# Patient Record
Sex: FEMALE | Race: WHITE | NOT HISPANIC OR LATINO | Employment: STUDENT | URBAN - METROPOLITAN AREA
[De-identification: names, ages, dates, MRNs, and addresses within clinical notes are randomized per-mention and may not be internally consistent; named-entity substitution may affect disease eponyms.]

---

## 2017-01-17 ENCOUNTER — MYC MEDICAL ADVICE (OUTPATIENT)
Dept: FAMILY MEDICINE | Facility: CLINIC | Age: 37
End: 2017-01-17

## 2017-01-17 ENCOUNTER — TELEPHONE (OUTPATIENT)
Dept: FAMILY MEDICINE | Facility: CLINIC | Age: 37
End: 2017-01-17

## 2017-01-17 DIAGNOSIS — E06.3 HASHIMOTO'S THYROIDITIS: ICD-10-CM

## 2017-01-17 DIAGNOSIS — E66.01 MORBID OBESITY, UNSPECIFIED OBESITY TYPE (H): ICD-10-CM

## 2017-01-17 DIAGNOSIS — Z98.84 S/P LAPAROSCOPIC SLEEVE GASTRECTOMY: ICD-10-CM

## 2017-01-17 RX ORDER — LEVOTHYROXINE SODIUM 137 UG/1
137 TABLET ORAL DAILY
COMMUNITY
Start: 2017-01-17 | End: 2017-01-31

## 2017-01-17 NOTE — TELEPHONE ENCOUNTER
Patient informed.  States she has been taking 137mcg daily; Rx'd by an outside provider she was seeing.  Updated med list and scheduled lab only for tomorrow 1/18.  Aria DEL TORO RN

## 2017-01-17 NOTE — TELEPHONE ENCOUNTER
Reason for Call: lab order    Detailed comments: pt has an appt on 1/31/17 w dr Callaway  For physical and thyroid check'  Pt is requesting that her thyroid labs are ordered now so she can  Get it checked before she sees dr Callaway  Please call pt back and advise    Phone Number Patient can be reached at: Home number on file [phone] (home)    Best Time:     Can we leave a detailed message on this number? YES    Call taken on 1/17/2017 at 12:20 PM by Claudia Mitchell

## 2017-01-18 DIAGNOSIS — E06.3 HASHIMOTO'S THYROIDITIS: ICD-10-CM

## 2017-01-18 DIAGNOSIS — E66.01 MORBID OBESITY, UNSPECIFIED OBESITY TYPE (H): ICD-10-CM

## 2017-01-18 DIAGNOSIS — Z98.84 S/P LAPAROSCOPIC SLEEVE GASTRECTOMY: ICD-10-CM

## 2017-01-18 LAB
ALBUMIN SERPL-MCNC: 3.5 G/DL (ref 3.4–5)
ALP SERPL-CCNC: 57 U/L (ref 40–150)
ALT SERPL W P-5'-P-CCNC: 15 U/L (ref 0–50)
ANION GAP SERPL CALCULATED.3IONS-SCNC: 8 MMOL/L (ref 3–14)
AST SERPL W P-5'-P-CCNC: 11 U/L (ref 0–45)
BILIRUB SERPL-MCNC: 0.4 MG/DL (ref 0.2–1.3)
BUN SERPL-MCNC: 11 MG/DL (ref 7–30)
CALCIUM SERPL-MCNC: 8.8 MG/DL (ref 8.5–10.1)
CHLORIDE SERPL-SCNC: 108 MMOL/L (ref 94–109)
CHOLEST SERPL-MCNC: 235 MG/DL
CO2 SERPL-SCNC: 24 MMOL/L (ref 20–32)
CREAT SERPL-MCNC: 0.68 MG/DL (ref 0.52–1.04)
FERRITIN SERPL-MCNC: 5 NG/ML (ref 12–150)
GFR SERPL CREATININE-BSD FRML MDRD: NORMAL ML/MIN/1.7M2
GLUCOSE SERPL-MCNC: 83 MG/DL (ref 70–99)
HDLC SERPL-MCNC: 54 MG/DL
HGB BLD-MCNC: 9.8 G/DL (ref 11.7–15.7)
LDLC SERPL CALC-MCNC: 160 MG/DL
NONHDLC SERPL-MCNC: 181 MG/DL
POTASSIUM SERPL-SCNC: 3.8 MMOL/L (ref 3.4–5.3)
PROT SERPL-MCNC: 7.1 G/DL (ref 6.8–8.8)
SODIUM SERPL-SCNC: 140 MMOL/L (ref 133–144)
TRIGL SERPL-MCNC: 107 MG/DL
TSH SERPL DL<=0.005 MIU/L-ACNC: 1.95 MU/L (ref 0.4–4)
VIT B12 SERPL-MCNC: 680 PG/ML (ref 193–986)

## 2017-01-18 PROCEDURE — 36415 COLL VENOUS BLD VENIPUNCTURE: CPT | Performed by: FAMILY MEDICINE

## 2017-01-18 PROCEDURE — 82607 VITAMIN B-12: CPT | Performed by: FAMILY MEDICINE

## 2017-01-18 PROCEDURE — 82728 ASSAY OF FERRITIN: CPT | Performed by: FAMILY MEDICINE

## 2017-01-18 PROCEDURE — 84443 ASSAY THYROID STIM HORMONE: CPT | Performed by: FAMILY MEDICINE

## 2017-01-18 PROCEDURE — 80053 COMPREHEN METABOLIC PANEL: CPT | Performed by: FAMILY MEDICINE

## 2017-01-18 PROCEDURE — 85018 HEMOGLOBIN: CPT | Performed by: FAMILY MEDICINE

## 2017-01-18 PROCEDURE — 80061 LIPID PANEL: CPT | Performed by: FAMILY MEDICINE

## 2017-01-19 NOTE — PROGRESS NOTES
Quick Note:    Jose Martin Slaughter,   Labs look good except for your hemoglobin and iron. You are a little more anemic. I suspect you are feeling pretty tired and maybe even short of breath when you exert yourself. Remind me how much iron you are taking?  -PN    ______

## 2017-01-23 ENCOUNTER — TRANSFERRED RECORDS (OUTPATIENT)
Dept: HEALTH INFORMATION MANAGEMENT | Facility: CLINIC | Age: 37
End: 2017-01-23

## 2017-01-31 ENCOUNTER — OFFICE VISIT (OUTPATIENT)
Dept: FAMILY MEDICINE | Facility: CLINIC | Age: 37
End: 2017-01-31
Payer: COMMERCIAL

## 2017-01-31 VITALS
HEART RATE: 90 BPM | HEIGHT: 66 IN | SYSTOLIC BLOOD PRESSURE: 128 MMHG | WEIGHT: 192.3 LBS | DIASTOLIC BLOOD PRESSURE: 80 MMHG | OXYGEN SATURATION: 99 % | RESPIRATION RATE: 16 BRPM | TEMPERATURE: 98.4 F | BODY MASS INDEX: 30.91 KG/M2

## 2017-01-31 DIAGNOSIS — E06.3 HASHIMOTO'S THYROIDITIS: ICD-10-CM

## 2017-01-31 DIAGNOSIS — Z00.00 ENCOUNTER FOR ROUTINE ADULT HEALTH EXAMINATION WITHOUT ABNORMAL FINDINGS: Primary | ICD-10-CM

## 2017-01-31 DIAGNOSIS — G43.109 MIGRAINE WITH AURA AND WITHOUT STATUS MIGRAINOSUS, NOT INTRACTABLE: ICD-10-CM

## 2017-01-31 DIAGNOSIS — L72.3 SEBACEOUS CYST: ICD-10-CM

## 2017-01-31 DIAGNOSIS — B36.0 TINEA VERSICOLOR: ICD-10-CM

## 2017-01-31 DIAGNOSIS — D50.8 IRON DEFICIENCY ANEMIA SECONDARY TO INADEQUATE DIETARY IRON INTAKE: ICD-10-CM

## 2017-01-31 PROCEDURE — 99395 PREV VISIT EST AGE 18-39: CPT | Performed by: FAMILY MEDICINE

## 2017-01-31 RX ORDER — FLUCONAZOLE 150 MG/1
TABLET ORAL
Qty: 4 TABLET | Refills: 0 | Status: SHIPPED | OUTPATIENT
Start: 2017-01-31 | End: 2017-07-12

## 2017-01-31 RX ORDER — LEVOTHYROXINE SODIUM 137 UG/1
137 TABLET ORAL DAILY
Qty: 90 TABLET | Refills: 3 | Status: SHIPPED | OUTPATIENT
Start: 2017-01-31 | End: 2017-07-18

## 2017-01-31 NOTE — PROGRESS NOTES
SUBJECTIVE:     CC: Kasandra Winkler is an 36 year old woman who presents for preventive health visit.     Healthy Habits:    Do you get at least three servings of calcium containing foods daily (dairy, green leafy vegetables, etc.)? yes    Amount of exercise or daily activities, outside of work: 2 day(s) per week    Problems taking medications regularly Yes sometimes forgets    Medication side effects: No    Have you had an eye exam in the past two years? no    Do you see a dentist twice per year? yes    Do you have sleep apnea, excessive snoring or daytime drowsiness?feels tired frequently          Today's PHQ-2 Score:   PHQ-2 ( 1999 Pf[employer]) 1/31/2017 9/30/2015   Q1: Little interest or pleasure in doing things 0 1   Q2: Feeling down, depressed or hopeless 0 0   PHQ-2 Score 0 1       Abuse: Current or Past(Physical, Sexual or Emotional)- No  Do you feel safe in your environment - Yes    Social History   Substance Use Topics     Smoking status: Former Smoker -- 0.50 packs/day for 10 years     Quit date: 07/20/2009     Smokeless tobacco: Never Used      Comment: social smoker for 10 yr     Alcohol Use: 0.0 oz/week     0 Standard drinks or equivalent per week      Comment: occasional ETOH use     The patient does not drink >3 drinks per day nor >7 drinks per week.    Recent Labs   Lab Test  01/18/17   0817  09/29/15   1134  03/06/15   0902   CHOL  235*  226*  211*   HDL  54  44*  49*   LDL  160*  149*  135*   TRIG  107  162*  134   CHOLHDLRATIO   --   5.1*  4.3   NHDL  181*   --    --        Reviewed orders with patient.  Reviewed health maintenance and updated orders accordingly - Yes    Mammo Decision Support:  Mammogram not appropriate for this patient based on age.    Pertinent mammograms are reviewed under the imaging tab.  History of abnormal Pap smear: NO - age 30-65 PAP every 5 years with negative HPV co-testing recommended  All Histories reviewed and updated in Epic.      ROS:  C: NEGATIVE for fever,  "chills, change in weight  INTEGUMENTARY/SKIN: hypopigmented spots on back  E: NEGATIVE for vision changes or irritation  ENT: NEGATIVE for ear, mouth and throat problems  R: NEGATIVE for significant cough or SOB  B: NEGATIVE for masses, tenderness or discharge  CV: NEGATIVE for chest pain, palpitations or peripheral edema  GI: NEGATIVE for nausea, abdominal pain, heartburn, or change in bowel habits  : NEGATIVE for unusual urinary or vaginal symptoms. Periods are regular.  M: NEGATIVE for significant arthralgias or myalgia  N: NEGATIVE for weakness, dizziness or paresthesias  P: NEGATIVE for changes in mood or affect    Problem list, Medication list, Allergies, and Medical/Social/Surgical histories reviewed in [facility] and updated as appropriate.  OBJECTIVE:     /80 mmHg  Pulse 90  Temp(Src) 98.4  F (36.9  C) (Oral)  Resp 16  Ht 5' 6\" (1.676 m)  Wt 192 lb 4.8 oz (87.227 kg)  BMI 31.05 kg/m2  SpO2 99%  LMP 01/26/2017 (Approximate)  Breastfeeding? No  EXAM:  GENERAL: healthy, alert and no distress  EYES: Eyes grossly normal to inspection, PERRL and conjunctivae and sclerae normal  HENT: ear canals and TM's normal, nose and mouth without ulcers or lesions  NECK: no adenopathy, no asymmetry, masses, or scars and thyroid normal to palpation  RESP: lungs clear to auscultation - no rales, rhonchi or wheezes  BREAST: normal without masses, tenderness or nipple discharge and no palpable axillary masses or adenopathy  CV: regular rate and rhythm, normal S1 S2, no S3 or S4, no murmur, click or rub, no peripheral edema and peripheral pulses strong  ABDOMEN: soft, nontender, no hepatosplenomegaly, no masses and bowel sounds normal  MS: no gross musculoskeletal defects noted, no edema  SKIN: hypopigmentation - upper back  NEURO: Normal strength and tone, mentation intact and speech normal  PSYCH: mentation appears normal, affect normal/bright    ASSESSMENT/PLAN:     1. Encounter for routine adult health examination " "without abnormal findings  Routine screening    2. Iron deficiency anemia secondary to inadequate dietary iron intake  Reviewed her recent labs  Has hx of anemia - secondary to her iron def from gastrectomy surgery  discusesd increasing to BID dosing and will plan to check labs in one month   - CBC with platelets; Future  - Ferritin; Future  - Iron and iron binding capacity; Future    3. Migraine with aura and without status migrainosus, not intractable   worked with neurology last year and diagnosed  meds not helping  Discussed using magnesium daily     4. Sebaceous cyst  On head - recommended removal with Dr. Lim     5. Hashimoto's thyroiditis  ordered  - levothyroxine (SYNTHROID/LEVOTHROID) 137 MCG tablet; Take 1 tablet (137 mcg) by mouth daily  Dispense: 90 tablet; Refill: 3    6. Tinea versicolor   tinea versicolor on exam today   - fluconazole (DIFLUCAN) 150 MG tablet; Take 300mg once weekly for 2 weeks  Dispense: 4 tablet; Refill: 0    COUNSELING:   Reviewed preventive health counseling, as reflected in patient instructions    BP Screening:   Last 3 BP Readings:    BP Readings from Last 3 Encounters:   01/31/17 128/80   11/14/16 130/50   09/23/16 122/55       The following was recommended to the patient:  Re-screen BP within a year and recommended lifestyle modifications       reports that she quit smoking about 7 years ago. She has never used smokeless tobacco.    Estimated body mass index is 31.05 kg/(m^2) as calculated from the following:    Height as of this encounter: 5' 6\" (1.676 m).    Weight as of this encounter: 192 lb 4.8 oz (87.227 kg).   Weight management plan: Discussed healthy diet and exercise guidelines and patient will follow up in 12 months in clinic to re-evaluate.    Counseling Resources:  ATP IV Guidelines  Pooled Cohorts Equation Calculator  Breast Cancer Risk Calculator  FRAX Risk Assessment  ICSI Preventive Guidelines  Dietary Guidelines for Americans, 2010  USDA's MyPlate  ASA " Prophylaxis  Lung CA Screening    Trish Callaway, [facility]

## 2017-01-31 NOTE — PATIENT INSTRUCTIONS
For migraine prevention -   Take magnesium 200-400mg daily  Verify B1 and B2 are in the multivitamin    Preventive Health Recommendations  Female Ages 26 - 39  Yearly exam:   See your health care provider every year in order to    Review health changes.     Discuss preventive care.      Review your medicines if you your doctor has prescribed any.    Until age 30: Get a Pap test every three years (more often if you have had an abnormal result).    After age 30: Talk to your doctor about whether you should have a Pap test every 3 years or have a Pap test with HPV screening every 5 years.   You do not need a Pap test if your uterus was removed (hysterectomy) and you have not had cancer.  You should be tested each year for STDs (sexually transmitted diseases), if you're at risk.   Talk to your provider about how often to have your cholesterol checked.  If you are at risk for diabetes, you should have a diabetes test (fasting glucose).  Shots: Get a flu shot each year. Get a tetanus shot every 10 years.   Nutrition:     Eat at least 5 servings of fruits and vegetables each day.    Eat whole-grain bread, whole-wheat pasta and brown rice instead of white grains and rice.    Talk to your provider about Calcium and Vitamin D.     Lifestyle    Exercise at least 150 minutes a week (30 minutes a day, 5 days of the week). This will help you control your weight and prevent disease.    Limit alcohol to one drink per day.    No smoking.     Wear sunscreen to prevent skin cancer.    See your dentist every six months for an exam and cleaning.

## 2017-01-31 NOTE — NURSING NOTE
"Chief Complaint   Patient presents with     Physical     /80 mmHg  Pulse 90  Temp(Src) 98.4  F (36.9  C) (Oral)  Resp 16  Ht 5' 6\" (1.676 m)  Wt 192 lb 4.8 oz (87.227 kg)  BMI 31.05 kg/m2  SpO2 99%  LMP 01/26/2017 (Approximate)  Breastfeeding? No Estimated body mass index is 31.05 kg/(m^2) as calculated from the following:    Height as of this encounter: 5' 6\" (1.676 m).    Weight as of this encounter: 192 lb 4.8 oz (87.227 kg).  bp completed using cuff size: regular    left    Health Maintenance Due Pending Provider Review:  Pap Smear and PHQ9    Possibly completing today per provider review.  PHQ-9 completed today.    Kylah Bowden MA  [facility]    "

## 2017-01-31 NOTE — MR AVS SNAPSHOT
After Visit Summary   1/31/2017    Kasandra Winkler    MRN: [id number]           Patient Information     Date Of Birth          1980        Visit Information        Provider Department      1/31/2017 1:30 PM Trish Callaway DO [facility]        Today's Diagnoses     Encounter for routine adult health examination without abnormal findings    -  1     Iron deficiency anemia secondary to inadequate dietary iron intake         Migraine with aura and without status migrainosus, not intractable         Sebaceous cyst         Hashimoto's thyroiditis         Tinea versicolor           Care Instructions    For migraine prevention -   Take magnesium 200-400mg daily  Verify B1 and B2 are in the multivitamin    Preventive Health Recommendations  Female Ages 26 - 39  Yearly exam:   See your health care provider every year in order to    Review health changes.     Discuss preventive care.      Review your medicines if you your doctor has prescribed any.    Until age 30: Get a Pap test every three years (more often if you have had an abnormal result).    After age 30: Talk to your doctor about whether you should have a Pap test every 3 years or have a Pap test with HPV screening every 5 years.   You do not need a Pap test if your uterus was removed (hysterectomy) and you have not had cancer.  You should be tested each year for STDs (sexually transmitted diseases), if you're at risk.   Talk to your provider about how often to have your cholesterol checked.  If you are at risk for diabetes, you should have a diabetes test (fasting glucose).  Shots: Get a flu shot each year. Get a tetanus shot every 10 years.   Nutrition:     Eat at least 5 servings of fruits and vegetables each day.    Eat whole-grain bread, whole-wheat pasta and brown rice instead of white grains and rice.    Talk to your provider about Calcium and Vitamin D.     Lifestyle    Exercise at least 150 minutes a week (30 minutes a day, 5  days of the week). This will help you control your weight and prevent disease.    Limit alcohol to one drink per day.    No smoking.     Wear sunscreen to prevent skin cancer.    See your dentist every six months for an exam and cleaning.            Follow-ups after your visit        Future tests that were ordered for you today     Open Future Orders        Priority Expected Expires Ordered    CBC with platelets Routine  5/31/2017 1/31/2017    Ferritin Routine  1/31/2018 1/31/2017    Iron and iron binding capacity Routine  5/31/2017 1/31/2017            Who to contact     If you have questions or need follow up information about today's clinic visit or your schedule please contact [facility] directly at [phone].  Normal or non-critical lab and imaging results will be communicated to you by [employer], letter or phone within 4 business days after the clinic has received the results. If you do not hear from us within 7 days, please contact the clinic through Get Int or phone. If you have a critical or abnormal lab result, we will notify you by phone as soon as possible.  Submit refill requests through [employer] or call your pharmacy and they will forward the refill request to us. Please allow 3 business days for your refill to be completed.          Additional Information About Your Visit        [employer] Information     [employer] gives you secure access to your electronic health record. If you see a primary care provider, you can also send messages to your care team and make appointments. If you have questions, please call your primary care clinic.  If you do not have a primary care provider, please call [phone] and they will assist you.        Care EveryWhere ID     This is your Care EveryWhere ID. This could be used by other organizations to access your [facility] medical records  [id number]        Your Vitals Were     Pulse Temperature Respirations    90 98.4  F (36.9  C) (Oral) 16    Height BMI (Body  "Mass Index) Pulse Oximetry    5' 6\" (1.676 m) 31.05 kg/m2 99%    Last Period Breastfeeding?       01/26/2017 (Approximate) No        Blood Pressure from Last 3 Encounters:   01/31/17 128/80   11/14/16 130/50   09/23/16 122/55    Weight from Last 3 Encounters:   01/31/17 192 lb 4.8 oz (87.227 kg)   09/23/16 193 lb 4.8 oz (87.68 kg)   08/22/16 196 lb (88.905 kg)                 Today's Medication Changes          These changes are accurate as of: 1/31/17  2:10 PM.  If you have any questions, ask your nurse or doctor.               Start taking these medicines.        Dose/Directions    fluconazole 150 MG tablet   Commonly known as:  DIFLUCAN   Used for:  Tinea versicolor   Started by:  Trish Callaway, DO        Take 300mg once weekly for 2 weeks   Quantity:  4 tablet   Refills:  0            Where to get your medicines      These medications were sent to [facility] [address] [place] [address] AT [address] [address]    Hours:  24-hours Phone:  [phone]    - fluconazole 150 MG tablet  - levothyroxine 137 MCG tablet             Primary Care Provider Office Phone # Fax #    Trish Callaway [phone]       [address] [address]        Thank you!     Thank you for choosing [facility]  for your care. Our goal is always to provide you with excellent care. Hearing back from our patients is one way we can continue to improve our services. Please take a few minutes to complete the written survey that you may receive in the mail after your visit with us. Thank you!             Your Updated Medication List - Protect others around you: Learn how to safely use, store and throw away your medicines at www.disposemymeds.org.          This list is accurate as of: 1/31/17  2:10 PM.  Always use your most recent med list.                   Brand Name Dispense Instructions for " use    azelastine 0.1 % spray    ASTELIN    1 Bottle    Spray 1-2 sprays into both nostrils 2 times daily       beclomethasone 80 MCG/ACT Inhaler    QVAR    3 Inhaler    Inhale 2 puffs into the lungs 2 times daily       calcium carbonate 500 MG tablet    OS-JONATAN 500 mg [place]. Ca     Take 500 mg by mouth 2 times daily       cetirizine 10 MG tablet    zyrTEC     Take 10 mg by mouth daily       clonazePAM 0.5 MG tablet    klonoPIN    20 tablet    Take 1 tablet (0.5 mg) by mouth as needed for anxiety       Cod Liver Oil 1000 MG Caps      Take 1 capsule by mouth daily.       fluconazole 150 MG tablet    DIFLUCAN    4 tablet    Take 300mg once weekly for 2 weeks       fluticasone 50 MCG/ACT spray    FLONASE    1 Package    Spray 1-2 sprays into both nostrils daily.       HM MULTIVITAMIN ADULT GUMMY PO          levothyroxine 137 MCG tablet    SYNTHROID/LEVOTHROID    90 tablet    Take 1 tablet (137 mcg) by mouth daily       Vitamin B-12 500 MCG Subl      Place 500 mcg under the tongue daily       Vitamin D3 3000 UNITS Tabs      Take 1 tablet by mouth daily.

## 2017-02-01 ASSESSMENT — PATIENT HEALTH QUESTIONNAIRE - PHQ9: SUM OF ALL RESPONSES TO PHQ QUESTIONS 1-9: 5

## 2017-02-02 DIAGNOSIS — J30.1 ALLERGIC RHINITIS DUE TO POLLEN: Primary | ICD-10-CM

## 2017-02-03 RX ORDER — AZELASTINE 1 MG/ML
SPRAY, METERED NASAL
Qty: 30 ML | Refills: 5 | Status: SHIPPED | OUTPATIENT
Start: 2017-02-03 | End: 2017-12-20

## 2017-02-03 NOTE — TELEPHONE ENCOUNTER
Prescription approved per [facility] Refill Protocol.  Aria DEL TORO RN     Pending Prescriptions:                       Disp   Refills    azelastine (ASTELIN) 0.1 % spray [Pharmacy*30 mL  0        Sig: USE 1 TO 2 SPRAYS IN EACH NOSTRIL TWICE DAILY        Last Written Prescription Date: 4/26/2016  Last Fill Quantity: 1,  # refills: 1   Last Office Visit with [facility], [facility] or [facility] prescribing provider: 1/31/2017

## 2017-02-24 ENCOUNTER — VIRTUAL VISIT (OUTPATIENT)
Dept: FAMILY MEDICINE | Facility: OTHER | Age: 37
End: 2017-02-24

## 2017-02-24 NOTE — PROGRESS NOTES
Date:   Clinician: Vince Vizcaino  Clinician NPI: [id number]  Patient: Kasandra Winkler  Patient : 1980  Patient Address: [address] [place] [id number]  Patient Phone: [phone]  Visit Protocol: URI  Patient Summary:  Kasandra is a 36 year old ( : 1980 ) female who initiated a Zip for a presumed sinus infection.      Her  symptoms started gradually 2 weeks ago  and consist of rhinitis, ear pain, cough, post-nasal drainage, hoarse voice, sore throat, nasal congestion, and malaise.   She denies dyspnea, chills, loss of appetite, itchy eyes, vomiting, nausea, dysphagia, chest pain, petechial or purpuric rash, myalgias, and fever. She denies a history of facial surgery.   Her profuse nasal secretions are green. Her moderate facial pain or pressure does not feel worse when bending or leaning forward and is located on both sides of her head. The facial pain or pressure started after the onset of other URI symptoms.  She has teeth pain and is confident the tooth pain is not from a cavity, recent dental work or other mouth problems. Kasandra has a moderate headache. The headache did not start before her other symptoms and is located on both sides of her head.   In the past year Kasandra has been diagnosed with three (3) episodes of sinusitis.   She has a moderately painful sore throat. The patient denies having white spots on the tonsils similar to a sample strep throat image provided. She has not been exposed to Strep. When asked to feel her neck she could not tell if lymph nodes were enlarged. She denies axillary lymphadenopathy.   Regarding the ear pain, the patient denies tinnitus, recent injury to the area around the ear, and experiencing pain when gently pulling on the earlobe.   She reports having mild ear pain on the ear canal area of both ears for 2-4 days. The patient hears normally despite the ear pain.   Kasandra denies having swelling, redness, a feeling of fullness in the ear as  if it is clogged, and tenderness on her ear.   Additionally, she finds the pain worsens if the mouth is open fully or the teeth are clenched and does not experience pain when bending the chin to the chest.   She has never had tympanostomy tube placement.    Her moderately severe (cough every 5-10 minutes) productive cough is NOT more bothersome at night. She believes the cough is caused by post-nasal drainage. Her cough produces green sputum.   Her highest temperature was 99.9 degrees Fahrenheit. Her current temperature is 98.9 degrees Fahrenheit. Kasandra has had a fever for 1 - 2 days and used the temporal method for measuring her temperature.   She has passed urine in the past 12 hours.   Kasandra denies having COPD or other chronic lung disease.   Pulse: self-reported pulse rate as: 10 beats in 10 seconds.   Current Temperature (F): 98.9     Weight (in lbs): 195   She states she is not pregnant and denies breastfeeding. She is currently menstruating.   Kasandra does not smoke or use smokeless tobacco.   MEDICATIONS:  Fluticasone (Flonase), cetirizine (Zyrtec), and thyroid  , ALLERGIES:  NKDA   Clinician Response:  Dear Kasandra,  Based on the information you have provided, you likely have  acute sinusitis, otherwise known as a sinus infection.   I am prescribing fluticasone propionate nasal (Flonase) 50 mcg/spray. Take one or two inhalations in each nostril one time a day. There are no refills with this prescription.   I am prescribing amoxicillin 500 mg. Take two tablets by mouth three times a day for 10 days. There are no refills with this prescription.   Try the following to help with your throat pain and discomfort:     Use throat lozenges    Gargle with warm salt water (1/4 teaspoon of salt per 8 ounce glass of water).    Suck on frozen items such as Popsicles or ice cubes.     Call 911 or go to the emergency room if you feel that your throat is closing off, you suddenly develop a rash, you are unable to swallow  fluids, you are drooling, or you are having difficulty breathing.  Follow up with your primary care clinician if your symptoms are not improving in 3-4 days.   Drink plenty of liquids, especially water and take time to rest your body. This may mean taking a nap or going to bed earlier. Your body is fighting an infection and liquids and rest will improve the pace of recovery. Remember to regularly wash your hands and avoid close contact with others to prevent spreading your infection.   Some people develop allergies to antibiotics. If you notice a new rash, significant swelling or difficulty breathing, stop the medication immediately and go into a clinic for physical evaluation.   Some women may also develop a yeast infection as a side effect of taking antibiotics. If you notice symptoms of a yeast infection, please use Zipnosis to get treatment.   If you become pregnant during this course of treatment with medication, stop taking the medication and contact your primary care clinician.   Diagnosis: Acute Sinusitis  Diagnosis ICD: J01.90  Prescription: fluticasone propionate (Flonase) 50mcg nasal spray 16 gm, 30 days supply. Take one or two inhalations in each nostril one time a day. Refills: 0, Refill as needed: no, Allow substitutions: yes  Prescription: amoxicillin 500mg oral tablet 60 tablets, 10 days supply. Take two tablets by mouth three times a day for 10 days. Refills: 0, Refill as needed: no, Allow substitutions: yes  Prescription Sent At: February 24 15:40:30, 2017  Pharmacy: [id number] - [phone] - [address] [place] [address]

## 2017-03-10 DIAGNOSIS — D50.8 IRON DEFICIENCY ANEMIA SECONDARY TO INADEQUATE DIETARY IRON INTAKE: ICD-10-CM

## 2017-03-10 LAB
ERYTHROCYTE [DISTWIDTH] IN BLOOD BY AUTOMATED COUNT: 18.6 % (ref 10–15)
HCT VFR BLD AUTO: 34.9 % (ref 35–47)
HGB BLD-MCNC: 10.9 G/DL (ref 11.7–15.7)
MCH RBC QN AUTO: 25.1 PG (ref 26.5–33)
MCHC RBC AUTO-ENTMCNC: 31.2 G/DL (ref 31.5–36.5)
MCV RBC AUTO: 80 FL (ref 78–100)
PLATELET # BLD AUTO: 296 10E9/L (ref 150–450)
RBC # BLD AUTO: 4.35 10E12/L (ref 3.8–5.2)
WBC # BLD AUTO: 8.4 10E9/L (ref 4–11)

## 2017-03-10 PROCEDURE — 82728 ASSAY OF FERRITIN: CPT | Performed by: FAMILY MEDICINE

## 2017-03-10 PROCEDURE — 85027 COMPLETE CBC AUTOMATED: CPT | Performed by: FAMILY MEDICINE

## 2017-03-10 PROCEDURE — 36415 COLL VENOUS BLD VENIPUNCTURE: CPT | Performed by: FAMILY MEDICINE

## 2017-03-10 PROCEDURE — 83540 ASSAY OF IRON: CPT | Performed by: FAMILY MEDICINE

## 2017-03-10 PROCEDURE — 83550 IRON BINDING TEST: CPT | Performed by: FAMILY MEDICINE

## 2017-03-11 LAB
FERRITIN SERPL-MCNC: 7 NG/ML (ref 12–150)
IRON SATN MFR SERPL: 6 % (ref 15–46)
IRON SERPL-MCNC: 24 UG/DL (ref 35–180)
TIBC SERPL-MCNC: 407 UG/DL (ref 240–430)

## 2017-03-13 ENCOUNTER — MYC MEDICAL ADVICE (OUTPATIENT)
Dept: FAMILY MEDICINE | Facility: CLINIC | Age: 37
End: 2017-03-13

## 2017-03-13 DIAGNOSIS — D50.8 IRON DEFICIENCY ANEMIA SECONDARY TO INADEQUATE DIETARY IRON INTAKE: Primary | ICD-10-CM

## 2017-03-14 NOTE — PROGRESS NOTES
Dear Kasandra,   Your test results are all back -   Looks like you are still anemic.  I agree with switching the iron.  Plan to recheck in 6 weeks.  Let us know if you have any questions.  -Trish Callaway, DO

## 2017-07-12 ENCOUNTER — OFFICE VISIT (OUTPATIENT)
Dept: FAMILY MEDICINE | Facility: CLINIC | Age: 37
End: 2017-07-12
Payer: COMMERCIAL

## 2017-07-12 VITALS
BODY MASS INDEX: 31.45 KG/M2 | DIASTOLIC BLOOD PRESSURE: 78 MMHG | WEIGHT: 195.7 LBS | TEMPERATURE: 98.2 F | SYSTOLIC BLOOD PRESSURE: 114 MMHG | HEIGHT: 66 IN | OXYGEN SATURATION: 99 % | HEART RATE: 96 BPM

## 2017-07-12 DIAGNOSIS — J01.90 ACUTE SINUSITIS WITH SYMPTOMS > 10 DAYS: Primary | ICD-10-CM

## 2017-07-12 DIAGNOSIS — E06.3 HASHIMOTO'S THYROIDITIS: ICD-10-CM

## 2017-07-12 DIAGNOSIS — D50.8 IRON DEFICIENCY ANEMIA SECONDARY TO INADEQUATE DIETARY IRON INTAKE: ICD-10-CM

## 2017-07-12 LAB
ERYTHROCYTE [DISTWIDTH] IN BLOOD BY AUTOMATED COUNT: 15 % (ref 10–15)
HCT VFR BLD AUTO: 34.7 % (ref 35–47)
HGB BLD-MCNC: 10.9 G/DL (ref 11.7–15.7)
MCH RBC QN AUTO: 25.8 PG (ref 26.5–33)
MCHC RBC AUTO-ENTMCNC: 31.4 G/DL (ref 31.5–36.5)
MCV RBC AUTO: 82 FL (ref 78–100)
PLATELET # BLD AUTO: 350 10E9/L (ref 150–450)
RBC # BLD AUTO: 4.23 10E12/L (ref 3.8–5.2)
WBC # BLD AUTO: 5.6 10E9/L (ref 4–11)

## 2017-07-12 PROCEDURE — 36415 COLL VENOUS BLD VENIPUNCTURE: CPT | Performed by: FAMILY MEDICINE

## 2017-07-12 PROCEDURE — 84443 ASSAY THYROID STIM HORMONE: CPT | Performed by: FAMILY MEDICINE

## 2017-07-12 PROCEDURE — 82728 ASSAY OF FERRITIN: CPT | Performed by: FAMILY MEDICINE

## 2017-07-12 PROCEDURE — 85027 COMPLETE CBC AUTOMATED: CPT | Performed by: FAMILY MEDICINE

## 2017-07-12 PROCEDURE — 99213 OFFICE O/P EST LOW 20 MIN: CPT | Performed by: FAMILY MEDICINE

## 2017-07-12 PROCEDURE — 83550 IRON BINDING TEST: CPT | Performed by: FAMILY MEDICINE

## 2017-07-12 PROCEDURE — 83540 ASSAY OF IRON: CPT | Performed by: FAMILY MEDICINE

## 2017-07-12 NOTE — NURSING NOTE
"Chief Complaint   Patient presents with     URI     x 2 weeks     /78  Pulse 96  Temp 98.2  F (36.8  C) (Oral)  Ht 5' 6\" (1.676 m)  Wt 195 lb 11.2 oz (88.8 kg)  SpO2 99%  BMI 31.59 kg/m2 Estimated body mass index is 31.59 kg/(m^2) as calculated from the following:    Height as of this encounter: 5' 6\" (1.676 m).    Weight as of this encounter: 195 lb 11.2 oz (88.8 kg).  Medication Reconciliation: complete      Health Maintenance due pending provider review:  PHQ9    PHQ/ACT/FOREST--Gave pt questionnare    Dariana Tran CMA  "

## 2017-07-12 NOTE — PROGRESS NOTES
SUBJECTIVE:                                                    Kasandra Winkler is a 36 year old female who presents to clinic today for the following health issues:      RESPIRATORY SYMPTOMS      Duration: 2 weeks    Description  nasal congestion, cough and sneezing, with asthma issues, fatigue    Severity: moderate    Accompanying signs and symptoms: None    History (predisposing factors):  asthma    Precipitating or alleviating factors: None    Therapies tried and outcome:  Inhaler, flonase, zyrtec with some improvement      Last week went to [place] - had migraine that started on the plane  Feeling fatigue - not sure if thyroid or iron  Has mental foggy -       -------------------------------------    Problem list and histories reviewed & adjusted, as indicated.  Additional history: as documented    Patient Active Problem List   Diagnosis     CARDIOVASCULAR SCREENING; LDL GOAL LESS THAN 160     Hypothyroidism     Obesity     Hashimoto's thyroiditis     Hypovitaminosis D     Fatigue     Ovarian cyst     Major depressive disorder, recurrent episode, mild (H)     Asthma with allergic rhinitis     Morbid obesity (H)     laparoscopic sleeve gastrectomy 6/29/15     Anxiety     Allergic rhinitis due to pollen     Iron deficiency anemia secondary to inadequate dietary iron intake     Migraine with aura and without status migrainosus, not intractable     Past Surgical History:   Procedure Laterality Date      SECTION  10/10/10     CHOLECYSTECTOMY, LAPOROSCOPIC  2009    gallstones -      FINGER SURGERY      right little finger fracture     LAPAROSCOPIC GASTRIC SLEEVE N/A 2015    Procedure: LAPAROSCOPIC GASTRIC SLEEVE;  Surgeon: Cam Alvarez MD;  Location:  OR       Social History   Substance Use Topics     Smoking status: Former Smoker     Packs/day: 0.50     Years: 10.00     Quit date: 2009     Smokeless tobacco: Never Used      Comment: social smoker for 10 yr     Alcohol use 0.0  "oz/week     0 Standard drinks or equivalent per week      Comment: occasional ETOH use     Family History   Problem Relation Age of Onset     HEART DISEASE Maternal Grandmother      Hypertension Maternal Grandmother      Arthritis Maternal Grandmother      RA      OSTEOPOROSIS Mother      Respiratory Maternal Grandmother      Asthma     Hypertension Mother      CEREBROVASCULAR DISEASE Mother      Aneurysm     Substance Abuse Mother            Reviewed and updated as needed this visit by clinical staff  Tobacco  Allergies  Meds  Problems  Med Hx  Surg Hx  Fam Hx  Soc Hx        Reviewed and updated as needed this visit by Provider  Allergies  Meds  Problems         ROS:  Constitutional, HEENT, cardiovascular, pulmonary, GI, , musculoskeletal, neuro, skin, endocrine and psych systems are negative, except as otherwise noted.    OBJECTIVE:     /78  Pulse 96  Temp 98.2  F (36.8  C) (Oral)  Ht 5' 6\" (1.676 m)  Wt 195 lb 11.2 oz (88.8 kg)  SpO2 99%  BMI 31.59 kg/m2  Body mass index is 31.59 kg/(m^2).  GENERAL: alert and no distress  HENT: normal cephalic/atraumatic, ear canals and TM's normal, nose and mouth without ulcers or lesions, oropharynx clear, oral mucous membranes moist and sinuses: not tender at this moment but tender other parts of the day  NECK: no adenopathy, no asymmetry, masses, or scars and thyroid normal to palpation  RESP: lungs clear to auscultation - no rales, rhonchi or wheezes  CV: regular rate and rhythm, normal S1 S2, no S3 or S4, no murmur, click or rub, no peripheral edema and peripheral pulses strong    Diagnostic Test Results:  pending    ASSESSMENT/PLAN:     1. Hashimoto's thyroiditis  Thyroid issues - feeling fatigue - will check TSH  - TSH with free T4 reflex    2. Iron deficiency anemia secondary to inadequate dietary iron intake  Long hx of anemia - due to gastric surgery for obesity and absorption issues  - CBC with platelets  - Iron and iron binding capacity  - " Ferritin    3. Acute sinusitis with symptoms > 10 days  Will hold off on filling for now and wait for labs but if symptoms worsen or persist she will fill  - amoxicillin-clavulanate (AUGMENTIN) 875-125 MG per tablet; Take 1 tablet by mouth 2 times daily  Dispense: 20 tablet; Refill: 0    Pt will call or RTC if symptoms worsen or do not improve.     Trish Callaway,   [facility]

## 2017-07-12 NOTE — MR AVS SNAPSHOT
"              After Visit Summary   7/12/2017    Kasandra Winkler    MRN: [id number]           Patient Information     Date Of Birth          1980        Visit Information        Provider Department      7/12/2017 7:45 AM Trish Callaway DO [facility]        Today's Diagnoses     Acute sinusitis with symptoms > 10 days    -  1    Hashimoto's thyroiditis        Iron deficiency anemia secondary to inadequate dietary iron intake           Follow-ups after your visit        Who to contact     If you have questions or need follow up information about today's clinic visit or your schedule please contact [facility] directly at [phone].  Normal or non-critical lab and imaging results will be communicated to you by MyChart, letter or phone within 4 business days after the clinic has received the results. If you do not hear from us within 7 days, please contact the clinic through theAudiencehart or phone. If you have a critical or abnormal lab result, we will notify you by phone as soon as possible.  Submit refill requests through GLG or call your pharmacy and they will forward the refill request to us. Please allow 3 business days for your refill to be completed.          Additional Information About Your Visit        MyChart Information     GLG gives you secure access to your electronic health record. If you see a primary care provider, you can also send messages to your care team and make appointments. If you have questions, please call your primary care clinic.  If you do not have a primary care provider, please call [phone] and they will assist you.        Care EveryWhere ID     This is your Care EveryWhere ID. This could be used by other organizations to access your [place] medical records  [id number]        Your Vitals Were     Pulse Temperature Height Pulse Oximetry BMI (Body Mass Index)       96 98.2  F (36.8  C) (Oral) 5' 6\" (1.676 m) 99% 31.59 kg/m2        Blood Pressure " from Last 3 Encounters:   07/12/17 114/78   01/31/17 128/80   11/14/16 130/50    Weight from Last 3 Encounters:   07/12/17 195 lb 11.2 oz (88.8 kg)   01/31/17 192 lb 4.8 oz (87.2 kg)   09/23/16 193 lb 4.8 oz (87.7 kg)              We Performed the Following     CBC with platelets     Ferritin     Iron and iron binding capacity     TSH with free T4 reflex          Today's Medication Changes          These changes are accurate as of: 7/12/17  8:20 AM.  If you have any questions, ask your nurse or doctor.               Start taking these medicines.        Dose/Directions    amoxicillin-clavulanate 875-125 MG per tablet   Commonly known as:  AUGMENTIN   Used for:  Acute sinusitis with symptoms > 10 days   Started by:  Trish Callaway DO        Dose:  1 tablet   Take 1 tablet by mouth 2 times daily   Quantity:  20 tablet   Refills:  0            Where to get your medicines      Some of these will need a paper prescription and others can be bought over the counter.  Ask your nurse if you have questions.     Bring a paper prescription for each of these medications     amoxicillin-clavulanate 875-125 MG per tablet                Primary Care Provider Office Phone # Fax #    Trish Callaway [phone]       [place] [address]        Equal Access to Services     KRIS SILVA : Hadii chrissy ku hadasho Soomaali, waaxda luqadaha, qaybta kaalmada adeegyada, chris grimm. So [facility] [phone].    ATENCIÓN: Si habla español, tiene a das disposición servicios gratuitos de asistencia lingüística. Sravan dominguez [phone].    We comply with applicable federal civil rights laws and [place] laws. We do not discriminate on the basis of race, color, national origin, age, disability sex, sexual orientation or gender identity.            Thank you!     Thank you for choosing [facility]  for your care. Our goal is always to provide you  with excellent care. Hearing back from our patients is one way we can continue to improve our services. Please take a few minutes to complete the written survey that you may receive in the mail after your visit with us. Thank you!             Your Updated Medication List - Protect others around you: Learn how to safely use, store and throw away your medicines at www.disposemymeds.org.          This list is accurate as of: 7/12/17  8:20 AM.  Always use your most recent med list.                   Brand Name Dispense Instructions for use Diagnosis    amoxicillin-clavulanate 875-125 MG per tablet    AUGMENTIN    20 tablet    Take 1 tablet by mouth 2 times daily    Acute sinusitis with symptoms > 10 days       azelastine 0.1 % spray    ASTELIN    30 mL    USE 1 TO 2 SPRAYS IN EACH NOSTRIL TWICE DAILY    Allergic rhinitis due to pollen       beclomethasone 80 MCG/ACT Inhaler    QVAR    3 Inhaler    Inhale 2 puffs into the lungs 2 times daily    Rhinitis, allergic       calcium carbonate 1250 MG tablet    OS-JONATAN 500 mg [place]. Ca     Take 500 mg by mouth 2 times daily        cetirizine 10 MG tablet    zyrTEC     Take 10 mg by mouth daily        clonazePAM 0.5 MG tablet    klonoPIN    20 tablet    Take 1 tablet (0.5 mg) by mouth as needed for anxiety    Major depressive disorder, recurrent episode, mild (H)       Cod Liver Oil 1000 MG Caps      Take 1 capsule by mouth daily.        fluticasone 50 MCG/ACT spray    FLONASE    1 Package    Spray 1-2 sprays into both nostrils daily.    Seasonal allergic rhinitis       HM MULTIVITAMIN ADULT GUMMY PO           levothyroxine 137 MCG tablet    SYNTHROID/LEVOTHROID    90 tablet    Take 1 tablet (137 mcg) by mouth daily    Hashimoto's thyroiditis       Vitamin B-12 500 MCG Subl      Place 500 mcg under the tongue daily        Vitamin D3 3000 UNITS Tabs      Take 1 tablet by mouth daily.

## 2017-07-13 LAB
FERRITIN SERPL-MCNC: 7 NG/ML (ref 12–150)
IRON SATN MFR SERPL: 9 % (ref 15–46)
IRON SERPL-MCNC: 36 UG/DL (ref 35–180)
TIBC SERPL-MCNC: 413 UG/DL (ref 240–430)
TSH SERPL DL<=0.005 MIU/L-ACNC: 3.87 MU/L (ref 0.4–4)

## 2017-07-13 ASSESSMENT — PATIENT HEALTH QUESTIONNAIRE - PHQ9: SUM OF ALL RESPONSES TO PHQ QUESTIONS 1-9: 7

## 2017-07-14 NOTE — PROGRESS NOTES
Dear Kasandra,   Your test results are all back -   -Anemia test is stable - keep trying to remember the iron.   It is not changed since the last check so I doubt it the new change.  Your thyroid is in the high normal range.  We could consider a slight increase in your dose.  Let us know if you have any questions.  -Trish Callaway, DO

## 2017-07-18 ENCOUNTER — MYC MEDICAL ADVICE (OUTPATIENT)
Dept: FAMILY MEDICINE | Facility: CLINIC | Age: 37
End: 2017-07-18

## 2017-07-18 DIAGNOSIS — E06.3 HASHIMOTO'S THYROIDITIS: ICD-10-CM

## 2017-07-18 RX ORDER — LEVOTHYROXINE SODIUM 150 UG/1
137 TABLET ORAL DAILY
Qty: 30 TABLET | Refills: 1 | Status: SHIPPED | OUTPATIENT
Start: 2017-07-18 | End: 2017-09-19

## 2017-08-11 ENCOUNTER — OFFICE VISIT (OUTPATIENT)
Dept: FAMILY MEDICINE | Facility: CLINIC | Age: 37
End: 2017-08-11
Payer: COMMERCIAL

## 2017-08-11 VITALS
OXYGEN SATURATION: 100 % | WEIGHT: 194 LBS | TEMPERATURE: 98.5 F | DIASTOLIC BLOOD PRESSURE: 87 MMHG | HEIGHT: 66 IN | HEART RATE: 81 BPM | BODY MASS INDEX: 31.18 KG/M2 | SYSTOLIC BLOOD PRESSURE: 127 MMHG

## 2017-08-11 DIAGNOSIS — D50.8 IRON DEFICIENCY ANEMIA SECONDARY TO INADEQUATE DIETARY IRON INTAKE: ICD-10-CM

## 2017-08-11 DIAGNOSIS — M94.0 COSTOCHONDRITIS: Primary | ICD-10-CM

## 2017-08-11 PROCEDURE — 99214 OFFICE O/P EST MOD 30 MIN: CPT | Performed by: FAMILY MEDICINE

## 2017-08-11 NOTE — PATIENT INSTRUCTIONS
Costochondritis    Costochondritis is inflammation of a rib or the cartilage that connects a rib to your breastbone (sternum). It causes tenderness, and sometimes chest pain may be sharp or aching, or it may feel like pressure. Pain may get worse with deep breathing, movement, or exercise. In some cases, the pain is mistaken for a heart attack. Despite this, the condition is not serious. Read on to learn more about the condition and how it can be treated.  What causes costochondritis?  The cause of costochondritis is not completely clear, but it may happen after a chest injury, chest infection, or coughing episode. Some physical activities can sometimes lead to costochondritis. Large-breasted women may be more likely to have the condition. Often, the reason for the inflammation is unknown.  Diagnosing costochondritis  There is no test for costochondritis. The condition is diagnosed by the symptoms you have. Your healthcare provider will perform a physical exam. He or she will ask you about your symptoms and examine your chest for tenderness. In some cases, tests are done to rule out more serious problems. These tests may include imaging tests such as chest X-ray, CT scan, or an ECG.  Treating costochondritis  If an underlying cause is found, treatment for that will likely relieve the problem. Costochondritis often goes away on its own. The course of the condition varies from person to person. It usually lasts from weeks to months. In some cases, mild symptoms continue for months to years. To ease symptoms:    Take medicine as directed. These relieve pain and swelling. Ibuprofen or other NSAIDs are often recommended. In some cases, you may be given prescription medicine, such as muscle relaxants.    Avoid activities that put stress on the chest or spine.    Apply a heating pad (set to warm, not too high, heat) to the breastbone several times a day.    Perform stretching exercises as directed.  Call the healthcare  provider right away if you have any of the following:    Pain that is not relieved by medicine    Shortness of breath    Lightheadedness, dizziness, or fainting    Feeling of irregular heartbeat or fast pulse  Anyone with chest pain should see a healthcare provider, especially those who are older and may be at risk for heart disease.   Date Last Reviewed: 10/1/2016    6804-7388 [employer]. [address] [place] [address]. All rights reserved. This information is not intended as a substitute for professional medical care. Always follow your healthcare professional's instructions.

## 2017-08-11 NOTE — NURSING NOTE
"Chief Complaint   Patient presents with     Abdominal Pain       Initial /87  Pulse 81  Temp 98.5  F (36.9  C) (Oral)  Ht 5' 6\" (1.676 m)  Wt 194 lb (88 kg)  LMP 08/07/2017 (Exact Date)  SpO2 100%  Breastfeeding? No  BMI 31.31 kg/m2 Estimated body mass index is 31.31 kg/(m^2) as calculated from the following:    Height as of this encounter: 5' 6\" (1.676 m).    Weight as of this encounter: 194 lb (88 kg).  Medication Reconciliation: complete      Health Maintenance that is potentially due pending provider review:  NONE    n/a    RICO Ramires  "

## 2017-08-11 NOTE — MR AVS SNAPSHOT
After Visit Summary   8/11/2017    Kasandra Winkler    MRN: [id number]           Patient Information     Date Of Birth          1980        Visit Information        Provider Department      8/11/2017 8:45 AM Trish Callaway DO [facility]        Care Instructions      Costochondritis    Costochondritis is inflammation of a rib or the cartilage that connects a rib to your breastbone (sternum). It causes tenderness, and sometimes chest pain may be sharp or aching, or it may feel like pressure. Pain may get worse with deep breathing, movement, or exercise. In some cases, the pain is mistaken for a heart attack. Despite this, the condition is not serious. Read on to learn more about the condition and how it can be treated.  What causes costochondritis?  The cause of costochondritis is not completely clear, but it may happen after a chest injury, chest infection, or coughing episode. Some physical activities can sometimes lead to costochondritis. Large-breasted women may be more likely to have the condition. Often, the reason for the inflammation is unknown.  Diagnosing costochondritis  There is no test for costochondritis. The condition is diagnosed by the symptoms you have. Your healthcare provider will perform a physical exam. He or she will ask you about your symptoms and examine your chest for tenderness. In some cases, tests are done to rule out more serious problems. These tests may include imaging tests such as chest X-ray, CT scan, or an ECG.  Treating costochondritis  If an underlying cause is found, treatment for that will likely relieve the problem. Costochondritis often goes away on its own. The course of the condition varies from person to person. It usually lasts from weeks to months. In some cases, mild symptoms continue for months to years. To ease symptoms:    Take medicine as directed. These relieve pain and swelling. Ibuprofen or other NSAIDs are often recommended. In  some cases, you may be given prescription medicine, such as muscle relaxants.    Avoid activities that put stress on the chest or spine.    Apply a heating pad (set to warm, not too high, heat) to the breastbone several times a day.    Perform stretching exercises as directed.  Call the healthcare provider right away if you have any of the following:    Pain that is not relieved by medicine    Shortness of breath    Lightheadedness, dizziness, or fainting    Feeling of irregular heartbeat or fast pulse  Anyone with chest pain should see a healthcare provider, especially those who are older and may be at risk for heart disease.   Date Last Reviewed: 10/1/2016    [id number] [employer]. [address] [place] [address], [place] [address]. All rights reserved. This information is not intended as a substitute for professional medical care. Always follow your healthcare professional's instructions.                Follow-ups after your visit        Who to contact     If you have questions or need follow up information about today's clinic visit or your schedule please contact [facility] directly at [phone].  Normal or non-critical lab and imaging results will be communicated to you by Skybox Imaginghart, letter or phone within 4 business days after the clinic has received the results. If you do not hear from us within 7 days, please contact the clinic through Revuzet or phone. If you have a critical or abnormal lab result, we will notify you by phone as soon as possible.  Submit refill requests through Springr or call your pharmacy and they will forward the refill request to us. Please allow 3 business days for your refill to be completed.          Additional Information About Your Visit        Springr Information     Springr gives you secure access to your electronic health record. If you see a primary care provider, you can also send messages to your care team and make appointments. If you have questions,  "please call your primary care clinic.  If you do not have a primary care provider, please call [phone] and they will assist you.        Care EveryWhere ID     This is your Care EveryWhere ID. This could be used by other organizations to access your [place] medical records  [id number]        Your Vitals Were     Pulse Temperature Height Last Period Pulse Oximetry Breastfeeding?    81 98.5  F (36.9  C) (Oral) 5' 6\" (1.676 m) 08/07/2017 (Exact Date) 100% No    BMI (Body Mass Index)                   31.31 kg/m2            Blood Pressure from Last 3 Encounters:   08/11/17 127/87   07/12/17 114/78   01/31/17 128/80    Weight from Last 3 Encounters:   08/11/17 194 lb (88 kg)   07/12/17 195 lb 11.2 oz (88.8 kg)   01/31/17 192 lb 4.8 oz (87.2 kg)              Today, you had the following     No orders found for display       Primary Care Provider Office Phone # Fax #    Trish Callaway [phone] [address]        Equal Access to Services     [facility] : Hadii aad ku hadasho Soomaali, waaxda luqadaha, qaybta kaalmada adeegyada, waxay robertain haygayathrin adeluis alfredo blankenship la'dahlia . So [facility] [phone].    ATENCIÓN: Si habla español, tiene a das disposición servicios gratuitos de asistencia lingüística. Llame al [phone].    We comply with applicable federal civil rights laws and [place] laws. We do not discriminate on the basis of race, color, national origin, age, disability sex, sexual orientation or gender identity.            Thank you!     Thank you for choosing [facility]  for your care. Our goal is always to provide you with excellent care. Hearing back from our patients is one way we can continue to improve our services. Please take a few minutes to complete the written survey that you may receive in the mail after your visit with us. Thank you!             Your Updated Medication List - Protect others around you: Learn how to safely use, " store and throw away your medicines at www.disposemymeds.org.          This list is accurate as of: 8/11/17  9:18 AM.  Always use your most recent med list.                   Brand Name Dispense Instructions for use Diagnosis    azelastine 0.1 % spray    ASTELIN    30 mL    USE 1 TO 2 SPRAYS IN EACH NOSTRIL TWICE DAILY    Allergic rhinitis due to pollen       beclomethasone 80 MCG/ACT Inhaler    QVAR    3 Inhaler    Inhale 2 puffs into the lungs 2 times daily    Rhinitis, allergic       calcium carbonate 1250 MG tablet    OS-JONATAN 500 mg [place]. Ca     Take 500 mg by mouth 2 times daily        cetirizine 10 MG tablet    zyrTEC     Take 10 mg by mouth daily        clonazePAM 0.5 MG tablet    klonoPIN    20 tablet    Take 1 tablet (0.5 mg) by mouth as needed for anxiety    Major depressive disorder, recurrent episode, mild (H)       Cod Liver Oil 1000 MG Caps      Take 1 capsule by mouth daily.        fluticasone 50 MCG/ACT spray    FLONASE    1 Package    Spray 1-2 sprays into both nostrils daily.    Seasonal allergic rhinitis       HM MULTIVITAMIN ADULT GUMMY PO           levothyroxine 150 MCG tablet    SYNTHROID/LEVOTHROID    30 tablet    Take 1 tablet (150 mcg) by mouth daily    Hashimoto's thyroiditis       Vitamin B-12 500 MCG Subl      Place 500 mcg under the tongue daily        Vitamin D3 3000 UNITS Tabs      Take 1 tablet by mouth daily.

## 2017-08-11 NOTE — PROGRESS NOTES
SUBJECTIVE:                                                    Kasandra Winkler is a 36 year old female who presents to clinic today for the following health issues:      Abdominal Pain      Duration: x2 weeks     Description (location/character/radiation): upper right quadrant        Associated flank pain: yes     Intensity:  moderate    Accompanying signs and symptoms:        Fever/Chills: no        Gas/Bloating: no        Nausea/vomitting: no        Diarrhea: no        Dysuria or Hematuria: no     History (previous similar pain/trauma/previous testing): no     Precipitating or alleviating factors:       Pain worse with eating/BM/urination: no       Pain relieved by BM: no     Therapies tried and outcome: aleve     LMP:  17    RUQ abdominal pain -   Lymph nodes get swollen - almost monthly around that time  Joints and hip pain around that time     Aggravated by bending over or touching area  No issues with food   No associated n/v/d/c        -------------------------------------    Problem list and histories reviewed & adjusted, as indicated.  Additional history: as documented    Patient Active Problem List   Diagnosis     CARDIOVASCULAR SCREENING; LDL GOAL LESS THAN 160     Hypothyroidism     Obesity     Hashimoto's thyroiditis     Hypovitaminosis D     Fatigue     Ovarian cyst     Major depressive disorder, recurrent episode, mild (H)     Asthma with allergic rhinitis     Morbid obesity (H)     laparoscopic sleeve gastrectomy 6/29/15     Anxiety     Allergic rhinitis due to pollen     Iron deficiency anemia secondary to inadequate dietary iron intake     Migraine with aura and without status migrainosus, not intractable     Past Surgical History:   Procedure Laterality Date      SECTION  10/10/10     CHOLECYSTECTOMY, LAPOROSCOPIC  2009    gallstones -      FINGER SURGERY      right little finger fracture     LAPAROSCOPIC GASTRIC SLEEVE N/A 2015    Procedure: LAPAROSCOPIC GASTRIC  "SLEEVE;  Surgeon: Cam Alvarez MD;  Location: [facility]       Social History   Substance Use Topics     Smoking status: Former Smoker     Packs/day: 0.50     Years: 10.00     Quit date: 7/20/2009     Smokeless tobacco: Never Used      Comment: social smoker for 10 yr     Alcohol use 0.0 oz/week     0 Standard drinks or equivalent per week      Comment: occasional ETOH use     Family History   Problem Relation Age of Onset     HEART DISEASE Maternal Grandmother      Hypertension Maternal Grandmother      Arthritis Maternal Grandmother      RA      OSTEOPOROSIS Mother      Respiratory Maternal Grandmother      Asthma     Hypertension Mother      CEREBROVASCULAR DISEASE Mother      Aneurysm     Substance Abuse Mother              Reviewed and updated as needed this visit by clinical staffTobacco  Allergies  Meds  Problems       Reviewed and updated as needed this visit by Provider  Allergies  Meds  Problems         ROS:  Constitutional, HEENT, cardiovascular, pulmonary, GI, , musculoskeletal, neuro, skin, endocrine and psych systems are negative, except as otherwise noted.      OBJECTIVE:   /87  Pulse 81  Temp 98.5  F (36.9  C) (Oral)  Ht 5' 6\" (1.676 m)  Wt 194 lb (88 kg)  LMP 08/07/2017 (Exact Date)  SpO2 100%  Breastfeeding? No  BMI 31.31 kg/m2  Body mass index is 31.31 kg/(m^2).  GENERAL: healthy, alert and no distress  ABDOMEN: bowel sounds normal and soft with some tenderness over the right costochondral lower rib area    Diagnostic Test Results:  none     ASSESSMENT/PLAN:       1. Costochondritis  Suspect costochondritis -   Associated more with menses - wonder about inflammation  Information printed  Advised to watch for warning signs and symptoms  Pt will call or RTC if symptoms worsen or do not improve.     2. Iron deficiency anemia secondary to inadequate dietary iron intake  Would like to f/u on anemia   Since her gastric bypass  - CBC with platelets; Future  - Ferritin; Future  - " Iron and iron binding capacity; Future      Trish Callaway, [facility]

## 2017-08-29 ENCOUNTER — OFFICE VISIT (OUTPATIENT)
Dept: FAMILY MEDICINE | Facility: CLINIC | Age: 37
End: 2017-08-29
Payer: COMMERCIAL

## 2017-08-29 VITALS
HEART RATE: 98 BPM | TEMPERATURE: 98.4 F | HEIGHT: 66 IN | BODY MASS INDEX: 31.48 KG/M2 | OXYGEN SATURATION: 95 % | SYSTOLIC BLOOD PRESSURE: 122 MMHG | WEIGHT: 195.9 LBS | DIASTOLIC BLOOD PRESSURE: 72 MMHG

## 2017-08-29 DIAGNOSIS — L21.0 DANDRUFF: Primary | ICD-10-CM

## 2017-08-29 DIAGNOSIS — B36.0 TINEA VERSICOLOR: ICD-10-CM

## 2017-08-29 PROCEDURE — 99213 OFFICE O/P EST LOW 20 MIN: CPT | Performed by: FAMILY MEDICINE

## 2017-08-29 RX ORDER — KETOCONAZOLE 20 MG/ML
SHAMPOO TOPICAL
Qty: 120 ML | Refills: 3 | Status: SHIPPED | OUTPATIENT
Start: 2017-08-29 | End: 2018-04-20

## 2017-08-29 RX ORDER — FLUCONAZOLE 150 MG/1
TABLET ORAL
Qty: 4 TABLET | Refills: 0 | Status: SHIPPED | OUTPATIENT
Start: 2017-08-29 | End: 2018-04-20

## 2017-08-29 NOTE — MR AVS SNAPSHOT
"              After Visit Summary   8/29/2017    Kasandra Winkler    MRN: [id number]           Patient Information     Date Of Birth          1980        Visit Information        Provider Department      8/29/2017 11:30 AM Trish Callaway, DO [facility]        Today's Diagnoses     Dandruff    -  1    Tinea versicolor           Follow-ups after your visit        Who to contact     If you have questions or need follow up information about today's clinic visit or your schedule please contact [facility] directly at [phone].  Normal or non-critical lab and imaging results will be communicated to you by [employer], letter or phone within 4 business days after the clinic has received the results. If you do not hear from us within 7 days, please contact the clinic through [employer] or phone. If you have a critical or abnormal lab result, we will notify you by phone as soon as possible.  Submit refill requests through [employer] or call your pharmacy and they will forward the refill request to us. Please allow 3 business days for your refill to be completed.          Additional Information About Your Visit        MyChart Information     [employer] gives you secure access to your electronic health record. If you see a primary care provider, you can also send messages to your care team and make appointments. If you have questions, please call your primary care clinic.  If you do not have a primary care provider, please call [phone] and they will assist you.        Care EveryWhere ID     This is your Care EveryWhere ID. This could be used by other organizations to access your [place] medical records  [id number]        Your Vitals Were     Pulse Temperature Height Last Period Pulse Oximetry BMI (Body Mass Index)    98 98.4  F (36.9  C) (Oral) 5' 6\" (1.676 m) 08/07/2017 (Exact Date) 95% 31.62 kg/m2       Blood Pressure from Last 3 Encounters:   08/29/17 122/72   08/11/17 127/87   07/12/17 114/78 "    Weight from Last 3 Encounters:   08/29/17 195 lb 14.4 oz (88.9 kg)   08/11/17 194 lb (88 kg)   07/12/17 195 lb 11.2 oz (88.8 kg)              Today, you had the following     No orders found for display         Today's Medication Changes          These changes are accurate as of: 8/29/17  2:31 PM.  If you have any questions, ask your nurse or doctor.               Start taking these medicines.        Dose/Directions    fluconazole 150 MG tablet   Commonly known as:  DIFLUCAN   Used for:  Tinea versicolor   Started by:  Trish Callaway DO        Take 2 tablets (300mg) by mouth once a week for 2 weeks   Quantity:  4 tablet   Refills:  0       ketoconazole 2 % shampoo   Commonly known as:  NIZORAL   Used for:  Dandruff, Tinea versicolor   Started by:  Trish Callaway DO        Apply to the affected area and wash off after 5 minutes.   Quantity:  120 mL   Refills:  3            Where to get your medicines      These medications were sent to [facility] [address] [place] [id number]    Hours:  24-hours Phone:  [phone]     ketoconazole 2 % shampoo         Some of these will need a paper prescription and others can be bought over the counter.  Ask your nurse if you have questions.     Bring a paper prescription for each of these medications     fluconazole 150 MG tablet                Primary Care Provider Office Phone # Fax #    Trish Callaway [phone] [address] [address]        Equal Access to Services     JESSICA SILVA AH: Hadii chrissy christianson hadasho Soomaali, waaxda luqadaha, qaybta kaalmada adeegyamian, chris grimm. So [facility] [phone].    ATENCIÓN: Si habla español, tiene a das disposición servicios gratuitos de asistencia lingüística. Llame al [phone].    We comply with applicable federal civil rights laws and [place] laws. We do  not discriminate on the basis of race, color, national origin, age, disability sex, sexual orientation or gender identity.            Thank you!     Thank you for choosing [facility]  for your care. Our goal is always to provide you with excellent care. Hearing back from our patients is one way we can continue to improve our services. Please take a few minutes to complete the written survey that you may receive in the mail after your visit with us. Thank you!             Your Updated Medication List - Protect others around you: Learn how to safely use, store and throw away your medicines at www.disposemymeds.org.          This list is accurate as of: 8/29/17  2:31 PM.  Always use your most recent med list.                   Brand Name Dispense Instructions for use Diagnosis    azelastine 0.1 % spray    ASTELIN    30 mL    USE 1 TO 2 SPRAYS IN EACH NOSTRIL TWICE DAILY    Allergic rhinitis due to pollen       beclomethasone 80 MCG/ACT Inhaler    QVAR    3 Inhaler    Inhale 2 puffs into the lungs 2 times daily    Rhinitis, allergic       calcium carbonate 1250 MG tablet    OS-JONATAN 500 mg [place]. [place]     Take 500 mg by mouth 2 times daily        cetirizine 10 MG tablet    zyrTEC     Take 10 mg by mouth daily        clonazePAM 0.5 MG tablet    klonoPIN    20 tablet    Take 1 tablet (0.5 mg) by mouth as needed for anxiety    Major depressive disorder, recurrent episode, mild (H)       Cod Liver Oil 1000 MG Caps      Take 1 capsule by mouth daily.        fluconazole 150 MG tablet    DIFLUCAN    4 tablet    Take 2 tablets (300mg) by mouth once a week for 2 weeks    Tinea versicolor       fluticasone 50 MCG/ACT spray    FLONASE    1 Package    Spray 1-2 sprays into both nostrils daily.    Seasonal allergic rhinitis       HM MULTIVITAMIN ADULT GUMMY PO           ketoconazole 2 % shampoo    NIZORAL    120 mL    Apply to the affected area and wash off after 5 minutes.    Dandruff, Tinea versicolor       levothyroxine  150 MCG tablet    SYNTHROID/LEVOTHROID    30 tablet    Take 1 tablet (150 mcg) by mouth daily    Hashimoto's thyroiditis       Vitamin B-12 500 MCG Subl      Place 500 mcg under the tongue daily        Vitamin D3 3000 UNITS Tabs      Take 1 tablet by mouth daily.

## 2017-08-29 NOTE — PROGRESS NOTES
"  SUBJECTIVE:   Kasandra Winkler is a 36 year old female who presents to clinic today for the following health issues:      Derm issue      Duration: x 1 week    Description (location/character/radiation): fungal type rash along upper chest area    Intensity:  moderate    Accompanying signs and symptoms:     History (similar episodes/previous evaluation): reoccuring issue    Precipitating or alleviating factors: None    Therapies tried and outcome: previously trx with \"pill\" with improvement     Used OTC cream -   In the past they didn't work     -------------------------------------    Problem list and histories reviewed & adjusted, as indicated.  Additional history: as documented    Patient Active Problem List   Diagnosis     CARDIOVASCULAR SCREENING; LDL GOAL LESS THAN 160     Hypothyroidism     Obesity     Hashimoto's thyroiditis     Hypovitaminosis D     Fatigue     Ovarian cyst     Major depressive disorder, recurrent episode, mild (H)     Asthma with allergic rhinitis     Morbid obesity (H)     laparoscopic sleeve gastrectomy 6/29/15     Anxiety     Allergic rhinitis due to pollen     Iron deficiency anemia secondary to inadequate dietary iron intake     Migraine with aura and without status migrainosus, not intractable     Past Surgical History:   Procedure Laterality Date      SECTION  10/10/10     CHOLECYSTECTOMY, LAPOROSCOPIC  2009    gallstones -      FINGER SURGERY      right little finger fracture     LAPAROSCOPIC GASTRIC SLEEVE N/A 2015    Procedure: LAPAROSCOPIC GASTRIC SLEEVE;  Surgeon: Cam Alvarez MD;  Location:  OR       Social History   Substance Use Topics     Smoking status: Former Smoker     Packs/day: 0.50     Years: 10.00     Quit date: 2009     Smokeless tobacco: Never Used      Comment: social smoker for 10 yr     Alcohol use 0.0 oz/week     0 Standard drinks or equivalent per week      Comment: occasional ETOH use     Family History   Problem Relation " "Age of Onset     HEART DISEASE Maternal Grandmother      Hypertension Maternal Grandmother      Arthritis Maternal Grandmother      RA      OSTEOPOROSIS Mother      Respiratory Maternal Grandmother      Asthma     Hypertension Mother      CEREBROVASCULAR DISEASE Mother      Aneurysm     Substance Abuse Mother              Reviewed and updated as needed this visit by clinical staffTobacco  Allergies  Meds  Problems  Med Hx  Surg Hx  Fam Hx  Soc Hx        Reviewed and updated as needed this visit by Provider  Allergies  Meds  Problems         ROS:  Constitutional, HEENT, cardiovascular, pulmonary, gi and gu systems are negative, except as otherwise noted.      OBJECTIVE:   /72  Pulse 98  Temp 98.4  F (36.9  C) (Oral)  Ht 5' 6\" (1.676 m)  Wt 195 lb 14.4 oz (88.9 kg)  LMP 08/07/2017 (Exact Date)  SpO2 95%  BMI 31.62 kg/m2  Body mass index is 31.62 kg/(m^2).  GENERAL: healthy, alert and no distress  SKIN: no suspicious lesions or rashes and hyperpigmentation - SPOTS ON THE CHEST AND BACK    Diagnostic Test Results:  none     ASSESSMENT/PLAN:       1. Dandruff     - ketoconazole (NIZORAL) 2 % shampoo; Apply to the affected area and wash off after 5 minutes.  Dispense: 120 mL; Refill: 3    2. Tinea versicolor  Discussed trying topical shampoo and creams first  Will use the oral diflucan only if not improving -   - ketoconazole (NIZORAL) 2 % shampoo; Apply to the affected area and wash off after 5 minutes.  Dispense: 120 mL; Refill: 3  - fluconazole (DIFLUCAN) 150 MG tablet; Take 2 tablets (300mg) by mouth once a week for 2 weeks  Dispense: 4 tablet; Refill: 0    Pt will call or RTC if symptoms worsen or do not improve.     Trish Callaway, DO  [facility]  "

## 2017-08-29 NOTE — NURSING NOTE
"Chief Complaint   Patient presents with     Derm Problem     reoccuring fungal issue     /72  Pulse 98  Temp 98.4  F (36.9  C) (Oral)  Ht 5' 6\" (1.676 m)  Wt 195 lb 14.4 oz (88.9 kg)  LMP 08/07/2017 (Exact Date)  SpO2 95%  BMI 31.62 kg/m2 Estimated body mass index is 31.62 kg/(m^2) as calculated from the following:    Height as of this encounter: 5' 6\" (1.676 m).    Weight as of this encounter: 195 lb 14.4 oz (88.9 kg).  Medication Reconciliation: complete      Health Maintenance due pending provider review:  NONE    n/a    Dariana Tran CMA  "

## 2017-09-14 DIAGNOSIS — E06.3 HASHIMOTO'S THYROIDITIS: ICD-10-CM

## 2017-09-14 DIAGNOSIS — D50.8 IRON DEFICIENCY ANEMIA SECONDARY TO INADEQUATE DIETARY IRON INTAKE: ICD-10-CM

## 2017-09-14 DIAGNOSIS — E56.9 VITAMIN DEFICIENCY: ICD-10-CM

## 2017-09-14 LAB
ERYTHROCYTE [DISTWIDTH] IN BLOOD BY AUTOMATED COUNT: 14.8 % (ref 10–15)
HCT VFR BLD AUTO: 34.4 % (ref 35–47)
HGB BLD-MCNC: 10.8 G/DL (ref 11.7–15.7)
MCH RBC QN AUTO: 25.5 PG (ref 26.5–33)
MCHC RBC AUTO-ENTMCNC: 31.4 G/DL (ref 31.5–36.5)
MCV RBC AUTO: 81 FL (ref 78–100)
PLATELET # BLD AUTO: 340 10E9/L (ref 150–450)
RBC # BLD AUTO: 4.24 10E12/L (ref 3.8–5.2)
WBC # BLD AUTO: 6.2 10E9/L (ref 4–11)

## 2017-09-14 PROCEDURE — 82728 ASSAY OF FERRITIN: CPT | Performed by: FAMILY MEDICINE

## 2017-09-14 PROCEDURE — 83540 ASSAY OF IRON: CPT | Performed by: FAMILY MEDICINE

## 2017-09-14 PROCEDURE — 85027 COMPLETE CBC AUTOMATED: CPT | Performed by: FAMILY MEDICINE

## 2017-09-14 PROCEDURE — 82306 VITAMIN D 25 HYDROXY: CPT | Performed by: FAMILY MEDICINE

## 2017-09-14 PROCEDURE — 84443 ASSAY THYROID STIM HORMONE: CPT | Performed by: FAMILY MEDICINE

## 2017-09-14 PROCEDURE — 36415 COLL VENOUS BLD VENIPUNCTURE: CPT | Performed by: FAMILY MEDICINE

## 2017-09-14 PROCEDURE — 83550 IRON BINDING TEST: CPT | Performed by: FAMILY MEDICINE

## 2017-09-15 ENCOUNTER — MYC MEDICAL ADVICE (OUTPATIENT)
Dept: FAMILY MEDICINE | Facility: CLINIC | Age: 37
End: 2017-09-15

## 2017-09-15 DIAGNOSIS — L21.0 DANDRUFF: ICD-10-CM

## 2017-09-15 DIAGNOSIS — E06.3 HASHIMOTO'S THYROIDITIS: Primary | ICD-10-CM

## 2017-09-15 DIAGNOSIS — B36.0 TINEA VERSICOLOR: ICD-10-CM

## 2017-09-15 LAB
DEPRECATED CALCIDIOL+CALCIFEROL SERPL-MC: 29 UG/L (ref 20–75)
FERRITIN SERPL-MCNC: 4 NG/ML (ref 12–150)
IRON SATN MFR SERPL: 8 % (ref 15–46)
IRON SERPL-MCNC: 31 UG/DL (ref 35–180)
TIBC SERPL-MCNC: 410 UG/DL (ref 240–430)
TSH SERPL DL<=0.005 MIU/L-ACNC: 0.44 MU/L (ref 0.4–4)

## 2017-09-15 NOTE — PROGRESS NOTES
Dear Kasandra,   Your test results are all back -   -Your vitamin D was stable  Your iron is still too low - remind me how much iron you are actually taking  The thyroid looks good.  Let us know if you have any questions.  -Trish Callaway, DO

## 2017-09-19 RX ORDER — LEVOTHYROXINE SODIUM 150 UG/1
TABLET ORAL
Qty: 30 TABLET | Refills: 1 | Status: SHIPPED | OUTPATIENT
Start: 2017-09-19 | End: 2017-09-22

## 2017-09-19 RX ORDER — LEVOTHYROXINE SODIUM 137 UG/1
TABLET ORAL
Qty: 30 TABLET | Refills: 1 | Status: SHIPPED | OUTPATIENT
Start: 2017-09-19 | End: 2017-09-22

## 2017-09-22 ENCOUNTER — OFFICE VISIT (OUTPATIENT)
Dept: FAMILY MEDICINE | Facility: CLINIC | Age: 37
End: 2017-09-22
Payer: COMMERCIAL

## 2017-09-22 VITALS
HEIGHT: 66 IN | DIASTOLIC BLOOD PRESSURE: 68 MMHG | TEMPERATURE: 98.3 F | HEART RATE: 98 BPM | SYSTOLIC BLOOD PRESSURE: 118 MMHG | WEIGHT: 196.8 LBS | OXYGEN SATURATION: 100 % | BODY MASS INDEX: 31.63 KG/M2

## 2017-09-22 DIAGNOSIS — R07.0 THROAT PAIN: Primary | ICD-10-CM

## 2017-09-22 DIAGNOSIS — E06.3 HASHIMOTO'S THYROIDITIS: ICD-10-CM

## 2017-09-22 LAB
DEPRECATED S PYO AG THROAT QL EIA: NORMAL
SPECIMEN SOURCE: NORMAL

## 2017-09-22 PROCEDURE — 87880 STREP A ASSAY W/OPTIC: CPT | Performed by: FAMILY MEDICINE

## 2017-09-22 PROCEDURE — 87081 CULTURE SCREEN ONLY: CPT | Performed by: FAMILY MEDICINE

## 2017-09-22 PROCEDURE — 99213 OFFICE O/P EST LOW 20 MIN: CPT | Performed by: FAMILY MEDICINE

## 2017-09-22 RX ORDER — LEVOTHYROXINE SODIUM 137 UG/1
137 TABLET ORAL DAILY
Qty: 30 TABLET | Refills: 1 | Status: SHIPPED | OUTPATIENT
Start: 2017-09-22 | End: 2017-12-10

## 2017-09-22 NOTE — MR AVS SNAPSHOT
After Visit Summary   9/22/2017    Kasandra Winkler    MRN: [id number]           Patient Information     Date Of Birth          1980        Visit Information        Provider Department      9/22/2017 9:15 AM Trish Callaway, DO [facility]        Today's Diagnoses     Throat pain    -  1    Hashimoto's thyroiditis           Follow-ups after your visit        Future tests that were ordered for you today     Open Future Orders        Priority Expected Expires Ordered    **TSH with free T4 reflex FUTURE 2mo Routine 11/21/2017 1/20/2018 9/22/2017            Who to contact     If you have questions or need follow up information about today's clinic visit or your schedule please contact [facility] directly at [phone].  Normal or non-critical lab and imaging results will be communicated to you by [employer]hart, letter or phone within 4 business days after the clinic has received the results. If you do not hear from us within 7 days, please contact the clinic through [employer] or phone. If you have a critical or abnormal lab result, we will notify you by phone as soon as possible.  Submit refill requests through [employer] or call your pharmacy and they will forward the refill request to us. Please allow 3 business days for your refill to be completed.          Additional Information About Your Visit        MyChart Information     [employer] gives you secure access to your electronic health record. If you see a primary care provider, you can also send messages to your care team and make appointments. If you have questions, please call your primary care clinic.  If you do not have a primary care provider, please call [phone] and they will assist you.        Care EveryWhere ID     This is your Care EveryWhere ID. This could be used by other organizations to access your [place] medical records  [id number]        Your Vitals Were     Pulse Temperature Height Pulse Oximetry BMI (Body  "Mass Index)       98 98.3  F (36.8  C) (Oral) 5' 6\" (1.676 m) 100% 31.76 kg/m2        Blood Pressure from Last 3 Encounters:   09/22/17 118/68   08/29/17 122/72   08/11/17 127/87    Weight from Last 3 Encounters:   09/22/17 196 lb 12.8 oz (89.3 kg)   08/29/17 195 lb 14.4 oz (88.9 kg)   08/11/17 194 lb (88 kg)              We Performed the Following     Beta strep group A culture     Rapid strep screen          Today's Medication Changes          These changes are accurate as of: 9/22/17 12:17 PM.  If you have any questions, ask your nurse or doctor.               These medicines have changed or have updated prescriptions.        Dose/Directions    levothyroxine 137 MCG tablet   Commonly known as:  SYNTHROID/LEVOTHROID   This may have changed:    - how much to take  - how to take this  - when to take this  - additional instructions   Used for:  Hashimoto's thyroiditis   Changed by:  Trish Callaway DO        Dose:  137 mcg   Take 1 tablet (137 mcg) by mouth daily   Quantity:  30 tablet   Refills:  1            Where to get your medicines      These medications were sent to [facility] [address] [place] [id number]    Hours:  24-hours Phone:  [phone]     levothyroxine 137 MCG tablet                Primary Care Provider Office Phone # Fax #    Trish Callaway [phone] [address] [address]        Equal Access to Services     [facility] AH: Hadstephan To, wadavid flores, qanorma thornealchris rodriguez. So [facility] [phone].    ATENCIÓN: Si habla español, tiene a das disposición servicios gratuitos de asistencia lingüística. Sravan al [phone].    We comply with applicable federal civil rights laws and [place] laws. We do not discriminate on the basis of race, color, national origin, age, disability sex, " sexual orientation or gender identity.            Thank you!     Thank you for choosing [facility]  for your care. Our goal is always to provide you with excellent care. Hearing back from our patients is one way we can continue to improve our services. Please take a few minutes to complete the written survey that you may receive in the mail after your visit with us. Thank you!             Your Updated Medication List - Protect others around you: Learn how to safely use, store and throw away your medicines at www.disposemymeds.org.          This list is accurate as of: 9/22/17 12:17 PM.  Always use your most recent med list.                   Brand Name Dispense Instructions for use Diagnosis    azelastine 0.1 % spray    ASTELIN    30 mL    USE 1 TO 2 SPRAYS IN EACH NOSTRIL TWICE DAILY    Allergic rhinitis due to pollen       beclomethasone 80 MCG/ACT Inhaler    QVAR    3 Inhaler    Inhale 2 puffs into the lungs 2 times daily    Rhinitis, allergic       calcium carbonate 1250 MG tablet    OS-JONATAN 500 mg [place]. [place]     Take 500 mg by mouth 2 times daily        cetirizine 10 MG tablet    zyrTEC     Take 10 mg by mouth daily        clonazePAM 0.5 MG tablet    klonoPIN    20 tablet    Take 1 tablet (0.5 mg) by mouth as needed for anxiety    Major depressive disorder, recurrent episode, mild (H)       Cod Liver Oil 1000 MG Caps      Take 1 capsule by mouth daily.        fluconazole 150 MG tablet    DIFLUCAN    4 tablet    Take 2 tablets (300mg) by mouth once a week for 2 weeks    Tinea versicolor       fluticasone 50 MCG/ACT spray    FLONASE    1 Package    Spray 1-2 sprays into both nostrils daily.    Seasonal allergic rhinitis       HM MULTIVITAMIN ADULT GUMMY PO           ketoconazole 2 % shampoo    NIZORAL    120 mL    Apply to the affected area and wash off after 5 minutes.    Dandruff, Tinea versicolor       levothyroxine 137 MCG tablet    SYNTHROID/LEVOTHROID    30 tablet    Take 1 tablet (137 mcg) by  mouth daily    Hashimoto's thyroiditis       Vitamin B-12 500 MCG Subl      Place 500 mcg under the tongue daily        Vitamin D3 3000 UNITS Tabs      Take 1 tablet by mouth daily.

## 2017-09-22 NOTE — PROGRESS NOTES
SUBJECTIVE:   Kasandra Winkler is a 36 year old female who presents to clinic today for the following health issues:      Sore throat      Duration: 4-5 days    Description  sore throat and headache    Severity: moderate    Accompanying signs and symptoms: some allergy flare ups    History (predisposing factors):  none    Precipitating or alleviating factors: None    Therapies tried and outcome:  Aleve with no improvement    Pt would also like to talk about thyroid   She was on 150mcg and this dose caused some hyperthyroid symptoms  Was going to every other day - but still worried about this dose.  Wants to go to 137mcg daily    -------------------------------------    Problem list and histories reviewed & adjusted, as indicated.  Additional history: as documented    Patient Active Problem List   Diagnosis     CARDIOVASCULAR SCREENING; LDL GOAL LESS THAN 160     Hypothyroidism     Obesity     Hashimoto's thyroiditis     Hypovitaminosis D     Fatigue     Ovarian cyst     Major depressive disorder, recurrent episode, mild (H)     Asthma with allergic rhinitis     Morbid obesity (H)     laparoscopic sleeve gastrectomy 6/29/15     Anxiety     Allergic rhinitis due to pollen     Iron deficiency anemia secondary to inadequate dietary iron intake     Migraine with aura and without status migrainosus, not intractable     Past Surgical History:   Procedure Laterality Date      SECTION  10/10/10     CHOLECYSTECTOMY, LAPOROSCOPIC  2009    gallstones -      FINGER SURGERY      right little finger fracture     LAPAROSCOPIC GASTRIC SLEEVE N/A 2015    Procedure: LAPAROSCOPIC GASTRIC SLEEVE;  Surgeon: Cam Alvarez MD;  Location:  OR       Social History   Substance Use Topics     Smoking status: Former Smoker     Packs/day: 0.50     Years: 10.00     Quit date: 2009     Smokeless tobacco: Never Used      Comment: social smoker for 10 yr     Alcohol use 0.0 oz/week     0 Standard drinks or  "equivalent per week      Comment: occasional ETOH use     Family History   Problem Relation Age of Onset     HEART DISEASE Maternal Grandmother      Hypertension Maternal Grandmother      Arthritis Maternal Grandmother      RA      OSTEOPOROSIS Mother      Respiratory Maternal Grandmother      Asthma     Hypertension Mother      CEREBROVASCULAR DISEASE Mother      Aneurysm     Substance Abuse Mother              Reviewed and updated as needed this visit by clinical staffTobacco  Allergies  Meds  Problems  Med Hx  Surg Hx  Fam Hx  Soc Hx        Reviewed and updated as needed this visit by Provider  Allergies  Meds  Problems         ROS:  Constitutional, HEENT, cardiovascular, pulmonary, gi and gu systems are negative, except as otherwise noted.      OBJECTIVE:   /68  Pulse 98  Temp 98.3  F (36.8  C) (Oral)  Ht 5' 6\" (1.676 m)  Wt 196 lb 12.8 oz (89.3 kg)  SpO2 100%  BMI 31.76 kg/m2  Body mass index is 31.76 kg/(m^2).  GENERAL: healthy, alert and no distress  HENT: normal cephalic/atraumatic, ear canals and TM's normal, nose and mouth without ulcers or lesions, oral mucous membranes moist and tonsillar hypertrophy  NECK: no adenopathy, no asymmetry, masses, or scars and thyroid normal to palpation  CV: regular rate and rhythm, normal S1 S2, no S3 or S4, no murmur, click or rub, no peripheral edema and peripheral pulses strong    Diagnostic Test Results:  Results for orders placed or performed in visit on 09/22/17 (from the past 24 hour(s))   Rapid strep screen   Result Value Ref Range    Specimen Description Throat     Rapid Strep A Screen       NEGATIVE: No Group A streptococcal antigen detected by immunoassay, await culture report.       ASSESSMENT/PLAN:       1. Throat pain  Suspect allergy or viral related  Conservative therapy  Pt will call or RTC if symptoms worsen or do not improve.   - Rapid strep screen  - Beta strep group A culture    2. Hashimoto's thyroiditis  Will switch to 137mcg " and recheck in 6 weeks.  PN    - levothyroxine (SYNTHROID/LEVOTHROID) 137 MCG tablet; Take 1 tablet (137 mcg) by mouth daily  Dispense: 30 tablet; Refill: 1        Trish Callaway [facility]

## 2017-09-23 LAB
BACTERIA SPEC CULT: NORMAL
SPECIMEN SOURCE: NORMAL

## 2017-09-24 ENCOUNTER — MYC MEDICAL ADVICE (OUTPATIENT)
Dept: FAMILY MEDICINE | Facility: CLINIC | Age: 37
End: 2017-09-24

## 2017-09-24 DIAGNOSIS — D50.8 IRON DEFICIENCY ANEMIA SECONDARY TO INADEQUATE DIETARY IRON INTAKE: Primary | ICD-10-CM

## 2017-11-14 ENCOUNTER — OFFICE VISIT (OUTPATIENT)
Dept: FAMILY MEDICINE | Facility: CLINIC | Age: 37
End: 2017-11-14
Payer: COMMERCIAL

## 2017-11-14 VITALS
OXYGEN SATURATION: 96 % | DIASTOLIC BLOOD PRESSURE: 80 MMHG | WEIGHT: 197 LBS | HEART RATE: 70 BPM | BODY MASS INDEX: 31.66 KG/M2 | TEMPERATURE: 99.1 F | SYSTOLIC BLOOD PRESSURE: 118 MMHG | RESPIRATION RATE: 14 BRPM | HEIGHT: 66 IN

## 2017-11-14 DIAGNOSIS — N92.0 SPOTTING: Primary | ICD-10-CM

## 2017-11-14 DIAGNOSIS — Z23 NEED FOR PROPHYLACTIC VACCINATION AND INOCULATION AGAINST INFLUENZA: ICD-10-CM

## 2017-11-14 DIAGNOSIS — G43.109 MIGRAINE WITH AURA AND WITHOUT STATUS MIGRAINOSUS, NOT INTRACTABLE: ICD-10-CM

## 2017-11-14 DIAGNOSIS — N89.8 VAGINAL DISCHARGE: ICD-10-CM

## 2017-11-14 LAB
ERYTHROCYTE [DISTWIDTH] IN BLOOD BY AUTOMATED COUNT: 16.1 % (ref 10–15)
HCT VFR BLD AUTO: 34.2 % (ref 35–47)
HGB BLD-MCNC: 10.8 G/DL (ref 11.7–15.7)
MCH RBC QN AUTO: 25.8 PG (ref 26.5–33)
MCHC RBC AUTO-ENTMCNC: 31.6 G/DL (ref 31.5–36.5)
MCV RBC AUTO: 82 FL (ref 78–100)
PLATELET # BLD AUTO: 281 10E9/L (ref 150–450)
RBC # BLD AUTO: 4.19 10E12/L (ref 3.8–5.2)
SPECIMEN SOURCE: ABNORMAL
WBC # BLD AUTO: 8.5 10E9/L (ref 4–11)
WET PREP SPEC: ABNORMAL

## 2017-11-14 PROCEDURE — 36415 COLL VENOUS BLD VENIPUNCTURE: CPT | Performed by: FAMILY MEDICINE

## 2017-11-14 PROCEDURE — 90686 IIV4 VACC NO PRSV 0.5 ML IM: CPT | Performed by: FAMILY MEDICINE

## 2017-11-14 PROCEDURE — 84443 ASSAY THYROID STIM HORMONE: CPT | Performed by: FAMILY MEDICINE

## 2017-11-14 PROCEDURE — 87210 SMEAR WET MOUNT SALINE/INK: CPT | Performed by: FAMILY MEDICINE

## 2017-11-14 PROCEDURE — 85027 COMPLETE CBC AUTOMATED: CPT | Performed by: FAMILY MEDICINE

## 2017-11-14 PROCEDURE — 99214 OFFICE O/P EST MOD 30 MIN: CPT | Mod: 25 | Performed by: FAMILY MEDICINE

## 2017-11-14 PROCEDURE — 90471 IMMUNIZATION ADMIN: CPT | Performed by: FAMILY MEDICINE

## 2017-11-14 RX ORDER — METRONIDAZOLE 500 MG/1
500 TABLET ORAL 2 TIMES DAILY
Qty: 14 TABLET | Refills: 0 | Status: SHIPPED | OUTPATIENT
Start: 2017-11-14 | End: 2017-11-21

## 2017-11-14 NOTE — PATIENT INSTRUCTIONS
" bacterial vaginosis  Those with symptoms present with an unpleasant, \"fishy smelling\" discharge that is more noticeable after coitus.I do recommend treatment with metronidazole orally twice a day for a week, that's been sent to the pharmacy for you to .The medication  may give metallic taste in mouth, but over all well tolerated by most. Please call with questions or concern as needed.    Call to schedule your imaging:    [place] or [facility] [phone]    [facility] [phone]      Keep menstrual calender    "

## 2017-11-14 NOTE — MR AVS SNAPSHOT
"              After Visit Summary   11/14/2017    Kasandra Winkler    MRN: [id number]           Patient Information     Date Of Birth          1980        Visit Information        Provider Department      11/14/2017 4:00 PM Ernestine Carey MD [facility]        Today's Diagnoses     Spotting    -  1    Vaginal discharge        Migraine with aura and without status migrainosus, not intractable        Need for prophylactic vaccination and inoculation against influenza          Care Instructions     bacterial vaginosis  Those with symptoms present with an unpleasant, \"fishy smelling\" discharge that is more noticeable after coitus.I do recommend treatment with metronidazole orally twice a day for a week, that's been sent to the pharmacy for you to .The medication  may give metallic taste in mouth, but over all well tolerated by most. Please call with questions or concern as needed.    Call to schedule your imaging:    [facility] [phone]    [facility] [phone]      Keep menstrual calender            Follow-ups after your visit        Who to contact     If you have questions or need follow up information about today's clinic visit or your schedule please contact [facility] directly at [phone].  Normal or non-critical lab and imaging results will be communicated to you by MyChart, letter or phone within 4 business days after the clinic has received the results. If you do not hear from us within 7 days, please contact the clinic through [email or web address] or phone. If you have a critical or abnormal lab result, we will notify you by phone as soon as possible.  Submit refill requests through [employer] or call your pharmacy and they will forward the refill request to us. Please allow 3 business days for your refill to be completed.          Additional Information About Your Visit        [email or web address] Information     [employer] gives you secure access to your electronic " "health record. If you see a primary care provider, you can also send messages to your care team and make appointments. If you have questions, please call your primary care clinic.  If you do not have a primary care provider, please call [phone] and they will assist you.        Care EveryWhere ID     This is your Care EveryWhere ID. This could be used by other organizations to access your [place] medical records  [id number]        Your Vitals Were     Pulse Temperature Respirations Height Pulse Oximetry Breastfeeding?    70 99.1  F (37.3  C) (Oral) 14 5' 6\" (1.676 m) 96% No    BMI (Body Mass Index)                   31.8 kg/m2            Blood Pressure from Last 3 Encounters:   11/14/17 118/80   09/22/17 118/68   08/29/17 122/72    Weight from Last 3 Encounters:   11/14/17 197 lb (89.4 kg)   09/22/17 196 lb 12.8 oz (89.3 kg)   08/29/17 195 lb 14.4 oz (88.9 kg)              We Performed the Following     CBC with platelets     HC FLU VAC PRESRV FREE QUAD SPLIT VIR 3+YRS IM     TSH with free T4 reflex     Wet prep          Today's Medication Changes          These changes are accurate as of: 11/14/17 11:59 PM.  If you have any questions, ask your nurse or doctor.               Start taking these medicines.        Dose/Directions    metroNIDAZOLE 500 MG tablet   Commonly known as:  FLAGYL   Used for:  Vaginal discharge   Started by:  Ernestine Carey MD        Dose:  500 mg   Take 1 tablet (500 mg) by mouth 2 times daily   Quantity:  14 tablet   Refills:  0            Where to get your medicines      These medications were sent to [facility] [id number] [place] [address] [address] [place] [address] AT [address] [address]    Hours:  24-hours Phone:  [phone]     metroNIDAZOLE 500 MG tablet                Primary Care Provider Office Phone # Fax #    Trish Callaway [phone] [address] [address]      "   Equal Access to Services     [facility] : Hadii chrissy christianson ursulamarciano Yousuf, waemyda luqadaha, qaybta kajoeymian lopez, chris grimm. So [facility] [phone].    ATENCIÓN: Si habla danie, tiene a das disposición servicios gratuitos de asistencia lingüística. Timame al [phone].    We comply with applicable federal civil rights laws and [place] laws. We do not discriminate on the basis of race, color, national origin, age, disability, sex, sexual orientation, or gender identity.            Thank you!     Thank you for choosing [facility]  for your care. Our goal is always to provide you with excellent care. Hearing back from our patients is one way we can continue to improve our services. Please take a few minutes to complete the written survey that you may receive in the mail after your visit with us. Thank you!             Your Updated Medication List - Protect others around you: Learn how to safely use, store and throw away your medicines at www.disposemymeds.org.          This list is accurate as of: 11/14/17 11:59 PM.  Always use your most recent med list.                   Brand Name Dispense Instructions for use Diagnosis    azelastine 0.1 % spray    ASTELIN    30 mL    USE 1 TO 2 SPRAYS IN EACH NOSTRIL TWICE DAILY    Allergic rhinitis due to pollen       beclomethasone 80 MCG/ACT Inhaler    QVAR    3 Inhaler    Inhale 2 puffs into the lungs 2 times daily    Rhinitis, allergic       calcium carbonate 1250 MG tablet    OS-JONATAN 500 mg [place]     Take 500 mg by mouth 2 times daily        cetirizine 10 MG tablet    zyrTEC     Take 10 mg by mouth daily        clonazePAM 0.5 MG tablet    klonoPIN    20 tablet    Take 1 tablet (0.5 mg) by mouth as needed for anxiety    Major depressive disorder, recurrent episode, mild (H)       Cod Liver Oil 1000 MG Caps      Take 1 capsule by mouth daily.        fluconazole 150 MG tablet    DIFLUCAN    4 tablet    Take 2 tablets (300mg)  by mouth once a week for 2 weeks    Tinea versicolor       fluticasone 50 MCG/ACT spray    FLONASE    1 Package    Spray 1-2 sprays into both nostrils daily.    Seasonal allergic rhinitis       HM MULTIVITAMIN ADULT GUMMY PO           ketoconazole 2 % shampoo    NIZORAL    120 mL    Apply to the affected area and wash off after 5 minutes.    Dandruff, Tinea versicolor       levothyroxine 137 MCG tablet    SYNTHROID/LEVOTHROID    30 tablet    Take 1 tablet (137 mcg) by mouth daily    Hashimoto's thyroiditis       metroNIDAZOLE 500 MG tablet    FLAGYL    14 tablet    Take 1 tablet (500 mg) by mouth 2 times daily    Vaginal discharge       Vitamin B-12 500 MCG Subl      Place 500 mcg under the tongue daily        Vitamin D3 3000 UNITS Tabs      Take 1 tablet by mouth daily.

## 2017-11-14 NOTE — PROGRESS NOTES
"  SUBJECTIVE:   Kasandra Winkler is a 36 year old female who presents to clinic today for the following health issues:      Vaginal Bleeding (Dysmenorrhea)    Spotting between periods last 2 months-    Onset: last 2 months    Description:  Duration of bleeding episodes: 2 days  Frequency between periods:   4 weeks  Describe bleeding/flow: increased rate of flow, heavier  Clots: YES-   Number of pads/hour: wears super plus tampon and pad  Cramping: mild and during    Intensity:  mild  Accompanying signs and symptoms: excessive discharge between periods, feeling bloating,   low, mid back pain,( different from usual)  pelvic cramping        History (similar episodes/previous evaluation): None    Precipitating or alleviating factors: None    Therapies tried and outcome: None    Last menstural period  10/30/17  Mutually monogamous relationship with female  Not pregnant and no concerns with sexually transmitted infection       PROBLEMS TO ADD ON...    Problem list and histories reviewed & adjusted, as indicated.  Additional history: as documented    Labs reviewed in EPIC    Reviewed and updated as needed this visit by clinical staffTobacco  Allergies  Meds  Med Hx  Surg Hx  Fam Hx  Soc Hx      Reviewed and updated as needed this visit by Provider         ROS: Migraine headache , was controlled , more lately- 2 in past week  Constitutional, HEENT, cardiovascular, pulmonary, gi and gu systems are negative, except as otherwise noted.      OBJECTIVE:   /80  Pulse 70  Temp 99.1  F (37.3  C) (Oral)  Resp 14  Ht 5' 6\" (1.676 m)  Wt 197 lb (89.4 kg)  SpO2 96%  Breastfeeding? No  BMI 31.8 kg/m2  Body mass index is 31.8 kg/(m^2).  GENERAL: healthy, alert and no distress  NECK: no adenopathy, no asymmetry, masses, or scars and thyroid normal to palpation  RESP: lungs clear to auscultation - no rales, rhonchi or wheezes  CV: regular rate and rhythm  ABDOMEN: soft, nontender, no hepatosplenomegaly, no masses and " bowel sounds normal  Vagina and vulva are normal;  no discharge is noted.  Cervix normal without lesions. Uterus anteverted and mobile, normal in size and shape without tenderness.  Adnexa normal in size without masses or tenderness. Scant discharge with ordour    ASSESSMENT/PLAN:     1. Spotting ? Mild uterine bleeding   - US Pelvic Complete with Transvaginal; Future- for endometrial thickness, and consider biopsy in follow up if problem persists  - Timmy menstrual calender Bring record in follow up in 2-3 months, unless heavy frequent bleeding     Today labs to  check for anemia, she reports she was advised to double the dose of iron tabs- has yet to start    & TSH- if needs adjustment of thyroid  Medications     - CBC with platelets  - TSH with free T4 reflex    2. Vaginal discharge    - Wet prep- positive for bacterial vaginosis   - metroNIDAZOLE (FLAGYL) 500 MG tablet; Take 1 tablet (500 mg) by mouth 2 times daily  Dispense: 14 tablet; Refill: 0    3. Migraine with aura and without status migrainosus, not intractable  Reports over the counter Excedrin help  Follow up with primary care physicain- if frequent headache     4. Need for prophylactic vaccination and inoculation against influenza    - HC FLU VAC PRESRV FREE QUAD SPLIT VIR 3+YRS IM          Potential medication side effects were discussed with the patient; let me know if any occur.  The patient indicates understanding of these issues and agrees with the plan.        Ernestine Carey MD  [facility]

## 2017-11-15 LAB — TSH SERPL DL<=0.005 MIU/L-ACNC: 3.95 MU/L (ref 0.4–4)

## 2017-11-16 ENCOUNTER — RADIANT APPOINTMENT (OUTPATIENT)
Dept: ULTRASOUND IMAGING | Facility: CLINIC | Age: 37
End: 2017-11-16
Attending: FAMILY MEDICINE
Payer: COMMERCIAL

## 2017-11-16 DIAGNOSIS — N92.0 SPOTTING: ICD-10-CM

## 2017-11-16 PROCEDURE — 76830 TRANSVAGINAL US NON-OB: CPT | Performed by: OBSTETRICS & GYNECOLOGY

## 2017-11-17 NOTE — PROGRESS NOTES
Hi  The ultrasound of uterus shows no fibroid- that's good  The inner linning of uterus, endometrium is slightly irregular-and if the bleeding continues, endometrial biopsy in office is an option    Next best option is  oral contraceptive pills to regulate heavy periods- that should be discussed in return office visit in the clinic- given your history of migraine headache     Keep taking iron tabs and watch symptoms- follow up if getting worse or unresolved    There is a small follicle in right ovary of no concern

## 2017-11-21 ENCOUNTER — OFFICE VISIT (OUTPATIENT)
Dept: FAMILY MEDICINE | Facility: CLINIC | Age: 37
End: 2017-11-21
Payer: COMMERCIAL

## 2017-11-21 VITALS
HEIGHT: 66 IN | HEART RATE: 78 BPM | RESPIRATION RATE: 16 BRPM | BODY MASS INDEX: 31.5 KG/M2 | OXYGEN SATURATION: 98 % | DIASTOLIC BLOOD PRESSURE: 81 MMHG | WEIGHT: 196 LBS | SYSTOLIC BLOOD PRESSURE: 119 MMHG | TEMPERATURE: 98.5 F

## 2017-11-21 DIAGNOSIS — F33.0 MAJOR DEPRESSIVE DISORDER, RECURRENT EPISODE, MILD (H): ICD-10-CM

## 2017-11-21 DIAGNOSIS — E06.3 HASHIMOTO'S THYROIDITIS: ICD-10-CM

## 2017-11-21 DIAGNOSIS — N89.8 VAGINAL DISCHARGE: Primary | ICD-10-CM

## 2017-11-21 DIAGNOSIS — F41.9 ANXIETY: ICD-10-CM

## 2017-11-21 LAB
SPECIMEN SOURCE: NORMAL
WET PREP SPEC: NORMAL

## 2017-11-21 PROCEDURE — 87210 SMEAR WET MOUNT SALINE/INK: CPT | Performed by: FAMILY MEDICINE

## 2017-11-21 PROCEDURE — 99214 OFFICE O/P EST MOD 30 MIN: CPT | Performed by: FAMILY MEDICINE

## 2017-11-21 ASSESSMENT — ANXIETY QUESTIONNAIRES
2. NOT BEING ABLE TO STOP OR CONTROL WORRYING: SEVERAL DAYS
5. BEING SO RESTLESS THAT IT IS HARD TO SIT STILL: NOT AT ALL
IF YOU CHECKED OFF ANY PROBLEMS ON THIS QUESTIONNAIRE, HOW DIFFICULT HAVE THESE PROBLEMS MADE IT FOR YOU TO DO YOUR WORK, TAKE CARE OF THINGS AT HOME, OR GET ALONG WITH OTHER PEOPLE: SOMEWHAT DIFFICULT
6. BECOMING EASILY ANNOYED OR IRRITABLE: SEVERAL DAYS
3. WORRYING TOO MUCH ABOUT DIFFERENT THINGS: MORE THAN HALF THE DAYS
7. FEELING AFRAID AS IF SOMETHING AWFUL MIGHT HAPPEN: SEVERAL DAYS
GAD7 TOTAL SCORE: 10
1. FEELING NERVOUS, ANXIOUS, OR ON EDGE: NEARLY EVERY DAY

## 2017-11-21 ASSESSMENT — PATIENT HEALTH QUESTIONNAIRE - PHQ9: 5. POOR APPETITE OR OVEREATING: MORE THAN HALF THE DAYS

## 2017-11-21 NOTE — PROGRESS NOTES
SUBJECTIVE:   36 year old female complains of white vaginal discharge for more than a week. She was seen on 11/14 & wet prep was positive for bacterial vaginosis. Treatment with metronidazole completed by patient yesterday and she reports the symptoms unchanged and wondering if needs additional antibiotic weeks.  Denies abnormal vaginal bleeding or significant pelvic pain or  fever. No UTI symptoms. Denies history of known exposure to STD. Denies dyspareunia.    Patient's last menstrual period was 11/06/2017.      We also discussed at length her concerns about thyroid and recent TSH  She reports she feels hyperthyroid and very surprised to see TSH fluctuated so much  She reports feeling edgy, anxious, sweaty & hot often- while she is often cold like in hypothyroid.   we discussed her TSH as well and she has an appointment with primary care physicain for next week to discuss it further.she reports the thyroid problem feels like a moving target that is hard to master, never felt balanced on most does and did try armour for a bit but vasyl active thyroid was too high.       She reports she has history of anxiety all her life and lately feeling the best with weekly therapy  She also reports she has used multiple anti anxiety medications, including selective serotonin reuptake inhibitor over past 20 yrs and does not necessary like them- as feel- almost lack of emotions on them    She does not want to consider selective serotonin reuptake inhibitor further and has plans to up her excercise- she like YOGA    OBJECTIVE:   She appears well, afebrile.  Abdomen: benign, soft, nontender, no masses.  Pelvic Exam: wet prep obtained by th the patient herself  S1 and S2 normal, no murmurs, clicks, gallops or rubs. Regular rate and rhythm.   Chest is clear; no wheezes or rales. No edema or JVD.  Psych: Alert and oriented times 3; coherent speech, normal   rate and volume, able to articulate logical thoughts, able   to abstract  reason, no tangential thoughts, no hallucinations   or delusions    FOREST-7 SCORE 9/30/2015 11/21/2017   Total Score 14 10     Assestment & Plan:  Vaginal discharge  - Wet prep, negative   -patient is reassured.   - we also discussed that this week, if she feels-that vaginal discharge is in excess, persistent  and with odour- she can make a lab only appointment and can get it done, for me to review and release the lab with recommendations,- but no need for antidotic today.  Vaginal culture is an option- that is not completed as wet prep was reported negative       Hashimoto's thyroiditis & hypothyroidism  TSH   Date Value Ref Range Status   11/14/2017 3.95 0.40 - 4.00 mU/L Final   09/14/2017 0.44 0.40 - 4.00 mU/L Final   07/12/2017 3.87 0.40 - 4.00 mU/L Final   01/18/2017 1.95 0.40 - 4.00 mU/L Final   07/07/2016 1.64 0.40 - 4.00 mU/L Final   currently on levothyroxine 137 mcg & feels sweaty  Her TSH has fluctuated, since July, rather quite a bit and most recent dose change was appropriate based on TSH (decreased from  150 mcg to 137 mcg) but lately she continues to have poor tolerance to heat, and feeling a bit edgy    We discussed that we can try change of the dose- & she reports she has not really found a dose that's worked best for her lately-     We discussed its  possible that some of the symptoms may be the result of on going anxiety.  NO CHANGE OF DOSE MADE TODAY      Anxiety & Major depressive disorder, recurrent episode, mild (H)    Long discussion about mental health . It is commendable that she has been dealing with the symptoms very well with weekly therapy/counsleing and has plans to up/ excercise- she like yoga- both are corner stone for anxiety/ depression management.  But given her symptoms , I do recommend to try a very small dose of SNRI- effexor or SSRI- like prozac at 10 mg and see if that off set, some of the symptoms- she is willing to think it over and did not want any prescriptions sent today-  she wants it further discussed with Trish Callaway and has an appointment next week    More than half the time spent on discussing and counseling as above    The patient indicates understanding of these issues and agrees with the plan.

## 2017-11-21 NOTE — NURSING NOTE
"Chief Complaint   Patient presents with     Vaginal Discharge     /81  Pulse 78  Temp 98.5  F (36.9  C) (Oral)  Resp 16  Ht 5' 6\" (1.676 m)  Wt 196 lb (88.9 kg)  LMP 11/06/2017  SpO2 98%  BMI 31.64 kg/m2 Estimated body mass index is 31.64 kg/(m^2) as calculated from the following:    Height as of this encounter: 5' 6\" (1.676 m).    Weight as of this encounter: 196 lb (88.9 kg).  bp completed using cuff size: regular       Health Maintenance addressed:  NONE    n/a    Tamara Boyle MA     "

## 2017-11-21 NOTE — MR AVS SNAPSHOT
After Visit Summary   11/21/2017    Kasandra Winkler    MRN: [id number]           Patient Information     Date Of Birth          1980        Visit Information        Provider Department      11/21/2017 9:00 AM Ernestine Carey MD [facility]        Today's Diagnoses     Vaginal discharge    -  1    Hashimoto's thyroiditis        Anxiety        Major depressive disorder, recurrent episode, mild (H)           Follow-ups after your visit        Your next 10 appointments already scheduled     Nov 29, 2017  8:15 AM CST   MyChart Short with Trish Callaway,    [facility] ([facility])    [address]  [facility] [address] [phone]              Who to contact     If you have questions or need follow up information about today's clinic visit or your schedule please contact [facility] directly at [phone].  Normal or non-critical lab and imaging results will be communicated to you by [employer], letter or phone within 4 business days after the clinic has received the results. If you do not hear from us within 7 days, please contact the clinic through [employer] or phone. If you have a critical or abnormal lab result, we will notify you by phone as soon as possible.  Submit refill requests through [employer] or call your pharmacy and they will forward the refill request to us. Please allow 3 business days for your refill to be completed.          Additional Information About Your Visit        MyChart Information     [employer] gives you secure access to your electronic health record. If you see a primary care provider, you can also send messages to your care team and make appointments. If you have questions, please call your primary care clinic.  If you do not have a primary care provider, please call [phone] and they will assist you.        Care EveryWhere ID     This is your Care EveryWhere ID. This could be used by other  "organizations to access your [place] medical records  [id number]        Your Vitals Were     Pulse Temperature Respirations Height Last Period Pulse Oximetry    78 98.5  F (36.9  C) (Oral) 16 5' 6\" (1.676 m) 11/06/2017 98%    BMI (Body Mass Index)                   31.64 kg/m2            Blood Pressure from Last 3 Encounters:   11/21/17 119/81   11/14/17 118/80   09/22/17 118/68    Weight from Last 3 Encounters:   11/21/17 196 lb (88.9 kg)   11/14/17 197 lb (89.4 kg)   09/22/17 196 lb 12.8 oz (89.3 kg)              We Performed the Following     Wet prep          Today's Medication Changes          These changes are accurate as of: 11/21/17 10:07 AM.  If you have any questions, ask your nurse or doctor.               Stop taking these medicines if you haven't already. Please contact your care team if you have questions.     metroNIDAZOLE 500 MG tablet   Commonly known as:  FLAGYL   Stopped by:  Ernestine Carey MD                    Primary Care Provider Office Phone # Fax #    Trish CASE Callaway [phone] [address] [address]        Equal Access to Services     KRIS SILVA AH: Hadii chrissy christianson hadasho Soomaali, waaxda luqadaha, qaybta kaalmada adeegyada, chris grimm. So [facility] [phone].    ATENCIÓN: Si habla español, tiene a das disposición servicios gratuitos de asistencia lingüística. Llame al [phone].    We comply with applicable federal civil rights laws and [place] laws. We do not discriminate on the basis of race, color, national origin, age, disability, sex, sexual orientation, or gender identity.            Thank you!     Thank you for choosing [facility]  for your care. Our goal is always to provide you with excellent care. Hearing back from our patients is one way we can continue to improve our services. Please take a few minutes to complete the written survey that you may receive in the mail after your " visit with us. Thank you!             Your Updated Medication List - Protect others around you: Learn how to safely use, store and throw away your medicines at www.disposemymeds.org.          This list is accurate as of: 11/21/17 10:07 AM.  Always use your most recent med list.                   Brand Name Dispense Instructions for use Diagnosis    azelastine 0.1 % spray    ASTELIN    30 mL    USE 1 TO 2 SPRAYS IN EACH NOSTRIL TWICE DAILY    Allergic rhinitis due to pollen       beclomethasone 80 MCG/ACT Inhaler    QVAR    3 Inhaler    Inhale 2 puffs into the lungs 2 times daily    Rhinitis, allergic       calcium carbonate 1250 MG tablet    OS-JONATAN 500 mg [place]. Ca     Take 500 mg by mouth 2 times daily        cetirizine 10 MG tablet    zyrTEC     Take 10 mg by mouth daily        clonazePAM 0.5 MG tablet    klonoPIN    20 tablet    Take 1 tablet (0.5 mg) by mouth as needed for anxiety    Major depressive disorder, recurrent episode, mild (H)       Cod Liver Oil 1000 MG Caps      Take 1 capsule by mouth daily.        fluconazole 150 MG tablet    DIFLUCAN    4 tablet    Take 2 tablets (300mg) by mouth once a week for 2 weeks    Tinea versicolor       fluticasone 50 MCG/ACT spray    FLONASE    1 Package    Spray 1-2 sprays into both nostrils daily.    Seasonal allergic rhinitis       HM MULTIVITAMIN ADULT GUMMY PO           ketoconazole 2 % shampoo    NIZORAL    120 mL    Apply to the affected area and wash off after 5 minutes.    Dandruff, Tinea versicolor       levothyroxine 137 MCG tablet    SYNTHROID/LEVOTHROID    30 tablet    Take 1 tablet (137 mcg) by mouth daily    Hashimoto's thyroiditis       Vitamin B-12 500 MCG Subl      Place 500 mcg under the tongue daily        Vitamin D3 3000 UNITS Tabs      Take 1 tablet by mouth daily.

## 2017-11-22 ASSESSMENT — ANXIETY QUESTIONNAIRES: GAD7 TOTAL SCORE: 10

## 2017-11-29 ENCOUNTER — OFFICE VISIT (OUTPATIENT)
Dept: FAMILY MEDICINE | Facility: CLINIC | Age: 37
End: 2017-11-29
Payer: COMMERCIAL

## 2017-11-29 VITALS
RESPIRATION RATE: 16 BRPM | BODY MASS INDEX: 31.5 KG/M2 | OXYGEN SATURATION: 97 % | WEIGHT: 196 LBS | SYSTOLIC BLOOD PRESSURE: 116 MMHG | HEIGHT: 66 IN | TEMPERATURE: 97.8 F | DIASTOLIC BLOOD PRESSURE: 76 MMHG | HEART RATE: 74 BPM

## 2017-11-29 DIAGNOSIS — F41.9 ANXIETY: ICD-10-CM

## 2017-11-29 DIAGNOSIS — N92.0 EXCESSIVE OR FREQUENT MENSTRUATION: ICD-10-CM

## 2017-11-29 DIAGNOSIS — N76.0 VAGINAL BACTERIOSIS: Primary | ICD-10-CM

## 2017-11-29 DIAGNOSIS — B96.89 VAGINAL BACTERIOSIS: Primary | ICD-10-CM

## 2017-11-29 DIAGNOSIS — D50.8 IRON DEFICIENCY ANEMIA SECONDARY TO INADEQUATE DIETARY IRON INTAKE: ICD-10-CM

## 2017-11-29 DIAGNOSIS — E06.3 HYPOTHYROIDISM DUE TO HASHIMOTO'S THYROIDITIS: ICD-10-CM

## 2017-11-29 PROCEDURE — 99214 OFFICE O/P EST MOD 30 MIN: CPT | Performed by: FAMILY MEDICINE

## 2017-11-29 RX ORDER — NORETHINDRONE ACETATE AND ETHINYL ESTRADIOL .02; 1 MG/1; MG/1
1 TABLET ORAL DAILY
Qty: 28 TABLET | Refills: 11 | Status: SHIPPED | OUTPATIENT
Start: 2017-11-29 | End: 2017-11-29

## 2017-11-29 RX ORDER — NORETHINDRONE ACETATE AND ETHINYL ESTRADIOL 1MG-20(21)
1 KIT ORAL DAILY
Qty: 28 TABLET | Refills: 11 | Status: SHIPPED | OUTPATIENT
Start: 2017-11-29 | End: 2017-12-28 | Stop reason: ALTCHOICE

## 2017-11-29 NOTE — PROGRESS NOTES
SUBJECTIVE:   Kasandra Winkler is a 36 year old female who presents to clinic today for the following health issues:      Hypothyroidism Follow-up      Since last visit, patient describes the following symptoms: dry skin, anxiety and hair loss, more migraines      Amount of exercise or physical activity: 2-3 days/week for an average of 30-45 minutes    Problems taking medications regularly: No    Medication side effects: none    Diet: regular (no restrictions)      Having hot flashes -   Flushing and sweating  Anxiety is very high -         TSH   Date Value Ref Range Status   2017 3.95 0.40 - 4.00 mU/L Final   2017 0.44 0.40 - 4.00 mU/L Final   2017 3.87 0.40 - 4.00 mU/L Final   2017 1.95 0.40 - 4.00 mU/L Final   2016 1.64 0.40 - 4.00 mU/L Final           -------------------------------------    Problem list and histories reviewed & adjusted, as indicated.  Additional history: as documented    Patient Active Problem List   Diagnosis     CARDIOVASCULAR SCREENING; LDL GOAL LESS THAN 160     Hypothyroidism     Obesity     Hashimoto's thyroiditis     Hypovitaminosis D     Fatigue     Ovarian cyst     Major depressive disorder, recurrent episode, mild (H)     Asthma with allergic rhinitis     Morbid obesity (H)     laparoscopic sleeve gastrectomy 6/29/15     Anxiety     Allergic rhinitis due to pollen     Iron deficiency anemia secondary to inadequate dietary iron intake     Migraine with aura and without status migrainosus, not intractable     Past Surgical History:   Procedure Laterality Date      SECTION  10/10/10     CHOLECYSTECTOMY, LAPOROSCOPIC  2009    gallstones -      FINGER SURGERY      right little finger fracture     LAPAROSCOPIC GASTRIC SLEEVE N/A 2015    Procedure: LAPAROSCOPIC GASTRIC SLEEVE;  Surgeon: Cam Alvarez MD;  Location:  OR       Social History   Substance Use Topics     Smoking status: Former Smoker     Packs/day: 0.50     Years:  "10.00     Quit date: 7/20/2009     Smokeless tobacco: Never Used      Comment: social smoker for 10 yr     Alcohol use 0.0 oz/week     0 Standard drinks or equivalent per week      Comment: occasional ETOH use     Family History   Problem Relation Age of Onset     OSTEOPOROSIS Mother      Hypertension Mother      CEREBROVASCULAR DISEASE Mother      Aneurysm     Substance Abuse Mother      HEART DISEASE Maternal Grandmother      Hypertension Maternal Grandmother      Arthritis Maternal Grandmother      RA      Respiratory Maternal Grandmother      Asthma             Reviewed and updated as needed this visit by clinical staffTobacco  Allergies  Meds  Problems  Med Hx  Surg Hx  Fam Hx  Soc Hx        Reviewed and updated as needed this visit by Provider  Allergies  Meds  Problems         ROS:  Constitutional, HEENT, cardiovascular, pulmonary, GI, , musculoskeletal, neuro, skin, endocrine and psych systems are negative, except as otherwise noted.      OBJECTIVE:   /76  Pulse 74  Temp 97.8  F (36.6  C)  Resp 16  Ht 5' 6\" (1.676 m)  Wt 196 lb (88.9 kg)  LMP 11/06/2017  SpO2 97%  BMI 31.64 kg/m2  Body mass index is 31.64 kg/(m^2).  GENERAL: alert and no distress  PSYCH: mentation appears normal, affect normal/bright    Diagnostic Test Results:  Results for orders placed or performed in visit on 11/21/17   Wet prep   Result Value Ref Range    Specimen Description Vagina     Wet Prep No Trichomonas seen     Wet Prep No clue cells seen     Wet Prep No yeast seen        ASSESSMENT/PLAN:     1. Vaginal bacteriosis  Pt has had BV 2 times this month  Currently menses started - not sure if it will return  Will try over the counter boric acid vaginal suppository  - Wet prep    2. Excessive or frequent menstruation  Will try birth control to see if helpful   Advised risk vs benefits   Will see back in one month  - norethindrone-ethinyl estradiol (JUNEL FE 1/20) 1-20 MG-MCG per tablet; Take 1 tablet by mouth " daily  Dispense: 28 tablet; Refill: 11    3. Iron deficiency anemia secondary to inadequate dietary iron intake  Pt missing doses of vitamin    4. Hypothyroidism due to Hashimoto's thyroiditis  Thyroid is up but she admits to missing about one tablet a week  Will try to remember better    5. Anxiety  Worsening anxiety - feels like she had a set back  Will try OCP but advised she may need SSRI  Pt will call or RTC if symptoms worsen or do not improve.     I spent over 25 minutes with patient and over 50% time in counseling regarding above issues.     Pt will call or RTC if symptoms worsen or do not improve.     Trish Callaway, DO  [facility]

## 2017-11-29 NOTE — MR AVS SNAPSHOT
After Visit Summary   11/29/2017    Kasandra Winkler    MRN: [id number]           Patient Information     Date Of Birth          1980        Visit Information        Provider Department      11/29/2017 8:15 AM Trish Callaway DO [facility]        Today's Diagnoses     Vaginal bacteriosis    -  1    Excessive or frequent menstruation        Iron deficiency anemia secondary to inadequate dietary iron intake        Hypothyroidism due to Hashimoto's thyroiditis        Anxiety           Follow-ups after your visit        Who to contact     If you have questions or need follow up information about today's clinic visit or your schedule please contact [facility] directly at [phone].  Normal or non-critical lab and imaging results will be communicated to you by [employer], letter or phone within 4 business days after the clinic has received the results. If you do not hear from us within 7 days, please contact the clinic through [employer] [facility] or phone. If you have a critical or abnormal lab result, we will notify you by phone as soon as possible.  Submit refill requests through [employer] or call your pharmacy and they will forward the refill request to us. Please allow 3 business days for your refill to be completed.          Additional Information About Your Visit        MyChart Information     [employer] gives you secure access to your electronic health record. If you see a primary care provider, you can also send messages to your care team and make appointments. If you have questions, please call your primary care clinic.  If you do not have a primary care provider, please call [phone] and they will assist you.        Care EveryWhere ID     This is your Care EveryWhere ID. This could be used by other organizations to access your [place] medical records  [id number]        Your Vitals Were     Pulse Temperature Respirations Height Last Period Pulse Oximetry    74 97.8  F (36.6  C)  "16 5' 6\" (1.676 m) 11/06/2017 97%    BMI (Body Mass Index)                   31.64 kg/m2            Blood Pressure from Last 3 Encounters:   11/29/17 116/76   11/21/17 119/81   11/14/17 118/80    Weight from Last 3 Encounters:   11/29/17 196 lb (88.9 kg)   11/21/17 196 lb (88.9 kg)   11/14/17 197 lb (89.4 kg)              We Performed the Following     Wet prep          Today's Medication Changes          These changes are accurate as of: 11/29/17  8:38 AM.  If you have any questions, ask your nurse or doctor.               Start taking these medicines.        Dose/Directions    norethindrone-ethinyl estradiol 1-20 MG-MCG per tablet   Commonly known as:  JUNEL FE 1/20   Used for:  Excessive or frequent menstruation   Started by:  Trish Callaway DO        Dose:  1 tablet   Take 1 tablet by mouth daily   Quantity:  28 tablet   Refills:  11            Where to get your medicines      [facility] [address] [place] [address] AT [address] [address]     Phone:  [phone]     norethindrone-ethinyl estradiol 1-20 MG-MCG per tablet                Primary Care Provider Office Phone # Fax #    Trish Callaway [phone] [address] [address]        Equal Access to Services     KRIS SILVA AH: Hadii chrissy christianson hadasho Soomaali, waaxda luqadaha, qaybta kaalmada adeegyada, chris grimm. So [facility] [phone].    ATENCIÓN: Si habla danie, tiene a das disposición servicios gratuitos de asistencia lingüística. Llame al [phone].    We comply with applicable federal civil rights laws and [place] laws. We do not discriminate on the basis of race, color, national origin, age, disability, sex, sexual orientation, or gender identity.            Thank you!     Thank you for choosing [facility]  for your care. Our goal is always to " provide you with excellent care. Hearing back from our patients is one way we can continue to improve our services. Please take a few minutes to complete the written survey that you may receive in the mail after your visit with us. Thank you!             Your Updated Medication List - Protect others around you: Learn how to safely use, store and throw away your medicines at www.disposemymeds.org.          This list is accurate as of: 11/29/17  8:38 AM.  Always use your most recent med list.                   Brand Name Dispense Instructions for use Diagnosis    azelastine 0.1 % spray    ASTELIN    30 mL    USE 1 TO 2 SPRAYS IN EACH NOSTRIL TWICE DAILY    Allergic rhinitis due to pollen       beclomethasone 80 MCG/ACT Inhaler    QVAR    3 Inhaler    Inhale 2 puffs into the lungs 2 times daily    Rhinitis, allergic       calcium carbonate 1250 MG tablet    OS-JONATAN 500 mg [place]. Ca     Take 500 mg by mouth 2 times daily        cetirizine 10 MG tablet    zyrTEC     Take 10 mg by mouth daily        clonazePAM 0.5 MG tablet    klonoPIN    20 tablet    Take 1 tablet (0.5 mg) by mouth as needed for anxiety    Major depressive disorder, recurrent episode, mild (H)       Cod Liver Oil 1000 MG Caps      Take 1 capsule by mouth daily.        fluconazole 150 MG tablet    DIFLUCAN    4 tablet    Take 2 tablets (300mg) by mouth once a week for 2 weeks    Tinea versicolor       fluticasone 50 MCG/ACT spray    FLONASE    1 Package    Spray 1-2 sprays into both nostrils daily.    Seasonal allergic rhinitis       HM MULTIVITAMIN ADULT GUMMY PO           ketoconazole 2 % shampoo    NIZORAL    120 mL    Apply to the affected area and wash off after 5 minutes.    Dandruff, Tinea versicolor       levothyroxine 137 MCG tablet    SYNTHROID/LEVOTHROID    30 tablet    Take 1 tablet (137 mcg) by mouth daily    Hashimoto's thyroiditis       norethindrone-ethinyl estradiol 1-20 MG-MCG per tablet    JUNEL FE 1/20    28 tablet    Take 1 tablet by  mouth daily    Excessive or frequent menstruation       Vitamin B-12 500 MCG Subl      Place 500 mcg under the tongue daily        Vitamin D3 3000 UNITS Tabs      Take 1 tablet by mouth daily.

## 2017-12-04 ENCOUNTER — OFFICE VISIT (OUTPATIENT)
Dept: FAMILY MEDICINE | Facility: CLINIC | Age: 37
End: 2017-12-04
Payer: COMMERCIAL

## 2017-12-04 ENCOUNTER — CARE COORDINATION (OUTPATIENT)
Dept: SURGERY | Facility: CLINIC | Age: 37
End: 2017-12-04

## 2017-12-04 VITALS
WEIGHT: 196.1 LBS | TEMPERATURE: 99.5 F | SYSTOLIC BLOOD PRESSURE: 111 MMHG | HEIGHT: 66 IN | DIASTOLIC BLOOD PRESSURE: 71 MMHG | OXYGEN SATURATION: 98 % | HEART RATE: 82 BPM | BODY MASS INDEX: 31.52 KG/M2

## 2017-12-04 DIAGNOSIS — Z98.84 STATUS POST BARIATRIC SURGERY: Primary | ICD-10-CM

## 2017-12-04 DIAGNOSIS — M54.42 ACUTE LEFT-SIDED LOW BACK PAIN WITH LEFT-SIDED SCIATICA: Primary | ICD-10-CM

## 2017-12-04 PROCEDURE — 99213 OFFICE O/P EST LOW 20 MIN: CPT | Performed by: PHYSICIAN ASSISTANT

## 2017-12-04 RX ORDER — METHOCARBAMOL 500 MG/1
1000 TABLET, FILM COATED ORAL 3 TIMES DAILY PRN
Qty: 30 TABLET | Refills: 1 | Status: SHIPPED | OUTPATIENT
Start: 2017-12-04 | End: 2017-12-20

## 2017-12-04 RX ORDER — HYDROCODONE BITARTRATE AND ACETAMINOPHEN 5; 325 MG/1; MG/1
1-2 TABLET ORAL
Qty: 7 TABLET | Refills: 0 | Status: SHIPPED | OUTPATIENT
Start: 2017-12-04 | End: 2018-02-22

## 2017-12-04 NOTE — NURSING NOTE
"Chief Complaint   Patient presents with     Back Pain       Initial /71  Pulse 82  Temp 99.5  F (37.5  C) (Oral)  Ht 5' 6\" (1.676 m)  Wt 196 lb 1.6 oz (89 kg)  LMP 11/27/2017 (Exact Date)  SpO2 98%  BMI 31.65 kg/m2 Estimated body mass index is 31.65 kg/(m^2) as calculated from the following:    Height as of this encounter: 5' 6\" (1.676 m).    Weight as of this encounter: 196 lb 1.6 oz (89 kg).  Medication Reconciliation: complete      Health Maintenance that is potentially due pending provider review:  NONE    n/a    RICO Ramires  "

## 2017-12-04 NOTE — LETTER
[facility]  [address]  [facility] [address]  Phone: [phone]    December 4, 2017        Kasandra Winkler  [address]  [facility] [address]          To whom it may concern:    RE: Kasandra Winkler    Patient was seen and treated today at our clinic and missed work.    Please contact me for questions or concerns.      Sincerely,        Jamie Alfaro PA-C

## 2017-12-04 NOTE — PROGRESS NOTES
SUBJECTIVE:   Kasandra Winkler is a 36 year old female who presents to clinic today for the following health issues:      Back Pain       Duration: x2 days        Specific cause: turning/bending    Description:   Location of pain: low back bilateral  Character of pain: sharp, stabbing and gnawing  Pain radiation:radiates into the right buttocks, radiates into the right leg, radiates into the left buttocks and radiates into the left leg  New numbness or weakness in legs, not attributed to pain:  no     Intensity: Currently 6/10, At its worst 9/10    History:   Pain interferes with job: YES  History of back problems: recurrent self limited episodes of low back pain in the past  Any previous MRI or X-rays: None  Sees a specialist for back pain:  No  Therapies tried without relief: aleve, ice and heat     Alleviating factors:   Improved by: lying down and chiropractor      Precipitating factors:  Worsened by: Lifting, Bending, Standing, Sitting and Walking    Functional and Psychosocial Screen (Andrey STarT Back):      Not performed today          Accompanying Signs & Symptoms:  Risk of Fracture:  None  Risk of Cauda Equina:  None  Risk of Infection:  None  Risk of Cancer:  None  Risk of Ankylosing Spondylitis:  Onset at age <35, male, AND morning back stiffness. no                       Problem list and histories reviewed & adjusted, as indicated.  Additional history: 37 y/o new to me female here for evaluation of low back pain.  She was doing come vacuuming with twisting, and felt the pain then.  She has had some intermittent shooting pain down her left leg.  She has taken some aleve, not a ton of help.  She does feel better laying down.  Sitting and walking.      She has had some low back issues in the past, and she has worked with chiropractor in the past with mild help.      BP Readings from Last 3 Encounters:   12/13/17 108/62   12/07/17 122/75   12/04/17 111/71    Wt Readings from Last 3 Encounters:   12/13/17  "196 lb (88.9 kg)   12/07/17 192 lb 6.4 oz (87.3 kg)   12/04/17 196 lb 1.6 oz (89 kg)                      Reviewed and updated as needed this visit by clinical staff       Reviewed and updated as needed this visit by Provider         ROS:  Constitutional, HEENT, cardiovascular, pulmonary, gi and gu systems are negative, except as otherwise noted.      OBJECTIVE:   /71  Pulse 82  Temp 99.5  F (37.5  C) (Oral)  Ht 5' 6\" (1.676 m)  Wt 196 lb 1.6 oz (89 kg)  LMP 11/27/2017 (Exact Date)  SpO2 98%  BMI 31.65 kg/m2  Body mass index is 31.65 kg/(m^2).  GENERAL: healthy, alert and mild distress  EYES: Eyes grossly normal to inspection  RESP: lungs clear to auscultation - no rales, rhonchi or wheezes  CV: regular rate and rhythm, normal S1 S2, no S3 or S4, no murmur, click or rub, no peripheral edema and peripheral pulses strong  Comprehensive back pain exam:  Tenderness of left para lumbar into piriformis, Pain limits the following motions: flexion and rotation, Lower extremity strength functional and equal on both sides, Lower extremity sensation normal and equal on both sides and Straight leg raise negative bilaterally    Diagnostic Test Results:  none     ASSESSMENT/PLAN:             1. Acute left-sided low back pain with left-sided sciatica  Ice, painless ROM exercises, especially extension.  Will have her get into PHYSICAL THERAPY for further eval and treat.  - [facility] PT, HAND, AND CHIROPRACTIC REFERRAL  - methocarbamol (ROBAXIN) 500 MG tablet; Take 2 tablets (1,000 mg) by mouth 3 times daily as needed for muscle spasms  Dispense: 30 tablet; Refill: 1  - HYDROcodone-acetaminophen (NORCO) 5-325 MG per tablet; Take 1-2 tablets by mouth nightly as needed for moderate to severe pain maximum 1 tablet(s) per day  Dispense: 7 tablet; Refill: 0        Jamie Alfaro PA-C  [facility]  "

## 2017-12-04 NOTE — MR AVS SNAPSHOT
After Visit Summary   12/4/2017    Kasandra Winkler    MRN: [id number]           Patient Information     Date Of Birth          1980        Visit Information        Provider Department      12/4/2017 2:00 PM Jamie Alfaro PA-C [facility]        Today's Diagnoses     Acute left-sided low back pain with left-sided sciatica    -  1       Follow-ups after your visit        Additional Services     [facility] PT, HAND, AND CHIROPRACTIC REFERRAL       **This order will print in the [facility] Scheduling Office**    Physical Therapy, Hand Therapy and Chiropractic Care are available through:    *[facility]  *[facility]  *[facility]    Call one number to schedule at any of the above locations: [phone].    Your provider has referred you to: Physical Therapy at [facility] or [facility]    Indication/Reason for Referral: Low Back Pain  Onset of Illness: 2 days  Therapy Orders: Evaluate and Treat  Special Programs: None  Special Request: None    Andrey Balderas      Additional Comments for the Therapist or Chiropractor:     Please be aware that coverage of these services is subject to the terms and limitations of your health insurance plan.  Call member services at your health plan with any benefit or coverage questions.      Please bring the following to your appointment:    *Your personal calendar for scheduling future appointments  *Comfortable clothing                  Your next 10 appointments already scheduled     Dec 07, 2017  1:00 PM CST   LAB with  LAB    Health Lab ([facility])    [address] [place] [address] [phone]           Please do not eat 10-12 hours before your appointment if you are coming in fasting for labs on lipids, cholesterol, or glucose (sugar). This does not apply to pregnant women. Water, hot tea and black coffee (with nothing added) are okay. Do not drink other  "fluids, diet soda or chew gum.            Dec 07, 2017  2:15 PM CST   (Arrive by 2:00 PM)   RETURN BARIATRIC SURGERY with Jasmin Robison PA-C   [facility] Surgical [facility] Management ([facility])    [address] [place] [address] [phone]              Who to contact     If you have questions or need follow up information about today's clinic visit or your schedule please contact [facility] directly at [phone].  Normal or non-critical lab and imaging results will be communicated to you by [employer], letter or phone within 4 business days after the clinic has received the results. If you do not hear from us within 7 days, please contact the clinic through [employer] or phone. If you have a critical or abnormal lab result, we will notify you by phone as soon as possible.  Submit refill requests through [employer] or call your pharmacy and they will forward the refill request to us. Please allow 3 business days for your refill to be completed.          Additional Information About Your Visit        MyChart Information     [employer] gives you secure access to your electronic health record. If you see a primary care provider, you can also send messages to your care team and make appointments. If you have questions, please call your primary care clinic.  If you do not have a primary care provider, please call [phone] and they will assist you.        Care EveryWhere ID     This is your Care EveryWhere ID. This could be used by other organizations to access your [facility] medical records  [id number]        Your Vitals Were     Pulse Temperature Height Last Period Pulse Oximetry BMI (Body Mass Index)    82 99.5  F (37.5  C) (Oral) 5' 6\" (1.676 m) 11/27/2017 (Exact Date) 98% 31.65 kg/m2       Blood Pressure from Last 3 Encounters:   12/04/17 111/71   11/29/17 116/76   11/21/17 119/81    Weight from Last 3 Encounters:   12/04/17 196 lb 1.6 oz (89 kg) "   11/29/17 196 lb (88.9 kg)   11/21/17 196 lb (88.9 kg)              We Performed the Following     [facility] PT, HAND, AND CHIROPRACTIC REFERRAL          Today's Medication Changes          These changes are accurate as of: 12/4/17  2:24 PM.  If you have any questions, ask your nurse or doctor.               Start taking these medicines.        Dose/Directions    HYDROcodone-acetaminophen 5-325 MG per tablet   Commonly known as:  NORCO   Used for:  Acute left-sided low back pain with left-sided sciatica   Started by:  Jamie Alfaro PA-C        Dose:  1-2 tablet   Take 1-2 tablets by mouth nightly as needed for moderate to severe pain maximum 1 tablet(s) per day   Quantity:  7 tablet   Refills:  0       methocarbamol 500 MG tablet   Commonly known as:  ROBAXIN   Used for:  Acute left-sided low back pain with left-sided sciatica   Started by:  Jamie Alfaro PA-C        Dose:  1000 mg   Take 2 tablets (1,000 mg) by mouth 3 times daily as needed for muscle spasms   Quantity:  30 tablet   Refills:  1            Where to get your medicines      These medications were sent to [facility] [address] [place] [address] AT [address] [address]     Phone:  [phone]     methocarbamol 500 MG tablet         Some of these will need a paper prescription and others can be bought over the counter.  Ask your nurse if you have questions.     Bring a paper prescription for each of these medications     HYDROcodone-acetaminophen 5-325 MG per tablet                Primary Care Provider Office Phone # Fax #    Trish Callaway [phone] [address] [address]        Equal Access to Services     JESSICA SILVA : Krishna To, wadavid flores, qaybta kaalchencho lopez, chris grimm. [facility] [phone].    ATENCIÓN: Si tere helton, nay cabello das  disposición servicios gratuitos de asistencia lingüística. Sravan dominguez [phone].    We comply with applicable federal civil rights laws and [place] laws. We do not discriminate on the basis of race, color, national origin, age, disability, sex, sexual orientation, or gender identity.            Thank you!     Thank you for choosing [facility]  for your care. Our goal is always to provide you with excellent care. Hearing back from our patients is one way we can continue to improve our services. Please take a few minutes to complete the written survey that you may receive in the mail after your visit with us. Thank you!             Your Updated Medication List - Protect others around you: Learn how to safely use, store and throw away your medicines at www.disposemymeds.org.          This list is accurate as of: 12/4/17  2:24 PM.  Always use your most recent med list.                   Brand Name Dispense Instructions for use Diagnosis    azelastine 0.1 % spray    ASTELIN    30 mL    USE 1 TO 2 SPRAYS IN EACH NOSTRIL TWICE DAILY    Allergic rhinitis due to pollen       beclomethasone 80 MCG/ACT Inhaler    QVAR    3 Inhaler    Inhale 2 puffs into the lungs 2 times daily    Rhinitis, allergic       calcium carbonate 1250 MG tablet    OS-JONATAN 500 mg [place]. [place]     Take 500 mg by mouth 2 times daily        cetirizine 10 MG tablet    zyrTEC     Take 10 mg by mouth daily        clonazePAM 0.5 MG tablet    klonoPIN    20 tablet    Take 1 tablet (0.5 mg) by mouth as needed for anxiety    Major depressive disorder, recurrent episode, mild (H)       Cod Liver Oil 1000 MG Caps      Take 1 capsule by mouth daily.        fluconazole 150 MG tablet    DIFLUCAN    4 tablet    Take 2 tablets (300mg) by mouth once a week for 2 weeks    Tinea versicolor       fluticasone 50 MCG/ACT spray    FLONASE    1 Package    Spray 1-2 sprays into both nostrils daily.    Seasonal allergic rhinitis       HM MULTIVITAMIN ADULT GUMMY PO            HYDROcodone-acetaminophen 5-325 MG per tablet    NORCO    7 tablet    Take 1-2 tablets by mouth nightly as needed for moderate to severe pain maximum 1 tablet(s) per day    Acute left-sided low back pain with left-sided sciatica       ketoconazole 2 % shampoo    NIZORAL    120 mL    Apply to the affected area and wash off after 5 minutes.    Dandruff, Tinea versicolor       levothyroxine 137 MCG tablet    SYNTHROID/LEVOTHROID    30 tablet    Take 1 tablet (137 mcg) by mouth daily    Hashimoto's thyroiditis       methocarbamol 500 MG tablet    ROBAXIN    30 tablet    Take 2 tablets (1,000 mg) by mouth 3 times daily as needed for muscle spasms    Acute left-sided low back pain with left-sided sciatica       norethindrone-ethinyl estradiol 1-20 MG-MCG per tablet    JUNEL FE 1/20    28 tablet    Take 1 tablet by mouth daily    Excessive or frequent menstruation       Vitamin B-12 500 MCG Subl      Place 500 mcg under the tongue daily        Vitamin D3 3000 UNITS Tabs      Take 1 tablet by mouth daily.

## 2017-12-05 ENCOUNTER — THERAPY VISIT (OUTPATIENT)
Dept: PHYSICAL THERAPY | Facility: CLINIC | Age: 37
End: 2017-12-05
Payer: COMMERCIAL

## 2017-12-05 DIAGNOSIS — Z98.84 STATUS POST BARIATRIC SURGERY: ICD-10-CM

## 2017-12-05 DIAGNOSIS — M54.42 ACUTE BILATERAL LOW BACK PAIN WITH LEFT-SIDED SCIATICA: Primary | ICD-10-CM

## 2017-12-05 LAB
ERYTHROCYTE [DISTWIDTH] IN BLOOD BY AUTOMATED COUNT: 17.6 % (ref 10–15)
HBA1C MFR BLD: 4.8 % (ref 4.3–6)
HCT VFR BLD AUTO: 35.5 % (ref 35–47)
HGB BLD-MCNC: 11.4 G/DL (ref 11.7–15.7)
MCH RBC QN AUTO: 26.7 PG (ref 26.5–33)
MCHC RBC AUTO-ENTMCNC: 32.1 G/DL (ref 31.5–36.5)
MCV RBC AUTO: 83 FL (ref 78–100)
PLATELET # BLD AUTO: 306 10E9/L (ref 150–450)
PTH-INTACT SERPL-MCNC: 47 PG/ML (ref 12–72)
RBC # BLD AUTO: 4.27 10E12/L (ref 3.8–5.2)
VIT B12 SERPL-MCNC: 822 PG/ML (ref 193–986)
WBC # BLD AUTO: 6.6 10E9/L (ref 4–11)

## 2017-12-05 PROCEDURE — 83036 HEMOGLOBIN GLYCOSYLATED A1C: CPT | Performed by: SURGERY

## 2017-12-05 PROCEDURE — 82525 ASSAY OF COPPER: CPT | Mod: 90 | Performed by: SURGERY

## 2017-12-05 PROCEDURE — 82728 ASSAY OF FERRITIN: CPT | Performed by: SURGERY

## 2017-12-05 PROCEDURE — 84630 ASSAY OF ZINC: CPT | Mod: 90 | Performed by: SURGERY

## 2017-12-05 PROCEDURE — 84134 ASSAY OF PREALBUMIN: CPT | Performed by: SURGERY

## 2017-12-05 PROCEDURE — 82306 VITAMIN D 25 HYDROXY: CPT | Performed by: SURGERY

## 2017-12-05 PROCEDURE — 84590 ASSAY OF VITAMIN A: CPT | Mod: 90 | Performed by: SURGERY

## 2017-12-05 PROCEDURE — G0283 ELEC STIM OTHER THAN WOUND: HCPCS | Mod: GP | Performed by: PHYSICAL THERAPIST

## 2017-12-05 PROCEDURE — 82607 VITAMIN B-12: CPT | Performed by: SURGERY

## 2017-12-05 PROCEDURE — 97110 THERAPEUTIC EXERCISES: CPT | Mod: GP | Performed by: PHYSICAL THERAPIST

## 2017-12-05 PROCEDURE — 80053 COMPREHEN METABOLIC PANEL: CPT | Performed by: SURGERY

## 2017-12-05 PROCEDURE — 36415 COLL VENOUS BLD VENIPUNCTURE: CPT | Performed by: SURGERY

## 2017-12-05 PROCEDURE — 83970 ASSAY OF PARATHORMONE: CPT | Performed by: SURGERY

## 2017-12-05 PROCEDURE — 85027 COMPLETE CBC AUTOMATED: CPT | Performed by: SURGERY

## 2017-12-05 PROCEDURE — 84425 ASSAY OF VITAMIN B-1: CPT | Mod: 90 | Performed by: SURGERY

## 2017-12-05 PROCEDURE — 99000 SPECIMEN HANDLING OFFICE-LAB: CPT | Performed by: SURGERY

## 2017-12-05 PROCEDURE — 97161 PT EVAL LOW COMPLEX 20 MIN: CPT | Mod: GP | Performed by: PHYSICAL THERAPIST

## 2017-12-05 NOTE — MR AVS SNAPSHOT
After Visit Summary   12/5/2017    Kasandra Winkler    MRN: [id number]           Patient Information     Date Of Birth          1980        Visit Information        Provider Department      12/5/2017 11:40 AM Hortensia Foote, PT [facility] Physical Therapy        Today's Diagnoses     Acute bilateral low back pain with left-sided sciatica    -  1       Follow-ups after your visit        Your next 10 appointments already scheduled     Dec 07, 2017  1:00 PM CST   LAB with  LAB   [facility] ([facility])    [address] [place] [address]  [address] [id number] [phone]           Please do not eat 10-12 hours before your appointment if you are coming in fasting for labs on lipids, cholesterol, or glucose (sugar). This does not apply to pregnant women. Water, hot tea and black coffee (with nothing added) are okay. Do not drink other fluids, diet soda or chew gum.            Dec 07, 2017  2:15 PM CST   (Arrive by 2:00 PM)   RETURN BARIATRIC SURGERY with Jasmin Robison PA-C   [facility] Surgical Weight Management ([facility])    [address] [place] [address]  [facility] [id number] [phone]              Future tests that were ordered for you today     Open Future Orders        Priority Expected Expires Ordered    CBC with platelets [DOF603] Routine  12/4/2018 12/4/2017    Comprehensive metabolic panel [LAB17] Routine  12/4/2018 12/4/2017    Parathyroid Hormone Intact [LMM716] Routine  12/4/2018 12/4/2017    Vitamin D [DEQ614] Routine  12/4/2018 12/4/2017    Vitamin B12 [LAB67] Routine  12/4/2018 12/4/2017    Ferritin [LAB68] Routine  12/4/2018 12/4/2017    Hemoglobin [KON747] Routine  12/4/2018 12/4/2017    Prealbumin [XWZ690] Routine  12/4/2018 12/4/2017    Hemoglobin A1c [LAB90] Routine  12/4/2018 12/4/2017    Copper level [LXJ937] Routine  12/4/2018 12/4/2017    Zinc [TWW1234] Routine   12/4/2018 12/4/2017    Vitamin A [YLL120] Routine  12/4/2018 12/4/2017    Vitamin B1 (Thiamine) [PYG377] Routine  12/4/2018 12/4/2017            Who to contact     If you have questions or need follow up information about today's clinic visit or your schedule please contact [facility] PHYSICAL THERAPY directly at [phone].  Normal or non-critical lab and imaging results will be communicated to you by [employer], letter or phone within 4 business days after the clinic has received the results. If you do not hear from us within 7 days, please contact the clinic through [employer] or phone. If you have a critical or abnormal lab result, we will notify you by phone as soon as possible.  Submit refill requests through [employer] or call your pharmacy and they will forward the refill request to us. Please allow 3 business days for your refill to be completed.          Additional Information About Your Visit        Med[employer]ay Information     [employer] gives you secure access to your electronic health record. If you see a primary care provider, you can also send messages to your care team and make appointments. If you have questions, please call your primary care clinic.  If you do not have a primary care provider, please call [phone] and they will assist you.        Care EveryWhere ID     This is your Care EveryWhere ID. This could be used by other organizations to access your [facility] medical records  [id number]        Your Vitals Were     Last Period                   11/27/2017 (Exact Date)            Blood Pressure from Last 3 Encounters:   12/04/17 111/71   11/29/17 116/76   11/21/17 119/81    Weight from Last 3 Encounters:   12/04/17 89 kg (196 lb 1.6 oz)   11/29/17 88.9 kg (196 lb)   11/21/17 88.9 kg (196 lb)              We Performed the Following     ELECTRIC STIMULATION THERAPY     HC PT EVAL, LOW COMPLEXITY     [facility] INITIAL EVAL REPORT     THERAPEUTIC EXERCISES        Primary Care  Provider Office Phone # Fax #    Trish Callaway [phone] [address] [address]        Equal Access to Services     KRIS SILVA : Krishna chrissy christianson ursulao Sohumphrey, waaxda luqadaha, qaybta kaalmada aderj, chris beckettdahlia grimm. So [facility] [phone].    ATENCIÓN: Si habla español, tiene a das disposición servicios gratuitos de asistencia lingüística. Timame al [phone].    We comply with applicable federal civil rights laws and [place] laws. We do not discriminate on the basis of race, color, national origin, age, disability, sex, sexual orientation, or gender identity.            Thank you!     Thank you for choosing [place] [facility] PHYSICAL THERAPY  for your care. Our goal is always to provide you with excellent care. Hearing back from our patients is one way we can continue to improve our services. Please take a few minutes to complete the written survey that you may receive in the mail after your visit with us. Thank you!             Your Updated Medication List - Protect others around you: Learn how to safely use, store and throw away your medicines at www.disposemymeds.org.          This list is accurate as of: 12/5/17 12:26 PM.  Always use your most recent med list.                   Brand Name Dispense Instructions for use Diagnosis    azelastine 0.1 % spray    ASTELIN    30 mL    USE 1 TO 2 SPRAYS IN EACH NOSTRIL TWICE DAILY    Allergic rhinitis due to pollen       beclomethasone 80 MCG/ACT Inhaler    QVAR    3 Inhaler    Inhale 2 puffs into the lungs 2 times daily    Rhinitis, allergic       calcium carbonate 1250 MG tablet    OS-JONATAN 500 mg [place]. [place]     Take 500 mg by mouth 2 times daily        cetirizine 10 MG tablet    zyrTEC     Take 10 mg by mouth daily        clonazePAM 0.5 MG tablet    klonoPIN    20 tablet    Take 1 tablet (0.5 mg) by mouth as needed for anxiety    Major depressive disorder, recurrent  episode, mild (H)       Cod Liver Oil 1000 MG Caps      Take 1 capsule by mouth daily.        fluconazole 150 MG tablet    DIFLUCAN    4 tablet    Take 2 tablets (300mg) by mouth once a week for 2 weeks    Tinea versicolor       fluticasone 50 MCG/ACT spray    FLONASE    1 Package    Spray 1-2 sprays into both nostrils daily.    Seasonal allergic rhinitis       HM MULTIVITAMIN ADULT GUMMY PO           HYDROcodone-acetaminophen 5-325 MG per tablet    NORCO    7 tablet    Take 1-2 tablets by mouth nightly as needed for moderate to severe pain maximum 1 tablet(s) per day    Acute left-sided low back pain with left-sided sciatica       ketoconazole 2 % shampoo    NIZORAL    120 mL    Apply to the affected area and wash off after 5 minutes.    Dandruff, Tinea versicolor       levothyroxine 137 MCG tablet    SYNTHROID/LEVOTHROID    30 tablet    Take 1 tablet (137 mcg) by mouth daily    Hashimoto's thyroiditis       methocarbamol 500 MG tablet    ROBAXIN    30 tablet    Take 2 tablets (1,000 mg) by mouth 3 times daily as needed for muscle spasms    Acute left-sided low back pain with left-sided sciatica       norethindrone-ethinyl estradiol 1-20 MG-MCG per tablet    JUNEL FE 1/20    28 tablet    Take 1 tablet by mouth daily    Excessive or frequent menstruation       Vitamin B-12 500 MCG Subl      Place 500 mcg under the tongue daily        Vitamin D3 3000 UNITS Tabs      Take 1 tablet by mouth daily.

## 2017-12-05 NOTE — PROGRESS NOTES
Subjective:    Patient is a 36 year old female presenting with rehab back hpi.   Kasandra Winkler is a 36 year old female with a lumbar condition.  Condition occurred with:  Twisting.  Condition occurred: at home.  This is a new condition  Has had lbp in the past, but on 12/2, patient was vacuuming and had sharp pain in low back and down to left calf/ankle..    Patient reports pain:  Lumbar spine left.  Radiates to:  Gluteals left, knee left and lower leg left.  Pain is described as sharp Episode frequency: constant back apin and intermittent leg pain. Pain Scale: 5-9/10 without pain med but can decrease to 3/10 with pain meds.   Pain is the same all the time.  Symptoms are exacerbated by bending, lifting, twisting, sitting and walking Relieved by: hydrocodone and muscle relaxer, standing is better than sitting, but still hurts.  Since onset symptoms are gradually improving.    Previous treatment includes chiropractic.  There was mild improvement following previous treatment.  General health as reported by patient is fair.                                              Objective:    Standing Alignment:    Cervical/Thoracic:  Forward head  Shoulder/UE:  Rounded shoulders  Lumbar:  Lordosis decr            Gait:    Gait Type:  Antalgic   Assistive Devices:  None  Deviations:  Lumbar:  Trunk flexionGeneral Deviations:  Beba decr               Lumbar/SI Evaluation  ROM:    AROM Lumbar:   Flexion:          Mid thigh++  Ext:                    25%+   Side Bend:        Left:     Right:   Rotation:           Left:     Right:   Side Glide:        Left:  WNL+    Right:  WNL+        Strength: Feels the best lying prone today with 1-2 pillows.  Lumbar Myotomes:  normal            Lumbar DTR's:  normal          Neural Tension/Mobility:    Left side:  Slump positive.   Right side:   Slump positive.  Lumbar Palpation:  Palpation (lumbar): Tender at L5-S1.  Tenderness present at Left:    Quadratus Lumborum and Erector  Spinae  Tenderness present at Right: Quadratus Lumborum and Erector Spinae                                                     General     ROS    Assessment/Plan:      Patient is a 36 year old female with lumbar complaints.    Patient has the following significant findings with corresponding treatment plan.                Diagnosis 1:  Left lumbar radiculopathy  Pain -  hot/cold therapy, electric stimulation, self management, education, directional preference exercise and home program  Decreased ROM/flexibility - manual therapy, therapeutic exercise and home program  Decreased strength - therapeutic exercise, therapeutic activities and home program  Impaired gait - gait training and home program  Impaired muscle performance - neuro re-education and home program  Decreased function - therapeutic activities and home program  Impaired posture - neuro re-education and home program    Therapy Evaluation Codes:   1) History comprised of:   Personal factors that impact the plan of care:      None.    Comorbidity factors that impact the plan of care are:      None.     Medications impacting care: None.  2) Examination of Body Systems comprised of:   Body structures and functions that impact the plan of care:      Lumbar spine.   Activity limitations that impact the plan of care are:      Bending, Cooking, Driving, Dressing, Sitting, Stairs, Walking, Working, Sleeping and Laying down.  3) Clinical presentation characteristics are:   Evolving/Changing.  4) Decision-Making    Low complexity using standardized patient assessment instrument and/or measureable assessment of functional outcome.  Cumulative Therapy Evaluation is: Low complexity.    Previous and current functional limitations:  (See Goal Flow Sheet for this information)    Short term and Long term goals: (See Goal Flow Sheet for this information)     Communication ability:  Patient appears to be able to clearly communicate and understand verbal and written  communication and follow directions correctly.  Treatment Explanation - The following has been discussed with the patient:   RX ordered/plan of care  Anticipated outcomes  Possible risks and side effects  This patient would benefit from PT intervention to resume normal activities.   Rehab potential is good.    Frequency:  2 X week, once daily  Duration:  for 1 weeks tapering to 1 X a week over 7 weeks  Discharge Plan:  Achieve all LTG.  Independent in home treatment program.  Reach maximal therapeutic benefit.    Please refer to the daily flowsheet for treatment today, total treatment time and time spent performing 1:1 timed codes.

## 2017-12-06 LAB
ALBUMIN SERPL-MCNC: 3.7 G/DL (ref 3.4–5)
ALP SERPL-CCNC: 53 U/L (ref 40–150)
ALT SERPL W P-5'-P-CCNC: 20 U/L (ref 0–50)
ANION GAP SERPL CALCULATED.3IONS-SCNC: 8 MMOL/L (ref 3–14)
AST SERPL W P-5'-P-CCNC: 11 U/L (ref 0–45)
BILIRUB SERPL-MCNC: 0.4 MG/DL (ref 0.2–1.3)
BUN SERPL-MCNC: 13 MG/DL (ref 7–30)
CALCIUM SERPL-MCNC: 8.9 MG/DL (ref 8.5–10.1)
CHLORIDE SERPL-SCNC: 107 MMOL/L (ref 94–109)
CO2 SERPL-SCNC: 23 MMOL/L (ref 20–32)
COPPER SERPL-MCNC: 131 UG/DL (ref 80–155)
CREAT SERPL-MCNC: 0.74 MG/DL (ref 0.52–1.04)
DEPRECATED CALCIDIOL+CALCIFEROL SERPL-MC: 29 UG/L (ref 20–75)
FERRITIN SERPL-MCNC: 7 NG/ML (ref 12–150)
GFR SERPL CREATININE-BSD FRML MDRD: 87 ML/MIN/1.7M2
GLUCOSE SERPL-MCNC: 82 MG/DL (ref 70–99)
POTASSIUM SERPL-SCNC: 4.3 MMOL/L (ref 3.4–5.3)
PREALB SERPL IA-MCNC: 23 MG/DL (ref 15–45)
PROT SERPL-MCNC: 7.2 G/DL (ref 6.8–8.8)
SODIUM SERPL-SCNC: 138 MMOL/L (ref 133–144)
ZINC SERPL-MCNC: 75 UG/DL (ref 60–120)

## 2017-12-06 NOTE — PROGRESS NOTES
Subjective:    Patient is a 36 year old female presenting with rehab left ankle/foot hpi.                                      Pertinent medical history includes:  Thyroid problems, anemia and migraines.      Current medications:  Thyroid medication, anti-inflammatory, pain medication and muscle relaxants.      Primary job tasks include:  Prolonged sitting.              Oswestry Score: 58 %                 Objective:    System    Physical Exam    General     ROS    Assessment/Plan:

## 2017-12-07 ENCOUNTER — THERAPY VISIT (OUTPATIENT)
Dept: PHYSICAL THERAPY | Facility: CLINIC | Age: 37
End: 2017-12-07
Payer: COMMERCIAL

## 2017-12-07 ENCOUNTER — OFFICE VISIT (OUTPATIENT)
Dept: SURGERY | Facility: CLINIC | Age: 37
End: 2017-12-07

## 2017-12-07 VITALS
HEIGHT: 66 IN | BODY MASS INDEX: 30.92 KG/M2 | OXYGEN SATURATION: 98 % | SYSTOLIC BLOOD PRESSURE: 122 MMHG | DIASTOLIC BLOOD PRESSURE: 75 MMHG | HEART RATE: 75 BPM | WEIGHT: 192.4 LBS | TEMPERATURE: 98.9 F

## 2017-12-07 DIAGNOSIS — Z98.84 STATUS POST BARIATRIC SURGERY: Primary | ICD-10-CM

## 2017-12-07 DIAGNOSIS — M54.42 ACUTE BILATERAL LOW BACK PAIN WITH LEFT-SIDED SCIATICA: ICD-10-CM

## 2017-12-07 PROCEDURE — 97140 MANUAL THERAPY 1/> REGIONS: CPT | Mod: GP | Performed by: PHYSICAL THERAPIST

## 2017-12-07 PROCEDURE — 97110 THERAPEUTIC EXERCISES: CPT | Mod: GP | Performed by: PHYSICAL THERAPIST

## 2017-12-07 NOTE — PATIENT INSTRUCTIONS
Return to clinic in 1  Year and yearly with labs    Set up iron infusion    Continue working with PCP or GYN regarding heavy menstrual periods.    Infusion center [phone]

## 2017-12-07 NOTE — MR AVS SNAPSHOT
After Visit Summary   12/7/2017    Kasandra Winkler    MRN: [id number]           Patient Information     Date Of Birth          1980        Visit Information        Provider Department      12/7/2017 2:15 PM Jasmin Robison PA-C [facility] Surgical Weight Management        Today's Diagnoses     Status post bariatric surgery    -  1      Care Instructions    Return to clinic in 1  Year and yearly with labs    Set up iron infusion    Continue working with PCP or GYN regarding heavy menstrual periods.    Infusion center [phone]            Follow-ups after your visit        Your next 10 appointments already scheduled     Dec 07, 2017  4:10 PM CST   [facility] with Hortensia Foote PT   [place] [facility] ([facility]  )    [id number]  [place] [address] [phone]              Who to contact     Please call your clinic at [phone] to:    Ask questions about your health    Make or cancel appointments    Discuss your medicines    Learn about your test results    Speak to your doctor   If you have compliments or concerns about an experience at your clinic, or if you wish to file a complaint, please contact [facility] Patient Relations at [phone] or email us at [email or web address]         Additional Information About Your Visit        MyChart Information     [employer] gives you secure access to your electronic health record. If you see a primary care provider, you can also send messages to your care team and make appointments. If you have questions, please call your primary care clinic.  If you do not have a primary care provider, please call [phone] and they will assist you.      [employer] is an electronic gateway that provides easy, online access to your medical records. With [employer], you can request a clinic appointment, read your test results, renew a prescription or communicate  "with your care team.     To access your existing account, please contact your [facility] or call [phone] for assistance.        Care EveryWhere ID     This is your Care EveryWhere ID. This could be used by other organizations to access your [facility] medical records  [id number]        Your Vitals Were     Pulse Temperature Height Last Period Pulse Oximetry BMI (Body Mass Index)    75 98.9  F (37.2  C) (Oral) 5' 6\" 11/27/2017 (Exact Date) 98% 31.05 kg/m2       Blood Pressure from Last 3 Encounters:   12/07/17 122/75   12/04/17 111/71   11/29/17 116/76    Weight from Last 3 Encounters:   12/07/17 192 lb 6.4 oz   12/04/17 196 lb 1.6 oz   11/29/17 196 lb              Today, you had the following     No orders found for display       Primary Care Provider Office Phone # Fax #    Trish CASE Callaway [phone]       [address] [address]        Equal Access to Services     [facility]: Hadii aad ku hadasho Soomaali, waaxda luqadaha, qaybta kaalmada adeegyada, waxay shabbir haydahlia will . So [facility] [phone].    ATENCIÓN: Si habla español, tiene a das disposición servicios gratuitos de asistencia lingüística. Llame al [phone].    We comply with applicable federal civil rights laws and [place] laws. We do not discriminate on the basis of race, color, national origin, age, disability, sex, sexual orientation, or gender identity.            Thank you!     Thank you for choosing [facility] SURGICAL WEIGHT MANAGEMENT  for your care. Our goal is always to provide you with excellent care. Hearing back from our patients is one way we can continue to improve our services. Please take a few minutes to complete the written survey that you may receive in the mail after your visit with us. Thank you!             Your Updated Medication List - Protect others around you: Learn how to safely use, store and throw away your medicines at " www.disposemymeds.org.          This list is accurate as of: 12/7/17  2:58 PM.  Always use your most recent med list.                   Brand Name Dispense Instructions for use Diagnosis    azelastine 0.1 % spray    ASTELIN    30 mL    USE 1 TO 2 SPRAYS IN EACH NOSTRIL TWICE DAILY    Allergic rhinitis due to pollen       beclomethasone 80 MCG/ACT Inhaler    QVAR    3 Inhaler    Inhale 2 puffs into the lungs 2 times daily    Rhinitis, allergic       calcium carbonate 1250 MG tablet    OS-JONATAN 500 mg [place]. [place]     Take 500 mg by mouth 2 times daily        cetirizine 10 MG tablet    zyrTEC     Take 10 mg by mouth daily        clonazePAM 0.5 MG tablet    klonoPIN    20 tablet    Take 1 tablet (0.5 mg) by mouth as needed for anxiety    Major depressive disorder, recurrent episode, mild (H)       Cod Liver Oil 1000 MG Caps      Take 1 capsule by mouth daily.        fluconazole 150 MG tablet    DIFLUCAN    4 tablet    Take 2 tablets (300mg) by mouth once a week for 2 weeks    Tinea versicolor       fluticasone 50 MCG/ACT spray    FLONASE    1 Package    Spray 1-2 sprays into both nostrils daily.    Seasonal allergic rhinitis       HM MULTIVITAMIN ADULT GUMMY PO           HYDROcodone-acetaminophen 5-325 MG per tablet    NORCO    7 tablet    Take 1-2 tablets by mouth nightly as needed for moderate to severe pain maximum 1 tablet(s) per day    Acute left-sided low back pain with left-sided sciatica       ketoconazole 2 % shampoo    NIZORAL    120 mL    Apply to the affected area and wash off after 5 minutes.    Dandruff, Tinea versicolor       levothyroxine 137 MCG tablet    SYNTHROID/LEVOTHROID    30 tablet    Take 1 tablet (137 mcg) by mouth daily    Hashimoto's thyroiditis       methocarbamol 500 MG tablet    ROBAXIN    30 tablet    Take 2 tablets (1,000 mg) by mouth 3 times daily as needed for muscle spasms    Acute left-sided low back pain with left-sided sciatica       norethindrone-ethinyl estradiol 1-20 MG-MCG per  tablet    JUNEL FE 1/20    28 tablet    Take 1 tablet by mouth daily    Excessive or frequent menstruation       Vitamin B-12 500 MCG Subl      Place 500 mcg under the tongue daily        Vitamin D3 3000 UNITS Tabs      Take 1 tablet by mouth daily.

## 2017-12-07 NOTE — LETTER
2017       RE: Kasandra Winkler  [address]  [place] [address]     Dear Colleague,    Thank you for referring your patient, Kasandra Winkler, to the [facility] SURGICAL WEIGHT MANAGEMENT at [facility]. Please see a copy of my visit note below.    Return Bariatric Surgery Note    RE: Kasandra Winkler  MR#: [id number]  : 1980  VISIT DATE: Dec 7, 2017    Dear Trish Callaway,    I had the pleasure of seeing your patient, Kasandra Winkler, in my post-bariatric surgery assessment clinic.    CHIEF COMPLAINT: Post-bariatric surgery follow-up    HISTORY OF PRESENT ILLNESS:  Questions Regarding Prior Weight Loss Surgery Reviewed With Patient 2017   I had the following weight loss procedure: Sleeve Gastrectomy   What year was your surgery?    How has your weight changed since your last visit? I have lost weight   Are you currently taking any weight loss medications? No   Do you currently have any of the following: Heartburn, acid reflux, or GERD (acid reflux disease)?   Have you been to the Emergency room since your last visit with us? Yes   Were you in the hospital since your last visit with us? No   Do you have any concerns today? anemia and face tingling       Weight History:     2017   What is your highest lifetime weight? 298   What is your lowest weight since surgery? (In pounds) 190     Initial Weight: 288 lb  Current Weight: Weight: 192 lb 6.4 oz  Cumulative weight loss (lbs): 95.6  Last Visits Weight: 245 lb 1.6 oz    Questions Regarding Co-Morbidities and Health Concerns Reviewed With Patient 2017   Pre-diabetes: Never   Diabetes II: Never   High Blood Pressure: Never   High cholesterol: Never   Heartburn/Reflux: Stayed the same   Are you taking daily medication for heartburn, acid reflux, or GERD (acid reflux disease)? No   Sleep apnea: Never   PCOS: Never   Back pain: Improved   Joint pain: Stayed the same   Lower leg swelling:  Improved       Eating Habits 12/7/2017   How many meals do you eat per day? 3   Do you snack between meals? Sometimes   How much food are you eating at each meal? 1/2 cup to 1 cup   Are you able to separate your meals and liquids by at least 30 minutes? Yes   Are you able to avoid liquid calories? Sometimes       Exercise Questions Reviewed With Patient 12/7/2017   How often do you exercise? 3 to 4 times per week   What is the duration of your exercise (in minutes)? 30 Minutes   What types of exercise do you do? walking, group fitness classes   What keeps you from being more active?  Pain, Too tired       Social History:      12/7/2017   Are you smoking? No   Are you drinking alcohol? No       Medications:  Current Outpatient Prescriptions   Medication     methocarbamol (ROBAXIN) 500 MG tablet     HYDROcodone-acetaminophen (NORCO) 5-325 MG per tablet     norethindrone-ethinyl estradiol (JUNEL FE 1/20) 1-20 MG-MCG per tablet     levothyroxine (SYNTHROID/LEVOTHROID) 137 MCG tablet     ketoconazole (NIZORAL) 2 % shampoo     fluconazole (DIFLUCAN) 150 MG tablet     azelastine (ASTELIN) 0.1 % spray     clonazePAM (KLONOPIN) 0.5 MG tablet     beclomethasone (QVAR) 80 MCG/ACT Inhaler     calcium carbonate (OS-JONATAN 500 MG [place]. [place]) 500 MG tablet     Cyanocobalamin (VITAMIN B-12) 500 MCG SUBL     Multiple Vitamins-Minerals (HM MULTIVITAMIN ADULT GUMMY PO)     cetirizine (ZYRTEC) 10 MG tablet     Cod Liver Oil 1000 MG CAPS     fluticasone (FLONASE) 50 MCG/ACT nasal spray     Cholecalciferol (VITAMIN D3) 3000 UNITS TABS     No current facility-administered medications for this visit.          12/7/2017   Do you avoid NSAIDs such as (Ibuprofen, Aleve, Naproxen, Advil)?   No     ROS:  GI:      12/7/2017   Vomiting: No   Diarrhea: No   Constipation: Yes   Swallowing trouble: No   Abdominal pain: Yes   Heartburn: Yes   Rash in skin folds: No   Depression: No   Stress urinary incontinence No     Skin:   BAR KIMBERLY ROS - SKIN 12/7/2017    Rash in skin folds: No     Psych:      12/7/2017   Depression: No   Anxiety: Yes     Female Only:   BAR RBS ROS - FEMALE ONLY 12/7/2017   Female only: Excessive menstrual bleeding, Regular menstrual cycles     LABS/IMAGING/MEDICAL RECORDS REVIEW:   Results for ELYSE PAULINO (MRN [id number]) as of 12/7/2017 14:50   Ref. Range 12/5/2017 12:43   Sodium Latest Ref Range: 133 - 144 mmol/L 138   Potassium Latest Ref Range: 3.4 - 5.3 mmol/L 4.3   Chloride Latest Ref Range: 94 - 109 mmol/L 107   Carbon Dioxide Latest Ref Range: 20 - 32 mmol/L 23   Urea Nitrogen Latest Ref Range: 7 - 30 mg/dL 13   Creatinine Latest Ref Range: 0.52 - 1.04 mg/dL 0.74   GFR Estimate Latest Ref Range: >60 mL/min/1.7m2 87   GFR Estimate If Black Latest Ref Range: >60 mL/min/1.7m2 >90   Calcium Latest Ref Range: 8.5 - 10.1 mg/dL 8.9   Anion Gap Latest Ref Range: 3 - 14 mmol/L 8   Albumin Latest Ref Range: 3.4 - 5.0 g/dL 3.7   Prealbumin Latest Ref Range: 15 - 45 mg/dL 23   Protein Total Latest Ref Range: 6.8 - 8.8 g/dL 7.2   Bilirubin Total Latest Ref Range: 0.2 - 1.3 mg/dL 0.4   Alkaline Phosphatase Latest Ref Range: 40 - 150 U/L 53   ALT Latest Ref Range: 0 - 50 U/L 20   AST Latest Ref Range: 0 - 45 U/L 11   Hemoglobin A1C Latest Ref Range: 4.3 - 6.0 % 4.8   Copper Latest Ref Range: 80 - 155 ug/dL 131   Ferritin Latest Ref Range: 12 - 150 ng/mL 7 (L)   Vitamin B12 Latest Ref Range: 193 - 986 pg/mL 822   Vitamin D Deficiency screening Latest Ref Range: 20 - 75 ug/L 29   Zinc Latest Ref Range: 60 - 120 ug/dL 75   Glucose Latest Ref Range: 70 - 99 mg/dL 82   WBC Latest Ref Range: 4.0 - 11.0 10e9/L 6.6   Hemoglobin Latest Ref Range: 11.7 - 15.7 g/dL 11.4 (L)   Hematocrit Latest Ref Range: 35.0 - 47.0 % 35.5   Platelet Count Latest Ref Range: 150 - 450 10e9/L 306   RBC Count Latest Ref Range: 3.8 - 5.2 10e12/L 4.27   MCV Latest Ref Range: 78 - 100 fl 83   MCH Latest Ref Range: 26.5 - 33.0 pg 26.7   MCHC Latest Ref Range: 31.5 - 36.5 g/dL 32.1  "  RDW Latest Ref Range: 10.0 - 15.0 % 17.6 (H)   Parathyroid Hormone Intact Latest Ref Range: 12 - 72 pg/mL 47     PHYSICAL EXAMINATION:  /75  Pulse 75  Temp 98.9  F (37.2  C) (Oral)  Ht 5' 6\"  Wt 192 lb 6.4 oz  LMP 11/27/2017 (Exact Date)  SpO2 98%  BMI 31.05 kg/m2   General: No apparent distress  Neuro: A & O x 3  Head: Atraumatic, normocephalic  Eyes: PERRL, EOMI  Skin: warm and dry, no rashes on exposed skin  Respiratory: respirations unlabored  Abdomen: soft NT ND  Extremities: No LE swelling    ASSESSMENT AND PLAN:      1. 2.5 years status laparoscopic gastric sleeve. Struggling with heavy menstrual periods and anemia.  Would like to do iron infusion.  Labs not improving with oral iron and difficult for her to remember to take the oral iron. She has lost 95 lbs overall since consult.     2. Morbid Obesity current BMI: Body mass index is 31.05 kg/(m^2).    3. Post surgical malabsorption:   Labs ordered per protocol.   Follow food plan per dietitian recommendations.   Continue taking recommended post-op vitamins.  4. Return to clinic in 1  Year.    5. Set up iron infusion    6. Continue working with PCP or GYN regarding heavy menstrual periods.        Sincerely,    Jasmin Robison PA-C    I spent a total of 15 minutes face to face with Kasandra during today's office visit. Over 50% of this time was spent counseling the patient and/or coordinating care.    Again, thank you for allowing me to participate in the care of your patient.      Sincerely,    Jasmin Robison PA-C      "

## 2017-12-07 NOTE — PROGRESS NOTES
Return Bariatric Surgery Note    RE: Kasandra Winkler  MR#: [id number]  : 1980  VISIT DATE: Dec 7, 2017    Dear Trish Callaway,    I had the pleasure of seeing your patient, Kasandra Winkler, in my post-bariatric surgery assessment clinic.    CHIEF COMPLAINT: Post-bariatric surgery follow-up    HISTORY OF PRESENT ILLNESS:  Questions Regarding Prior Weight Loss Surgery Reviewed With Patient 2017   I had the following weight loss procedure: Sleeve Gastrectomy   What year was your surgery?    How has your weight changed since your last visit? I have lost weight   Are you currently taking any weight loss medications? No   Do you currently have any of the following: Heartburn, acid reflux, or GERD (acid reflux disease)?   Have you been to the Emergency room since your last visit with us? Yes   Were you in the hospital since your last visit with us? No   Do you have any concerns today? anemia and face tingling       Weight History:     2017   What is your highest lifetime weight? 298   What is your lowest weight since surgery? (In pounds) 190     Initial Weight: 288 lb  Current Weight: Weight: 192 lb 6.4 oz  Cumulative weight loss (lbs): 95.6  Last Visits Weight: 245 lb 1.6 oz    Questions Regarding Co-Morbidities and Health Concerns Reviewed With Patient 2017   Pre-diabetes: Never   Diabetes II: Never   High Blood Pressure: Never   High cholesterol: Never   Heartburn/Reflux: Stayed the same   Are you taking daily medication for heartburn, acid reflux, or GERD (acid reflux disease)? No   Sleep apnea: Never   PCOS: Never   Back pain: Improved   Joint pain: Stayed the same   Lower leg swelling: Improved       Eating Habits 2017   How many meals do you eat per day? 3   Do you snack between meals? Sometimes   How much food are you eating at each meal? 1/2 cup to 1 cup   Are you able to separate your meals and liquids by at least 30 minutes? Yes   Are you able to avoid liquid calories? Sometimes        Exercise Questions Reviewed With Patient 12/7/2017   How often do you exercise? 3 to 4 times per week   What is the duration of your exercise (in minutes)? 30 Minutes   What types of exercise do you do? walking, group fitness classes   What keeps you from being more active?  Pain, Too tired       Social History:      12/7/2017   Are you smoking? No   Are you drinking alcohol? No       Medications:  Current Outpatient Prescriptions   Medication     methocarbamol (ROBAXIN) 500 MG tablet     HYDROcodone-acetaminophen (NORCO) 5-325 MG per tablet     norethindrone-ethinyl estradiol (JUNEL FE 1/20) 1-20 MG-MCG per tablet     levothyroxine (SYNTHROID/LEVOTHROID) 137 MCG tablet     ketoconazole (NIZORAL) 2 % shampoo     fluconazole (DIFLUCAN) 150 MG tablet     azelastine (ASTELIN) 0.1 % spray     clonazePAM (KLONOPIN) 0.5 MG tablet     beclomethasone (QVAR) 80 MCG/ACT Inhaler     calcium carbonate (OS-JONATAN 500 MG [place]. [place]) 500 MG tablet     Cyanocobalamin (VITAMIN B-12) 500 MCG SUBL     Multiple Vitamins-Minerals (HM MULTIVITAMIN ADULT GUMMY PO)     cetirizine (ZYRTEC) 10 MG tablet     Cod Liver Oil 1000 MG CAPS     fluticasone (FLONASE) 50 MCG/ACT nasal spray     Cholecalciferol (VITAMIN D3) 3000 UNITS TABS     No current facility-administered medications for this visit.          12/7/2017   Do you avoid NSAIDs such as (Ibuprofen, Aleve, Naproxen, Advil)?   No     ROS:  GI:      12/7/2017   Vomiting: No   Diarrhea: No   Constipation: Yes   Swallowing trouble: No   Abdominal pain: Yes   Heartburn: Yes   Rash in skin folds: No   Depression: No   Stress urinary incontinence No     Skin:   BAR RBS ROS - SKIN 12/7/2017   Rash in skin folds: No     Psych:      12/7/2017   Depression: No   Anxiety: Yes     Female Only:   BAR RBS ROS - FEMALE ONLY 12/7/2017   Female only: Excessive menstrual bleeding, Regular menstrual cycles     LABS/IMAGING/MEDICAL RECORDS REVIEW:   Results for ELYSE PAULINO (MRN [id number]) as of  "12/7/2017 14:50   Ref. Range 12/5/2017 12:43   Sodium Latest Ref Range: 133 - 144 mmol/L 138   Potassium Latest Ref Range: 3.4 - 5.3 mmol/L 4.3   Chloride Latest Ref Range: 94 - 109 mmol/L 107   Carbon Dioxide Latest Ref Range: 20 - 32 mmol/L 23   Urea Nitrogen Latest Ref Range: 7 - 30 mg/dL 13   Creatinine Latest Ref Range: 0.52 - 1.04 mg/dL 0.74   GFR Estimate Latest Ref Range: >60 mL/min/1.7m2 87   GFR Estimate If Black Latest Ref Range: >60 mL/min/1.7m2 >90   Calcium Latest Ref Range: 8.5 - 10.1 mg/dL 8.9   Anion Gap Latest Ref Range: 3 - 14 mmol/L 8   Albumin Latest Ref Range: 3.4 - 5.0 g/dL 3.7   Prealbumin Latest Ref Range: 15 - 45 mg/dL 23   Protein Total Latest Ref Range: 6.8 - 8.8 g/dL 7.2   Bilirubin Total Latest Ref Range: 0.2 - 1.3 mg/dL 0.4   Alkaline Phosphatase Latest Ref Range: 40 - 150 U/L 53   ALT Latest Ref Range: 0 - 50 U/L 20   AST Latest Ref Range: 0 - 45 U/L 11   Hemoglobin A1C Latest Ref Range: 4.3 - 6.0 % 4.8   Copper Latest Ref Range: 80 - 155 ug/dL 131   Ferritin Latest Ref Range: 12 - 150 ng/mL 7 (L)   Vitamin B12 Latest Ref Range: 193 - 986 pg/mL 822   Vitamin D Deficiency screening Latest Ref Range: 20 - 75 ug/L 29   Zinc Latest Ref Range: 60 - 120 ug/dL 75   Glucose Latest Ref Range: 70 - 99 mg/dL 82   WBC Latest Ref Range: 4.0 - 11.0 10e9/L 6.6   Hemoglobin Latest Ref Range: 11.7 - 15.7 g/dL 11.4 (L)   Hematocrit Latest Ref Range: 35.0 - 47.0 % 35.5   Platelet Count Latest Ref Range: 150 - 450 10e9/L 306   RBC Count Latest Ref Range: 3.8 - 5.2 10e12/L 4.27   MCV Latest Ref Range: 78 - 100 fl 83   MCH Latest Ref Range: 26.5 - 33.0 pg 26.7   MCHC Latest Ref Range: 31.5 - 36.5 g/dL 32.1   RDW Latest Ref Range: 10.0 - 15.0 % 17.6 (H)   Parathyroid Hormone Intact Latest Ref Range: 12 - 72 pg/mL 47     PHYSICAL EXAMINATION:  /75  Pulse 75  Temp 98.9  F (37.2  C) (Oral)  Ht 5' 6\"  Wt 192 lb 6.4 oz  LMP 11/27/2017 (Exact Date)  SpO2 98%  BMI 31.05 kg/m2   General: No apparent " distress  Neuro: A & O x 3  Head: Atraumatic, normocephalic  Eyes: PERRL, EOMI  Skin: warm and dry, no rashes on exposed skin  Respiratory: respirations unlabored  Abdomen: soft NT ND  Extremities: No LE swelling    ASSESSMENT AND PLAN:      1. 2.5 years status laparoscopic gastric sleeve. Struggling with heavy menstrual periods and anemia.  Would like to do iron infusion.  Labs not improving with oral iron and difficult for her to remember to take the oral iron. She has lost 95 lbs overall since consult.     2. Morbid Obesity current BMI: Body mass index is 31.05 kg/(m^2).    3. Post surgical malabsorption:   Labs ordered per protocol.   Follow food plan per dietitian recommendations.   Continue taking recommended post-op vitamins.  4. Return to clinic in 1  Year.    5. Set up iron infusion    6. Continue working with PCP or GYN regarding heavy menstrual periods.        Sincerely,    Jasmin Robison PA-C    I spent a total of 15 minutes face to face with Kasandra during today's office visit. Over 50% of this time was spent counseling the patient and/or coordinating care.

## 2017-12-07 NOTE — NURSING NOTE
"(   Chief Complaint   Patient presents with     RECHECK     F/u    )    ( Weight: 192 lb 6.4 oz )  ( Height: 5' 6\" )  ( BMI (Calculated): 31.12 )  ( Initial Weight: 288 lb )  ( Cumulative weight loss (lbs): 95.6 )  ( Last Visits Weight: 245 lb 1.6 oz )  ( Wt change since last visit (lbs): -52.7 )  (   )  (   )    ( BP: 122/75 )  (   )  ( Temp: 98.9  F (37.2  C) )  ( Temp src: Oral )  ( Pulse: 75 )  (   )  ( SpO2: 98 % )    (   Patient Active Problem List   Diagnosis     CARDIOVASCULAR SCREENING; LDL GOAL LESS THAN 160     Hypothyroidism     Obesity     Hashimoto's thyroiditis     Hypovitaminosis D     Fatigue     Ovarian cyst     Major depressive disorder, recurrent episode, mild (H)     Asthma with allergic rhinitis     Morbid obesity (H)     laparoscopic sleeve gastrectomy 6/29/15     Anxiety     Allergic rhinitis due to pollen     Iron deficiency anemia secondary to inadequate dietary iron intake     Migraine with aura and without status migrainosus, not intractable     Acute bilateral low back pain with left-sided sciatica    )  (   Current Outpatient Prescriptions   Medication Sig Dispense Refill     methocarbamol (ROBAXIN) 500 MG tablet Take 2 tablets (1,000 mg) by mouth 3 times daily as needed for muscle spasms 30 tablet 1     HYDROcodone-acetaminophen (NORCO) 5-325 MG per tablet Take 1-2 tablets by mouth nightly as needed for moderate to severe pain maximum 1 tablet(s) per day 7 tablet 0     norethindrone-ethinyl estradiol (JUNEL FE 1/20) 1-20 MG-MCG per tablet Take 1 tablet by mouth daily 28 tablet 11     levothyroxine (SYNTHROID/LEVOTHROID) 137 MCG tablet Take 1 tablet (137 mcg) by mouth daily 30 tablet 1     ketoconazole (NIZORAL) 2 % shampoo Apply to the affected area and wash off after 5 minutes. 120 mL 3     fluconazole (DIFLUCAN) 150 MG tablet Take 2 tablets (300mg) by mouth once a week for 2 weeks 4 tablet 0     azelastine (ASTELIN) 0.1 % spray USE 1 TO 2 SPRAYS IN EACH NOSTRIL TWICE DAILY 30 mL 5 "     clonazePAM (KLONOPIN) 0.5 MG tablet Take 1 tablet (0.5 mg) by mouth as needed for anxiety 20 tablet 1     beclomethasone (QVAR) 80 MCG/ACT Inhaler Inhale 2 puffs into the lungs 2 times daily 3 Inhaler 1     calcium carbonate (OS-JONATAN 500 MG [place]. [place]) 500 MG tablet Take 500 mg by mouth 2 times daily       Cyanocobalamin (VITAMIN B-12) 500 MCG SUBL Place 500 mcg under the tongue daily       Multiple Vitamins-Minerals (HM MULTIVITAMIN ADULT GUMMY PO)        cetirizine (ZYRTEC) 10 MG tablet Take 10 mg by mouth daily       Cod Liver Oil 1000 MG CAPS Take 1 capsule by mouth daily.       fluticasone (FLONASE) 50 MCG/ACT nasal spray Spray 1-2 sprays into both nostrils daily. 1 Package 11     Cholecalciferol (VITAMIN D3) 3000 UNITS TABS Take 1 tablet by mouth daily.      )  ( Diabetes Eval:    )    ( Pain Eval:  Data Unavailable )    ( Wound Eval:       )    (   History   Smoking Status     Former Smoker     Packs/day: 0.50     Years: 10.00     Quit date: 7/20/2009   Smokeless Tobacco     Never Used     Comment: social smoker for 10 yr    )    ( Signed By:  John Lopez; December 7, 2017; 2:15 PM )

## 2017-12-07 NOTE — MR AVS SNAPSHOT
After Visit Summary   12/7/2017    Kasandra Winkler    MRN: [id number]           Patient Information     Date Of Birth          1980        Visit Information        Provider Department      12/7/2017 4:10 PM Hortensia Foote, PT [place] [facility]        Today's Diagnoses     Acute bilateral low back pain with left-sided sciatica           Follow-ups after your visit        Your next 10 appointments already scheduled     Dec 14, 2017  7:50 AM CST   CHACHA Spine with David Reddy Pt, PT   [place] [facility] ([facility]  )    [address]  [facility] [address]   [phone]            Dec 20, 2017  3:10 PM CST   CHACHA Spine with David Reddy Pt, PT   [facility] [place] [facility] ([facility]  )    [address]  [facility] [address]   [phone]            Dec 21, 2017 11:00 AM CST   Infusion 360 with UC SPEC INFUSION, UC 44 ATC   [facility] Specialty and Procedure ([facility])    [address] [place] [address]   [phone]            Dec 27, 2017  4:30 PM CST   CHACHA Spine with David Reddy Pt, PT   [facility] [place] [facility] Therapy ([facility]  )    [address]  [facility] [address]   [phone]              Who to contact     If you have questions or need follow up information about today's clinic visit or your schedule please contact [place] [facility] directly at [phone].  Normal or non-critical lab and imaging results will be communicated to you by MyChart, letter or phone within 4 business days after the clinic has received the results. If you do not hear from us within 7 days, please contact the clinic through MyChart or phone. If you have a critical or abnormal lab result, we will notify  you by phone as soon as possible.  Submit refill requests through [employer] or call your pharmacy and they will forward the refill request to us. Please allow 3 business days for your refill to be completed.          Additional Information About Your Visit        Co[employer] Information     [employer] gives you secure access to your electronic health record. If you see a primary care provider, you can also send messages to your care team and make appointments. If you have questions, please call your primary care clinic.  If you do not have a primary care provider, please call [phone] and they will assist you.        Care EveryWhere ID     This is your Care EveryWhere ID. This could be used by other organizations to access your [place] medical records  [id number]        Your Vitals Were     Last Period                   11/27/2017 (Exact Date)            Blood Pressure from Last 3 Encounters:   12/07/17 122/75   12/04/17 111/71   11/29/17 116/76    Weight from Last 3 Encounters:   12/07/17 87.3 kg (192 lb 6.4 oz)   12/04/17 89 kg (196 lb 1.6 oz)   11/29/17 88.9 kg (196 lb)              We Performed the Following     MANUAL THER TECH,1+REGIONS,EA 15 MIN     THERAPEUTIC EXERCISES        Primary Care Provider Office Phone # Fax #    Trish CASE Callaway [phone]       [address] [address]        Equal Access to Services     KRIS SILVA : Hadii aad ku hadasho Soomaali, waaxda luqadaha, qaybta kaalmada jessica, chris will . So [facility] [phone].    ATENCIÓN: Si habla español, tiene a das disposición servicios gratuitos de asistencia lingüística. Sravan al [phone].    We comply with applicable federal civil rights laws and [place] laws. We do not discriminate on the basis of race, color, national origin, age, disability, sex, sexual orientation, or gender identity.            Thank you!     Thank you for choosing [facility]  for your care. Our goal is always to provide you with excellent care. Hearing back from our patients is one way we can continue to improve our services. Please take a few minutes to complete the written survey that you may receive in the mail after your visit with us. Thank you!             Your Updated Medication List - Protect others around you: Learn how to safely use, store and throw away your medicines at www.disposemymeds.org.          This list is accurate as of: 12/7/17  5:31 PM.  Always use your most recent med list.                   Brand Name Dispense Instructions for use Diagnosis    azelastine 0.1 % spray    ASTELIN    30 mL    USE 1 TO 2 SPRAYS IN EACH NOSTRIL TWICE DAILY    Allergic rhinitis due to pollen       beclomethasone 80 MCG/ACT Inhaler    QVAR    3 Inhaler    Inhale 2 puffs into the lungs 2 times daily    Rhinitis, allergic       calcium carbonate 1250 MG tablet    OS-JONATAN 500 mg [place]. [place]     Take 500 mg by mouth 2 times daily        cetirizine 10 MG tablet    zyrTEC     Take 10 mg by mouth daily        clonazePAM 0.5 MG tablet    klonoPIN    20 tablet    Take 1 tablet (0.5 mg) by mouth as needed for anxiety    Major depressive disorder, recurrent episode, mild (H)       Cod Liver Oil 1000 MG Caps      Take 1 capsule by mouth daily.        fluconazole 150 MG tablet    DIFLUCAN    4 tablet    Take 2 tablets (300mg) by mouth once a week for 2 weeks    Tinea versicolor       fluticasone 50 MCG/ACT spray    FLONASE    1 Package    Spray 1-2 sprays into both nostrils daily.    Seasonal allergic rhinitis       HM MULTIVITAMIN ADULT GUMMY PO           HYDROcodone-acetaminophen 5-325 MG per tablet    NORCO    7 tablet    Take 1-2 tablets by mouth nightly as needed for moderate to severe pain maximum 1 tablet(s) per day    Acute left-sided low back pain with left-sided sciatica       ketoconazole 2 % shampoo    NIZORAL    120 mL    Apply to the affected area and wash off after  5 minutes.    Dandruff, Tinea versicolor       levothyroxine 137 MCG tablet    SYNTHROID/LEVOTHROID    30 tablet    Take 1 tablet (137 mcg) by mouth daily    Hashimoto's thyroiditis       methocarbamol 500 MG tablet    ROBAXIN    30 tablet    Take 2 tablets (1,000 mg) by mouth 3 times daily as needed for muscle spasms    Acute left-sided low back pain with left-sided sciatica       norethindrone-ethinyl estradiol 1-20 MG-MCG per tablet    JUNEL FE 1/20    28 tablet    Take 1 tablet by mouth daily    Excessive or frequent menstruation       Vitamin B-12 500 MCG Subl      Place 500 mcg under the tongue daily        Vitamin D3 3000 UNITS Tabs      Take 1 tablet by mouth daily.

## 2017-12-08 LAB
ANNOTATION COMMENT IMP: NORMAL
RETINYL PALMITATE SERPL-MCNC: <0.02 MG/L (ref 0–0.1)
VIT A SERPL-MCNC: 0.56 MG/L (ref 0.3–1.2)

## 2017-12-09 LAB — VIT B1 BLD-MCNC: 96 NMOL/L (ref 70–180)

## 2017-12-10 DIAGNOSIS — E06.3 HASHIMOTO'S THYROIDITIS: ICD-10-CM

## 2017-12-11 RX ORDER — LEVOTHYROXINE SODIUM 137 UG/1
TABLET ORAL
Qty: 90 TABLET | Refills: 0 | Status: SHIPPED | OUTPATIENT
Start: 2017-12-11 | End: 2018-02-22 | Stop reason: ALTCHOICE

## 2017-12-11 NOTE — TELEPHONE ENCOUNTER
Prescription approved per [facility] Refill Protocol.  Aria DEL TORO RN    Requested Prescriptions   Pending Prescriptions Disp Refills     levothyroxine (SYNTHROID/LEVOTHROID) 137 MCG tablet [Pharmacy Med Name: LEVOTHYROXINE 0.137MG (137MCG) TAB] 30 tablet 0     Sig: TAKE 1 TABLET BY MOUTH DAILY    Thyroid Protocol Passed    12/10/2017  9:59 AM       Passed - Patient is 12 years or older       Passed - Recent or future visit with authorizing provider's specialty    Patient had office visit in the last year or has a visit in the next 30 days with authorizing provider.  See chart review.              Passed - Normal TSH on file in past 12 months    Recent Labs   Lab Test  11/14/17   1730   TSH  3.95             Passed - No active pregnancy on record    If patient is pregnant or has had a positive pregnancy test, please check TSH.         Passed - No positive pregnancy test in past 12 months    If patient is pregnant or has had a positive pregnancy test, please check TSH.

## 2017-12-13 ENCOUNTER — OFFICE VISIT (OUTPATIENT)
Dept: FAMILY MEDICINE | Facility: CLINIC | Age: 37
End: 2017-12-13
Payer: COMMERCIAL

## 2017-12-13 VITALS
RESPIRATION RATE: 14 BRPM | TEMPERATURE: 97.7 F | DIASTOLIC BLOOD PRESSURE: 62 MMHG | SYSTOLIC BLOOD PRESSURE: 108 MMHG | BODY MASS INDEX: 31.5 KG/M2 | HEIGHT: 66 IN | OXYGEN SATURATION: 98 % | HEART RATE: 64 BPM | WEIGHT: 196 LBS

## 2017-12-13 DIAGNOSIS — F41.9 ANXIETY: Primary | ICD-10-CM

## 2017-12-13 DIAGNOSIS — M54.50 ACUTE BILATERAL LOW BACK PAIN WITHOUT SCIATICA: ICD-10-CM

## 2017-12-13 DIAGNOSIS — N92.1 MENORRHAGIA WITH IRREGULAR CYCLE: ICD-10-CM

## 2017-12-13 DIAGNOSIS — F33.0 MAJOR DEPRESSIVE DISORDER, RECURRENT EPISODE, MILD (H): ICD-10-CM

## 2017-12-13 PROCEDURE — 99214 OFFICE O/P EST MOD 30 MIN: CPT | Performed by: FAMILY MEDICINE

## 2017-12-13 RX ORDER — BUSPIRONE HYDROCHLORIDE 10 MG/1
10 TABLET ORAL 2 TIMES DAILY
Qty: 60 TABLET | Refills: 3 | Status: SHIPPED | OUTPATIENT
Start: 2017-12-13 | End: 2018-02-22

## 2017-12-13 RX ORDER — DULOXETIN HYDROCHLORIDE 30 MG/1
30 CAPSULE, DELAYED RELEASE ORAL 2 TIMES DAILY
Qty: 60 CAPSULE | Refills: 3 | Status: SHIPPED | OUTPATIENT
Start: 2017-12-13 | End: 2017-12-15

## 2017-12-13 ASSESSMENT — PATIENT HEALTH QUESTIONNAIRE - PHQ9
5. POOR APPETITE OR OVEREATING: NEARLY EVERY DAY
5. POOR APPETITE OR OVEREATING: NEARLY EVERY DAY
SUM OF ALL RESPONSES TO PHQ QUESTIONS 1-9: 14

## 2017-12-13 ASSESSMENT — ANXIETY QUESTIONNAIRES
6. BECOMING EASILY ANNOYED OR IRRITABLE: MORE THAN HALF THE DAYS
IF YOU CHECKED OFF ANY PROBLEMS ON THIS QUESTIONNAIRE, HOW DIFFICULT HAVE THESE PROBLEMS MADE IT FOR YOU TO DO YOUR WORK, TAKE CARE OF THINGS AT HOME, OR GET ALONG WITH OTHER PEOPLE: EXTREMELY DIFFICULT
IF YOU CHECKED OFF ANY PROBLEMS ON THIS QUESTIONNAIRE, HOW DIFFICULT HAVE THESE PROBLEMS MADE IT FOR YOU TO DO YOUR WORK, TAKE CARE OF THINGS AT HOME, OR GET ALONG WITH OTHER PEOPLE: EXTREMELY DIFFICULT
1. FEELING NERVOUS, ANXIOUS, OR ON EDGE: NEARLY EVERY DAY
1. FEELING NERVOUS, ANXIOUS, OR ON EDGE: NEARLY EVERY DAY
7. FEELING AFRAID AS IF SOMETHING AWFUL MIGHT HAPPEN: NEARLY EVERY DAY
3. WORRYING TOO MUCH ABOUT DIFFERENT THINGS: NEARLY EVERY DAY
2. NOT BEING ABLE TO STOP OR CONTROL WORRYING: NEARLY EVERY DAY
5. BEING SO RESTLESS THAT IT IS HARD TO SIT STILL: NOT AT ALL
GAD7 TOTAL SCORE: 17
5. BEING SO RESTLESS THAT IT IS HARD TO SIT STILL: NOT AT ALL
3. WORRYING TOO MUCH ABOUT DIFFERENT THINGS: NEARLY EVERY DAY
6. BECOMING EASILY ANNOYED OR IRRITABLE: MORE THAN HALF THE DAYS
2. NOT BEING ABLE TO STOP OR CONTROL WORRYING: NEARLY EVERY DAY

## 2017-12-13 NOTE — MR AVS SNAPSHOT
After Visit Summary   12/13/2017    Kasandra Winkler    MRN: [id number]           Patient Information     Date Of Birth          1980        Visit Information        Provider Department      12/13/2017 11:30 AM Ernestine Carey MD [facility]        Today's Diagnoses     Anxiety    -  1    Major depressive disorder, recurrent episode, mild (H)        Other iron deficiency anemia          Care Instructions    Start buspar twice daily  Ok to take it once daily if that's all that helps more  Start cymbalat twice daily  Follow up in 1 month  Earlier if concern    IUD placement- Dr Self          Follow-ups after your visit        Your next 10 appointments already scheduled     Dec 20, 2017  3:10 PM CST   CHACHA Spine with David Reddy Pt, PT   [place] [facility] ([facility]  )    [address]  [facility] [id number] [phone]            Dec 21, 2017 11:00 AM CST   Infusion 360 with UC SPEC INFUSION, UC 44 ATC   [facility] [place] Specialty and Procedure ([facility])    [address] [place] [id number] [phone]            Dec 27, 2017  4:30 PM CST   CHACHA Spine with David Reddy Pt, PT   [facility] Therapy ([facility]  )    [address]  [facility] [id number] [phone]              Who to contact     If you have questions or need follow up information about today's clinic visit or your schedule please contact [facility] directly at [phone].  Normal or non-critical lab and imaging results will be communicated to you by MyChart, letter or phone within 4 business days after the clinic has received the results. If you do not hear from us within 7 days, please contact the clinic through MyChart or phone. If you have a critical or abnormal lab result, we will notify you by phone as soon  "as possible.  Submit refill requests through [employer] or call your pharmacy and they will forward the refill request to us. Please allow 3 business days for your refill to be completed.          Additional Information About Your Visit        [email or web address] Information     [employer] gives you secure access to your electronic health record. If you see a primary care provider, you can also send messages to your care team and make appointments. If you have questions, please call your primary care clinic.  If you do not have a primary care provider, please call [phone] and they will assist you.        Care EveryWhere ID     This is your Care EveryWhere ID. This could be used by other organizations to access your [facility] medical records  [id number]        Your Vitals Were     Pulse Temperature Respirations Height Last Period Pulse Oximetry    64 97.7  F (36.5  C) (Tympanic) 14 5' 6\" (1.676 m) 11/27/2017 (Exact Date) 98%    Breastfeeding? BMI (Body Mass Index)                No 31.64 kg/m2           Blood Pressure from Last 3 Encounters:   12/13/17 108/62   12/07/17 122/75   12/04/17 111/71    Weight from Last 3 Encounters:   12/13/17 196 lb (88.9 kg)   12/07/17 192 lb 6.4 oz (87.3 kg)   12/04/17 196 lb 1.6 oz (89 kg)              We Performed the Following     DEPRESSION ACTION PLAN (DAP)          Today's Medication Changes          These changes are accurate as of: 12/13/17 12:33 PM.  If you have any questions, ask your nurse or doctor.               Start taking these medicines.        Dose/Directions    busPIRone 10 MG tablet   Commonly known as:  BUSPAR   Used for:  Anxiety   Started by:  Erenstine Carey MD        Dose:  10 mg   Take 1 tablet (10 mg) by mouth 2 times daily   Quantity:  60 tablet   Refills:  3       DULoxetine 30 MG EC capsule   Commonly known as:  CYMBALTA   Used for:  Major depressive disorder, recurrent episode, mild (H), Anxiety   Started by:  Ernestine Carey MD        Dose:  30 mg   Take 1 " capsule (30 mg) by mouth 2 times daily   Quantity:  60 capsule   Refills:  3            Where to get your medicines      These medications were sent to [facility] [address] [facility] [address] [place], [place] [address]A AVE AT [facility] & [address] [place] [address]     Phone:  [phone]     busPIRone 10 MG tablet    DULoxetine 30 MG EC capsule                Primary Care Provider Office Phone # Fax #    Trish WILLOUGHBY Cesia,  [phone] [address] [address]        Equal Access to Services     [facility]: Hadii aad ku hadasho Soomaali, waaxda luqadaha, qaybta kaalmada adeegyada, waxcarlos granadosin hayaan adeluis alfredo will . So [facility] [phone].    ATENCIÓN: Si habla español, tiene a das disposición servicios gratuitos de asistencia lingüística. Llame al [phone].    We comply with applicable federal civil rights laws and [place] laws. We do not discriminate on the basis of race, color, national origin, age, disability, sex, sexual orientation, or gender identity.            Thank you!     Thank you for choosing [facility]  for your care. Our goal is always to provide you with excellent care. Hearing back from our patients is one way we can continue to improve our services. Please take a few minutes to complete the written survey that you may receive in the mail after your visit with us. Thank you!             Your Updated Medication List - Protect others around you: Learn how to safely use, store and throw away your medicines at www.disposemymeds.org.          This list is accurate as of: 12/13/17 12:33 PM.  Always use your most recent med list.                   Brand Name Dispense Instructions for use Diagnosis    azelastine 0.1 % spray    ASTELIN    30 mL    USE 1 TO 2 SPRAYS IN EACH NOSTRIL TWICE DAILY    Allergic rhinitis due to pollen       beclomethasone 80 MCG/ACT Inhaler    QVAR    3 Inhaler    Inhale 2 puffs into  the lungs 2 times daily    Rhinitis, allergic       busPIRone 10 MG tablet    BUSPAR    60 tablet    Take 1 tablet (10 mg) by mouth 2 times daily    Anxiety       calcium carbonate 1250 MG tablet    OS-JONATAN 500 mg [place]. [place]     Take 500 mg by mouth 2 times daily        cetirizine 10 MG tablet    zyrTEC     Take 10 mg by mouth daily        clonazePAM 0.5 MG tablet    klonoPIN    20 tablet    Take 1 tablet (0.5 mg) by mouth as needed for anxiety    Major depressive disorder, recurrent episode, mild (H)       Cod Liver Oil 1000 MG Caps      Take 1 capsule by mouth daily.        DULoxetine 30 MG EC capsule    CYMBALTA    60 capsule    Take 1 capsule (30 mg) by mouth 2 times daily    Major depressive disorder, recurrent episode, mild (H), Anxiety       fluconazole 150 MG tablet    DIFLUCAN    4 tablet    Take 2 tablets (300mg) by mouth once a week for 2 weeks    Tinea versicolor       fluticasone 50 MCG/ACT spray    FLONASE    1 Package    Spray 1-2 sprays into both nostrils daily.    Seasonal allergic rhinitis       HM MULTIVITAMIN ADULT GUMMY PO           HYDROcodone-acetaminophen 5-325 MG per tablet    NORCO    7 tablet    Take 1-2 tablets by mouth nightly as needed for moderate to severe pain maximum 1 tablet(s) per day    Acute left-sided low back pain with left-sided sciatica       ketoconazole 2 % shampoo    NIZORAL    120 mL    Apply to the affected area and wash off after 5 minutes.    Dandruff, Tinea versicolor       levothyroxine 137 MCG tablet    SYNTHROID/LEVOTHROID    90 tablet    TAKE 1 TABLET BY MOUTH DAILY    Hashimoto's thyroiditis       methocarbamol 500 MG tablet    ROBAXIN    30 tablet    Take 2 tablets (1,000 mg) by mouth 3 times daily as needed for muscle spasms    Acute left-sided low back pain with left-sided sciatica       norethindrone-ethinyl estradiol 1-20 MG-MCG per tablet    JUNEL FE 1/20    28 tablet    Take 1 tablet by mouth daily    Excessive or frequent menstruation       Vitamin B-12 500  MCG Subl      Place 500 mcg under the tongue daily        Vitamin D3 3000 UNITS Tabs      Take 1 tablet by mouth daily.

## 2017-12-13 NOTE — PATIENT INSTRUCTIONS
Start buspar twice daily  Ok to take it once daily if that's all that helps more  Start cymbalat twice daily  Follow up in 1 month  Earlier if concern    IUD placement- Dr Self

## 2017-12-13 NOTE — NURSING NOTE
"Chief Complaint   Patient presents with     Anxiety     discuss meds     Depression       Initial /62  Pulse 64  Temp 97.7  F (36.5  C) (Tympanic)  Resp 14  Ht 5' 6\" (1.676 m)  Wt 196 lb (88.9 kg)  LMP 11/27/2017 (Exact Date)  SpO2 98%  Breastfeeding? No  BMI 31.64 kg/m2 Estimated body mass index is 31.64 kg/(m^2) as calculated from the following:    Height as of this encounter: 5' 6\" (1.676 m).    Weight as of this encounter: 196 lb (88.9 kg).  BP completed using cuff size: large    Health Maintenance that is potentially due pending provider review:  Health Maintenance Due   Topic Date Due     DEPRESSION ACTION PLAN Q1 YR  03/23/2017     PHQ-9 Q6 MONTHS  01/12/2018         PHQ/ACT/FOREST--Gave pt questionnare and DAP ordered  "

## 2017-12-13 NOTE — PROGRESS NOTES
SUBJECTIVE:   Kasandra Winkler is a 37 year old female who presents to clinic today for the following health issues:  In the past tried wellbutrin, celexa and prozac -   wellbutrin stopped working  celexa and prozac didn't help - not effective, side effects    Was on  on cymbalta - 60mg daily  Did much better but started to have migraines  Switched to effexor but did not help - more anxiety and depression  Was on lexapro - stopped July 2015    She reports even after stopping Cymbalta still get migraine headache -   Reports  Previously resisted medications but now ready- wondering which one would be better, also  interested in buspar      She reports she stopped oral contraceptive pills as they made the mood worse and breast tenderness /bloated / irritable , mood a bit better since  stopped oral contraceptive pills about 10 days ago  Oral contraceptive pills were given to control heavy bleeding, last menstural period 2 days ago, ends up with multiple soaked tampons, not hourly but often  Has seen bariatric surgeon and there is plan for iron infusion- that's not started yet.  Wondering about IUD/ Merina    Recent history of lower back pain- herniated disc  Denies saddle numbness, paralysis but sitting long hours, cause further lower back pain   Requesting sit stand desk      Depression and Anxiety Follow-Up    Status since last visit: No change    Other associated symptoms:None    Complicating factors: new job, dealing with therapy    Significant life event: No     Current substance abuse: None    PHQ-9 Score and MyChart F/U Questions 1/31/2017 7/12/2017 12/13/2017   Total Score 5 7 14   Q9: Suicide Ideation Not at all Not at all Several days     FOREST-7 SCORE 9/30/2015 11/21/2017   Total Score 14 10       PHQ-9  English  PHQ-9   Any Language  GAD7  Suicide Assessment Five-step Evaluation and Treatment (SAFE-T)      PROBLEMS TO ADD ON...    Problem list and histories reviewed & adjusted, as indicated.  Additional  history: as documented    Patient Active Problem List   Diagnosis     CARDIOVASCULAR SCREENING; LDL GOAL LESS THAN 160     Hypothyroidism     Obesity     Hashimoto's thyroiditis     Hypovitaminosis D     Fatigue     Ovarian cyst     Major depressive disorder, recurrent episode, mild (H)     Asthma with allergic rhinitis     Morbid obesity (H)     laparoscopic sleeve gastrectomy 6/29/15     Anxiety     Allergic rhinitis due to pollen     Iron deficiency anemia secondary to inadequate dietary iron intake     Migraine with aura and without status migrainosus, not intractable     Acute bilateral low back pain with left-sided sciatica     Past Surgical History:   Procedure Laterality Date      SECTION  10/10/10     CHOLECYSTECTOMY, LAPOROSCOPIC  2009    gallstones -      FINGER SURGERY      right little finger fracture     LAPAROSCOPIC GASTRIC SLEEVE N/A 2015    Procedure: LAPAROSCOPIC GASTRIC SLEEVE;  Surgeon: Cam Alvarez MD;  Location:  OR       Social History   Substance Use Topics     Smoking status: Former Smoker     Packs/day: 0.50     Years: 10.00     Quit date: 2009     Smokeless tobacco: Never Used      Comment: social smoker for 10 yr     Alcohol use 0.0 oz/week     0 Standard drinks or equivalent per week      Comment: occasional ETOH use     Family History   Problem Relation Age of Onset     OSTEOPOROSIS Mother      Hypertension Mother      CEREBROVASCULAR DISEASE Mother      Aneurysm     Substance Abuse Mother      HEART DISEASE Maternal Grandmother      Hypertension Maternal Grandmother      Arthritis Maternal Grandmother      RA      Respiratory Maternal Grandmother      Asthma             Reviewed and updated as needed this visit by clinical staffTobacco  Allergies  Meds  Med Hx  Surg Hx  Fam Hx  Soc Hx      Reviewed and updated as needed this visit by Provider         ROS:  Constitutional, HEENT, cardiovascular, pulmonary, gi and gu systems are negative, except as  "otherwise noted.      OBJECTIVE:   /62  Pulse 64  Temp 97.7  F (36.5  C) (Tympanic)  Resp 14  Ht 5' 6\" (1.676 m)  Wt 196 lb (88.9 kg)  LMP 11/27/2017 (Exact Date)  SpO2 98%  Breastfeeding? No  BMI 31.64 kg/m2  Body mass index is 31.64 kg/(m^2).  GENERAL: healthy, alert and no distress  NECK: no adenopathy, no asymmetry, masses, or scars and thyroid normal to palpation  RESP: lungs clear to auscultation - no rales, rhonchi or wheezes  CV: regular rate and rhythm, normal S1 S2, no S3 or S4, no murmur, click or rub, no peripheral edema and peripheral pulses strong  ABDOMEN: soft, nontender, no hepatosplenomegaly, no masses and bowel sounds normal  MS: extremities normal- no gross deformities noted  SKIN: no suspicious lesions or rashes  NEURO: Normal strength and tone, mentation intact and speech normal  PSYCH: mentation appears normal, affect normal/bright    Diagnostic Test Results:    ASSESSMENT/PLAN:   Anxiety  We discussed the treatment for anxiety and depression in detail.  The importance of a multi faceted approach in controlling symptoms was reviewed.  The benefits of cognitive behavioral therapy reviewed, benefits of exercise, and stress reduction also discussed.      Duration of treatment is at least 9 months with medication, ideally longer. It may take 3-4 weeks before symptom improvement happens.  Do not stop medication suddenly, medication will need to be tapered off.  Slight increased risk of suicide with SSRI group of medications discussed.      - busPIRone (BUSPAR) 10 MG tablet; Take 1 tablet (10 mg) by mouth 2 times daily      Major depressive disorder, recurrent episode, mild (H)    - DEPRESSION ACTION PLAN (DAP)  - DULoxetine (CYMBALTA) 30 MG EC capsule; Take 1 capsule (30 mg) by mouth 2 times daily    Heavy mensturtaion  Regarding heavy mensuration we discuss IUD placement- pros and cons, detail procedure   Ultrasound- most recent-11/2017    Acute bilateral low back pain without " sciatica   OK to have sit/stand desk  No signs of herniated disc  If saddle numbness, paralysis, limb weakness need to be seen for follow up     Continue with PHYSICAL THERAPY       This is addendum- 12/15/17  She report anxiety with Cymbalta  was10 times worse , and took only one pill  & is advised to stop it-    We discussed buspar and she reports she is nervous to start it  Wondering if that can be as needed   And buspar- ok to take twice daily as needed   If worsening symptoms should be seen in emergency department    Eventually if medications not helping  should have psychiatrist consultation & she reports she will manage that on own             Ernestine Carey MD  [facility]

## 2017-12-14 ASSESSMENT — ANXIETY QUESTIONNAIRES: GAD7 TOTAL SCORE: 17

## 2017-12-15 ENCOUNTER — TELEPHONE (OUTPATIENT)
Dept: FAMILY MEDICINE | Facility: CLINIC | Age: 37
End: 2017-12-15

## 2017-12-15 NOTE — TELEPHONE ENCOUNTER
Reason for call:  Patient reporting a symptom    Symptom or request: rx reaction     Duration (how long have symptoms been present):  Sudden     Have you been treated for this before? No    Additional comments: pt is having a reaction to DULoxetine (CYMBALTA) 30. Pt is having severe anxiety cant sleep... Trans to [employer]    Phone Number patient can be reached at:  Home number on file [phone] (home)    Best Time:  any    Can we leave a detailed message on this number:  YES    Call taken on 12/15/2017 at 2:59 PM by Ronnie Breaux

## 2017-12-15 NOTE — TELEPHONE ENCOUNTER
"AS,  Pt says she took the Cymbalta, 1 dose (30mg) after she saw you on 12/13.  She says she had \"way more anxiety, 10x as bad, had to leave work early and couldnt sleep.\"  She has not taken any since.    She did not take the Buspar as she read on the Internet that Buspar and Cymbalta contraindicate each other, one makes serotonin go up and one makes it go down according to some articles she read.    She is looking for your advise.  Also wanting to try klonopin again as it helped in the past per pt.    Please advise.  Thanks,  Anjelica Vaughan RN      "

## 2017-12-16 NOTE — TELEPHONE ENCOUNTER
This is addendum- 12/15/17  She report anxiety with Cymbalta  was10 times worse , and took only one pill  & is advised to stop it-    We discussed buspar and she reports she is nervous to start it  Wondering if that can be as needed   And buspar- ok to take twice daily as needed   If worsening symptoms should be seen in emergency department    Eventually if medications not helping  should have psychiatrist consultation & she reports she will manage that on own   I will see her next week for IUD and she is encouraged to call

## 2017-12-18 ENCOUNTER — OFFICE VISIT (OUTPATIENT)
Dept: FAMILY MEDICINE | Facility: CLINIC | Age: 37
End: 2017-12-18
Payer: COMMERCIAL

## 2017-12-18 VITALS
OXYGEN SATURATION: 98 % | BODY MASS INDEX: 31.66 KG/M2 | DIASTOLIC BLOOD PRESSURE: 80 MMHG | WEIGHT: 197 LBS | HEART RATE: 86 BPM | HEIGHT: 66 IN | RESPIRATION RATE: 16 BRPM | TEMPERATURE: 97.8 F | SYSTOLIC BLOOD PRESSURE: 120 MMHG

## 2017-12-18 DIAGNOSIS — Z30.430 ENCOUNTER FOR IUD INSERTION: Primary | ICD-10-CM

## 2017-12-18 DIAGNOSIS — F33.0 MAJOR DEPRESSIVE DISORDER, RECURRENT EPISODE, MILD (H): ICD-10-CM

## 2017-12-18 DIAGNOSIS — F41.9 ANXIETY: ICD-10-CM

## 2017-12-18 DIAGNOSIS — N88.2 STENOSIS, CERVIX: ICD-10-CM

## 2017-12-18 LAB — BETA HCG QUAL IFA URINE: NEGATIVE

## 2017-12-18 PROCEDURE — 99214 OFFICE O/P EST MOD 30 MIN: CPT | Performed by: FAMILY MEDICINE

## 2017-12-18 PROCEDURE — 84703 CHORIONIC GONADOTROPIN ASSAY: CPT | Performed by: FAMILY MEDICINE

## 2017-12-18 NOTE — PROGRESS NOTES
SUBJECTIVE:   Kasandra Winkler is a 37 year old female who presents to clinic today for the following health issues:      IUD insert  Patient presents today for Merina  IUD placement for heavy mensturation  Type of IUD: Merina    Verification of Procedure  Just before the procedure begins, through verbal and active participation of team members, verify:   Initials   Patient Name Kasandra Winkler    Procedure to be performed AS      Reason for insertion:  Family planning      Pregnancy test: Negative  Past GYN history:  No STD history  Last PAP smear:  Normal  Manogomous relationship? Yes  Urine pregnancy test negative     Counseling:  Patient counseled on potential side effects of Mirena, including unpredictable spotting for at least 2-3 months, decreased dysmenorrhea and plan for removal/replacement of IUD in 5 years or when desired. Reminded patient to check for IUD strings every month.       Consent:  Risks, benefits of treatment, and no treatment were discussed.  Patient's questions were elicited and answered. , Written consent signed and scanned into medical record. and Patient received and verbalized understanding of discharge instructions    Irregular spotting or periods may occur in first 6 months  Amenorrhea, no periods after  1 year use, mostly common (20 %)  First year failure rate: 0.2 %  Of pregnancies that occur: 50% ectopic   Progesterone related side effects ; headache , acne-call or return office visit as needed    Conception occurs :80 % within 12 months of removal     The patient meets and is agreeable to the following conditions:  She is not interested in conception in the near future.  She currently is in a stable, monogamous relationship.  There is no previous history of pelvic inflammatory disease.  There is no previous history of ectopic pregnancy.  She is willing to check monthly for the IUD string.  She is at least 8 weeks post-partum.  There is no history of unresolved abnormal  uterine bleeding.  There is no history of an unresolved abnormal PAP smear.  She has no history of Lyle's disease or an allergy to copper (for ParaGard).  She has no history of diabetes, AIDS, leukemia, IV drug use or chronic steroid use.  She is willing to return annually for PAP smears.  She has had a PAP smear within the past 6 months.  She denies the possibility of pregnancy.  Pregnancy test today is negative.    The following risks were discussed with the patient:  Possibility of pregnancy and ectopic pregnancy.  Possibility of pelvic inflammatory disease, particularly with new partners.  Risk of uterine perforation or IUD expulsion.  Possibility of difficult removal.  Spotting or heavy bleeding.  Cramping, pain or infection during or after insertion.    The patient was given patient information on the IUD and the patient education brochure from the .  The patient has given consent to proceed with placement of the IUD.  A written consent form is present in the chart.  She wishes to proceed.    PROCEDURE:    The patient has taken 600-800mg of Ibuprofen prior to the procedure.   She is placed in a dorsal lithotomy potion and a pelvic exam is performed to determine the position of the uterus, its anetevrted.  The cervix is identified and cleaned with betadine three times.  A single tooth tenaculum is applied to the anterior lip of the cervix for stabilization. i found the cervix stenosis and unable to dilate the cervix without significant pain, and uterus could not be sounded with Pipelle either.   The tenaculum removed and local bleeding controlled with Monsel      (Z30.430) Encounter for IUD insertion  (primary encounter diagnosis)- failed insertion and procedure abandoned   Plan:neg urine pregnancy test    OB/GYN REFERRAL  Will send a message to primary care Trish Calzada & see if she able to accommodate the patient in clinic and Kasandra would like IUD completed soon  If not - OBG/GYN  referral is given  I found the cervix stenosed- wondering if primary care physicain would like to offer Cytotec pre-procedure    Patient was observed post procedure reports no further cramping, no vaginal bleeding\           (F33.0) Major depressive disorder, recurrent episode, mild (H)  And (F41.9) Anxiety  Comment: unchanged  Plan: we discussed above in detail  She reports single dose of Cymbalta at night caused severe reaction with worsening anxiety, was not able to sleep that night- so understandably not willing to try it further  She has been to counseling and reports she is most likely able to handle anxiety attacks so well on the surface that it does not show but inside having complete anxiety attack    I have encouraged her to try Buspar up to twice daily as needed   She reports her nervousness to try it but willing to proceed and keep us informed  Advised follow up in 2-4 weeks or as needed      The patient indicates understanding of these issues and agrees with the plan.

## 2017-12-18 NOTE — NURSING NOTE
"Chief Complaint   Patient presents with     IUD     initial /80 (BP Location: Left arm, Cuff Size: Adult Large)  Pulse 86  Temp 97.8  F (36.6  C) (Oral)  Resp 16  Ht 5' 6\" (1.676 m)  Wt 197 lb (89.4 kg)  LMP 12/11/2017  SpO2 98%  BMI 31.8 kg/m2 Estimated body mass index is 31.8 kg/(m^2) as calculated from the following:    Height as of this encounter: 5' 6\" (1.676 m).    Weight as of this encounter: 197 lb (89.4 kg).  BP completed using cuff size: large.  L  arm      Health Maintenance that is potentially due pending provider review:  NONE    n/a    Troy Polanco ma  "

## 2017-12-18 NOTE — MR AVS SNAPSHOT
After Visit Summary   12/18/2017    Kasandra Winkler    MRN: [id number]           Patient Information     Date Of Birth          1980        Visit Information        Provider Department      12/18/2017 10:00 AM Ernestine Carey MD; UP PROC RM [facility]        Today's Diagnoses     Encounter for IUD insertion    -  1    Stenosis, cervix        Major depressive disorder, recurrent episode, mild (H)        Anxiety           Follow-ups after your visit        Additional Services     OB/GYN REFERRAL       Your provider has referred you to:  [facility]: [facility] [phone]   [email or web address]  [facility]: [facility] [phone]   [email or web address]    Please be aware that coverage of these services is subject to the terms and limitations of your health insurance plan.  Call member services at your health plan with any benefit or coverage questions.      Please bring the following with you to your appointment:    (1) Any X-Rays, CTs or MRIs which have been performed.  Contact the facility where they were done to arrange for  prior to your scheduled appointment.   (2) List of current medications   (3) This referral request   (4) Any documents/labs given to you for this referral                  Your next 10 appointments already scheduled     Dec 20, 2017  3:10 PM CST   [facility] Spine with David Reddy Pt, PT   [facility] ([facility]  )    [address]  [facility] [id number] [phone]            Dec 21, 2017 11:00 AM CST   Infusion 360 with UC SPEC INFUSION, UC 44 ATC   [facility] ([facility])    [address]  [facility] [address] [phone]            Dec 27, 2017  4:30 PM CST   CH[facility]HA  "Spine with David Reddy Pt, PT   [place] [facility] ([facility]  )    [address]  [facility] [address] [phone]              Who to contact     If you have questions or need follow up information about today's clinic visit or your schedule please contact [facility] directly at [phone].  Normal or non-critical lab and imaging results will be communicated to you by Databoxhart, letter or phone within 4 business days after the clinic has received the results. If you do not hear from us within 7 days, please contact the clinic through Vunglet or phone. If you have a critical or abnormal lab result, we will notify you by phone as soon as possible.  Submit refill requests through Hydrelis or call your pharmacy and they will forward the refill request to us. Please allow 3 business days for your refill to be completed.          Additional Information About Your Visit        DataboxharGenometry Information     [employer] gives you secure access to your electronic health record. If you see a primary care provider, you can also send messages to your care team and make appointments. If you have questions, please call your primary care clinic.  If you do not have a primary care provider, please call [phone] and they will assist you.        Care EveryWhere ID     This is your Care EveryWhere ID. This could be used by other organizations to access your [place] medical records  [id number]        Your Vitals Were     Pulse Temperature Respirations Height Last Period Pulse Oximetry    86 97.8  F (36.6  C) (Oral) 16 5' 6\" (1.676 m) 12/11/2017 98%    BMI (Body Mass Index)                   31.8 kg/m2            Blood Pressure from Last 3 Encounters:   12/18/17 120/80   12/13/17 108/62   12/07/17 122/75    Weight from Last 3 Encounters:   12/18/17 197 lb (89.4 kg)   12/13/17 196 lb (88.9 kg)   12/07/17 192 lb 6.4 oz (87.3 kg)              We Performed the Following  "    Beta HCG qual IFA urine - [facility] and [place]     OB/GYN REFERRAL        Primary Care Provider Office Phone # Fax #    Trish Callaway [phone] [address] [address]        Equal Access to Services     KRIS SILVA : Hadii aad ku hadasho Soomaali, waaxda luqadaha, qaybta kaalmada adeegyada, waxay idiin hayaan adeeg khyvonne labriannalaney grimm. So [facility] [phone].    ATENCIÓN: Si habla español, tiene a das disposición servicios gratuitos de asistencia lingüística. Llame al [phone].    We comply with applicable federal civil rights laws and [place] laws. We do not discriminate on the basis of race, color, national origin, age, disability, sex, sexual orientation, or gender identity.            Thank you!     Thank you for choosing [facility]  for your care. Our goal is always to provide you with excellent care. Hearing back from our patients is one way we can continue to improve our services. Please take a few minutes to complete the written survey that you may receive in the mail after your visit with us. Thank you!             Your Updated Medication List - Protect others around you: Learn how to safely use, store and throw away your medicines at www.disposemymeds.org.          This list is accurate as of: 12/18/17 11:43 AM.  Always use your most recent med list.                   Brand Name Dispense Instructions for use Diagnosis    azelastine 0.1 % spray    ASTELIN    30 mL    USE 1 TO 2 SPRAYS IN EACH NOSTRIL TWICE DAILY    Allergic rhinitis due to pollen       beclomethasone 80 MCG/ACT Inhaler    QVAR    3 Inhaler    Inhale 2 puffs into the lungs 2 times daily    Rhinitis, allergic       busPIRone 10 MG tablet    BUSPAR    60 tablet    Take 1 tablet (10 mg) by mouth 2 times daily    Anxiety       calcium carbonate 1250 MG tablet    OS-JONATAN 500 mg [place]. Ca     Take 500 mg by mouth 2 times daily        cetirizine 10 MG tablet    zyrTEC     Take 10 mg by  mouth daily        clonazePAM 0.5 MG tablet    klonoPIN    20 tablet    Take 1 tablet (0.5 mg) by mouth as needed for anxiety    Major depressive disorder, recurrent episode, mild (H)       Cod Liver Oil 1000 MG Caps      Take 1 capsule by mouth daily.        fluconazole 150 MG tablet    DIFLUCAN    4 tablet    Take 2 tablets (300mg) by mouth once a week for 2 weeks    Tinea versicolor       fluticasone 50 MCG/ACT spray    FLONASE    1 Package    Spray 1-2 sprays into both nostrils daily.    Seasonal allergic rhinitis       HM MULTIVITAMIN ADULT GUMMY PO           HYDROcodone-acetaminophen 5-325 MG per tablet    NORCO    7 tablet    Take 1-2 tablets by mouth nightly as needed for moderate to severe pain maximum 1 tablet(s) per day    Acute left-sided low back pain with left-sided sciatica       ketoconazole 2 % shampoo    NIZORAL    120 mL    Apply to the affected area and wash off after 5 minutes.    Dandruff, Tinea versicolor       levothyroxine 137 MCG tablet    SYNTHROID/LEVOTHROID    90 tablet    TAKE 1 TABLET BY MOUTH DAILY    Hashimoto's thyroiditis       methocarbamol 500 MG tablet    ROBAXIN    30 tablet    Take 2 tablets (1,000 mg) by mouth 3 times daily as needed for muscle spasms    Acute left-sided low back pain with left-sided sciatica       norethindrone-ethinyl estradiol 1-20 MG-MCG per tablet    JUNEL FE 1/20    28 tablet    Take 1 tablet by mouth daily    Excessive or frequent menstruation       Vitamin B-12 500 MCG Subl      Place 500 mcg under the tongue daily        Vitamin D3 3000 UNITS Tabs      Take 1 tablet by mouth daily.

## 2017-12-19 ENCOUNTER — TELEPHONE (OUTPATIENT)
Dept: OBGYN | Facility: CLINIC | Age: 37
End: 2017-12-19

## 2017-12-19 DIAGNOSIS — Z30.430 ENCOUNTER FOR IUD INSERTION: Primary | ICD-10-CM

## 2017-12-19 NOTE — TELEPHONE ENCOUNTER
Received call from Kasandra who had a failed IUD insertion yestreday at a [facility]. It was suggested that she try again with cytotec before procedure. Nurse transferred patient to call center to make appointment. Spoke with Dr. Maria in clinic who approved 400mg buccal of cytotec 2 hour prior to appointment. Nurse left message with patient to call back and let us know which pharmacy she would like medication sent to.

## 2017-12-20 ENCOUNTER — MYC REFILL (OUTPATIENT)
Dept: FAMILY MEDICINE | Facility: CLINIC | Age: 37
End: 2017-12-20

## 2017-12-20 DIAGNOSIS — J30.1 SEASONAL ALLERGIC RHINITIS DUE TO POLLEN, UNSPECIFIED CHRONICITY: Primary | ICD-10-CM

## 2017-12-20 DIAGNOSIS — M54.42 ACUTE LEFT-SIDED LOW BACK PAIN WITH LEFT-SIDED SCIATICA: ICD-10-CM

## 2017-12-20 DIAGNOSIS — F33.0 MAJOR DEPRESSIVE DISORDER, RECURRENT EPISODE, MILD (H): ICD-10-CM

## 2017-12-20 DIAGNOSIS — J30.1 ALLERGIC RHINITIS DUE TO POLLEN: ICD-10-CM

## 2017-12-20 DIAGNOSIS — J45.40 MODERATE PERSISTENT ASTHMA WITH ALLERGIC RHINITIS WITHOUT COMPLICATION: Primary | ICD-10-CM

## 2017-12-20 RX ORDER — MISOPROSTOL 200 UG/1
TABLET ORAL
Qty: 2 TABLET | Refills: 0 | Status: SHIPPED | OUTPATIENT
Start: 2017-12-20 | End: 2018-04-20

## 2017-12-20 NOTE — TELEPHONE ENCOUNTER
Message from MyChart:  Original authorizing provider: OSBALDO Moffett would like a refill of the following medications:  methocarbamol (ROBAXIN) 500 MG tablet [Jamie Alfaro PA-C]    Preferred pharmacy: [facility] [address] [place] [address] [address] AT [facility] & [address] [place] [address]    Comment:

## 2017-12-20 NOTE — TELEPHONE ENCOUNTER
Message from [employer]:  Original authorizing provider: DO Kasandra Donis would like a refill of the following medications:  beclomethasone (QVAR) 80 MCG/ACT Inhaler [Trish Callaway DO]    Preferred pharmacy: [facility] [address] [place] [address] [address] AT [address]    Comment:

## 2017-12-20 NOTE — TELEPHONE ENCOUNTER
Message from [employer]:  Original authorizing provider: MD Kasandra Zacarias would like a refill of the following medications:  fluticasone (FLONASE) 50 MCG/ACT nasal spray [Ernestine Craey MD]    Preferred pharmacy: [facility]ORE [address] [place] [address] [address] AT [address]    Comment:

## 2017-12-20 NOTE — TELEPHONE ENCOUNTER
Message from [employer]:  Original authorizing provider: DO Kasandra Donis would like a refill of the following medications:  azelastine (ASTELIN) 0.1 % spray [Trish Callaway DO]    Preferred pharmacy: [facility] [address] [place] [address] [address] AT [address]    Comment:

## 2017-12-20 NOTE — TELEPHONE ENCOUNTER
Received message on triage voicemail from Kasandra returning call.    Tried to reach Kasandra but received voicemail.  Confirmed we need to talk to her about medication to take before procedure. Will send to [facility] on [place] but please call to discuss instructions on how to take medicine.

## 2017-12-20 NOTE — TELEPHONE ENCOUNTER
Message from [employer]adira Gamest:  Original authorizing provider: DO Kasandra Donis would like a refill of the following medications:  clonazePAM (KLONOPIN) 0.5 MG tablet [Trish Callaway DO]    Preferred pharmacy: [facility]ORE [address] [place] [address] [address] AT [address]    Comment:

## 2017-12-21 ENCOUNTER — INFUSION THERAPY VISIT (OUTPATIENT)
Dept: INFUSION THERAPY | Facility: CLINIC | Age: 37
End: 2017-12-21
Attending: PHYSICIAN ASSISTANT
Payer: COMMERCIAL

## 2017-12-21 VITALS
RESPIRATION RATE: 18 BRPM | SYSTOLIC BLOOD PRESSURE: 112 MMHG | TEMPERATURE: 97.9 F | OXYGEN SATURATION: 97 % | HEART RATE: 66 BPM | DIASTOLIC BLOOD PRESSURE: 62 MMHG

## 2017-12-21 DIAGNOSIS — D50.8 IRON DEFICIENCY ANEMIA SECONDARY TO INADEQUATE DIETARY IRON INTAKE: Primary | ICD-10-CM

## 2017-12-21 DIAGNOSIS — J30.9 RHINITIS, ALLERGIC: ICD-10-CM

## 2017-12-21 DIAGNOSIS — E06.3 HASHIMOTO'S THYROIDITIS: ICD-10-CM

## 2017-12-21 LAB — TSH SERPL DL<=0.005 MIU/L-ACNC: 3.15 MU/L (ref 0.4–4)

## 2017-12-21 PROCEDURE — 96366 THER/PROPH/DIAG IV INF ADDON: CPT

## 2017-12-21 PROCEDURE — 96365 THER/PROPH/DIAG IV INF INIT: CPT

## 2017-12-21 PROCEDURE — 96376 TX/PRO/DX INJ SAME DRUG ADON: CPT

## 2017-12-21 PROCEDURE — 25000128 H RX IP 250 OP 636: Mod: ZF | Performed by: PHYSICIAN ASSISTANT

## 2017-12-21 PROCEDURE — 84443 ASSAY THYROID STIM HORMONE: CPT | Performed by: FAMILY MEDICINE

## 2017-12-21 RX ORDER — AZELASTINE 1 MG/ML
1-2 SPRAY, METERED NASAL 2 TIMES DAILY
Qty: 30 ML | Refills: 5 | Status: SHIPPED | OUTPATIENT
Start: 2017-12-21 | End: 2018-10-05

## 2017-12-21 RX ADMIN — IRON DEXTRAN 975 MG: 50 INJECTION INTRAMUSCULAR; INTRAVENOUS at 13:06

## 2017-12-21 RX ADMIN — IRON DEXTRAN 25 MG: 50 INJECTION INTRAMUSCULAR; INTRAVENOUS at 11:37

## 2017-12-21 NOTE — TELEPHONE ENCOUNTER
PN,    Controlled Substance Refill Request for Clonazepam    Last [place]  - last [place] July 2016    Last clinic visit: 12/18/2017     Clinic visit frequency required: not documented  Next appt: none    Controlled substance agreement on file: No.    Documentation in problem list reviewed:  started, needs to be completed    Processing:  [facility]    [place] reviewed[place]  reviewed- [place] concerns  [place]:  [email or web address]    Please authorize if appropriate.  Thanks,  Aria DEL TORO RN

## 2017-12-21 NOTE — MR AVS SNAPSHOT
After Visit Summary   12/21/2017    Kasandra Winkler    MRN: [id number]           Patient Information     Date Of Birth          1980        Visit Information        Provider Department      12/21/2017 11:00 AM UC 44 ATC; UC SPEC INFUSION [facility]        Today's Diagnoses     Iron deficiency anemia secondary to inadequate dietary iron intake    -  1    Hashimoto's thyroiditis          Care Instructions    Dear Kasandra Winkler    Thank you for choosing [facility] ([facility]) for your infusion.  The following information is a summary of our appointment as well as important reminders.      Additional information: Today you had an infusion of iron Dextran 1000mg with 25 mg test dose.      Iron Dextran Solution for injection  What is this medicine?  IRON DEXTRAN (AHY hiro DEX mccarty) is an iron complex. Iron is used to make healthy red blood cells, which carry oxygen and nutrients through the body. This medicine is used to treat people who cannot take iron by mouth and have low levels of iron in the blood.  This medicine may be used for other purposes; ask your health care provider or pharmacist if you have questions.  What should I tell my health care provider before I take this medicine?  They need to know if you have any of these conditions:    anemia not caused by low iron levels    heart disease    high levels of iron in the blood    kidney disease    liver disease    an unusual or allergic reaction to iron, other medicines, foods, dyes, or preservatives    pregnant or trying to get pregnant    breast-feeding  How should I use this medicine?  This medicine is for injection into a vein or a muscle. It is given by a health care professional in a hospital or clinic setting.  Talk to your pediatrician regarding the use of this medicine in children. While this drug may be prescribed for  children as young as 4 months old for selected conditions, precautions do apply.  Overdosage: If you think you have taken too much of this medicine contact a poison control center or emergency room at once.  NOTE: This medicine is only for you. Do not share this medicine with others.    We look forward in seeing you on your next appointment here at [facility].  Please don t hesitate to call us at [phone] to reschedule any of your appointments or to speak with one of the [facility] registered nurses.  It was a pleasure taking care of you today.    Sincerely,    [facility]  Specialty Infusion [facility] Procedure Center  [address] [place]  [address]  Phone:  [phone]          Follow-ups after your visit        Your next 10 appointments already scheduled     Dec 27, 2017  2:30 PM CST   New Patient Visit with VIKTORIA [facility] ([facility])    [facility] [employer] [address]  [address] [facility] [address] [phone]              Who to contact     If you have questions or need follow up information about today's clinic visit or your schedule please contact [facility]ED[facility] directly at [phone].  Normal or non-critical lab and imaging results will be communicated to you by [employer], letter or phone within 4 business days after the clinic has received the results. If you do not hear from us within 7 days, please contact the clinic through [employer] or phone. If you have a critical or abnormal lab result, we will notify you by phone as soon as possible.  Submit refill requests through [employer] or call your pharmacy and they will forward the refill request to us. Please allow 3 business days for your refill to be completed.          Additional Information About Your Visit        [employer] Information     [employer] gives you secure access to your electronic health  record. If you see a primary care provider, you can also send messages to your care team and make appointments. If you have questions, please call your primary care clinic.  If you do not have a primary care provider, please call [phone] and they will assist you.        Care EveryWhere ID     This is your Care EveryWhere ID. This could be used by other organizations to access your [place] medical records  [id number]        Your Vitals Were     Pulse Temperature Respirations Last Period Pulse Oximetry       82 97.9  F (36.6  C) 18 12/11/2017 97%        Blood Pressure from Last 3 Encounters:   12/21/17 132/86   12/18/17 120/80   12/13/17 108/62    Weight from Last 3 Encounters:   12/18/17 89.4 kg (197 lb)   12/13/17 88.9 kg (196 lb)   12/07/17 87.3 kg (192 lb 6.4 oz)              We Performed the Following     **TSH with free T4 reflex FUTURE 2mo        Primary Care Provider Office Phone # Fax #    Trish Callaway [phone] [address] [address]        Equal Access to Services     [facility]: Hadii aad ku hadasho Soomaali, waaxda luqadaha, qaybta kaalmada adeegyada, waxay idiin hayaan adeluis alfredo will . So [facility] [phone].    ATENCIÓN: Si habla español, tiene a das disposición servicios gratuitos de asistencia lingüística. Llame al [phone].    We comply with applicable federal civil rights laws and [place] laws. We do not discriminate on the basis of race, color, national origin, age, disability, sex, sexual orientation, or gender identity.            Thank you!     Thank you for choosing [facility] SPECIALTY AND PROCEDURE  for your care. Our goal is always to provide you with excellent care. Hearing back from our patients is one way we can continue to improve our services. Please take a few minutes to complete the written survey that you may receive in the mail after your visit with us. Thank you!             Your Updated  Medication List - Protect others around you: Learn how to safely use, store and throw away your medicines at www.disposemymeds.org.          This list is accurate as of: 12/21/17 12:04 PM.  Always use your most recent med list.                   Brand Name Dispense Instructions for use Diagnosis    azelastine 0.1 % spray    ASTELIN    30 mL    Spray 1-2 sprays into both nostrils 2 times daily    Allergic rhinitis due to pollen       beclomethasone 80 MCG/ACT Inhaler    QVAR    3 Inhaler    Inhale 2 puffs into the lungs 2 times daily    Rhinitis, allergic       busPIRone 10 MG tablet    BUSPAR    60 tablet    Take 1 tablet (10 mg) by mouth 2 times daily    Anxiety       calcium carbonate 1250 MG tablet    OS-JONATAN 500 mg [place]. Ca     Take 500 mg by mouth 2 times daily        cetirizine 10 MG tablet    zyrTEC     Take 10 mg by mouth daily        clonazePAM 0.5 MG tablet    klonoPIN    20 tablet    Take 1 tablet (0.5 mg) by mouth as needed for anxiety    Major depressive disorder, recurrent episode, mild (H)       Cod Liver Oil 1000 MG Caps      Take 1 capsule by mouth daily.        fluconazole 150 MG tablet    DIFLUCAN    4 tablet    Take 2 tablets (300mg) by mouth once a week for 2 weeks    Tinea versicolor       fluticasone 50 MCG/ACT spray    FLONASE    1 Package    Spray 1-2 sprays into both nostrils daily.    Seasonal allergic rhinitis       HM MULTIVITAMIN ADULT GUMMY PO           HYDROcodone-acetaminophen 5-325 MG per tablet    NORCO    7 tablet    Take 1-2 tablets by mouth nightly as needed for moderate to severe pain maximum 1 tablet(s) per day    Acute left-sided low back pain with left-sided sciatica       ketoconazole 2 % shampoo    NIZORAL    120 mL    Apply to the affected area and wash off after 5 minutes.    Dandruff, Tinea versicolor       levothyroxine 137 MCG tablet    SYNTHROID/LEVOTHROID    90 tablet    TAKE 1 TABLET BY MOUTH DAILY    Hashimoto's thyroiditis       methocarbamol 500 MG tablet     ROBAXIN    30 tablet    Take 2 tablets (1,000 mg) by mouth 3 times daily as needed for muscle spasms    Acute left-sided low back pain with left-sided sciatica       misoprostol 200 MCG tablet    CYTOTEC    2 tablet    Take 2 hours before procedure. Place 1 tab between cheek and gum on the right, and 2nd tab on the left. Dissolve for 30 min, then swallow.    Encounter for IUD insertion       norethindrone-ethinyl estradiol 1-20 MG-MCG per tablet    JUNEL FE 1/20    28 tablet    Take 1 tablet by mouth daily    Excessive or frequent menstruation       Vitamin B-12 500 MCG Subl      Place 500 mcg under the tongue daily        Vitamin D3 3000 UNITS Tabs      Take 1 tablet by mouth daily.

## 2017-12-21 NOTE — TELEPHONE ENCOUNTER
Spoke with Kasandra about instructions/directions for misoprostol. Kasandra will take 2 hours before IUD insertion.

## 2017-12-21 NOTE — TELEPHONE ENCOUNTER
Routing refill request to provider for review/approval because:  Drug not on the [facility] refill protocol   Aria DEL TORO RN    Requested Prescriptions   Pending Prescriptions Disp Refills     methocarbamol (ROBAXIN) 500 MG tablet 30 tablet 1     Sig: Take 2 tablets (1,000 mg) by mouth 3 times daily as needed for muscle spasms    There is no refill protocol information for this order

## 2017-12-21 NOTE — PROGRESS NOTES
Nursing Note  Kasandra Winkler presents today [facility] for:   Chief Complaint   Patient presents with     Infusion     Infed     During today's [facility] appointment, orders from  were completed.  Frequency: once    Progress note:  Patient identification verified by name and date of birth.  Assessment completed.  Vitals recorded in Doc Flowsheets.  Patient was provided with education regarding infusion and possible side effects.  Patient verbalized understanding.      needed: No  Premedications: were not ordered.  Infusion Rates: Test infusion given over approximately 5 mins. No evidence reaction after 1 hour. Full dose (975 mg) given over  Approximate Infusion length:4 hours.   Labs: were drawn per orders.   Vascular access: peripheral IV placed today.  Treatment Conditions: Has had very heavy periods for several years and not getting adequate increases in levels from oral supplements and food.  Patient tolerated infusion: well. No reaction noted. Reviewed what to do for various possible reactions/side effects.    Discharge Plan:   Follow up plan of care with: Dr. Robison  Discharge instructions were reviewed with patient.  Patient/representative verbalized understanding of discharge instructions and all questions answered.  Patient discharged from [facility] in stable condition.    GIACOMO BECK, RN        [facility] 12/11/2017

## 2017-12-21 NOTE — PROGRESS NOTES
Dear Kasandra,   Your test results are all back -   -All of your labs are normal.  Thyroid is just top of normal - let me know if you are feeling good or if you feel like we should adjust the dose.  Let us know if you have any questions.  -Trish Callaway, DO

## 2017-12-21 NOTE — TELEPHONE ENCOUNTER
Prescription approved per [facility] Refill Protocol.  Patient informed via Teodoro DEL TORO RN    Requested Prescriptions   Pending Prescriptions Disp Refills     azelastine (ASTELIN) 0.1 % spray 30 mL 5    Antihistamines Protocol Passed    12/20/2017  7:11 AM       Passed - Recent or future visit with authorizing provider's specialty    Patient had office visit in the last year or has a visit in the next 30 days with authorizing provider.  See chart review.              Passed - Patient is age 3 or older    Apply age and/or weight-based dosing for peds patients age 3 and older.    Forward request to provider for patients under the age of 3.

## 2017-12-21 NOTE — TELEPHONE ENCOUNTER
Routing refill request to provider for review/approval because:  Drug interaction warning - Ketoconazole and Flonase (Medium Warning)  A break in medication - last Rx from 2012  Aria DEL TORO RN    Requested Prescriptions   Pending Prescriptions Disp Refills     fluticasone (FLONASE) 50 MCG/ACT spray       Sig: Spray 1-2 sprays into both nostrils daily    Inhaled Steroids Protocol Failed    12/20/2017  7:11 AM       Failed - Asthma control test 20 or greater in last 6 months    Please review ACT score.          Passed - Patient is age 12 or older       Passed - Recent (6 mo) or future visit with authorizing provider's specialty    Patient had office visit in the last 6 months or has a visit in the next 30 days with authorizing provider.  See chart review.

## 2017-12-21 NOTE — TELEPHONE ENCOUNTER
Routing refill request to provider for review/approval because:  A break in medication - patient to use daily per instructions   Last Rx from April 2016  Aria DEL TORO RN    Requested Prescriptions   Pending Prescriptions Disp Refills     beclomethasone (QVAR) 80 MCG/ACT Inhaler 3 Inhaler 1     Sig: Inhale 2 puffs into the lungs 2 times daily    Inhaled Steroids Protocol Failed    12/20/2017  7:11 AM       Failed - Asthma control test 20 or greater in last 6 months    Please review ACT score.          Passed - Patient is age 12 or older       Passed - Recent (6 mo) or future visit with authorizing provider's specialty    Patient had office visit in the last 6 months or has a visit in the next 30 days with authorizing provider.  See chart review.

## 2017-12-21 NOTE — PATIENT INSTRUCTIONS
Dear Kasandra Winkler    Thank you for choosing [facility] Special[facility] ([facility]) for your infusion.  The following information is a summary of our appointment as well as important reminders.      Additional information: Today you had an infusion of iron Dextran 1000mg with 25 mg test dose.      Iron Dextran Solution for injection  What is this medicine?  IRON DEXTRAN (AHY hiro DEX mccarty) is an iron complex. Iron is used to make healthy red blood cells, which carry oxygen and nutrients through the body. This medicine is used to treat people who cannot take iron by mouth and have low levels of iron in the blood.  This medicine may be used for other purposes; ask your health care provider or pharmacist if you have questions.  What should I tell my health care provider before I take this medicine?  They need to know if you have any of these conditions:    anemia not caused by low iron levels    heart disease    high levels of iron in the blood    kidney disease    liver disease    an unusual or allergic reaction to iron, other medicines, foods, dyes, or preservatives    pregnant or trying to get pregnant    breast-feeding  How should I use this medicine?  This medicine is for injection into a vein or a muscle. It is given by a health care professional in a hospital or clinic setting.  Talk to your pediatrician regarding the use of this medicine in children. While this drug may be prescribed for children as young as 4 months old for selected conditions, precautions do apply.  Overdosage: If you think you have taken too much of this medicine contact a poison control center or emergency room at once.  NOTE: This medicine is only for you. Do not share this medicine with others.    We look forward in seeing you on your next appointment here at [facility].  Please don t hesitate to call us at [phone] to reschedule any of your appointments or to speak with one of the [facility]  registered nurses.  It was a pleasure taking care of you today.    Sincerely,    [facility]  Specialty Infusion & Procedure Center  [address] [place]  [address]  Phone:  [phone]

## 2017-12-22 RX ORDER — METHOCARBAMOL 500 MG/1
1000 TABLET, FILM COATED ORAL 3 TIMES DAILY PRN
Qty: 30 TABLET | Refills: 1 | Status: SHIPPED | OUTPATIENT
Start: 2017-12-22 | End: 2018-04-20

## 2017-12-22 RX ORDER — FLUTICASONE PROPIONATE 50 MCG
1-2 SPRAY, SUSPENSION (ML) NASAL DAILY
Qty: 48 ML | Refills: 1 | Status: SHIPPED | OUTPATIENT
Start: 2017-12-22 | End: 2018-04-11

## 2017-12-22 RX ORDER — CLONAZEPAM 0.5 MG/1
0.5 TABLET ORAL PRN
Qty: 20 TABLET | Refills: 0 | Status: SHIPPED | OUTPATIENT
Start: 2017-12-22 | End: 2021-08-03

## 2017-12-22 NOTE — TELEPHONE ENCOUNTER
Denied  Duplicate; Rx sent today  Aria DEL TORO RN    Requested Prescriptions   Pending Prescriptions Disp Refills     QVAR 80 MCG/ACT Inhaler [Pharmacy Med Name: QVAR 80MCG ORAL INHALER 120 DOSES] 26.1 g 0     Sig: INHALE 2 PUFFS INTO THE LUNGS BY MOUTH TWICE DAILY.    Inhaled Steroids Protocol Failed    12/21/2017  6:46 PM       Failed - Asthma control test 20 or greater in last 6 months    Please review ACT score.          Passed - Patient is age 12 or older       Passed - Recent (6 mo) or future visit with authorizing provider's specialty    Patient had office visit in the last 6 months or has a visit in the next 30 days with authorizing provider.  See chart review.

## 2017-12-27 ENCOUNTER — OFFICE VISIT (OUTPATIENT)
Dept: OBGYN | Facility: CLINIC | Age: 37
End: 2017-12-27
Attending: NURSE PRACTITIONER
Payer: COMMERCIAL

## 2017-12-27 ENCOUNTER — TELEPHONE (OUTPATIENT)
Dept: FAMILY MEDICINE | Facility: CLINIC | Age: 37
End: 2017-12-27

## 2017-12-27 VITALS
SYSTOLIC BLOOD PRESSURE: 131 MMHG | DIASTOLIC BLOOD PRESSURE: 82 MMHG | BODY MASS INDEX: 30.82 KG/M2 | HEIGHT: 66 IN | WEIGHT: 191.8 LBS | HEART RATE: 66 BPM

## 2017-12-27 DIAGNOSIS — J45.40 MODERATE PERSISTENT ASTHMA WITH ALLERGIC RHINITIS WITHOUT COMPLICATION: Primary | ICD-10-CM

## 2017-12-27 DIAGNOSIS — Z30.430 ENCOUNTER FOR IUD INSERTION: Primary | ICD-10-CM

## 2017-12-27 LAB
HCG UR QL: NEGATIVE
INTERNAL QC OK POCT: YES

## 2017-12-27 PROCEDURE — 81025 URINE PREGNANCY TEST: CPT | Mod: ZF | Performed by: NURSE PRACTITIONER

## 2017-12-27 PROCEDURE — 99212 OFFICE O/P EST SF 10 MIN: CPT | Mod: ZF

## 2017-12-27 PROCEDURE — 58300 INSERT INTRAUTERINE DEVICE: CPT | Mod: ZF | Performed by: NURSE PRACTITIONER

## 2017-12-27 PROCEDURE — 25000125 ZZHC RX 250: Mod: ZF

## 2017-12-27 RX ORDER — FLUTICASONE PROPIONATE 110 UG/1
2 AEROSOL, METERED RESPIRATORY (INHALATION) 2 TIMES DAILY
Qty: 3 INHALER | Refills: 3 | Status: SHIPPED | OUTPATIENT
Start: 2017-12-27 | End: 2021-08-03

## 2017-12-27 NOTE — PATIENT INSTRUCTIONS
IUD pamphlet reviewed and given to patient.    A Mirena IUD was placed today  This is due to be removed /replaced by 12/27/2022.  You are encouraged to check for your IUD strings once per month  Return to clinic in 4-6 weeks for a string check.  May take ibuprofen as needed or apply heat today for cramping  Notify provider if you have heavy bleeding (soaking a pad in an hour), abnormal vaginal discharge (odorous), or severe cramping.  Do not place anything in the vagina for 24 hours.

## 2017-12-27 NOTE — PROGRESS NOTES
"  SUBJECTIVE: Kasandra Winkler is a 37 year old  female, requests ***    Verification of Procedure:  Just before the procedure begans through verbal and active participation of team members, I verified:     Initials   Patient Name ***   Patient  ***   Procedure to be performed ***     Consent:  Risks, benefits of treatment, and alternative options for contraception were discussed.  Patient's questions were elicited and answered.  Written consent was obtained and scanned into medical record.       OBJECTIVE: /82  Pulse 66  Ht 1.676 m (5' 5.98\")  Wt 87 kg (191 lb 12.8 oz)  LMP 2017  BMI 30.97 kg/m2    Pelvic Exam:  EG/BUS: {[facility] USPEC VULVA EXAM :729323::\"Normal genital architecture without lesions, erythema or abnormal secretions.\",\"Bartholin's, Urethra, Gambrills's glands are normal.\"}   Vagina: {VAGINAL MUCOSA:508647::\"moist, pink, rugae\"} with {Color:370174::\"creamy\",\"white\",\"odorless\"}secretions  Cervix: {CERVIX :974616::\"no lesions\"}  Uterus: {UTERUS :770232::\"anteverted, \"}   Adnexa: {ADNEXA :407110::\"Within normal limits\",\"No masses, nodularity, tenderness\"}  Rectum: {RECTAL NEGATIVES LIST:211285::\"anus normal\"}      PROCEDURE NOTE  --  {IUD type:763002} Insertion    Reason for Insertion:  {IUD Reason:947590606}.    Pregnancy test: {POSITIVE (CAP):373034}    Counseling:  Patient counseled on efficacy, benefits, risks, potential side effects of IUD.  Insertion procedure and risk of infection, perforation, and spontaneous expulsion reviewed.   Advised to plan removal and/or replacement of IUD in *** years from today or when desired.       Kasandra  was pre-medicated with *** prior to beginning the procedure.      Under sterile technique, cervix was visualized with a {SMALL MEDIUM LARGE:869488} {Endo Speculum type:149722139} speculum and prepped with {Cervix Prep:317797615} solution. The uterus was sounded to *** cm. The {IUD type:238088} IUD insertion apparatus was prepared and placed " through the cervix without significant resistance and deployed at the fundus in the usual fashion. The strings were trimmed 3 cm from the external os.      Device Lot #: ***  Device Expiration Date: ***     EBL:  Minimal     Complications: *** None      Kasandra Winkler {Tolerated/Did Not Tolerate Procedure:967030486}    PLAN:      Written information on IUD use reviewed and given.  Symptoms to report reviewed. To report heavy bleeding, severe cramping, or abnormal vaginal discharge.  May take Ibuprofen 400-800 mg PO TID PRN or Naproxen 500 mg PO BID for cramping. Reminded to check for IUD strings every month. Patient has been counseled to use backup birth control method for 1 week.  Return to clinic in 4-6 weeks for string check. Kasandra Winkler  verbalized understanding of instructions.

## 2017-12-27 NOTE — TELEPHONE ENCOUNTER
[employer] cannot perform PA for Qvar because it is a plan exclusion for this member.  Covered alternative: Flovent HFA 44, 110, 220mcg.  Pt uses [employer] [address] [place] [address].

## 2017-12-27 NOTE — LETTER
"2017       RE: Kasandra Winkler  [address]  [place] [address]     Dear Colleague,    Thank you for referring your patient, Kasandra Winkler, to the [facility] at [facility]. Please see a copy of my visit note below.      SUBJECTIVE: Kasandra Winkler is a 37 year old  female who requests Mirena IUD insertion for menorrhagia.    Sexually active with a long-term female partner.   Encouraged STD testing, but Kasandra declined.   Menses have been q2 weeks and heavy; she was referred to [facility] for this procedure by her family practice clinic after a failed insertion on 2017.  She had an iron transfusion 1 week ago; her latest Hgb was 11.4 and Ferritin was 7 (2017).     LMP: 2017    Verification of Procedure:  Just before the procedure began, through verbal and active participation of team members, I verified:     Initials   Patient Name SLP   Patient  SLP   Procedure to be performed SLP     Consent:  Risks, benefits of treatment, and alternative options for contraception were discussed.  Patient's questions were elicited and answered.  Written consent was obtained and scanned into medical record.     OBJECTIVE: /82  Pulse 66  Ht 1.676 m (5' 5.98\")  Wt 87 kg (191 lb 12.8 oz)  LMP 2017  BMI 30.97 kg/m2    Pelvic Exam:  EG/BUS: Normal genital architecture without lesions, erythema or abnormal secretions. Bartholin's, Urethra, Wyanet's glands are normal.  Vagina: moist, pink, rugae with menstrual blood in vaginal vault   Cervix: pink, moist, closed, without lesion or CMT  Uterus: anteverted,  and small, smooth, firm, mobile w/o pain   Adnexa: Within normal limits and No masses, nodularity, tenderness  Rectum: anus normal    PROCEDURE NOTE  --  Mirena Insertion    Reason for Insertion:  menorrhagia    Pregnancy test: Negative    Counseling:  Patient was counseled on efficacy, benefits, risks, and potential " side effects of IUD.  Insertion procedure and risk of infection, perforation, and spontaneous expulsion reviewed.   Advised to plan removal and/or replacement of IUD in 5 years from today or when desired.       Kasandra declined ibuprofen prior to beginning the procedure.  She had taken misoprostol 400mg buccal 2 hrs prior to procedure due to failed insertion on 12/18/2017.    Under sterile technique, cervix was visualized with a medium Graves speculum and prepped with Betadine solution. A tenaculum was placed on the cervix at the 12 o'clock position.  The uterus was sounded to 9 cm. The Mirena IUD insertion apparatus was prepared and placed through the cervix without significant resistance and deployed at the fundus in the usual fashion. The strings were trimmed 3 cm from the external os.      Device Lot #: [id number]  Device Expiration Date: 06/2020     EBL:  Minimal     Complications: None      Kasandra Winkler tolerated procedure well.    PLAN:    Written information on IUD use reviewed and given.  Symptoms to report reviewed. To report heavy bleeding, severe cramping, or abnormal vaginal discharge.  May take Ibuprofen 400-800 mg PO TID PRN or Naproxen 500 mg PO BID for cramping. Reminded to check for IUD strings every month. Instructed to not put anything into the vagina for 24 hours.  Return to clinic in 4-6 weeks for string check. This Mirena IUD is due to be removed/ replaced by 12/27/2022.  Kasandra Winkler verbalized understanding of instructions.    Amirah Browne, DNP, APRN, WHNP

## 2017-12-27 NOTE — PROGRESS NOTES
"  SUBJECTIVE: Kasandra Winkler is a 37 year old  female who requests Mirena IUD insertion for menorrhagia.    Sexually active with a long-term female partner.   Encouraged STD testing, but Kasandra declined.   Menses have been q2 weeks and heavy; she was referred to [facility] for this procedure by her family practice clinic after a failed insertion on 2017.  She had an iron transfusion 1 week ago; her latest Hgb was 11.4 and Ferritin was 7 (2017).     LMP: 2017    Verification of Procedure:  Just before the procedure began, through verbal and active participation of team members, I verified:     Initials   Patient Name SLP   Patient  SLP   Procedure to be performed SLP     Consent:  Risks, benefits of treatment, and alternative options for contraception were discussed.  Patient's questions were elicited and answered.  Written consent was obtained and scanned into medical record.     OBJECTIVE: /82  Pulse 66  Ht 1.676 m (5' 5.98\")  Wt 87 kg (191 lb 12.8 oz)  LMP 2017  BMI 30.97 kg/m2    Pelvic Exam:  EG/BUS: Normal genital architecture without lesions, erythema or abnormal secretions. Bartholin's, Urethra, Kensett's glands are normal.  Vagina: moist, pink, rugae with menstrual blood in vaginal vault   Cervix: pink, moist, closed, without lesion or CMT  Uterus: anteverted,  and small, smooth, firm, mobile w/o pain   Adnexa: Within normal limits and No masses, nodularity, tenderness  Rectum: anus normal    PROCEDURE NOTE  --  Mirena Insertion    Reason for Insertion:  menorrhagia    Pregnancy test: Negative    Counseling:  Patient was counseled on efficacy, benefits, risks, and potential side effects of IUD.  Insertion procedure and risk of infection, perforation, and spontaneous expulsion reviewed.   Advised to plan removal and/or replacement of IUD in 5 years from today or when desired.       Kasandra declined ibuprofen prior to beginning the procedure.  She had taken misoprostol 400mg " buccal 2 hrs prior to procedure due to failed insertion on 12/18/2017.    Under sterile technique, cervix was visualized with a medium Graves speculum and prepped with Betadine solution. A tenaculum was placed on the cervix at the 12 o'clock position.  The uterus was sounded to 9 cm. The Mirena IUD insertion apparatus was prepared and placed through the cervix without significant resistance and deployed at the fundus in the usual fashion. The strings were trimmed 3 cm from the external os.      Device Lot #: [id number]  Device Expiration Date: 06/2020     EBL:  Minimal     Complications: None      Kasandra Wnikler tolerated procedure well.    PLAN:    Written information on IUD use reviewed and given.  Symptoms to report reviewed. To report heavy bleeding, severe cramping, or abnormal vaginal discharge.  May take Ibuprofen 400-800 mg PO TID PRN or Naproxen 500 mg PO BID for cramping. Reminded to check for IUD strings every month. Instructed to not put anything into the vagina for 24 hours.  Return to clinic in 4-6 weeks for string check. This Mirena IUD is due to be removed/ replaced by 12/27/2022.  Kasandra Winkler verbalized understanding of instructions.    Amirah Browne, JERROD, APRN, WHNP

## 2017-12-27 NOTE — MR AVS SNAPSHOT
After Visit Summary   12/27/2017    Kasandra Winkler    MRN: [id number]           Patient Information     Date Of Birth          1980        Visit Information        Provider Department      12/27/2017 2:30 PM Amirah Browne APRN [facility]        Today's Diagnoses     Encounter for IUD insertion    -  1      Care Instructions      IUD pamphlet reviewed and given to patient.    A Mirena IUD was placed today  This is due to be removed /replaced by 12/27/2022.  You are encouraged to check for your IUD strings once per month  Return to clinic in 4-6 weeks for a string check.  May take ibuprofen as needed or apply heat today for cramping  Notify provider if you have heavy bleeding (soaking a pad in an hour), abnormal vaginal discharge (odorous), or severe cramping.  Do not place anything in the vagina for 24 hours.             Follow-ups after your visit        Who to contact     Please call your clinic at [phone] to:    Ask questions about your health    Make or cancel appointments    Discuss your medicines    Learn about your test results    Speak to your doctor   If you have compliments or concerns about an experience at your clinic, or if you wish to file a complaint, please contact [facility] Patient Relations at [phone] or email us at [email or web address]         Additional Information About Your Visit        MyChart Information     [employer] gives you secure access to your electronic health record. If you see a primary care provider, you can also send messages to your care team and make appointments. If you have questions, please call your primary care clinic.  If you do not have a primary care provider, please call [phone] and they will assist you.      Disrupt6 is an electronic gateway that provides easy, online access to your medical records. With Disrupt6, you can request a clinic appointment, read your test  "results, renew a prescription or communicate with your care team.     To access your existing account, please contact your [facility] or call [phone] for assistance.        Care EveryWhere ID     This is your Care EveryWhere ID. This could be used by other organizations to access your [place] medical records  [id number]        Your Vitals Were     Pulse Height Last Period BMI (Body Mass Index)          66 1.676 m (5' 5.98\") 12/25/2017 30.97 kg/m2         Blood Pressure from Last 3 Encounters:   12/27/17 131/82   12/21/17 112/62   12/18/17 120/80    Weight from Last 3 Encounters:   12/27/17 87 kg (191 lb 12.8 oz)   12/18/17 89.4 kg (197 lb)   12/13/17 88.9 kg (196 lb)              We Performed the Following     hCG qual urine POCT [POC7]     Insertion of IUD [43004]          Today's Medication Changes          These changes are accurate as of: 12/27/17  3:47 PM.  If you have any questions, ask your nurse or doctor.               Start taking these medicines.        Dose/Directions    levonorgestrel 20 MCG/24HR IUD   Commonly known as:  MIRENA (52 MG)   Used for:  Encounter for IUD insertion   Started by:  Amirah Browne APRN CNP        Dose:  1 each   1 each (20 mcg) by Intrauterine route once for 1 dose   Quantity:  1 each   Refills:  0            Where to get your medicines      Some of these will need a paper prescription and others can be bought over the counter.  Ask your nurse if you have questions.     You don't need a prescription for these medications     levonorgestrel 20 MCG/24HR IUD                Primary Care Provider Office Phone # Fax #    Trish Callaway [phone] [address] [address]        Equal Access to Services     KRIS SILVA : Krishna vergarao Sohumphrey, waaxda luqadaha, qaybta kaalmada kellieyada, chris grimm. So [facility] [phone].    ATENCIÓN: Si tere helton, " tiene a das disposición servicios gratuitos de asistencia lingüística. Sravan dominguez [phone].    We comply with applicable federal civil rights laws and [place] laws. We do not discriminate on the basis of race, color, national origin, age, disability, sex, sexual orientation, or gender identity.            Thank you!     Thank you for choosing [facility]  for your care. Our goal is always to provide you with excellent care. Hearing back from our patients is one way we can continue to improve our services. Please take a few minutes to complete the written survey that you may receive in the mail after your visit with us. Thank you!             Your Updated Medication List - Protect others around you: Learn how to safely use, store and throw away your medicines at www.disposemymeds.org.          This list is accurate as of: 12/27/17  3:47 PM.  Always use your most recent med list.                   Brand Name Dispense Instructions for use Diagnosis    azelastine 0.1 % spray    ASTELIN    30 mL    Spray 1-2 sprays into both nostrils 2 times daily    Allergic rhinitis due to pollen       beclomethasone 80 MCG/ACT Inhaler    QVAR    3 Inhaler    Inhale 2 puffs into the lungs 2 times daily    Moderate persistent asthma with allergic rhinitis without complication       busPIRone 10 MG tablet    BUSPAR    60 tablet    Take 1 tablet (10 mg) by mouth 2 times daily    Anxiety       calcium carbonate 1250 MG tablet    OS-JONATAN 500 mg [place]. Ca     Take 500 mg by mouth 2 times daily        cetirizine 10 MG tablet    zyrTEC     Take 10 mg by mouth daily        clonazePAM 0.5 MG tablet    klonoPIN    20 tablet    Take 1 tablet (0.5 mg) by mouth as needed for anxiety    Major depressive disorder, recurrent episode, mild (H)       Cod Liver Oil 1000 MG Caps      Take 1 capsule by mouth daily.        fluconazole 150 MG tablet    DIFLUCAN    4 tablet    Take 2 tablets (300mg) by mouth once a week for 2 weeks     Tinea versicolor       fluticasone 50 MCG/ACT spray    FLONASE    48 mL    Spray 1-2 sprays into both nostrils daily    Seasonal allergic rhinitis due to pollen, unspecified chronicity       HM MULTIVITAMIN ADULT GUMMY PO           HYDROcodone-acetaminophen 5-325 MG per tablet    NORCO    7 tablet    Take 1-2 tablets by mouth nightly as needed for moderate to severe pain maximum 1 tablet(s) per day    Acute left-sided low back pain with left-sided sciatica       ketoconazole 2 % shampoo    NIZORAL    120 mL    Apply to the affected area and wash off after 5 minutes.    Dandruff, Tinea versicolor       levonorgestrel 20 MCG/24HR IUD    MIRENA (52 MG)    1 each    1 each (20 mcg) by Intrauterine route once for 1 dose    Encounter for IUD insertion       levothyroxine 137 MCG tablet    SYNTHROID/LEVOTHROID    90 tablet    TAKE 1 TABLET BY MOUTH DAILY    Hashimoto's thyroiditis       methocarbamol 500 MG tablet    ROBAXIN    30 tablet    Take 2 tablets (1,000 mg) by mouth 3 times daily as needed for muscle spasms    Acute left-sided low back pain with left-sided sciatica       misoprostol 200 MCG tablet    CYTOTEC    2 tablet    Take 2 hours before procedure. Place 1 tab between cheek and gum on the right, and 2nd tab on the left. Dissolve for 30 min, then swallow.    Encounter for IUD insertion       norethindrone-ethinyl estradiol 1-20 MG-MCG per tablet    JUNEL FE 1/20    28 tablet    Take 1 tablet by mouth daily    Excessive or frequent menstruation       Vitamin B-12 500 MCG Subl      Place 500 mcg under the tongue daily        Vitamin D3 3000 UNITS Tabs      Take 1 tablet by mouth daily.

## 2017-12-28 PROBLEM — Z30.430 ENCOUNTER FOR IUD INSERTION: Status: ACTIVE | Noted: 2017-12-28

## 2018-01-08 ENCOUNTER — TELEPHONE (OUTPATIENT)
Dept: OBGYN | Facility: CLINIC | Age: 38
End: 2018-01-08

## 2018-01-08 NOTE — TELEPHONE ENCOUNTER
Spoke to Kasandra who had a IUD placed 12/27/17 calling to report pain on right side of her abdomen, rates it 6-7/10. She hasn't taken any ibuprofen. She will take some Aleve to see if that helps. She reports minimal spotting. She didn't have her string check or make that appt. Scheduled for US tomorrow in clinic to check placement. If pain severe instructed to go to ED.Pt indicated understanding and agreed with plan.

## 2018-01-15 ENCOUNTER — TELEPHONE (OUTPATIENT)
Dept: OBGYN | Facility: CLINIC | Age: 38
End: 2018-01-15

## 2018-01-15 DIAGNOSIS — R10.2 PELVIC PAIN IN FEMALE: Primary | ICD-10-CM

## 2018-01-15 NOTE — TELEPHONE ENCOUNTER
Spoke with Kasandra and she states that she is still having significant pain on her right side. She had a period and pain still persists. Wondering if it is related to IUD. Nurse arranged for US and follow up. Patient agreeable to plan.

## 2018-01-15 NOTE — TELEPHONE ENCOUNTER
----- Message from Gemini Waaicha sent at 1/15/2018  2:05 PM CST -----  Regarding: US order   Contact: [phone]  Patient was scheduled for an US due to pain thought to be due to her IUD placed in December. She cancelled that US the morning of the appt, 1/9. She is wanting to schedule the US again. I do now show an US order on the chart. Please check regarding the US order & please contact patient regarding the pain.     Thank you!  Aydee    Call Center       Please DO NOT send this message and/or reply back to sender. Call Center Representatives DO NOT respond to messages.

## 2018-01-16 ENCOUNTER — OFFICE VISIT (OUTPATIENT)
Dept: OBGYN | Facility: CLINIC | Age: 38
End: 2018-01-16
Attending: OBSTETRICS & GYNECOLOGY
Payer: COMMERCIAL

## 2018-01-16 ENCOUNTER — TELEPHONE (OUTPATIENT)
Dept: FAMILY MEDICINE | Facility: CLINIC | Age: 38
End: 2018-01-16

## 2018-01-16 VITALS
DIASTOLIC BLOOD PRESSURE: 78 MMHG | HEART RATE: 60 BPM | BODY MASS INDEX: 30.68 KG/M2 | WEIGHT: 190 LBS | SYSTOLIC BLOOD PRESSURE: 116 MMHG

## 2018-01-16 DIAGNOSIS — R10.2 PELVIC PAIN IN FEMALE: ICD-10-CM

## 2018-01-16 DIAGNOSIS — E06.3 HASHIMOTO'S THYROIDITIS: ICD-10-CM

## 2018-01-16 DIAGNOSIS — E06.3 HYPOTHYROIDISM DUE TO HASHIMOTO'S THYROIDITIS: Primary | ICD-10-CM

## 2018-01-16 DIAGNOSIS — D50.8 IRON DEFICIENCY ANEMIA SECONDARY TO INADEQUATE DIETARY IRON INTAKE: ICD-10-CM

## 2018-01-16 DIAGNOSIS — N83.201 CYST OF RIGHT OVARY: Primary | ICD-10-CM

## 2018-01-16 PROCEDURE — G0463 HOSPITAL OUTPT CLINIC VISIT: HCPCS | Mod: 25,ZF

## 2018-01-16 PROCEDURE — 76830 TRANSVAGINAL US NON-OB: CPT | Mod: ZF

## 2018-01-16 ASSESSMENT — PAIN SCALES - GENERAL: PAINLEVEL: NO PAIN (0)

## 2018-01-16 NOTE — LETTER
1/16/2018       RE: Kasandra Winkler  [address]  [facility] [address]     Dear Colleague,    Thank you for referring your patient, Kasandra Winkler, to the [facility] at [facility]. Please see a copy of my visit note below.    37 year old female presents for gynecologic ultrasound indicated by pelvic pain.  This study was done transvaginally.    Uterine findings:    Presence: Visible Size: Normal  5.4 x 5.8 x 4.6cm.  Endometrium = 7.1 mm. IUD in place   Cx length = 44.04 mm.      Flexion:  Anteverted Position: Midline Margins: Smooth Shape: Normal   Contour: Regular Texture: Homogeneous Cavity: Normal Masses: Normal    Pelvic findings:    Right Adnexa: Normal   Left Adnexa: Normal   Bladder:  Normal         Cul - de - sac fluid: None    Ovarian follicles:   Right ovary:  2.4 x 2.6 x 3.2cm.     1 hemorrhagic cyst      Size(s):  2.0 x 1.7 x 2.1     Left ovary:  2.1 x 2.1 x 1.7cm.     0 follices    Comments:  IUD in correct position.  2.1 cm hemorrhagic right ovarian cyst.  Recommend repeat scan in 6-8 weeks to ensure resolution.      ANTONI Christina MD      [facility]

## 2018-01-16 NOTE — MR AVS SNAPSHOT
After Visit Summary   1/16/2018    Kasandra Winkler    MRN: [id number]           Patient Information     Date Of Birth          1980        Visit Information        Provider Department      1/16/2018 3:30 PM [facility] ULTRASOUND [facility]        Today's Diagnoses     Pelvic pain in female           Follow-ups after your visit        Your next 10 appointments already scheduled     Jan 16, 2018  3:30 PM CST   ULTRASOUND with [facility] ULTRASOUND   [facility] ([facility])    [facility] [address]  [address]  [facility] [address] [phone]            Jan 16, 2018  4:00 PM CST   Return Visit with Hodan Macedo MD   [facility] ([facility])    [facility] [address]  [address]  [facility] [id number] [phone]            Jan 24, 2018  2:30 PM CST   MyChart Long with Trish Callaway DO   [facility] ([facility]    [address] [facility] [id number] [phone]              Future tests that were ordered for you today     Open Future Orders        Priority Expected Expires Ordered    TSH Routine  1/24/2018 1/16/2018    T4, free Routine  1/24/2018 1/16/2018    T3, Free Routine  1/24/2018 1/16/2018    Thyroid peroxidase antibody Routine  1/24/2018 1/16/2018    Iron and iron binding capacity Routine  1/24/2018 1/16/2018    Ferritin Routine  1/24/2018 1/16/2018    CBC with platelets Routine  1/24/2018 1/16/2018            Who to contact     Please call your clinic at [phone] to:    Ask questions about your health    Make or cancel appointments    Discuss your medicines    Learn about your test results    Speak to your doctor   If you have compliments or concerns about an experience at your clinic, or if you wish to file a complaint, please contact [facility] Patient  Relations at [phone] or email us at [email or web address]         Additional Information About Your Visit        Ipsat Therapies[employer]YumDots Information     SiBEAM gives you secure access to your electronic health record. If you see a primary care provider, you can also send messages to your care team and make appointments. If you have questions, please call your primary care clinic.  If you do not have a primary care provider, please call [phone] and they will assist you.      SiBEAM is an electronic gateway that provides easy, online access to your medical records. With SiBE[employer], you can request a clinic appointment, read your test results, renew a prescription or communicate with your care team.     To access your existing account, please contact your [facility] or call [phone] for assistance.        Care EveryWhere ID     This is your Care EveryWhere ID. This could be used by other organizations to access your [place] medical records  [id number]        Your Vitals Were     Last Period                   12/25/2017            Blood Pressure from Last 3 Encounters:   12/27/17 131/82   12/21/17 112/62   12/18/17 120/80    Weight from Last 3 Encounters:   12/27/17 87 kg (191 lb 12.8 oz)   12/18/17 89.4 kg (197 lb)   12/13/17 88.9 kg (196 lb)              We Performed the Following     US GYN Complete Transvaginal - [address] (In Clinic)        Primary Care Provider Office Phone # Fax #    Trish Callaway [phone] [address] [facility] [address] [address]        Equal Access to Services     [facility]: Hadii aad ku hadasho Soomaali, waaxda luqadaha, qaybta kaalmada adeegyada, chris shea haydahlia will . So [facility] [phone].    ATENCIÓN: Si habla español, tiene a das disposición servicios gratuitos de asistencia lingüística. Llame al [phone].    We comply with applicable federal civil rights laws and [place] laws. We do not  discriminate on the basis of race, color, national origin, age, disability, sex, sexual orientation, or gender identity.            Thank you!     Thank you for choosing [facility]  for your care. Our goal is always to provide you with excellent care. Hearing back from our patients is one way we can continue to improve our services. Please take a few minutes to complete the written survey that you may receive in the mail after your visit with us. Thank you!             Your Updated Medication List - Protect others around you: Learn how to safely use, store and throw away your medicines at www.disposemymeds.org.          This list is accurate as of: 1/16/18  3:19 PM.  Always use your most recent med list.                   Brand Name Dispense Instructions for use Diagnosis    azelastine 0.1 % spray    ASTELIN    30 mL    Spray 1-2 sprays into both nostrils 2 times daily    Allergic rhinitis due to pollen       beclomethasone 80 MCG/ACT Inhaler    QVAR    3 Inhaler    Inhale 2 puffs into the lungs 2 times daily    Moderate persistent asthma with allergic rhinitis without complication       busPIRone 10 MG tablet    BUSPAR    60 tablet    Take 1 tablet (10 mg) by mouth 2 times daily    Anxiety       calcium carbonate 1250 MG tablet    OS-JONATAN 500 mg [place]. Ca     Take 500 mg by mouth 2 times daily        cetirizine 10 MG tablet    zyrTEC     Take 10 mg by mouth daily        clonazePAM 0.5 MG tablet    klonoPIN    20 tablet    Take 1 tablet (0.5 mg) by mouth as needed for anxiety    Major depressive disorder, recurrent episode, mild (H)       Cod Liver Oil 1000 MG Caps      Take 1 capsule by mouth daily.        fluconazole 150 MG tablet    DIFLUCAN    4 tablet    Take 2 tablets (300mg) by mouth once a week for 2 weeks    Tinea versicolor       fluticasone 110 MCG/ACT Inhaler    FLOVENT HFA    3 Inhaler    Inhale 2 puffs into the lungs 2 times daily    Moderate persistent asthma with allergic rhinitis  without complication       fluticasone 50 MCG/ACT spray    FLONASE    48 mL    Spray 1-2 sprays into both nostrils daily    Seasonal allergic rhinitis due to pollen, unspecified chronicity       HM MULTIVITAMIN ADULT GUMMY PO           HYDROcodone-acetaminophen 5-325 MG per tablet    NORCO    7 tablet    Take 1-2 tablets by mouth nightly as needed for moderate to severe pain maximum 1 tablet(s) per day    Acute left-sided low back pain with left-sided sciatica       ketoconazole 2 % shampoo    NIZORAL    120 mL    Apply to the affected area and wash off after 5 minutes.    Dandruff, Tinea versicolor       levonorgestrel 20 MCG/24HR IUD    MIRENA (52 MG)    1 each    1 each (20 mcg) by Intrauterine route once for 1 dose    Encounter for IUD insertion       levothyroxine 137 MCG tablet    SYNTHROID/LEVOTHROID    90 tablet    TAKE 1 TABLET BY MOUTH DAILY    Hashimoto's thyroiditis       methocarbamol 500 MG tablet    ROBAXIN    30 tablet    Take 2 tablets (1,000 mg) by mouth 3 times daily as needed for muscle spasms    Acute left-sided low back pain with left-sided sciatica       misoprostol 200 MCG tablet    CYTOTEC    2 tablet    Take 2 hours before procedure. Place 1 tab between cheek and gum on the right, and 2nd tab on the left. Dissolve for 30 min, then swallow.    Encounter for IUD insertion       Vitamin B-12 500 MCG Subl      Place 500 mcg under the tongue daily        Vitamin D3 3000 UNITS Tabs      Take 1 tablet by mouth daily.

## 2018-01-16 NOTE — TELEPHONE ENCOUNTER
Kasandra is Requesting  Lab orders for Full Thyroid panel including antibodies and Iron/anemia to come in this week, has appt scheduled on 1/24/18 and would like the results for that appt.please place orders and then we can schedule her a lab appt this week.  Thanks,  Deysi [facility] Unit Coordinator

## 2018-01-16 NOTE — LETTER
"2018       RE: Kasandra Winkler  [address]  [place] [facility] [place] [address]     Dear Colleague,    Thank you for referring your patient, Kasandra Winkler, to the [facility] at [facility]. Please see a copy of my visit note below.    Follow up Ultrasound    Patient name: Kasandra Winkler  MRN: [id number]  : 1980    S: Kasandra feels okay today. She notes that for the last two weeks, a few weeks after first placement of an IUD, she was noted to have right lower quadrant abdominal pain that is described as sharp, intermittent. Lasts for 10 seconds to a couple minutes. Seem to be getting more uncomfortable over the last week. Tried Aleve, this did not help much. She also describes discomfort with a sensation of needing to empty her bowels \"have a good bowel movement\" has some bowel movements regularly, but does note episodes of apparent abdominal cramping that feel like a bowel movement, but no diarrhea or bowel movement occurs.     O:   Vitals:    18 1508   BP: 116/78   Pulse: 60   Weight: 86.2 kg (190 lb)     Gen: comfortable appearing, no distress  Abdomen: soft, nondistended, tender to palpation in Right lower quadrant.    US:  Rt ov: 2 cm hemorrhagic cyst. IUD in appropriate place      A/P: 37 year old female here for discussion of US results  - discussed hemorrhagic cyt on right ovary that may be contributing to discomfort, and does contribute if ruptures, but currently, no sign of that given no rebound tenderness. We specifically discussed torsion precautions, to return if the pain worsens or becomes constant. Discussed that endometriosis may have contributed to her period pain in the past, but no evidence of endometrioma or endometriosis is seen on ultrasound. In addition discussed miralax and fluids for treatment of constipation that may help with her abdominal discomfort overall.  - also discussed avoidance of NSAIDs, including " aleve in the setting of a gastric sleeve and call the surgeon's office with any questions  - rtc in 6-8 weeks for clinic check and repeat US: at that time, recommend pelvic exam with palpation of posterior vagina for discomfort.    Patient discussed with Dr. Luna.  Hodan Macedo MD 1/16/2018 5:39 PM     The Patient was seen in [facility] by Dr. Macedo.   I reviewed the history & exam. Assessment and plan were jointly made.   Maddie Luna MD, MPH

## 2018-01-16 NOTE — PROGRESS NOTES
"Follow up Ultrasound    Patient name: Kasandra Winkler  MRN: [id number]  : 1980    S: Kasandra feels okay today. She notes that for the last two weeks, a few weeks after first placement of an IUD, she was noted to have right lower quadrant abdominal pain that is described as sharp, intermittent. Lasts for 10 seconds to a couple minutes. Seem to be getting more uncomfortable over the last week. Tried Aleve, this did not help much. She also describes discomfort with a sensation of needing to empty her bowels \"have a good bowel movement\" has some bowel movements regularly, but does note episodes of apparent abdominal cramping that feel like a bowel movement, but no diarrhea or bowel movement occurs.     O:   Vitals:    18 1508   BP: 116/78   Pulse: 60   Weight: 86.2 kg (190 lb)     Gen: comfortable appearing, no distress  Abdomen: soft, nondistended, tender to palpation in Right lower quadrant.    US:  Rt ov: 2 cm hemorrhagic cyst. IUD in appropriate place      A/P: 37 year old female here for discussion of US results  - discussed hemorrhagic cyt on right ovary that may be contributing to discomfort, and does contribute if ruptures, but currently, no sign of that given no rebound tenderness. We specifically discussed torsion precautions, to return if the pain worsens or becomes constant. Discussed that endometriosis may have contributed to her period pain in the past, but no evidence of endometrioma or endometriosis is seen on ultrasound. In addition discussed miralax and fluids for treatment of constipation that may help with her abdominal discomfort overall.  - also discussed avoidance of NSAIDs, including aleve in the setting of a gastric sleeve and call the surgeon's office with any questions  - rtc in 6-8 weeks for clinic check and repeat US: at that time, recommend pelvic exam with palpation of posterior vagina for discomfort.    Patient discussed with Dr. Luna.  Hodan Macedo MD 2018 " 5:39 PM     The Patient was seen in [facility] by Dr. Macedo.   I reviewed the history & exam. Assessment and plan were jointly made.   Maddie Luna MD, MPH

## 2018-01-16 NOTE — MR AVS SNAPSHOT
After Visit Summary   1/16/2018    Kasandra Winkler    MRN: [id number]           Patient Information     Date Of Birth          1980        Visit Information        Provider Department      1/16/2018 4:00 PM Hodan Macedo MD [facility]        Today's Diagnoses     Cyst of right ovary    -  1      Care Instructions    Try tylenol for pain and return in 6-8 weeks for repeat visit.     If pain worsens or becomes constant          Follow-ups after your visit        Follow-up notes from your care team     Return in about 1 week (around 1/23/2018).      Your next 10 appointments already scheduled     Jan 16, 2018  4:00 PM CST   Return Visit with Hodan Macedo MD   [facility] ([facility])    [facility] [address]  [address]  [facility] [address] [phone]            Jan 24, 2018  2:30 PM CST   Manojt Long with Trish Callaway DO   [facility] ([facility])    [address] [place] [facility] [facility] [id number] [phone]              Future tests that were ordered for you today     Open Future Orders        Priority Expected Expires Ordered    US GYN Complete Transvaginal - 35488 (In Clinic) Routine  5/16/2018 1/16/2018    TSH Routine  1/24/2018 1/16/2018    T4, free Routine  1/24/2018 1/16/2018    T3, Free Routine  1/24/2018 1/16/2018    Thyroid peroxidase antibody Routine  1/24/2018 1/16/2018    Iron and iron binding capacity Routine  1/24/2018 1/16/2018    Ferritin Routine  1/24/2018 1/16/2018    CBC with platelets Routine  1/24/2018 1/16/2018            Who to contact     Please call your clinic at [phone] to:    Ask questions about your health    Make or cancel appointments    Discuss your medicines    Learn about your test results    Speak to your doctor   If you have compliments or concerns about an experience at your clinic, or if you wish to file a  complaint, please contact [facility] Patient Relations at [phone] or email us at [email or web address]         Additional Information About Your Visit        [employer] Information     [email or web address] gives you secure access to your electronic health record. If you see a primary care provider, you can also send messages to your care team and make appointments. If you have questions, please call your primary care clinic.  If you do not have a primary care provider, please call [phone] and they will assist you.      [employer] is an electronic gateway that provides easy, online access to your medical records. With [employer], you can request a clinic appointment, read your test results, renew a prescription or communicate with your care team.     To access your existing account, please contact your [facility] or call [phone] for assistance.        Care EveryWhere ID     This is your Care EveryWhere ID. This could be used by other organizations to access your [facility] medical records  [id number]        Your Vitals Were     Pulse Last Period Breastfeeding? BMI (Body Mass Index)          60 12/25/2017 No 30.68 kg/m2         Blood Pressure from Last 3 Encounters:   01/16/18 116/78   12/27/17 131/82   12/21/17 112/62    Weight from Last 3 Encounters:   01/16/18 86.2 kg (190 lb)   12/27/17 87 kg (191 lb 12.8 oz)   12/18/17 89.4 kg (197 lb)               Primary Care Provider Office Phone # Fax #    Trish Callaway [phone]       [facility] [address] [address]        Equal Access to Services     KRIS SILVA : Hadii aad ku hadasho Soomaali, waaxda luqadaha, qaybta kaalmada adeegyada, chris grimm. So [facility] [phone].    ATENCIÓN: Si habla español, tiene a das disposición servicios gratuitos de asistencia lingüística. Llame al [phone].    We comply with applicable federal civil rights laws  and [place] laws. We do not discriminate on the basis of race, color, national origin, age, disability, sex, sexual orientation, or gender identity.            Thank you!     Thank you for choosing [facility]  for your care. Our goal is always to provide you with excellent care. Hearing back from our patients is one way we can continue to improve our services. Please take a few minutes to complete the written survey that you may receive in the mail after your visit with us. Thank you!             Your Updated Medication List - Protect others around you: Learn how to safely use, store and throw away your medicines at www.disposemymeds.org.          This list is accurate as of: 1/16/18  3:40 PM.  Always use your most recent med list.                   Brand Name Dispense Instructions for use Diagnosis    azelastine 0.1 % spray    ASTELIN    30 mL    Spray 1-2 sprays into both nostrils 2 times daily    Allergic rhinitis due to pollen       beclomethasone 80 MCG/ACT Inhaler    QVAR    3 Inhaler    Inhale 2 puffs into the lungs 2 times daily    Moderate persistent asthma with allergic rhinitis without complication       busPIRone 10 MG tablet    BUSPAR    60 tablet    Take 1 tablet (10 mg) by mouth 2 times daily    Anxiety       calcium carbonate 1250 MG tablet    OS-JONATAN 500 mg [place]. Ca     Take 500 mg by mouth 2 times daily        cetirizine 10 MG tablet    zyrTEC     Take 10 mg by mouth daily        clonazePAM 0.5 MG tablet    klonoPIN    20 tablet    Take 1 tablet (0.5 mg) by mouth as needed for anxiety    Major depressive disorder, recurrent episode, mild (H)       Cod Liver Oil 1000 MG Caps      Take 1 capsule by mouth daily.        fluconazole 150 MG tablet    DIFLUCAN    4 tablet    Take 2 tablets (300mg) by mouth once a week for 2 weeks    Tinea versicolor       fluticasone 110 MCG/ACT Inhaler    FLOVENT HFA    3 Inhaler    Inhale 2 puffs into the lungs 2 times daily    Moderate persistent  asthma with allergic rhinitis without complication       fluticasone 50 MCG/ACT spray    FLONASE    48 mL    Spray 1-2 sprays into both nostrils daily    Seasonal allergic rhinitis due to pollen, unspecified chronicity       HM MULTIVITAMIN ADULT GUMMY PO           HYDROcodone-acetaminophen 5-325 MG per tablet    NORCO    7 tablet    Take 1-2 tablets by mouth nightly as needed for moderate to severe pain maximum 1 tablet(s) per day    Acute left-sided low back pain with left-sided sciatica       ketoconazole 2 % shampoo    NIZORAL    120 mL    Apply to the affected area and wash off after 5 minutes.    Dandruff, Tinea versicolor       levonorgestrel 20 MCG/24HR IUD    MIRENA (52 MG)    1 each    1 each (20 mcg) by Intrauterine route once for 1 dose    Encounter for IUD insertion       levothyroxine 137 MCG tablet    SYNTHROID/LEVOTHROID    90 tablet    TAKE 1 TABLET BY MOUTH DAILY    Hashimoto's thyroiditis       methocarbamol 500 MG tablet    ROBAXIN    30 tablet    Take 2 tablets (1,000 mg) by mouth 3 times daily as needed for muscle spasms    Acute left-sided low back pain with left-sided sciatica       misoprostol 200 MCG tablet    CYTOTEC    2 tablet    Take 2 hours before procedure. Place 1 tab between cheek and gum on the right, and 2nd tab on the left. Dissolve for 30 min, then swallow.    Encounter for IUD insertion       Vitamin B-12 500 MCG Subl      Place 500 mcg under the tongue daily        Vitamin D3 3000 UNITS Tabs      Take 1 tablet by mouth daily.

## 2018-01-16 NOTE — PATIENT INSTRUCTIONS
Try tylenol for pain and return in 6-8 weeks for repeat visit.     If pain worsens or becomes constant

## 2018-01-16 NOTE — PROGRESS NOTES
37 year old female presents for gynecologic ultrasound indicated by pelvic pain.  This study was done transvaginally.    Uterine findings:    Presence: Visible Size: Normal  5.4 x 5.8 x 4.6cm.  Endometrium = 7.1 mm. IUD in place   Cx length = 44.04 mm.      Flexion:  Anteverted Position: Midline Margins: Smooth Shape: Normal   Contour: Regular Texture: Homogeneous Cavity: Normal Masses: Normal    Pelvic findings:    Right Adnexa: Normal   Left Adnexa: Normal   Bladder:  Normal         Cul - de - sac fluid: None    Ovarian follicles:   Right ovary:  2.4 x 2.6 x 3.2cm.     1 hemorrhagic cyst      Size(s):  2.0 x 1.7 x 2.1     Left ovary:  2.1 x 2.1 x 1.7cm.     0 follices    Comments:  IUD in correct position.  2.1 cm hemorrhagic right ovarian cyst.  Recommend repeat scan in 6-8 weeks to ensure resolution.      ANTONI Christina MD

## 2018-01-18 DIAGNOSIS — D50.8 IRON DEFICIENCY ANEMIA SECONDARY TO INADEQUATE DIETARY IRON INTAKE: ICD-10-CM

## 2018-01-18 DIAGNOSIS — E06.3 HASHIMOTO'S THYROIDITIS: ICD-10-CM

## 2018-01-18 DIAGNOSIS — E06.3 HYPOTHYROIDISM DUE TO HASHIMOTO'S THYROIDITIS: ICD-10-CM

## 2018-01-18 LAB
ERYTHROCYTE [DISTWIDTH] IN BLOOD BY AUTOMATED COUNT: 16.5 % (ref 10–15)
FERRITIN SERPL-MCNC: 202 NG/ML (ref 12–150)
HCT VFR BLD AUTO: 37.9 % (ref 35–47)
HGB BLD-MCNC: 12.4 G/DL (ref 11.7–15.7)
IRON SATN MFR SERPL: 31 % (ref 15–46)
IRON SERPL-MCNC: 84 UG/DL (ref 35–180)
MCH RBC QN AUTO: 27.8 PG (ref 26.5–33)
MCHC RBC AUTO-ENTMCNC: 32.7 G/DL (ref 31.5–36.5)
MCV RBC AUTO: 85 FL (ref 78–100)
PLATELET # BLD AUTO: 276 10E9/L (ref 150–450)
RBC # BLD AUTO: 4.46 10E12/L (ref 3.8–5.2)
T3FREE SERPL-MCNC: 2.5 PG/ML (ref 2.3–4.2)
T4 FREE SERPL-MCNC: 1.32 NG/DL (ref 0.76–1.46)
TIBC SERPL-MCNC: 270 UG/DL (ref 240–430)
TSH SERPL DL<=0.005 MIU/L-ACNC: 0.59 MU/L (ref 0.4–4)
WBC # BLD AUTO: 6.7 10E9/L (ref 4–11)

## 2018-01-18 PROCEDURE — 36415 COLL VENOUS BLD VENIPUNCTURE: CPT | Performed by: FAMILY MEDICINE

## 2018-01-18 PROCEDURE — 82728 ASSAY OF FERRITIN: CPT | Performed by: FAMILY MEDICINE

## 2018-01-18 PROCEDURE — 83550 IRON BINDING TEST: CPT | Performed by: FAMILY MEDICINE

## 2018-01-18 PROCEDURE — 86376 MICROSOMAL ANTIBODY EACH: CPT | Performed by: FAMILY MEDICINE

## 2018-01-18 PROCEDURE — 85027 COMPLETE CBC AUTOMATED: CPT | Performed by: FAMILY MEDICINE

## 2018-01-18 PROCEDURE — 83540 ASSAY OF IRON: CPT | Performed by: FAMILY MEDICINE

## 2018-01-18 PROCEDURE — 84439 ASSAY OF FREE THYROXINE: CPT | Performed by: FAMILY MEDICINE

## 2018-01-18 PROCEDURE — 84443 ASSAY THYROID STIM HORMONE: CPT | Performed by: FAMILY MEDICINE

## 2018-01-18 PROCEDURE — 84481 FREE ASSAY (FT-3): CPT | Performed by: FAMILY MEDICINE

## 2018-01-19 LAB — THYROPEROXIDASE AB SERPL-ACNC: 1368 IU/ML

## 2018-01-23 NOTE — PROGRESS NOTES
Dear Kasandra,   Your test results are all back -   Labs are looking better -   The iron is improving.  Thyroid is normal but the antibodies are still high but your baseline.  Let us know if you have any questions.  -Trish Callaway, DO

## 2018-01-24 ENCOUNTER — OFFICE VISIT (OUTPATIENT)
Dept: FAMILY MEDICINE | Facility: CLINIC | Age: 38
End: 2018-01-24
Payer: COMMERCIAL

## 2018-01-24 VITALS
DIASTOLIC BLOOD PRESSURE: 80 MMHG | WEIGHT: 195.2 LBS | OXYGEN SATURATION: 98 % | SYSTOLIC BLOOD PRESSURE: 118 MMHG | HEART RATE: 91 BPM | TEMPERATURE: 99.3 F | HEIGHT: 66 IN | BODY MASS INDEX: 31.37 KG/M2

## 2018-01-24 DIAGNOSIS — G47.19 EXCESSIVE DAYTIME SLEEPINESS: Primary | ICD-10-CM

## 2018-01-24 DIAGNOSIS — E06.3 HYPOTHYROIDISM DUE TO HASHIMOTO'S THYROIDITIS: ICD-10-CM

## 2018-01-24 DIAGNOSIS — R53.83 FATIGUE, UNSPECIFIED TYPE: ICD-10-CM

## 2018-01-24 DIAGNOSIS — F33.0 MAJOR DEPRESSIVE DISORDER, RECURRENT EPISODE, MILD (H): ICD-10-CM

## 2018-01-24 PROCEDURE — 99214 OFFICE O/P EST MOD 30 MIN: CPT | Performed by: FAMILY MEDICINE

## 2018-01-24 RX ORDER — LEVOTHYROXINE SODIUM 137 MCG
137 TABLET ORAL DAILY
Qty: 90 TABLET | Refills: 3 | Status: SHIPPED | OUTPATIENT
Start: 2018-01-24 | End: 2018-06-20

## 2018-01-24 RX ORDER — AMITRIPTYLINE HYDROCHLORIDE 10 MG/1
10 TABLET ORAL AT BEDTIME
Qty: 30 TABLET | Refills: 1 | Status: SHIPPED | OUTPATIENT
Start: 2018-01-24 | End: 2020-04-17

## 2018-01-24 NOTE — PATIENT INSTRUCTIONS
Consider getting appointment with Quiana for genetic testing for medications - pharmD at [facility]

## 2018-01-24 NOTE — MR AVS SNAPSHOT
After Visit Summary   1/24/2018    Kasandra Winkler    MRN: [id number]           Patient Information     Date Of Birth          1980        Visit Information        Provider Department      1/24/2018 2:30 PM Trish Callaway DO [facility]        Today's Diagnoses     Excessive daytime sleepiness    -  1    Major depressive disorder, recurrent episode, mild (H)          Care Instructions    Consider getting appointment with Quiana for genetic testing for medications - pharmD at [facility]          Follow-ups after your visit        Additional Services     SLEEP EVALUATION & MANAGEMENT REFERRAL - [facility]  [phone] (Age 2 and up)       Please be aware that coverage of these services is subject to the terms and limitations of your health insurance plan.  Call member services at your health plan with any benefit or coverage questions.      Please bring the following to your appointment:    >>   List of current medications   >>   This referral request   >>   Any documents/labs given to you for this referral                      Future tests that were ordered for you today     Open Future Orders        Priority Expected Expires Ordered    SLEEP EVALUATION & MANAGEMENT REFERRAL - [facility]  [phone] (Age 2 and up) Routine  1/24/2019 1/24/2018            Who to contact     If you have questions or need follow up information about today's clinic visit or your schedule please contact [facility] directly at [phone].  Normal or non-critical lab and imaging results will be communicated to you by MyChart, letter or phone within 4 business days after the clinic has received the results. If you do not hear from us within 7 days, please contact the clinic through MyChart or phone. If you have a critical or abnormal lab result, we will notify you by phone as  "soon as possible.  Submit refill requests through madvertise or call your pharmacy and they will forward the refill request to us. Please allow 3 business days for your refill to be completed.          Additional Information About Your Visit        [employer] Information     [employer]ise gives you secure access to your electronic health record. If you see a primary care provider, you can also send messages to your care team and make appointments. If you have questions, please call your primary care clinic.  If you do not have a primary care provider, please call [phone] and they will assist you.        Care EveryWhere ID     This is your Care EveryWhere ID. This could be used by other organizations to access your [facility] medical records  [id number]        Your Vitals Were     Pulse Temperature Height Last Period Pulse Oximetry BMI (Body Mass Index)    91 99.3  F (37.4  C) (Tympanic) 5' 6\" (1.676 m) 12/25/2017 98% 31.51 kg/m2       Blood Pressure from Last 3 Encounters:   01/24/18 118/80   01/16/18 116/78   12/27/17 131/82    Weight from Last 3 Encounters:   01/24/18 195 lb 3.2 oz (88.5 kg)   01/16/18 190 lb (86.2 kg)   12/27/17 191 lb 12.8 oz (87 kg)                 Today's Medication Changes          These changes are accurate as of 1/24/18  3:04 PM.  If you have any questions, ask your nurse or doctor.               Start taking these medicines.        Dose/Directions    amitriptyline 10 MG tablet   Commonly known as:  ELAVIL   Used for:  Major depressive disorder, recurrent episode, mild (H)   Started by:  Trish Callaway DO        Dose:  10 mg   Take 1 tablet (10 mg) by mouth At Bedtime   Quantity:  30 tablet   Refills:  1            Where to get your medicines      These medications were sent to [facility] [address] [place] [address] AT [address] [address]     Phone:  [phone]     amitriptyline 10 MG tablet                Primary " Care Provider Office Phone # Fax #    Trish Callaway [phone] [address] [address]        Equal Access to Services     KRIS SILVA : Hadii aad ku hadjanetho Soomaali, waaxda luqadaha, qaybta kaalmada adeegyada, chris blankenship laUmmdahlia grimm. So [facility] [phone].    ATENCIÓN: Si habla español, tiene a das disposición servicios gratuitos de asistencia lingüística. Llame al [phone].    We comply with applicable federal civil rights laws and [place] laws. We do not discriminate on the basis of race, color, national origin, age, disability, sex, sexual orientation, or gender identity.            Thank you!     Thank you for choosing [facility]  for your care. Our goal is always to provide you with excellent care. Hearing back from our patients is one way we can continue to improve our services. Please take a few minutes to complete the written survey that you may receive in the mail after your visit with us. Thank you!             Your Updated Medication List - Protect others around you: Learn how to safely use, store and throw away your medicines at www.disposemymeds.org.          This list is accurate as of 1/24/18  3:04 PM.  Always use your most recent med list.                   Brand Name Dispense Instructions for use Diagnosis    amitriptyline 10 MG tablet    ELAVIL    30 tablet    Take 1 tablet (10 mg) by mouth At Bedtime    Major depressive disorder, recurrent episode, mild (H)       azelastine 0.1 % spray    ASTELIN    30 mL    Spray 1-2 sprays into both nostrils 2 times daily    Allergic rhinitis due to pollen       beclomethasone 80 MCG/ACT Inhaler    QVAR    3 Inhaler    Inhale 2 puffs into the lungs 2 times daily    Moderate persistent asthma with allergic rhinitis without complication       busPIRone 10 MG tablet    BUSPAR    60 tablet    Take 1 tablet (10 mg) by mouth 2 times daily    Anxiety       calcium carbonate 1250 MG  tablet    OS-JONATAN 500 mg [place]. Ca     Take 500 mg by mouth 2 times daily        cetirizine 10 MG tablet    zyrTEC     Take 10 mg by mouth daily        clonazePAM 0.5 MG tablet    klonoPIN    20 tablet    Take 1 tablet (0.5 mg) by mouth as needed for anxiety    Major depressive disorder, recurrent episode, mild (H)       Cod Liver Oil 1000 MG Caps      Take 1 capsule by mouth daily.        fluconazole 150 MG tablet    DIFLUCAN    4 tablet    Take 2 tablets (300mg) by mouth once a week for 2 weeks    Tinea versicolor       fluticasone 110 MCG/ACT Inhaler    FLOVENT HFA    3 Inhaler    Inhale 2 puffs into the lungs 2 times daily    Moderate persistent asthma with allergic rhinitis without complication       fluticasone 50 MCG/ACT spray    FLONASE    48 mL    Spray 1-2 sprays into both nostrils daily    Seasonal allergic rhinitis due to pollen, unspecified chronicity       HM MULTIVITAMIN ADULT GUMMY PO           HYDROcodone-acetaminophen 5-325 MG per tablet    NORCO    7 tablet    Take 1-2 tablets by mouth nightly as needed for moderate to severe pain maximum 1 tablet(s) per day    Acute left-sided low back pain with left-sided sciatica       ketoconazole 2 % shampoo    NIZORAL    120 mL    Apply to the affected area and wash off after 5 minutes.    Dandruff, Tinea versicolor       levonorgestrel 20 MCG/24HR IUD    MIRENA (52 MG)    1 each    1 each (20 mcg) by Intrauterine route once for 1 dose    Encounter for IUD insertion       levothyroxine 137 MCG tablet    SYNTHROID/LEVOTHROID    90 tablet    TAKE 1 TABLET BY MOUTH DAILY    Hashimoto's thyroiditis       methocarbamol 500 MG tablet    ROBAXIN    30 tablet    Take 2 tablets (1,000 mg) by mouth 3 times daily as needed for muscle spasms    Acute left-sided low back pain with left-sided sciatica       misoprostol 200 MCG tablet    CYTOTEC    2 tablet    Take 2 hours before procedure. Place 1 tab between cheek and gum on the right, and 2nd tab on the left. Dissolve for  30 min, then swallow.    Encounter for IUD insertion       Vitamin B-12 500 MCG Subl      Place 500 mcg under the tongue daily        Vitamin D3 3000 UNITS Tabs      Take 1 tablet by mouth daily.

## 2018-01-24 NOTE — PROGRESS NOTES
SUBJECTIVE:   Kasandra Winkler is a 37 year old female who presents to clinic today for the following health issues:      Chief Complaint   Patient presents with     Lab Result Notice     discuss results, pt not feeling well, having some fatigue and brain fog, myalgias-getting worse     Doesn't feel good at all and states generally just bad -   Fatigued all day long -   Getting a ton of sleep - but wakes and never feels rested.   Feels exhausted  Drinking coffee all day long to stay awke -   Hurt everywhere and feels like a flare -up of what she has struggled with in the past.  Feels like everything hurts  Memory feels very foggy - worse that she has been  She does have hx of hypothyroid and her TSH seems to vary quite a bit despite taking med regularly   She also had iron infusion    Never had a sleep evaluation -     Intermittent dry eyes -     intermittent tingling and burning    Got IUD one month ago - spotting since it was placed  Got period - it is lighter - using light and regular tampons q few hours     When asked about mood she feels like she is getting depressed and anxious dealing with all these things.    -------------------------------------    Problem list and histories reviewed & adjusted, as indicated.  Additional history: as documented    Patient Active Problem List   Diagnosis     CARDIOVASCULAR SCREENING; LDL GOAL LESS THAN 160     Hypothyroidism     Obesity     Hashimoto's thyroiditis     Hypovitaminosis D     Fatigue     Ovarian cyst     Major depressive disorder, recurrent episode, mild (H)     Asthma with allergic rhinitis     Morbid obesity (H)     laparoscopic sleeve gastrectomy 6/29/15     Anxiety     Allergic rhinitis due to pollen     Iron deficiency anemia secondary to inadequate dietary iron intake     Migraine with aura and without status migrainosus, not intractable     Acute bilateral low back pain with left-sided sciatica     Menorrhagia with irregular cycle     Encounter for  "IUD insertion     Past Surgical History:   Procedure Laterality Date      SECTION  10/10/10     CHOLECYSTECTOMY, LAPOROSCOPIC  2009    gallstones -      FINGER SURGERY      right little finger fracture     LAPAROSCOPIC GASTRIC SLEEVE N/A 2015    Procedure: LAPAROSCOPIC GASTRIC SLEEVE;  Surgeon: Cam Alvarez MD;  Location:  OR       Social History   Substance Use Topics     Smoking status: Former Smoker     Packs/day: 0.50     Years: 10.00     Quit date: 2009     Smokeless tobacco: Never Used      Comment: social smoker for 10 yr     Alcohol use 0.0 oz/week     0 Standard drinks or equivalent per week      Comment: occasional ETOH use     Family History   Problem Relation Age of Onset     OSTEOPOROSIS Mother      Hypertension Mother      CEREBROVASCULAR DISEASE Mother      Aneurysm     Substance Abuse Mother      HEART DISEASE Maternal Grandmother      Hypertension Maternal Grandmother      Arthritis Maternal Grandmother      RA      Respiratory Maternal Grandmother      Asthma           Reviewed and updated as needed this visit by clinical staff  Tobacco  Allergies  Meds  Problems  Med Hx  Surg Hx  Fam Hx  Soc Hx        Reviewed and updated as needed this visit by Provider  Allergies  Meds  Problems         ROS:  Constitutional, HEENT, cardiovascular, pulmonary, GI, , musculoskeletal, neuro, skin, endocrine and psych systems are negative, except as otherwise noted.    OBJECTIVE:     /80  Pulse 91  Temp 99.3  F (37.4  C) (Tympanic)  Ht 5' 6\" (1.676 m)  Wt 195 lb 3.2 oz (88.5 kg)  LMP 2017  SpO2 98%  BMI 31.51 kg/m2  Body mass index is 31.51 kg/(m^2).  GENERAL: alert and no distress  PSYCH: mentation appears normal, affect normal/bright    Diagnostic Test Results:  Results for orders placed or performed in visit on 18   TSH   Result Value Ref Range    TSH 0.59 0.40 - 4.00 mU/L   T4, free   Result Value Ref Range    T4 Free 1.32 0.76 - 1.46 ng/dL "   T3, Free   Result Value Ref Range    Free T3 2.5 2.3 - 4.2 pg/mL   Thyroid peroxidase antibody   Result Value Ref Range    Thyroid Peroxidase Antibody 1368 (H) <35 IU/mL   Iron and iron binding capacity   Result Value Ref Range    Iron 84 35 - 180 ug/dL    Iron Binding Cap 270 240 - 430 ug/dL    Iron Saturation Index 31 15 - 46 %   Ferritin   Result Value Ref Range    Ferritin 202 (H) 12 - 150 ng/mL   CBC with platelets   Result Value Ref Range    WBC 6.7 4.0 - 11.0 10e9/L    RBC Count 4.46 3.8 - 5.2 10e12/L    Hemoglobin 12.4 11.7 - 15.7 g/dL    Hematocrit 37.9 35.0 - 47.0 %    MCV 85 78 - 100 fl    MCH 27.8 26.5 - 33.0 pg    MCHC 32.7 31.5 - 36.5 g/dL    RDW 16.5 (H) 10.0 - 15.0 %    Platelet Count 276 150 - 450 10e9/L       ASSESSMENT/PLAN:     1. Excessive daytime sleepiness  Pt with excessive fatigue -   Will check sleep evaluation to look for possible underlying cause for her fatigue   See below  - SLEEP EVALUATION & MANAGEMENT REFERRAL - [facility] [place] / [facility]  [phone] (Age 2 and up); Future    2. Major depressive disorder, recurrent episode, mild (H)  Pt has hx of depression -   She is hesitant to start anything at this time.  Discussed trial of amitriptyline for possible fibromyalgia like symptoms - she will take at bedtime  Consider other meds at her next visit   - amitriptyline (ELAVIL) 10 MG tablet; Take 1 tablet (10 mg) by mouth At Bedtime  Dispense: 30 tablet; Refill: 1    3. Hypothyroidism due to Hashimoto's thyroiditis  Had trouble getting her dose and seems variable TSH despite taking med  Will do trial of brand synthroid and see how she does  - SYNTHROID 137 MCG tablet; Take 1 tablet (137 mcg) by mouth daily  Dispense: 90 tablet; Refill: 3    4. Fatigue, unspecified type  Unclear underlying cause - see above possible issues -   Will see back in 4-6 weeks and see how she is doing.      Pt will call or RTC if symptoms worsen or do not improve.    I  spent over 25 minutes with patient and over 50% time in counseling regarding above issues.     Trish Callaway,   [facility]

## 2018-01-24 NOTE — NURSING NOTE
"Chief Complaint   Patient presents with     Lab Result Notice     discuss results, pt not feeling well, having some fatigue and brain fog, myalgias-getting worse     /80  Pulse 91  Temp 99.3  F (37.4  C) (Tympanic)  Ht 5' 6\" (1.676 m)  Wt 195 lb 3.2 oz (88.5 kg)  LMP 12/25/2017  SpO2 98%  BMI 31.51 kg/m2 Estimated body mass index is 31.51 kg/(m^2) as calculated from the following:    Height as of this encounter: 5' 6\" (1.676 m).    Weight as of this encounter: 195 lb 3.2 oz (88.5 kg).  Medication Reconciliation: complete      Health Maintenance due pending provider review:  ACT  Not completed due to recent issues    Dariana Tran CMA  "

## 2018-01-25 ASSESSMENT — ASTHMA QUESTIONNAIRES: ACT_TOTALSCORE: 22

## 2018-02-08 PROBLEM — M54.42 ACUTE BILATERAL LOW BACK PAIN WITH LEFT-SIDED SCIATICA: Status: RESOLVED | Noted: 2017-12-05 | Resolved: 2018-02-08

## 2018-02-08 NOTE — PROGRESS NOTES
Subjective:  HPI                    Objective:  System    Physical Exam    General     ROS    Assessment/Plan:    DISCHARGE REPORT    Progress reporting period is from 12/5/17 to 12/7/17.       SUBJECTIVE  Subjective changes noted by patient:  This patient was only seen for 2 visits, within 1 week, for PT.  At last visit she was Feeling better, but has been taking her muscle relaxer regularly and working from home so she can lie down a lot.  Can stand upright today and walk without the zingers down her leg.  Can only sit about 3 minutes.  This patient then canceled her follow ups, saying she was no longer working from home and was going to a place closer to her work.  Current pain level is unknown.      Initial Pain level:  (4-9/10).   Changes in function:  unknown  Adverse reaction to treatment or activity: None    OBJECTIVE  Changes noted in objective findings:      Lumbar ROM: Flex to mid thigh++, ext 66%+(improved from 25%).    Repeated DKC felt back pain and was worse upon standing.    Prone lying with 1 pillow felt fine and was no worse upon standing.    Unable to progress to ZAID yet.     ASSESSMENT/PLAN  Updated problem list and treatment plan: Diagnosis 1:  Left lumbar radiculopathy  Pain -  hot/cold therapy, electric stimulation, manual therapy, self management, education and home program  Decreased ROM/flexibility - manual therapy, therapeutic exercise and home program  Decreased strength - therapeutic exercise, therapeutic activities and home program  Impaired gait - gait training and home program  Impaired muscle performance - neuro re-education and home program  Decreased function - therapeutic activities and home program  STG/LTGs have been met or progress has been made towards goals:  None  Assessment of Progress: The patient has not returned to therapy. Current status is unknown.  Self Management Plans:  Patient has been instructed in a home treatment program.  Patient  has been instructed in self  management of symptoms.    Kasandra continues to require the following intervention to meet STG and LTG's:  PT, but at a location closer to her work.    Recommendations:  DC PT at this clinic so she can go to a clinic closer to her work.    Please refer to the daily flowsheet for treatment today, total treatment time and time spent performing 1:1 timed codes.

## 2018-02-21 ENCOUNTER — OFFICE VISIT (OUTPATIENT)
Dept: FAMILY MEDICINE | Facility: CLINIC | Age: 38
End: 2018-02-21
Payer: COMMERCIAL

## 2018-02-21 VITALS
BODY MASS INDEX: 31.64 KG/M2 | SYSTOLIC BLOOD PRESSURE: 128 MMHG | OXYGEN SATURATION: 99 % | HEART RATE: 65 BPM | TEMPERATURE: 98.1 F | DIASTOLIC BLOOD PRESSURE: 77 MMHG | WEIGHT: 196 LBS | RESPIRATION RATE: 18 BRPM

## 2018-02-21 DIAGNOSIS — J45.40 MODERATE PERSISTENT ASTHMA WITH ALLERGIC RHINITIS WITHOUT COMPLICATION: ICD-10-CM

## 2018-02-21 DIAGNOSIS — H10.32 ACUTE CONJUNCTIVITIS OF LEFT EYE, UNSPECIFIED ACUTE CONJUNCTIVITIS TYPE: Primary | ICD-10-CM

## 2018-02-21 PROCEDURE — 99213 OFFICE O/P EST LOW 20 MIN: CPT | Performed by: FAMILY MEDICINE

## 2018-02-21 NOTE — MR AVS SNAPSHOT
After Visit Summary   2/21/2018    Kasandra Winkler    MRN: [id number]           Patient Information     Date Of Birth          1980        Visit Information        Provider Department      2/21/2018 3:00 PM Lyudmila Carbajal MD [facility]        Today's Diagnoses     Acute conjunctivitis of left eye, unspecified acute conjunctivitis type    -  1    Moderate persistent asthma with allergic rhinitis without complication          Care Instructions    You can try Zaditor or Alaway eye drops for allergic eye irritation.            Follow-ups after your visit        Additional Services     OPHTHALMOLOGY ADULT REFERRAL       Your provider has referred you to: [facility], [facility] [phone]   [email or web address]    Please be aware that coverage of these services is subject to the terms and limitations of your health insurance plan.  Call member services at your health plan with any benefit or coverage questions.      Please bring the following with you to your appointment:    (1) Any X-Rays, CTs or MRIs which have been performed.  Contact the facility where they were done to arrange for  prior to your scheduled appointment.    (2) List of current medications  (3) This referral request   (4) Any documents/labs given to you for this referral                  Your next 10 appointments already scheduled     Feb 22, 2018  2:00 PM CST   New Sleep Patient with Je Smith MD   [facility]    [address] [address] [phone]              Who to contact     If you have questions or need follow up information about today's clinic visit or your schedule please contact [facility] directly at [phone].  Normal or non-critical lab and imaging results will be communicated to you by  MyChart, letter or phone within 4 business days after the clinic has received the results. If you do not hear from us within 7 days, please contact the clinic through [employer] or phone. If you have a critical or abnormal lab result, we will notify you by phone as soon as possible.  Submit refill requests through [employer] or call your pharmacy and they will forward the refill request to us. Please allow 3 business days for your refill to be completed.          Additional Information About Your Visit        [employer] Information     [employer] gives you secure access to your electronic health record. If you see a primary care provider, you can also send messages to your care team and make appointments. If you have questions, please call your primary care clinic.  If you do not have a primary care provider, please call [phone] and they will assist you.        Care EveryWhere ID     This is your Care EveryWhere ID. This could be used by other organizations to access your [place] medical records  [id number]        Your Vitals Were     Pulse Temperature Respirations Pulse Oximetry BMI (Body Mass Index)       65 98.1  F (36.7  C) (Oral) 18 99% 31.64 kg/m2        Blood Pressure from Last 3 Encounters:   02/21/18 128/77   01/24/18 118/80   01/16/18 116/78    Weight from Last 3 Encounters:   02/21/18 196 lb (88.9 kg)   01/24/18 195 lb 3.2 oz (88.5 kg)   01/16/18 190 lb (86.2 kg)              We Performed the Following     OPHTHALMOLOGY ADULT REFERRAL          Where to get your medicines      These medications were sent to [facility] [address] [place] [address] [address] [facility] [address] [address]     Phone:  [phone]     beclomethasone 80 MCG/ACT Inhaler          Primary Care Provider Office Phone # Fax #    Trish Callaway [phone]       [address] [address]        Equal Access to Services     KRIS SILVA :  Hadii aad sammy bermeojanetho Soomaali, waaxda luqadaha, qaybta kaalmada jessica, chris robertain hayaalaney parksluis alfredo blankenship labriannalaney sirisha. So [facility] [phone].    ATENCIÓN: Si tere helton, tiene a das disposición servicios gratuitos de asistencia lingüística. Timame al [phone].    We comply with applicable federal civil rights laws and [place] laws. We do not discriminate on the basis of race, color, national origin, age, disability, sex, sexual orientation, or gender identity.            Thank you!     Thank you for choosing [facility]  for your care. Our goal is always to provide you with excellent care. Hearing back from our patients is one way we can continue to improve our services. Please take a few minutes to complete the written survey that you may receive in the mail after your visit with us. Thank you!             Your Updated Medication List - Protect others around you: Learn how to safely use, store and throw away your medicines at www.disposemymeds.org.          This list is accurate as of 2/21/18  3:34 PM.  Always use your most recent med list.                   Brand Name Dispense Instructions for use Diagnosis    amitriptyline 10 MG tablet    ELAVIL    30 tablet    Take 1 tablet (10 mg) by mouth At Bedtime    Major depressive disorder, recurrent episode, mild (H)       azelastine 0.1 % spray    ASTELIN    30 mL    Spray 1-2 sprays into both nostrils 2 times daily    Allergic rhinitis due to pollen       beclomethasone 80 MCG/ACT Inhaler    QVAR    3 Inhaler    Inhale 2 puffs into the lungs 2 times daily    Moderate persistent asthma with allergic rhinitis without complication       busPIRone 10 MG tablet    BUSPAR    60 tablet    Take 1 tablet (10 mg) by mouth 2 times daily    Anxiety       calcium carbonate 1250 MG tablet    OS-JONATAN 500 mg [place]. Ca     Take 500 mg by mouth 2 times daily        cetirizine 10 MG tablet    zyrTEC     Take 10 mg by mouth daily        clonazePAM 0.5 MG tablet     klonoPIN    20 tablet    Take 1 tablet (0.5 mg) by mouth as needed for anxiety    Major depressive disorder, recurrent episode, mild (H)       Cod Liver Oil 1000 MG Caps      Take 1 capsule by mouth daily.        fluconazole 150 MG tablet    DIFLUCAN    4 tablet    Take 2 tablets (300mg) by mouth once a week for 2 weeks    Tinea versicolor       fluticasone 110 MCG/ACT Inhaler    FLOVENT HFA    3 Inhaler    Inhale 2 puffs into the lungs 2 times daily    Moderate persistent asthma with allergic rhinitis without complication       fluticasone 50 MCG/ACT spray    FLONASE    48 mL    Spray 1-2 sprays into both nostrils daily    Seasonal allergic rhinitis due to pollen, unspecified chronicity       HM MULTIVITAMIN ADULT GUMMY PO           HYDROcodone-acetaminophen 5-325 MG per tablet    NORCO    7 tablet    Take 1-2 tablets by mouth nightly as needed for moderate to severe pain maximum 1 tablet(s) per day    Acute left-sided low back pain with left-sided sciatica       ketoconazole 2 % shampoo    NIZORAL    120 mL    Apply to the affected area and wash off after 5 minutes.    Dandruff, Tinea versicolor       levonorgestrel 20 MCG/24HR IUD    MIRENA (52 MG)    1 each    1 each (20 mcg) by Intrauterine route once for 1 dose    Encounter for IUD insertion       * levothyroxine 137 MCG tablet    SYNTHROID/LEVOTHROID    90 tablet    TAKE 1 TABLET BY MOUTH DAILY    Hashimoto's thyroiditis       * SYNTHROID 137 MCG tablet   Generic drug:  levothyroxine     90 tablet    Take 1 tablet (137 mcg) by mouth daily    Hypothyroidism due to Hashimoto's thyroiditis       methocarbamol 500 MG tablet    ROBAXIN    30 tablet    Take 2 tablets (1,000 mg) by mouth 3 times daily as needed for muscle spasms    Acute left-sided low back pain with left-sided sciatica       misoprostol 200 MCG tablet    CYTOTEC    2 tablet    Take 2 hours before procedure. Place 1 tab between cheek and gum on the right, and 2nd tab on the left. Dissolve for 30  min, then swallow.    Encounter for IUD insertion       Vitamin B-12 500 MCG Subl      Place 500 mcg under the tongue daily        Vitamin D3 3000 UNITS Tabs      Take 1 tablet by mouth daily.        * Notice:  This list has 2 medication(s) that are the same as other medications prescribed for you. Read the directions carefully, and ask your doctor or other care provider to review them with you.

## 2018-02-21 NOTE — PROGRESS NOTES
SUBJECTIVE:   Kasandra Winkler is a 37 year old female who presents to clinic today for the following health issues:    Eye(s) Problem      Duration: months    Description:  Location: left  Pain: YES- irritated, and uncomfortable today   Redness: YES  Discharge: no - but eye is watery    Accompanying signs and symptoms: pt report she feels congested in her head     History (Trauma, foreign body exposure,): None    Precipitating or alleviating factors (contact use): None    Therapies tried and outcome: None    Waxing and waning x months  Worse when she wakes up  Small amount of matter in the eye when she wakes up  Clears up over the course of the day  Sleeps on left side and notes it is usually her left eye that is affected.  However, she thinks it sometimes happens on the right.  She does have a history of environmental allergies - was skin test and was positive to a number of pollens and dust.  She typically uses cetirizine 10 mg QHS but notes she missed doses for the past few days.    Uses hypoallergenic detergents.  Not itchy.  Today it's worse.  Does not wear contacts or glasses.      Problem list and histories reviewed & adjusted, as indicated.  Additional history: as documented    BP Readings from Last 3 Encounters:   02/21/18 128/77   01/24/18 118/80   01/16/18 116/78    Wt Readings from Last 3 Encounters:   02/21/18 196 lb (88.9 kg)   01/24/18 195 lb 3.2 oz (88.5 kg)   01/16/18 190 lb (86.2 kg)           Reviewed and updated as needed this visit by clinical staff  Tobacco  Allergies  Meds  Med Hx  Surg Hx  Fam Hx  Soc Hx        ROS:  EYE: Vision is ok    OBJECTIVE:     /77 (BP Location: Left arm, Patient Position: Sitting, Cuff Size: Adult Regular)  Pulse 65  Temp 98.1  F (36.7  C) (Oral)  Resp 18  Wt 196 lb (88.9 kg)  SpO2 99%  BMI 31.64 kg/m2  Body mass index is 31.64 kg/(m^2).  GEN:  no apparent distress  EYES: PERRL, left conjunctivae injected with no matter noted, corneal light  reflexes crisp       ASSESSMENT/PLAN:     1. Acute conjunctivitis of left eye, unspecified acute conjunctivitis type  Unusual presentation as it has been recurrent x months and typically affects only one eye at a time.  I'd like her to see ophtho.  In the mean time she can try OTC Zaditor or Alaway drops as that may help if this is allergic conjunctivitis.  I also recommended she resume her nightly cetirizine.    - OPHTHALMOLOGY ADULT REFERRAL    2. Moderate persistent asthma with allergic rhinitis without complication  stable/well controlled   - beclomethasone (QVAR) 80 MCG/ACT Inhaler; Inhale 2 puffs into the lungs 2 times daily  Dispense: 3 Inhaler; Refill: 1     Patient Instructions   You can try Zaditor or Alaway eye drops for allergic eye irritation.       Lyudmila Carbajal MD  [facility]

## 2018-02-22 ENCOUNTER — OFFICE VISIT (OUTPATIENT)
Dept: SLEEP MEDICINE | Facility: CLINIC | Age: 38
End: 2018-02-22
Payer: COMMERCIAL

## 2018-02-22 ENCOUNTER — TELEPHONE (OUTPATIENT)
Dept: FAMILY MEDICINE | Facility: CLINIC | Age: 38
End: 2018-02-22

## 2018-02-22 VITALS
RESPIRATION RATE: 16 BRPM | WEIGHT: 195 LBS | BODY MASS INDEX: 31.34 KG/M2 | HEART RATE: 75 BPM | OXYGEN SATURATION: 99 % | DIASTOLIC BLOOD PRESSURE: 75 MMHG | SYSTOLIC BLOOD PRESSURE: 112 MMHG | HEIGHT: 66 IN

## 2018-02-22 DIAGNOSIS — J45.40 MODERATE PERSISTENT ASTHMA WITH ALLERGIC RHINITIS WITHOUT COMPLICATION: Primary | ICD-10-CM

## 2018-02-22 DIAGNOSIS — R29.818 SUSPECTED SLEEP APNEA: Primary | ICD-10-CM

## 2018-02-22 DIAGNOSIS — R06.83 SNORING: ICD-10-CM

## 2018-02-22 DIAGNOSIS — F51.04 PSYCHOPHYSIOLOGIC INSOMNIA: ICD-10-CM

## 2018-02-22 DIAGNOSIS — R53.82 CHRONIC FATIGUE: ICD-10-CM

## 2018-02-22 DIAGNOSIS — G47.19 EXCESSIVE DAYTIME SLEEPINESS: ICD-10-CM

## 2018-02-22 PROCEDURE — 99204 OFFICE O/P NEW MOD 45 MIN: CPT | Performed by: INTERNAL MEDICINE

## 2018-02-22 NOTE — NURSING NOTE
"Chief Complaint   Patient presents with     Sleep Problem     Fatigue        Initial /75  Pulse 75  Resp 16  Ht 1.676 m (5' 5.98\")  Wt 88.5 kg (195 lb)  SpO2 99%  BMI 31.49 kg/m2 Estimated body mass index is 31.49 kg/(m^2) as calculated from the following:    Height as of this encounter: 1.676 m (5' 5.98\").    Weight as of this encounter: 88.5 kg (195 lb).  Medication Reconciliation: complete   ESS 2  Neck 40cm  Catherine Ch MA      "

## 2018-02-22 NOTE — TELEPHONE ENCOUNTER
Prior Authorization Retail Medication Request  Medication/Dose:   Diagnosis and ICD code:   New/Renewal/Insurance Change PA:   Previously Tried and Failed Therapies:     Insurance ID (if provided): [id number]  [facility]  Insurance Phone (if provided): [phone]    Any additional info from fax request:     If you received a fax notification from an outside Pharmacy:  Pharmacy Name:[facility] [address]  Pharmacy #:[phone]  Pharmacy Fax:[phone]

## 2018-02-22 NOTE — PATIENT INSTRUCTIONS

## 2018-02-22 NOTE — MR AVS SNAPSHOT
After Visit Summary   2/22/2018    Kasandra Winkler    MRN: [id number]           Patient Information     Date Of Birth          1980        Visit Information        Provider Department      2/22/2018 2:00 PM Je Smith MD [facility]        Today's Diagnoses     Suspected sleep apnea    -  1    Excessive daytime sleepiness        Psychophysiologic insomnia        Snoring        Chronic fatigue          Care Instructions      Your BMI is Body mass index is 31.49 kg/(m^2).  Weight management is a personal decision.  If you are interested in exploring weight loss strategies, the following discussion covers the approaches that may be successful. Body mass index (BMI) is one way to tell whether you are at a healthy weight, overweight, or obese. It measures your weight in relation to your height.  A BMI of 18.5 to 24.9 is in the healthy range. A person with a BMI of 25 to 29.9 is considered overweight, and someone with a BMI of 30 or greater is considered obese. More than two-thirds of American adults are considered overweight or obese.  Being overweight or obese increases the risk for further weight gain. Excess weight may lead to heart disease and diabetes.  Creating and following plans for healthy eating and physical activity may help you improve your health.  Weight control is part of healthy lifestyle and includes exercise, emotional health, and healthy eating habits. Careful eating habits lifelong are the mainstay of weight control. Though there are significant health benefits from weight loss, long-term weight loss with diet alone may be very difficult to achieve- studies show long-term success with dietary management in less than 10% of people. Attaining a healthy weight may be especially difficult to achieve in those with severe obesity. In some cases, medications, devices and surgical management might be considered.  What can you do?  If you are overweight or obese and  are interested in methods for weight loss, you should discuss this with your provider.     Consider reducing daily calorie intake by 500 calories.     Keep a food journal.     Avoiding skipping meals, consider cutting portions instead.    Diet combined with exercise helps maintain muscle while optimizing fat loss. Strength training is particularly important for building and maintaining muscle mass. Exercise helps reduce stress, increase energy, and improves fitness. Increasing exercise without diet control, however, may not burn enough calories to loose weight.       Start walking three days a week 10-20 minutes at a time    Work towards walking thirty minutes five days a week     Eventually, increase the speed of your walking for 1-2 minutes at time    In addition, we recommend that you review healthy lifestyles and methods for weight loss available through the National Institutes of Health patient information sites:  http://win.niddk.nih.gov/publications/index.htm    And look into health and wellness programs that may be available through your health insurance provider, employer, local community center, or [employer].    Weight management plan: Patient was referred to their PCP to discuss a diet and exercise plan.              Follow-ups after your visit        Additional Services     SLEEP PSYCHOLOGY REFERRAL       Referral Urgency: Next available    Dr. João Dacosta is at the following clinics on the following days:  [facility] - [address] [place] [address]        Thursday and Friday (PLEASE CALL [phone] to schedule an appointment).  [facility] [address] [place]       Wednesdays   (PLEASE CALL [phone] to schedule an appointment).     Please be aware that coverage of these services is subject to the terms and limitations of your health insurance plan. Call member services at your health plan with any benefit or coverage questions.     Please bring the following  to your appointment:  >> List of current medications   >> This referral request   >> Any documents/labs given to you for this referral                  Your next 10 appointments already scheduled     Apr 05, 2018  8:30 PM CDT   PSG Split with BED 1 SH SLEEP   [facility] ([facility])    [address]  [facility] [id number] [phone]            Apr 19, 2018  1:30 PM CDT   Return Sleep Patient with Je Smith MD   [facility] ([facility])    [address]  [facility] [id number] [phone]              Future tests that were ordered for you today     Open Future Orders        Priority Expected Expires Ordered    Comprehensive Sleep Study Routine  8/21/2018 2/22/2018            Who to contact     If you have questions or need follow up information about today's clinic visit or your schedule please contact [facility] directly at [phone].  Normal or non-critical lab and imaging results will be communicated to you by Astley Clarkehart, letter or phone within 4 business days after the clinic has received the results. If you do not hear from us within 7 days, please contact the clinic through [employer] or phone. If you have a critical or abnormal lab result, we will notify you by phone as soon as possible.  Submit refill requests through [employer] or call your pharmacy and they will forward the refill request to us. Please allow 3 business days for your refill to be completed.          Additional Information About Your Visit        [employer] Information     [employer] gives you secure access to your electronic health record. If you see a primary care provider, you can also send messages to your care team and make appointments. If you have questions, please call your primary care clinic.  If you do not have a primary care provider, please call [phone] and they will assist you.        Care  "EveryWhere ID     This is your Care EveryWhere ID. This could be used by other organizations to access your [facility] medical records  [id number]        Your Vitals Were     Pulse Respirations Height Pulse Oximetry BMI (Body Mass Index)       75 16 1.676 m (5' 5.98\") 99% 31.49 kg/m2        Blood Pressure from Last 3 Encounters:   02/22/18 112/75   02/21/18 128/77   01/24/18 118/80    Weight from Last 3 Encounters:   02/22/18 88.5 kg (195 lb)   02/21/18 88.9 kg (196 lb)   01/24/18 88.5 kg (195 lb 3.2 oz)              We Performed the Following     SLEEP EVALUATION & MANAGEMENT REFERRAL - ADULT -[facility] [place] [facility]  [phone] (Age 2 and up)     SLEEP PSYCHOLOGY REFERRAL          Today's Medication Changes          These changes are accurate as of 2/22/18  3:04 PM.  If you have any questions, ask your nurse or doctor.               These medicines have changed or have updated prescriptions.        Dose/Directions    SYNTHROID 137 MCG tablet   This may have changed:  Another medication with the same name was removed. Continue taking this medication, and follow the directions you see here.   Used for:  Hypothyroidism due to Hashimoto's thyroiditis   Generic drug:  levothyroxine   Changed by:  Je Smith MD        Dose:  137 mcg   Take 1 tablet (137 mcg) by mouth daily   Quantity:  90 tablet   Refills:  3                Primary Care Provider Office Phone # Fax #    Trish CASE Callaway [phone] [address] [address]        Equal Access to Services     [facility] : Hadii chrissy vergarao Sohumphrey, waaxda luqadaha, qaybta kaalmada adeegyada, waxcarols idifarzana grimm. So [facility] [phone].    ATENCIÓN: Si habla español, tiene a das disposición servicios gratuitos de asistencia lingüística. Llame al [phone].    We comply with applicable federal civil rights laws and [place] laws. We do not discriminate on the " basis of race, color, national origin, age, disability, sex, sexual orientation, or gender identity.            Thank you!     Thank you for choosing [facility]  for your care. Our goal is always to provide you with excellent care. Hearing back from our patients is one way we can continue to improve our services. Please take a few minutes to complete the written survey that you may receive in the mail after your visit with us. Thank you!             Your Updated Medication List - Protect others around you: Learn how to safely use, store and throw away your medicines at www.disposemymeds.org.          This list is accurate as of 2/22/18  3:04 PM.  Always use your most recent med list.                   Brand Name Dispense Instructions for use Diagnosis    amitriptyline 10 MG tablet    ELAVIL    30 tablet    Take 1 tablet (10 mg) by mouth At Bedtime    Major depressive disorder, recurrent episode, mild (H)       azelastine 0.1 % spray    ASTELIN    30 mL    Spray 1-2 sprays into both nostrils 2 times daily    Allergic rhinitis due to pollen       beclomethasone 80 MCG/ACT Inhaler    QVAR    3 Inhaler    Inhale 2 puffs into the lungs 2 times daily    Moderate persistent asthma with allergic rhinitis without complication       calcium carbonate 1250 MG tablet    OS-JONATAN 500 mg [place]. [place]     Take 500 mg by mouth 2 times daily        cetirizine 10 MG tablet    zyrTEC     Take 10 mg by mouth daily        clonazePAM 0.5 MG tablet    klonoPIN    20 tablet    Take 1 tablet (0.5 mg) by mouth as needed for anxiety    Major depressive disorder, recurrent episode, mild (H)       Cod Liver Oil 1000 MG Caps      Take 1 capsule by mouth daily.        fluconazole 150 MG tablet    DIFLUCAN    4 tablet    Take 2 tablets (300mg) by mouth once a week for 2 weeks    Tinea versicolor       fluticasone 110 MCG/ACT Inhaler    FLOVENT HFA    3 Inhaler    Inhale 2 puffs into the lungs 2 times daily    Moderate persistent asthma  with allergic rhinitis without complication       fluticasone 50 MCG/ACT spray    FLONASE    48 mL    Spray 1-2 sprays into both nostrils daily    Seasonal allergic rhinitis due to pollen, unspecified chronicity       HM MULTIVITAMIN ADULT GUMMY PO           ketoconazole 2 % shampoo    NIZORAL    120 mL    Apply to the affected area and wash off after 5 minutes.    Dandruff, Tinea versicolor       levonorgestrel 20 MCG/24HR IUD    MIRENA (52 MG)    1 each    1 each (20 mcg) by Intrauterine route once for 1 dose    Encounter for IUD insertion       methocarbamol 500 MG tablet    ROBAXIN    30 tablet    Take 2 tablets (1,000 mg) by mouth 3 times daily as needed for muscle spasms    Acute left-sided low back pain with left-sided sciatica       misoprostol 200 MCG tablet    CYTOTEC    2 tablet    Take 2 hours before procedure. Place 1 tab between cheek and gum on the right, and 2nd tab on the left. Dissolve for 30 min, then swallow.    Encounter for IUD insertion       SYNTHROID 137 MCG tablet   Generic drug:  levothyroxine     90 tablet    Take 1 tablet (137 mcg) by mouth daily    Hypothyroidism due to Hashimoto's thyroiditis       Vitamin B-12 500 MCG Subl      Place 500 mcg under the tongue daily        Vitamin D3 3000 UNITS Tabs      Take 1 tablet by mouth daily.

## 2018-02-22 NOTE — LETTER
2/22/2018         RE: Kasandra Winkler  [address]  [place] [facility] [address]        Dear Colleague,    Thank you for referring your patient, Kasandra Winkler, to the [facility]. Please see a copy of my visit note below.    Visit Date:   02/22/2018      CHIEF COMPLAINT:  Fatigue, frequent nocturnal arousals.      HISTORY OF PRESENT ILLNESS:  Ms. Winkler is a 37-year-old female who presents with chief complaints of poor sleep quality and daytime fatigue for the last 8 years.  The patient reports that her sleep disturbances changed after she was diagnosed with Hashimoto's thyroiditis and had her son.  She reports that premorbidly, she was a good sleeper.  This changed and she started having frequent nocturnal arousals.  Her sleep quality deteriorated.  She started waking up feeling tired and felt tired throughout the day.      The patient characterizes her daytime dysfunction as fatigue and exhaustion rather than excessive sleepiness.  She denies any inadvertent naps and is unable to take a nap during the day.  Her [place] Sleepiness Scale score is 2/24.  She denies having any drowsiness while driving.  There is no history of motor vehicle accidents or near-misses due to drowsiness.      The patient goes to bed at 9:30 p.m.  It takes her 1 to 1-1/2 hours to fall asleep.  On some days, she can fall asleep within 30 minutes.  She estimates multiple arousals throughout the night, on some nights up to 10 times.  She wakes up in response to external noises or for uncertain reasons.  Sometimes, she lies awake for hours before being able to return to sleep.  She wakes spontaneously between 6-7 a.m.  She feels unrefreshed on waking up.  On weekends, she goes to bed at 10 p.m. with a sleep latency of 30-90 minutes.  Her wake time is at 7 a.m. on the weekends.      The patient has a history of snoring.  Snoring is reported to be intermittent.  She has occasionally experienced gasping and choking  episodes in her sleep.  She denies any knowledge of witnessed apneas.  She occasionally experiences a headache in the morning.  She denies having a dry mouth.  There is no difficulty breathing through her nose.  She usually sleeps on her stomach or her sides.      The patient drinks 3 expresso coffees in the morning.  She rarely drinks alcohol.      The patient denies having restless legs symptoms.  There is no history of dream enactment behavior or other parasomnias.      She denies any history of cataplexy, sleep paralysis or hypnagogic hallucinations.      PAST MEDICAL HISTORY:   1.  Major depressive disorder, recurrent.   2.  Anxiety.   3.  Hashimoto thyroiditis.      FAMILY HISTORY:  No family history of sleep disorders.      SOCIAL HISTORY:  She works as an .  She lives with her wife and 8-year-old son.      REVIEW OF SYSTEMS:  A complete review of systems was negative except for sinus pain, postnasal drip, headaches, shortness of breath with activity, diarrhea, constipation, joint pain.      PHYSICAL EXAMINATION:   CONSTITUTIONAL:  Awake, alert, cooperative, in no apparent distress.   EYES:  No icterus.  Normal conjunctivae.  PERRL.    ENT:  Oropharynx is Lewis class II.   CARDIOVASCULAR:  Regular S1 and S2.   PULMONARY:  Chest is symmetric.  Lungs clear bilaterally.   NEUROLOGIC:  Alert and oriented x 3, no focal neurological deficit.  Cranial nerves are grossly normal.   PSYCHIATRIC:  Mood is euthymic with a congruent affect.  Attention and concentration are normal.      IMPRESSION AND PLAN:   1.  Chronic psychophysiologic insomnia.   2.  To rule out sleep apnea     3.  Poor sleep quality.   4.  Chronic fatigue.      The patient presents with a history of frequent nocturnal arousals and poor sleep quality.  Symptoms started 8 years ago and coincided with a diagnosis of Hashimoto thyroiditis and birth of her son.  Although she was referred with an indication of excessive daytime sleepiness,  patient's history does not indicate significant hypersomnia.  Her daytime dysfunction is mostly characterized by chronic fatigue.  Overall, her presentation is more characteristic of an insomnia disorder rather than hypersomnia.      We have to entertain the possibility of sleep-disordered breathing or movement abnormalities that contribution to her poor sleep quality.  I have recommended that we proceed with an overnight polysomnogram for evaluation of other sleep fragmentating conditions.      The patient is currently prescribed amitriptyline at a dose of 10-20 mg for her sleep disturbance.  I recommended that she consult Dr. Dacosta, behavioral sleep specialist at our clinic, for cognitive behavioral therapy for insomnia.      I will meet with her after sleep study to review results and plan further management.      TREATMENT PLAN:   1.  Split night polysomnogram for evaluation of sleep apnea.   2.  Consultation with Dr. Dacosta for cognitive behavioral therapy for insomnia.   3.  Follow up after sleep study for further recommendations.      I spent a total of 45 minutes with this patient, with more than 50% spent in counseling.         JE DIANA MD             D: 2018   T: 2018   MT: YEMI      Name:     ELYSE PAULINO   MRN:      [id number]        Account:      [id number]   :      1980           Visit Date:   2018      Document: [id number]       cc: Trish Callaway DO       Again, thank you for allowing me to participate in the care of your patient.        Sincerely,        Je Diana MD, MD

## 2018-02-23 NOTE — PROGRESS NOTES
Visit Date:   02/22/2018      CHIEF COMPLAINT:  Fatigue, frequent nocturnal arousals.      HISTORY OF PRESENT ILLNESS:  Ms. Winkler is a 37-year-old female who presents with chief complaints of poor sleep quality and daytime fatigue for the last 8 years.  The patient reports that her sleep disturbances changed after she was diagnosed with Hashimoto's thyroiditis and had her son.  She reports that premorbidly, she was a good sleeper.  This changed and she started having frequent nocturnal arousals.  Her sleep quality deteriorated.  She started waking up feeling tired and felt tired throughout the day.      The patient characterizes her daytime dysfunction as fatigue and exhaustion rather than excessive sleepiness.  She denies any inadvertent naps and is unable to take a nap during the day.  Her [place] Sleepiness Scale score is 2/24.  She denies having any drowsiness while driving.  There is no history of motor vehicle accidents or near-misses due to drowsiness.      The patient goes to bed at 9:30 p.m.  It takes her 1 to 1-1/2 hours to fall asleep.  On some days, she can fall asleep within 30 minutes.  She estimates multiple arousals throughout the night, on some nights up to 10 times.  She wakes up in response to external noises or for uncertain reasons.  Sometimes, she lies awake for hours before being able to return to sleep.  She wakes spontaneously between 6-7 a.m.  She feels unrefreshed on waking up.  On weekends, she goes to bed at 10 p.m. with a sleep latency of 30-90 minutes.  Her wake time is at 7 a.m. on the weekends.      The patient has a history of snoring.  Snoring is reported to be intermittent.  She has occasionally experienced gasping and choking episodes in her sleep.  She denies any knowledge of witnessed apneas.  She occasionally experiences a headache in the morning.  She denies having a dry mouth.  There is no difficulty breathing through her nose.  She usually sleeps on her stomach or her  sides.      The patient drinks 3 expresso coffees in the morning.  She rarely drinks alcohol.      The patient denies having restless legs symptoms.  There is no history of dream enactment behavior or other parasomnias.      She denies any history of cataplexy, sleep paralysis or hypnagogic hallucinations.      PAST MEDICAL HISTORY:   1.  Major depressive disorder, recurrent.   2.  Anxiety.   3.  Hashimoto thyroiditis.      FAMILY HISTORY:  No family history of sleep disorders.      SOCIAL HISTORY:  She works as an .  She lives with her wife and 8-year-old son.      REVIEW OF SYSTEMS:  A complete review of systems was negative except for sinus pain, postnasal drip, headaches, shortness of breath with activity, diarrhea, constipation, joint pain.      PHYSICAL EXAMINATION:   CONSTITUTIONAL:  Awake, alert, cooperative, in no apparent distress.   EYES:  No icterus.  Normal conjunctivae.  PERRL.    ENT:  Oropharynx is Lewis class II.   CARDIOVASCULAR:  Regular S1 and S2.   PULMONARY:  Chest is symmetric.  Lungs clear bilaterally.   NEUROLOGIC:  Alert and oriented x 3, no focal neurological deficit.  Cranial nerves are grossly normal.   PSYCHIATRIC:  Mood is euthymic with a congruent affect.  Attention and concentration are normal.      IMPRESSION AND PLAN:   1.  Chronic psychophysiologic insomnia.   2.  To rule out sleep apnea     3.  Poor sleep quality.   4.  Chronic fatigue.      The patient presents with a history of frequent nocturnal arousals and poor sleep quality.  Symptoms started 8 years ago and coincided with a diagnosis of Hashimoto thyroiditis and birth of her son.  Although she was referred with an indication of excessive daytime sleepiness, patient's history does not indicate significant hypersomnia.  Her daytime dysfunction is mostly characterized by chronic fatigue.  Overall, her presentation is more characteristic of an insomnia disorder rather than hypersomnia.      We have to entertain  the possibility of sleep-disordered breathing or movement abnormalities that contribution to her poor sleep quality.  I have recommended that we proceed with an overnight polysomnogram for evaluation of other sleep fragmentating conditions.      The patient is currently prescribed amitriptyline at a dose of 10-20 mg for her sleep disturbance.  I recommended that she consult Dr. Dacosta, behavioral sleep specialist at our clinic, for cognitive behavioral therapy for insomnia.      I will meet with her after sleep study to review results and plan further management.      TREATMENT PLAN:   1.  Split night polysomnogram for evaluation of sleep apnea.   2.  Consultation with Dr. Dacosta for cognitive behavioral therapy for insomnia.   3.  Follow up after sleep study for further recommendations.      I spent a total of 45 minutes with this patient, with more than 50% spent in counseling.         FELIPA DIANA MD             D: 2018   T: 2018   MT: YEMI      Name:     ELYSE PAULINO   MRN:      [id number]        Account:      [id number]   :      1980           Visit Date:   2018      Document: [id number]       cc: Trish Callaway DO

## 2018-02-28 DIAGNOSIS — F33.0 MAJOR DEPRESSIVE DISORDER, RECURRENT EPISODE, MILD (H): ICD-10-CM

## 2018-03-01 ENCOUNTER — TRANSFERRED RECORDS (OUTPATIENT)
Dept: HEALTH INFORMATION MANAGEMENT | Facility: CLINIC | Age: 38
End: 2018-03-01

## 2018-03-01 ENCOUNTER — OFFICE VISIT (OUTPATIENT)
Dept: OBGYN | Facility: CLINIC | Age: 38
End: 2018-03-01
Payer: COMMERCIAL

## 2018-03-01 DIAGNOSIS — N83.201 CYST OF RIGHT OVARY: Primary | ICD-10-CM

## 2018-03-01 PROCEDURE — 76857 US EXAM PELVIC LIMITED: CPT | Mod: ZF

## 2018-03-01 RX ORDER — AMITRIPTYLINE HYDROCHLORIDE 10 MG/1
TABLET ORAL
Start: 2018-03-01

## 2018-03-01 NOTE — MR AVS SNAPSHOT
After Visit Summary   3/1/2018    Kasandra Winkler    MRN: [id number]           Patient Information     Date Of Birth          1980        Visit Information        Provider Department      3/1/2018 8:00 AM [facility] ULTRASOUND [facility]        Today's Diagnoses     Cyst of right ovary    -  1       Follow-ups after your visit        Your next 10 appointments already scheduled     Apr 05, 2018  8:30 PM CDT   PSG Split with BED 1 SH SLEEP   [facility] ([facility])    [address]  [place] [id number] [phone]            Apr 19, 2018  1:30 PM CDT   Return Sleep Patient with Je Smith MD   [facility] ([facility])    [address]  [facility] [address] [phone]              Who to contact     Please call your clinic at [phone] to:    Ask questions about your health    Make or cancel appointments    Discuss your medicines    Learn about your test results    Speak to your doctor            Additional Information About Your Visit        [employer] Information     [employer] gives you secure access to your electronic health record. If you see a primary care provider, you can also send messages to your care team and make appointments. If you have questions, please call your primary care clinic.  If you do not have a primary care provider, please call [phone] and they will assist you.      [employer] is an electronic gateway that provides easy, online access to your medical records. With [employer], you can request a clinic appointment, read your test results, renew a prescription or communicate with your care team.     To access your existing account, please contact your [facility] or call [phone] for assistance.        Care EveryWhere ID     This is your Care EveryWhere ID. This could be used by other organizations to  access your [place] medical records  [id number]         Blood Pressure from Last 3 Encounters:   02/22/18 112/75   02/21/18 128/77   01/24/18 118/80    Weight from Last 3 Encounters:   02/22/18 88.5 kg (195 lb)   02/21/18 88.9 kg (196 lb)   01/24/18 88.5 kg (195 lb 3.2 oz)               Primary Care Provider Office Phone # Fax #    Trish Callaway  [phone] [address] [address]        Equal Access to Services     [facility] Hospital[facility] : Hadii aad ku hadasho Soomaali, waaxda luqadaha, qaybta kaalmada adeegyada, chris shea haydahlia will . So [facility] [phone].    ATENCIÓN: Si habla español, tiene a das disposición servicios gratuitos de asistencia lingüística. LlC[facility] [phone].    We comply with applicable federal civil rights laws and [place] laws. We do not discriminate on the basis of race, color, national origin, age, disability, sex, sexual orientation, or gender identity.            Thank you!     Thank you for choosing [facility]  for your care. Our goal is always to provide you with excellent care. Hearing back from our patients is one way we can continue to improve our services. Please take a few minutes to complete the written survey that you may receive in the mail after your visit with us. Thank you!             Your Updated Medication List - Protect others around you: Learn how to safely use, store and throw away your medicines at www.disposemymeds.org.          This list is accurate as of 3/1/18  2:45 PM.  Always use your most recent med list.                   Brand Name Dispense Instructions for use Diagnosis    amitriptyline 10 MG tablet    ELAVIL    30 tablet    Take 1 tablet (10 mg) by mouth At Bedtime    Major depressive disorder, recurrent episode, mild (H)       azelastine 0.1 % spray    ASTELIN    30 mL    Spray 1-2 sprays into both nostrils 2 times daily    Allergic rhinitis due to pollen        beclomethasone 80 MCG/ACT Inhaler    QVAR    3 Inhaler    Inhale 2 puffs into the lungs 2 times daily    Moderate persistent asthma with allergic rhinitis without complication       calcium carbonate 1250 MG tablet    OS-JONATAN 500 mg [place]. Ca     Take 500 mg by mouth 2 times daily        cetirizine 10 MG tablet    zyrTEC     Take 10 mg by mouth daily        clonazePAM 0.5 MG tablet    klonoPIN    20 tablet    Take 1 tablet (0.5 mg) by mouth as needed for anxiety    Major depressive disorder, recurrent episode, mild (H)       Cod Liver Oil 1000 MG Caps      Take 1 capsule by mouth daily.        fluconazole 150 MG tablet    DIFLUCAN    4 tablet    Take 2 tablets (300mg) by mouth once a week for 2 weeks    Tinea versicolor       fluticasone 110 MCG/ACT Inhaler    FLOVENT HFA    3 Inhaler    Inhale 2 puffs into the lungs 2 times daily    Moderate persistent asthma with allergic rhinitis without complication       fluticasone 50 MCG/ACT spray    FLONASE    48 mL    Spray 1-2 sprays into both nostrils daily    Seasonal allergic rhinitis due to pollen, unspecified chronicity       HM MULTIVITAMIN ADULT GUMMY PO           ketoconazole 2 % shampoo    NIZORAL    120 mL    Apply to the affected area and wash off after 5 minutes.    Dandruff, Tinea versicolor       levonorgestrel 20 MCG/24HR IUD    MIRENA (52 MG)    1 each    1 each (20 mcg) by Intrauterine route once for 1 dose    Encounter for IUD insertion       methocarbamol 500 MG tablet    ROBAXIN    30 tablet    Take 2 tablets (1,000 mg) by mouth 3 times daily as needed for muscle spasms    Acute left-sided low back pain with left-sided sciatica       misoprostol 200 MCG tablet    CYTOTEC    2 tablet    Take 2 hours before procedure. Place 1 tab between cheek and gum on the right, and 2nd tab on the left. Dissolve for 30 min, then swallow.    Encounter for IUD insertion       SYNTHROID 137 MCG tablet   Generic drug:  levothyroxine     90 tablet    Take 1 tablet (137 mcg)  by mouth daily    Hypothyroidism due to Hashimoto's thyroiditis       Vitamin B-12 500 MCG Subl      Place 500 mcg under the tongue daily        Vitamin D3 3000 UNITS Tabs      Take 1 tablet by mouth daily.

## 2018-03-01 NOTE — TELEPHONE ENCOUNTER
Denied  Refill request too early; Rx sent 1/24/2018 for 2 months  Aria DEL TORO RN    Requested Prescriptions   Pending Prescriptions Disp Refills     amitriptyline (ELAVIL) 10 MG tablet [Pharmacy Med Name: AMITRIPTYLINE 10MG TABLETS] 30 tablet 0     Sig: TAKE 1 TABLET(10 MG) BY MOUTH AT BEDTIME    Tricyclic Antidepressants Protocol Passed    2/28/2018  9:16 PM       Passed - Blood pressure under 140/90 in past 12 months    BP Readings from Last 3 Encounters:   02/22/18 112/75   02/21/18 128/77   01/24/18 118/80                Passed - Patient is age 18 or older       Passed - No active pregnancy on record       Passed - No positive pregnancy test in past 12 months

## 2018-03-01 NOTE — PROGRESS NOTES
37 year old female presents for gynecologic ultrasound indicated by f/u ovarian cyst.  This study was done transvaginally.    Uterine findings:   Presence: Visible Size: Normal 5.5 x 5.3 x 4.6 cm.  Endometrium = 5.0 mm.   Cx length = 49.5 mm.      Flexion:  Anteverted Position: Midline Margins: Smooth Shape: Normal   Contour: Regular Texture: Homogeneous Cavity: Normal- IUD in place  Masses: Normal    Pelvic findings:    Right Adnexa: Normal   Left Adnexa: Normal   Bladder:  Normal         Cul - de - sac fluid: None    Ovarian follicles:   Right ovary:  2.2 x 2.4 x 1.8cm.     1 follicle     Size(s):  1.4 x 1.4 x 1.1cm     Left ovary:  3.5 x 2.8 x 2.8 cm.     1 follicle     Size(s):  1.9 x 1.8 x 1.9cm      Comments:  Resolution of ovarian cyst    ANTONI Christina MD

## 2018-03-01 NOTE — LETTER
3/1/2018      RE: Kasandra Winkler  [address]  [place] [facility] [address]       37 year old female presents for gynecologic ultrasound indicated by f/u ovarian cyst.  This study was done transvaginally.    Uterine findings:   Presence: Visible Size: Normal 5.5 x 5.3 x 4.6 cm.  Endometrium = 5.0 mm.   Cx length = 49.5 mm.      Flexion:  Anteverted Position: Midline Margins: Smooth Shape: Normal   Contour: Regular Texture: Homogeneous Cavity: Normal- IUD in place  Masses: Normal    Pelvic findings:    Right Adnexa: Normal   Left Adnexa: Normal   Bladder:  Normal         Cul - de - sac fluid: None    Ovarian follicles:   Right ovary:  2.2 x 2.4 x 1.8cm.     1 follicle     Size(s):  1.4 x 1.4 x 1.1cm     Left ovary:  3.5 x 2.8 x 2.8 cm.     1 follicle     Size(s):  1.9 x 1.8 x 1.9cm      Comments:  Resolution of ovarian cyst    ANTONI Christina MD    [facility]

## 2018-03-05 ENCOUNTER — TELEPHONE (OUTPATIENT)
Dept: OBGYN | Facility: CLINIC | Age: 38
End: 2018-03-05

## 2018-03-05 DIAGNOSIS — R10.84 ABDOMINAL PAIN, GENERALIZED: Primary | ICD-10-CM

## 2018-03-05 NOTE — TELEPHONE ENCOUNTER
Telephone Note    Returned call from patient seeking US results. Reviewed US is normal, showed cyst resolution. Kasandra reports ongoing pain. I explained that pain from a ruptured hemorrhagic cyst can sometimes take several weeks to resolve. If ongoing pain over the next 2-3 weeks, return to clinic for eval. Also discussed option of GI eval- referral placed per her request.    Rosemary Barnes MD

## 2018-03-08 NOTE — TELEPHONE ENCOUNTER
PA was denied. Please order alternative med with complete SIG or begin appeal process.     If you would like to appeal:   Create letter of medical necessity or    Compile supporting clinical documentation in EPIC Telephone encounter (TE).    Route TE to: [facility].

## 2018-03-09 NOTE — TELEPHONE ENCOUNTER
What medications are covered under her insurance?  I am OK writing for an inhaled steroid that is covered  Thanks  PN

## 2018-03-20 ENCOUNTER — TELEPHONE (OUTPATIENT)
Dept: SLEEP MEDICINE | Facility: CLINIC | Age: 38
End: 2018-03-20

## 2018-03-20 DIAGNOSIS — R29.818 SUSPECTED SLEEP APNEA: Primary | ICD-10-CM

## 2018-03-20 DIAGNOSIS — R06.83 SNORING: ICD-10-CM

## 2018-03-20 DIAGNOSIS — R53.83 FATIGUE, UNSPECIFIED TYPE: ICD-10-CM

## 2018-03-20 NOTE — PROGRESS NOTES
PSG coverage denied by insurance.     Will proceed with home sleep apnea test and consider further investigations as needed,

## 2018-03-20 NOTE — TELEPHONE ENCOUNTER
Called the patient to schedule an HST because her insurance doesn't cover an in lab study. Patient stated she would like to think about it and call back if she wants to pursue this further.     Tia Mendoza

## 2018-04-11 DIAGNOSIS — J30.1 SEASONAL ALLERGIC RHINITIS DUE TO POLLEN, UNSPECIFIED CHRONICITY: ICD-10-CM

## 2018-04-12 RX ORDER — FLUTICASONE PROPIONATE 50 MCG
SPRAY, SUSPENSION (ML) NASAL
Qty: 16 ML | Refills: 2 | Status: SHIPPED | OUTPATIENT
Start: 2018-04-12 | End: 2018-09-15

## 2018-04-12 NOTE — TELEPHONE ENCOUNTER
"Prescription approved per [facility] Refill Protocol.  Aria DEL TORO RN    Requested Prescriptions   Pending Prescriptions Disp Refills     fluticasone (FLONASE) 50 MCG/ACT spray [Pharmacy Med Name: FLUTICASONE 50MCG NASAL SP (120) RX] 16 mL 0     Sig: SHAKE LIQUID AND USE 1 TO 2 SPRAYS IN EACH NOSTRIL DAILY    Inhaled Steroids Protocol Passed    4/11/2018  8:21 PM       Passed - Patient is age 12 or older       Passed - Asthma control assessment score within normal limits in last 6 months    Please review ACT score.          Passed - Recent (6 mo) or future (30 days) visit within the authorizing provider's specialty    Patient had office visit in the last 6 months or has a visit in the next 30 days with authorizing provider or within the authorizing provider's specialty.  See \"Patient Info\" tab in inbasket, or \"Choose Columns\" in Meds & Orders section of the refill encounter.            Next 5 appointments (look out 90 days)     Apr 20, 2018  2:15 PM CDT   Teodoro Fernando with Trish Callaway,    [facility] ([facility])    [address] [facility] [id number] [phone]                  "

## 2018-04-20 ENCOUNTER — OFFICE VISIT (OUTPATIENT)
Dept: FAMILY MEDICINE | Facility: CLINIC | Age: 38
End: 2018-04-20
Payer: COMMERCIAL

## 2018-04-20 VITALS
OXYGEN SATURATION: 93 % | TEMPERATURE: 99 F | HEART RATE: 95 BPM | DIASTOLIC BLOOD PRESSURE: 72 MMHG | SYSTOLIC BLOOD PRESSURE: 118 MMHG | BODY MASS INDEX: 32.74 KG/M2 | HEIGHT: 66 IN | WEIGHT: 203.7 LBS

## 2018-04-20 DIAGNOSIS — N92.1 MENORRHAGIA WITH IRREGULAR CYCLE: Primary | ICD-10-CM

## 2018-04-20 DIAGNOSIS — K59.00 CONSTIPATION, UNSPECIFIED CONSTIPATION TYPE: ICD-10-CM

## 2018-04-20 LAB
ERYTHROCYTE [DISTWIDTH] IN BLOOD BY AUTOMATED COUNT: 13.4 % (ref 10–15)
HCT VFR BLD AUTO: 40 % (ref 35–47)
HGB BLD-MCNC: 13.1 G/DL (ref 11.7–15.7)
MCH RBC QN AUTO: 29.3 PG (ref 26.5–33)
MCHC RBC AUTO-ENTMCNC: 32.8 G/DL (ref 31.5–36.5)
MCV RBC AUTO: 90 FL (ref 78–100)
PLATELET # BLD AUTO: 307 10E9/L (ref 150–450)
RBC # BLD AUTO: 4.47 10E12/L (ref 3.8–5.2)
WBC # BLD AUTO: 8.3 10E9/L (ref 4–11)

## 2018-04-20 PROCEDURE — 36415 COLL VENOUS BLD VENIPUNCTURE: CPT | Performed by: FAMILY MEDICINE

## 2018-04-20 PROCEDURE — 85027 COMPLETE CBC AUTOMATED: CPT | Performed by: FAMILY MEDICINE

## 2018-04-20 PROCEDURE — 82728 ASSAY OF FERRITIN: CPT | Performed by: FAMILY MEDICINE

## 2018-04-20 PROCEDURE — 99214 OFFICE O/P EST MOD 30 MIN: CPT | Performed by: FAMILY MEDICINE

## 2018-04-20 NOTE — MR AVS SNAPSHOT
"              After Visit Summary   4/20/2018    Kasandra Winkler    MRN: [id number]           Patient Information     Date Of Birth          1980        Visit Information        Provider Department      4/20/2018 2:15 PM Trish Callaway DO [facility]        Today's Diagnoses     Menorrhagia with irregular cycle    -  1    Constipation, unspecified constipation type           Follow-ups after your visit        Who to contact     If you have questions or need follow up information about today's clinic visit or your schedule please contact [facility] directly at [phone].  Normal or non-critical lab and imaging results will be communicated to you by [employer], letter or phone within 4 business days after the clinic has received the results. If you do not hear from us within 7 days, please contact the clinic through [employer] or phone. If you have a critical or abnormal lab result, we will notify you by phone as soon as possible.  Submit refill requests through [employer] or call your pharmacy and they will forward the refill request to us. Please allow 3 business days for your refill to be completed.          Additional Information About Your Visit        MyChart Information     [employer] gives you secure access to your electronic health record. If you see a primary care provider, you can also send messages to your care team and make appointments. If you have questions, please call your primary care clinic.  If you do not have a primary care provider, please call [phone] and they will assist you.        Care EveryWhere ID     This is your Care EveryWhere ID. This could be used by other organizations to access your [place] medical records  [id number]        Your Vitals Were     Pulse Temperature Height Last Period Pulse Oximetry BMI (Body Mass Index)    95 99  F (37.2  C) (Tympanic) 5' 6\" (1.676 m) 04/10/2018 93% 32.88 kg/m2       Blood Pressure from Last 3 Encounters:   04/20/18 " 118/72   02/22/18 112/75   02/21/18 128/77    Weight from Last 3 Encounters:   04/20/18 203 lb 11.2 oz (92.4 kg)   02/22/18 195 lb (88.5 kg)   02/21/18 196 lb (88.9 kg)              We Performed the Following     CBC with platelets     Ferritin          Today's Medication Changes          These changes are accurate as of 4/20/18  3:32 PM.  If you have any questions, ask your nurse or doctor.               Stop taking these medicines if you haven't already. Please contact your care team if you have questions.     beclomethasone 80 MCG/ACT Inhaler   Commonly known as:  QVAR   Stopped by:  Trish Callaway DO           ketoconazole 2 % shampoo   Commonly known as:  NIZORAL   Stopped by:  Trish Callaway DO                    Primary Care Provider Office Phone # Fax #    Trish Callaway [phone] [address] [address]        Equal Access to Services     [facility] Center[facility] : Hadii aad ku hadasho Soomaali, waaxda luqadaha, qaybta kaalmada adeegyada, waxay robertain haydahlia will . So [facility] [phone].    ATENCIÓN: Si habla español, tiene a das disposición servicios gratuitos de asistencia lingüística. Llame al [phone].    We comply with applicable federal civil rights laws and [place] laws. We do not discriminate on the basis of race, color, national origin, age, disability, sex, sexual orientation, or gender identity.            Thank you!     Thank you for choosing [facility]  for your care. Our goal is always to provide you with excellent care. Hearing back from our patients is one way we can continue to improve our services. Please take a few minutes to complete the written survey that you may receive in the mail after your visit with us. Thank you!             Your Updated Medication List - Protect others around you: Learn how to safely use, store and throw away your medicines at www.disposemymeds.org.          This list is accurate as of 4/20/18   3:32 PM.  Always use your most recent med list.                   Brand Name Dispense Instructions for use Diagnosis    amitriptyline 10 MG tablet    ELAVIL    30 tablet    Take 1 tablet (10 mg) by mouth At Bedtime    Major depressive disorder, recurrent episode, mild (H)       azelastine 0.1 % spray    ASTELIN    30 mL    Spray 1-2 sprays into both nostrils 2 times daily    Allergic rhinitis due to pollen       budesonide 180 MCG/ACT inhaler    PULMICORT FLEXHALER    1 Inhaler    Inhale 2 puffs into the lungs 2 times daily    Moderate persistent asthma with allergic rhinitis without complication       calcium carbonate 1250 MG tablet    OS-JONATAN 500 mg [place]. Ca     Take 500 mg by mouth 2 times daily        cetirizine 10 MG tablet    zyrTEC     Take 10 mg by mouth daily        clonazePAM 0.5 MG tablet    klonoPIN    20 tablet    Take 1 tablet (0.5 mg) by mouth as needed for anxiety    Major depressive disorder, recurrent episode, mild (H)       Cod Liver Oil 1000 MG Caps      Take 1 capsule by mouth daily.        fluticasone 110 MCG/ACT Inhaler    FLOVENT HFA    3 Inhaler    Inhale 2 puffs into the lungs 2 times daily    Moderate persistent asthma with allergic rhinitis without complication       fluticasone 50 MCG/ACT spray    FLONASE    16 mL    SHAKE LIQUID AND USE 1 TO 2 SPRAYS IN EACH NOSTRIL DAILY    Seasonal allergic rhinitis due to pollen, unspecified chronicity       HM MULTIVITAMIN ADULT GUMMY PO           levonorgestrel 20 MCG/24HR IUD    MIRENA (52 MG)    1 each    1 each (20 mcg) by Intrauterine route once for 1 dose    Encounter for IUD insertion       SYNTHROID 137 MCG tablet   Generic drug:  levothyroxine     90 tablet    Take 1 tablet (137 mcg) by mouth daily    Hypothyroidism due to Hashimoto's thyroiditis       Vitamin B-12 500 MCG Subl      Place 500 mcg under the tongue daily        Vitamin D3 3000 units Tabs      Take 1 tablet by mouth daily.

## 2018-04-20 NOTE — PROGRESS NOTES
SUBJECTIVE:   Kasandra Winkler is a 37 year old female who presents to clinic today for the following health issues:      Abdominal Pain      Duration: ongoing since IUD insertion 12/2017    Description (location/character/radiation): lower R side       Associated flank pain: None    Intensity:  moderate    Accompanying signs and symptoms:        Fever/Chills: no        Gas/Bloating: YES       Nausea/vomitting: no        Diarrhea: stool issues       Dysuria or Hematuria: no     History (previous similar pain/trauma/previous testing): previous US    Precipitating or alleviating factors:       Pain worse with eating/BM/urination: no       Pain relieved by BM: no     Therapies tried and outcome: None    LMP:  04/10/2018-regular bleeding, last two periods have been 10 days      Abdominal pain -   Right pelvic area -   Feels like just past hip bone -   Going on since December - started with the IUD insertion  Pain persisted and the first ultrasound showed hemorrhagic cyst - right      Pain comes and goes -   Has period currently -   Since IUD was placed - has had some irregular bleeding and spotting.    No correlation between spotting and the pain  aggravating factors - none known  BM's are irregular - will alternate between constipation and diarrhea  Hasn't done much in the past -   Had taken a probiotic - not sure if helpful -     With constipation will sometimes have blood -   Will sometimes get BRB with wiping and some pain        -------------------------------------    Problem list and histories reviewed & adjusted, as indicated.  Additional history: as documented    Patient Active Problem List   Diagnosis     CARDIOVASCULAR SCREENING; LDL GOAL LESS THAN 160     Hypothyroidism     Obesity     Hashimoto's thyroiditis     Hypovitaminosis D     Fatigue     Ovarian cyst     Major depressive disorder, recurrent episode, mild (H)     Asthma with allergic rhinitis     Morbid obesity (H)     laparoscopic sleeve  "gastrectomy 6/29/15     Anxiety     Allergic rhinitis due to pollen     Iron deficiency anemia secondary to inadequate dietary iron intake     Migraine with aura and without status migrainosus, not intractable     Menorrhagia with irregular cycle     Encounter for IUD insertion     Past Surgical History:   Procedure Laterality Date      SECTION  10/10/10     CHOLECYSTECTOMY, LAPOROSCOPIC  2009    gallstones -      FINGER SURGERY      right little finger fracture     LAPAROSCOPIC GASTRIC SLEEVE N/A 2015    Procedure: LAPAROSCOPIC GASTRIC SLEEVE;  Surgeon: Cam Alvarez MD;  Location:  OR       Social History   Substance Use Topics     Smoking status: Former Smoker     Packs/day: 0.50     Years: 10.00     Quit date: 2009     Smokeless tobacco: Never Used      Comment: social smoker for 10 yr     Alcohol use 0.0 oz/week     0 Standard drinks or equivalent per week      Comment: occasional ETOH use     Family History   Problem Relation Age of Onset     OSTEOPOROSIS Mother      Hypertension Mother      CEREBROVASCULAR DISEASE Mother      Aneurysm     Substance Abuse Mother      HEART DISEASE Maternal Grandmother      Hypertension Maternal Grandmother      Arthritis Maternal Grandmother      RA      Respiratory Maternal Grandmother      Asthma           Reviewed and updated as needed this visit by clinical staff  Tobacco  Allergies  Meds  Problems  Med Hx  Surg Hx  Fam Hx  Soc Hx        Reviewed and updated as needed this visit by Provider  Allergies  Meds  Problems         ROS:  Constitutional, HEENT, cardiovascular, pulmonary, GI, , musculoskeletal, neuro, skin, endocrine and psych systems are negative, except as otherwise noted.    OBJECTIVE:     /72  Pulse 95  Temp 99  F (37.2  C) (Tympanic)  Ht 5' 6\" (1.676 m)  Wt 203 lb 11.2 oz (92.4 kg)  LMP 04/10/2018  SpO2 93%  BMI 32.88 kg/m2  Body mass index is 32.88 kg/(m^2).  GENERAL: healthy, alert and no " distress  ABDOMEN: bowel sounds normal and soft with diffuse tenderness but no g/r/r    Diagnostic Test Results:  Results for orders placed or performed in visit on 04/20/18 (from the past 24 hour(s))   CBC with platelets   Result Value Ref Range    WBC 8.3 4.0 - 11.0 10e9/L    RBC Count 4.47 3.8 - 5.2 10e12/L    Hemoglobin 13.1 11.7 - 15.7 g/dL    Hematocrit 40.0 35.0 - 47.0 %    MCV 90 78 - 100 fl    MCH 29.3 26.5 - 33.0 pg    MCHC 32.8 31.5 - 36.5 g/dL    RDW 13.4 10.0 - 15.0 %    Platelet Count 307 150 - 450 10e9/L       ASSESSMENT/PLAN:     1. Menorrhagia with irregular cycle  Very heavy menses -   Had IUD placed and better but now spotting longer and periods longer  Will check CBC and fierritin  - CBC with platelets  - Ferritin    2. Constipation, unspecified constipation type  Pt has pelvic pain -   Suspect this is constipation -  Discussed treatment options.  Pt will call or RTC if symptoms worsen or do not improve.   - CBC with platelets  - Ferritin    I spent over 25 minutes with patient and over 50% time in counseling regarding above issues.     Trish Callaway,   [facility]

## 2018-04-20 NOTE — NURSING NOTE
"Chief Complaint   Patient presents with     Abdominal Pain     results from US,      /72  Pulse 95  Temp 99  F (37.2  C) (Tympanic)  Ht 5' 6\" (1.676 m)  Wt 203 lb 11.2 oz (92.4 kg)  LMP 04/10/2018  SpO2 93%  BMI 32.88 kg/m2 Estimated body mass index is 32.88 kg/(m^2) as calculated from the following:    Height as of this encounter: 5' 6\" (1.676 m).    Weight as of this encounter: 203 lb 11.2 oz (92.4 kg).  Medication Reconciliation: complete      Health Maintenance due pending provider review:  Discuss PAP    n/a    Dariana Tran CMA  "

## 2018-04-21 LAB — FERRITIN SERPL-MCNC: 52 NG/ML (ref 12–150)

## 2018-04-25 NOTE — PROGRESS NOTES
Dear Kasandra,   Your test results are all back -   -All of your labs are normal.  Let us know if you have any questions.  -Trish Callaway, DO

## 2018-06-19 ENCOUNTER — OFFICE VISIT (OUTPATIENT)
Dept: FAMILY MEDICINE | Facility: CLINIC | Age: 38
End: 2018-06-19
Payer: COMMERCIAL

## 2018-06-19 VITALS
HEART RATE: 96 BPM | TEMPERATURE: 98.3 F | SYSTOLIC BLOOD PRESSURE: 110 MMHG | BODY MASS INDEX: 32.26 KG/M2 | OXYGEN SATURATION: 97 % | WEIGHT: 200.7 LBS | HEIGHT: 66 IN | DIASTOLIC BLOOD PRESSURE: 78 MMHG

## 2018-06-19 DIAGNOSIS — N89.8 VAGINAL DISCHARGE: ICD-10-CM

## 2018-06-19 DIAGNOSIS — N92.1 MENORRHAGIA WITH IRREGULAR CYCLE: ICD-10-CM

## 2018-06-19 DIAGNOSIS — E06.3 HYPOTHYROIDISM DUE TO HASHIMOTO'S THYROIDITIS: Primary | ICD-10-CM

## 2018-06-19 LAB
ERYTHROCYTE [DISTWIDTH] IN BLOOD BY AUTOMATED COUNT: 12.9 % (ref 10–15)
FERRITIN SERPL-MCNC: 39 NG/ML (ref 12–150)
HCT VFR BLD AUTO: 40.1 % (ref 35–47)
HGB BLD-MCNC: 13.1 G/DL (ref 11.7–15.7)
MCH RBC QN AUTO: 29.1 PG (ref 26.5–33)
MCHC RBC AUTO-ENTMCNC: 32.7 G/DL (ref 31.5–36.5)
MCV RBC AUTO: 89 FL (ref 78–100)
PLATELET # BLD AUTO: 256 10E9/L (ref 150–450)
RBC # BLD AUTO: 4.5 10E12/L (ref 3.8–5.2)
SPECIMEN SOURCE: ABNORMAL
T4 FREE SERPL-MCNC: 1.17 NG/DL (ref 0.76–1.46)
TSH SERPL DL<=0.005 MIU/L-ACNC: 0.19 MU/L (ref 0.4–4)
WBC # BLD AUTO: 6 10E9/L (ref 4–11)
WET PREP SPEC: ABNORMAL

## 2018-06-19 PROCEDURE — 84443 ASSAY THYROID STIM HORMONE: CPT | Performed by: FAMILY MEDICINE

## 2018-06-19 PROCEDURE — 87210 SMEAR WET MOUNT SALINE/INK: CPT | Performed by: FAMILY MEDICINE

## 2018-06-19 PROCEDURE — 82728 ASSAY OF FERRITIN: CPT | Performed by: FAMILY MEDICINE

## 2018-06-19 PROCEDURE — 36415 COLL VENOUS BLD VENIPUNCTURE: CPT | Performed by: FAMILY MEDICINE

## 2018-06-19 PROCEDURE — 85027 COMPLETE CBC AUTOMATED: CPT | Performed by: FAMILY MEDICINE

## 2018-06-19 PROCEDURE — 86376 MICROSOMAL ANTIBODY EACH: CPT | Performed by: FAMILY MEDICINE

## 2018-06-19 PROCEDURE — 99213 OFFICE O/P EST LOW 20 MIN: CPT | Performed by: FAMILY MEDICINE

## 2018-06-19 PROCEDURE — 84439 ASSAY OF FREE THYROXINE: CPT | Performed by: FAMILY MEDICINE

## 2018-06-19 RX ORDER — METRONIDAZOLE 500 MG/1
500 TABLET ORAL 2 TIMES DAILY
Qty: 14 TABLET | Refills: 0 | Status: SHIPPED | OUTPATIENT
Start: 2018-06-19 | End: 2018-07-06

## 2018-06-19 NOTE — NURSING NOTE
"Chief Complaint   Patient presents with     Vaginal Problem     /78  Pulse 96  Temp 98.3  F (36.8  C) (Oral)  Ht 5' 6\" (1.676 m)  Wt 200 lb 11.2 oz (91 kg)  SpO2 97%  BMI 32.39 kg/m2 Estimated body mass index is 32.39 kg/(m^2) as calculated from the following:    Height as of this encounter: 5' 6\" (1.676 m).    Weight as of this encounter: 200 lb 11.2 oz (91 kg).        Health Maintenance due pending provider review:  Pap Smear    Due in 09/2018, pt aware    Dariana Tran CMA  "

## 2018-06-19 NOTE — NURSING NOTE
Patient called - Rx not sent to pharmacy  Sent per Dr Callaway's written orders  Pt didn't need call once Rx sent  Patient was going to check with pharmacy in 1 hour - if issues will call us back  Aria DEL TORO RN

## 2018-06-19 NOTE — PROGRESS NOTES
Dear Kasandra,   Your test results are all back -   -Yeast vaginal infection test is normal - no signs of a yeast infection.  -Bacterial vaginal infection test is POSITIVE    ADVISE treatment with metronidazole 500mg twice daily orally for 7 days and a prescription has been sent to your pharmacy  -Trichomonas vaginal infection test is normal - no signs of a trichomonas infection.  Let us know if you have any questions.  -Trish Callaway, DO

## 2018-06-19 NOTE — PROGRESS NOTES
SUBJECTIVE:   Kasandra Winkler is a 37 year old female who presents to clinic today for the following health issues:      Vaginal Symptoms      Duration: increase in discharge, over the past few months    Description  vaginal discharge -     Intensity:  moderate    Accompanying signs and symptoms (fever/dysuria/abdominal or back pain): bloating    History  Sexually active: yes, single partner, contraception - IUD  Possibility of pregnancy: No  Recent antibiotic use: no     Precipitating or alleviating factors: None    Therapies tried and outcome: boric acid   Outcome: no improvement      -------------------------------------    Problem list and histories reviewed & adjusted, as indicated.  Additional history: as documented    Patient Active Problem List   Diagnosis     CARDIOVASCULAR SCREENING; LDL GOAL LESS THAN 160     Hypothyroidism     Obesity     Hashimoto's thyroiditis     Hypovitaminosis D     Fatigue     Ovarian cyst     Major depressive disorder, recurrent episode, mild (H)     Asthma with allergic rhinitis     Morbid obesity (H)     laparoscopic sleeve gastrectomy 6/29/15     Anxiety     Allergic rhinitis due to pollen     Iron deficiency anemia secondary to inadequate dietary iron intake     Migraine with aura and without status migrainosus, not intractable     Menorrhagia with irregular cycle     Encounter for IUD insertion     Past Surgical History:   Procedure Laterality Date      SECTION  10/10/10     CHOLECYSTECTOMY, LAPOROSCOPIC  2009    gallstones -      FINGER SURGERY      right little finger fracture     LAPAROSCOPIC GASTRIC SLEEVE N/A 2015    Procedure: LAPAROSCOPIC GASTRIC SLEEVE;  Surgeon: aCm Alvarez MD;  Location:  OR       Social History   Substance Use Topics     Smoking status: Former Smoker     Packs/day: 0.50     Years: 10.00     Quit date: 2009     Smokeless tobacco: Never Used      Comment: social smoker for 10 yr     Alcohol use 0.0 oz/week     0  "Standard drinks or equivalent per week      Comment: occasional ETOH use     Family History   Problem Relation Age of Onset     Osteoperosis Mother      Hypertension Mother      Cerebrovascular Disease Mother      Aneurysm     Substance Abuse Mother      HEART DISEASE Maternal Grandmother      Hypertension Maternal Grandmother      Arthritis Maternal Grandmother      RA      Respiratory Maternal Grandmother      Asthma           Reviewed and updated as needed this visit by clinical staff  Tobacco  Allergies  Meds  Problems  Med Hx  Surg Hx  Fam Hx  Soc Hx        Reviewed and updated as needed this visit by Provider  Allergies  Meds  Problems         ROS:  Constitutional, HEENT, cardiovascular, pulmonary, gi and gu systems are negative, except as otherwise noted.    OBJECTIVE:     /78  Pulse 96  Temp 98.3  F (36.8  C) (Oral)  Ht 5' 6\" (1.676 m)  Wt 200 lb 11.2 oz (91 kg)  SpO2 97%  BMI 32.39 kg/m2  Body mass index is 32.39 kg/(m^2).  GENERAL: healthy, alert and no distress   (female): normal female external genitalia, normal urethral meatus , vaginal mucosa pink, moist, well rugated, vaginal discharge - copious and bloody  and normal cervix, adnexae, and uterus without masses.    Diagnostic Test Results:  Results for orders placed or performed in visit on 06/19/18 (from the past 24 hour(s))   Wet prep   Result Value Ref Range    Specimen Description Vagina     Wet Prep No Trichomonas seen     Wet Prep Clue cells seen (A)     Wet Prep No yeast seen    CBC with platelets   Result Value Ref Range    WBC 6.0 4.0 - 11.0 10e9/L    RBC Count 4.50 3.8 - 5.2 10e12/L    Hemoglobin 13.1 11.7 - 15.7 g/dL    Hematocrit 40.1 35.0 - 47.0 %    MCV 89 78 - 100 fl    MCH 29.1 26.5 - 33.0 pg    MCHC 32.7 31.5 - 36.5 g/dL    RDW 12.9 10.0 - 15.0 %    Platelet Count 256 150 - 450 10e9/L       ASSESSMENT/PLAN:       1. Hypothyroidism due to Hashimoto's thyroiditis  Thyroid is pending  - TSH  - T4, free  - Thyroid " peroxidase antibody    2. Menorrhagia with irregular cycle  Will check iron  - CBC with platelets  - Ferritin    3. Vaginal discharge  Vaginal test shows BV   Will treat with oral flagyl BID for 7 days  - Wet prep  - metroNIDAZOLE (FLAGYL) 500 MG tablet; Take 1 tablet (500 mg) by mouth 2 times daily  Dispense: 14 tablet; Refill: 0    Pt will call or RTC if symptoms worsen or do not improve.      Trish Callaway,   [facility]

## 2018-06-20 ENCOUNTER — MYC MEDICAL ADVICE (OUTPATIENT)
Dept: FAMILY MEDICINE | Facility: CLINIC | Age: 38
End: 2018-06-20

## 2018-06-20 DIAGNOSIS — E06.3 HYPOTHYROIDISM DUE TO HASHIMOTO'S THYROIDITIS: ICD-10-CM

## 2018-06-20 LAB — THYROPEROXIDASE AB SERPL-ACNC: 1407 IU/ML

## 2018-06-20 RX ORDER — LEVOTHYROXINE SODIUM 125 MCG
125 TABLET ORAL DAILY
Qty: 30 TABLET | Refills: 1 | Status: SHIPPED | OUTPATIENT
Start: 2018-06-20 | End: 2018-08-18

## 2018-06-20 NOTE — PROGRESS NOTES
Dear Kasandra,   Your test results are all back -   Looks like you may be getting a little too much thyroid medication.  Still waiting for the antibodies.  Let us know if you have any questions.  -Trish Callaway, DO

## 2018-07-06 ENCOUNTER — OFFICE VISIT (OUTPATIENT)
Dept: FAMILY MEDICINE | Facility: CLINIC | Age: 38
End: 2018-07-06
Payer: COMMERCIAL

## 2018-07-06 VITALS
BODY MASS INDEX: 31.82 KG/M2 | SYSTOLIC BLOOD PRESSURE: 109 MMHG | OXYGEN SATURATION: 98 % | HEIGHT: 66 IN | HEART RATE: 71 BPM | DIASTOLIC BLOOD PRESSURE: 82 MMHG | WEIGHT: 198 LBS

## 2018-07-06 DIAGNOSIS — N89.8 VAGINAL DISCHARGE: Primary | ICD-10-CM

## 2018-07-06 DIAGNOSIS — R10.2 PELVIC PAIN IN FEMALE: ICD-10-CM

## 2018-07-06 LAB
ALBUMIN UR-MCNC: NEGATIVE MG/DL
APPEARANCE UR: CLEAR
BILIRUB UR QL STRIP: ABNORMAL
COLOR UR AUTO: YELLOW
GLUCOSE UR STRIP-MCNC: NEGATIVE MG/DL
HGB UR QL STRIP: NEGATIVE
KETONES UR STRIP-MCNC: 15 MG/DL
LEUKOCYTE ESTERASE UR QL STRIP: NEGATIVE
NITRATE UR QL: NEGATIVE
PH UR STRIP: 5.5 PH (ref 5–7)
SOURCE: ABNORMAL
SP GR UR STRIP: 1.02 (ref 1–1.03)
SPECIMEN SOURCE: NORMAL
UROBILINOGEN UR STRIP-ACNC: 1 EU/DL (ref 0.2–1)
WET PREP SPEC: NORMAL

## 2018-07-06 PROCEDURE — 81003 URINALYSIS AUTO W/O SCOPE: CPT | Performed by: FAMILY MEDICINE

## 2018-07-06 PROCEDURE — 87210 SMEAR WET MOUNT SALINE/INK: CPT | Performed by: FAMILY MEDICINE

## 2018-07-06 PROCEDURE — 99213 OFFICE O/P EST LOW 20 MIN: CPT | Performed by: FAMILY MEDICINE

## 2018-07-06 NOTE — NURSING NOTE
"Chief Complaint   Patient presents with     Vaginal Problem     wondering about BV, increased fatigue     /82  Pulse 71  Ht 5' 6\" (1.676 m)  Wt 198 lb (89.8 kg)  LMP 06/25/2018  SpO2 98%  BMI 31.96 kg/m2 Estimated body mass index is 31.96 kg/(m^2) as calculated from the following:    Height as of this encounter: 5' 6\" (1.676 m).    Weight as of this encounter: 198 lb (89.8 kg).        Health Maintenance due pending provider review:  ACT    completed    Dariana Tran CMA  "

## 2018-07-06 NOTE — MR AVS SNAPSHOT
After Visit Summary   7/6/2018    Kasandra Winkler    MRN: [id number]           Patient Information     Date Of Birth          1980        Visit Information        Provider Department      7/6/2018 2:30 PM Trish Callaway DO [facility]        Today's Diagnoses     Vaginal discharge    -  1    Pelvic pain in female           Follow-ups after your visit        Your next 10 appointments already scheduled     Jul 09, 2018  7:30 AM CDT   US PELVIC COMPLETE W TRANSVAGINAL with URUS3   [facility], [place], Ultrasound ([facility])    [address] [address] [phone]           Please bring a list of your medicines (including vitamins, minerals and over-the-counter drugs). Also, tell your doctor about any allergies you may have. Wear comfortable clothes and leave your valuables at home.  Adults: Drink six 8-ounce glasses of fluid one hour before your exam. Do NOT empty your bladder.  If you need to empty your bladder before your exam, try to release only a little bit of urine. Then, drink another 8oz glass of fluid.  Children: Children who are potty trained should drink at least 4 cups (32 oz) of liquid 45 minutes to one hour prior to the exam. The child s bladder must be full in order to achieve a diagnostic exam. If your child is very uncomfortable or has an urgent need to pee, please notify a technologist; they will try to find out how much longer the wait may be and provide instructions to help relieve the pressure. Occasionally it is medically necessary to insert a urinary catheter to fill the bladder.  Please call the Imaging Department at your exam site with any questions.            Jul 10, 2018  7:15 AM CDT   Teodoro Valero with Trish Callaway DO   [facility] ([facility])    [address] [address] [phone]              Future tests that were ordered for you  "today     Open Future Orders        Priority Expected Expires Ordered    US Pelvic Complete w Transvaginal Routine  7/6/2019 7/6/2018            Who to contact     If you have questions or need follow up information about today's clinic visit or your schedule please contact [facility] directly at [phone].  Normal or non-critical lab and imaging results will be communicated to you by MyChart, letter or phone within 4 business days after the clinic has received the results. If you do not hear from us within 7 days, please contact the clinic through [employer] or phone. If you have a critical or abnormal lab result, we will notify you by phone as soon as possible.  Submit refill requests through [employer] or call your pharmacy and they will forward the refill request to us. Please allow 3 business days for your refill to be completed.          Additional Information About Your Visit        MyChart Information     [employer] gives you secure access to your electronic health record. If you see a primary care provider, you can also send messages to your care team and make appointments. If you have questions, please call your primary care clinic.  If you do not have a primary care provider, please call [phone] and they will assist you.        Care EveryWhere ID     This is your Care EveryWhere ID. This could be used by other organizations to access your [place] medical records  [id number]        Your Vitals Were     Pulse Height Last Period Pulse Oximetry BMI (Body Mass Index)       71 5' 6\" (1.676 m) 06/25/2018 98% 31.96 kg/m2        Blood Pressure from Last 3 Encounters:   07/06/18 109/82   06/19/18 110/78   04/20/18 118/72    Weight from Last 3 Encounters:   07/06/18 198 lb (89.8 kg)   06/19/18 200 lb 11.2 oz (91 kg)   04/20/18 203 lb 11.2 oz (92.4 kg)              We Performed the Following     *UA reflex to Microscopic and Culture ([facility] [facility] (except [place] and [place])     Wet " prep        Primary Care Provider Office Phone # Fax #    Trish Callaway [phone] [address] [address]        Equal Access to Services     KRIS SILVA : Hadii aad ku hadjanetho Soomaali, waaxda luqadaha, qaybta kaalmada adeegyada, chris beckettdahlia grimm. So [facility] [phone].    ATENCIÓN: Si habla español, tiene a das disposición servicios gratuitos de asistencia lingüística. Llame al [phone].    We comply with applicable federal civil rights laws and [place] laws. We do not discriminate on the basis of race, color, national origin, age, disability, sex, sexual orientation, or gender identity.            Thank you!     Thank you for choosing [facility] [place]  for your care. Our goal is always to provide you with excellent care. Hearing back from our patients is one way we can continue to improve our services. Please take a few minutes to complete the written survey that you may receive in the mail after your visit with us. Thank you!             Your Updated Medication List - Protect others around you: Learn how to safely use, store and throw away your medicines at www.disposemymeds.org.          This list is accurate as of 7/6/18  5:25 PM.  Always use your most recent med list.                   Brand Name Dispense Instructions for use Diagnosis    amitriptyline 10 MG tablet    ELAVIL    30 tablet    Take 1 tablet (10 mg) by mouth At Bedtime    Major depressive disorder, recurrent episode, mild (H)       azelastine 0.1 % spray    ASTELIN    30 mL    Spray 1-2 sprays into both nostrils 2 times daily    Allergic rhinitis due to pollen       budesonide 180 MCG/ACT inhaler    PULMICORT FLEXHALER    1 Inhaler    Inhale 2 puffs into the lungs 2 times daily    Moderate persistent asthma with allergic rhinitis without complication       calcium carbonate 500 MG tablet    OS-JONATAN 500 mg [place]. Ca     Take 500 mg by mouth 2 times daily         cetirizine 10 MG tablet    zyrTEC     Take 10 mg by mouth daily        clonazePAM 0.5 MG tablet    klonoPIN    20 tablet    Take 1 tablet (0.5 mg) by mouth as needed for anxiety    Major depressive disorder, recurrent episode, mild (H)       Cod Liver Oil 1000 MG Caps      Take 1 capsule by mouth daily.        fluticasone 110 MCG/ACT Inhaler    FLOVENT HFA    3 Inhaler    Inhale 2 puffs into the lungs 2 times daily    Moderate persistent asthma with allergic rhinitis without complication       fluticasone 50 MCG/ACT spray    FLONASE    16 mL    SHAKE LIQUID AND USE 1 TO 2 SPRAYS IN EACH NOSTRIL DAILY    Seasonal allergic rhinitis due to pollen, unspecified chronicity       HM MULTIVITAMIN ADULT GUMMY PO           levonorgestrel 20 MCG/24HR IUD    MIRENA (52 MG)    1 each    1 each (20 mcg) by Intrauterine route once for 1 dose    Encounter for IUD insertion       SYNTHROID 125 MCG tablet   Generic drug:  levothyroxine     30 tablet    Take 1 tablet (125 mcg) by mouth daily    Hypothyroidism due to Hashimoto's thyroiditis       Vitamin B-12 500 MCG Subl      Place 500 mcg under the tongue daily        Vitamin D3 3000 units Tabs      Take 1 tablet by mouth daily.

## 2018-07-06 NOTE — PROGRESS NOTES
Dear Kasandra,   Your test results are all back -   The tests are all normal - nothing that explains your pelvic pain.  Lets see how the pelvic ultrasound looks.  Let us know if you have any questions.  -Trish Callaway, DO

## 2018-07-06 NOTE — PROGRESS NOTES
SUBJECTIVE:   Kasandra Winkler is a 37 year old female who presents to clinic today for the following health issues:      Vaginal problem      Duration: ongoing, but recently worse over the past week    Description (location/character/radiation): ongoing discharge, itching, fatigue    Intensity:  moderate    Accompanying signs and symptoms: recent period    History (similar episodes/previous evaluation): frequent BV    Precipitating or alleviating factors: None    Therapies tried and outcome: None     LMP -   2018  Bleeding for 9+ days -     Was doing boric acid - stopped 2 days prior     Having pelvic pain -   Feels discomfort diffusely in the pelvis    Fatigue    Low back pain  No dysuria,   Urinary frequency    -------------------------------------    Problem list and histories reviewed & adjusted, as indicated.  Additional history: as documented    Patient Active Problem List   Diagnosis     CARDIOVASCULAR SCREENING; LDL GOAL LESS THAN 160     Hypothyroidism     Obesity     Hashimoto's thyroiditis     Hypovitaminosis D     Fatigue     Ovarian cyst     Major depressive disorder, recurrent episode, mild (H)     Asthma with allergic rhinitis     Morbid obesity (H)     laparoscopic sleeve gastrectomy 6/29/15     Anxiety     Allergic rhinitis due to pollen     Iron deficiency anemia secondary to inadequate dietary iron intake     Migraine with aura and without status migrainosus, not intractable     Menorrhagia with irregular cycle     Encounter for IUD insertion     Past Surgical History:   Procedure Laterality Date      SECTION  10/10/10     CHOLECYSTECTOMY, LAPOROSCOPIC  2009    gallstones -      FINGER SURGERY      right little finger fracture     LAPAROSCOPIC GASTRIC SLEEVE N/A 2015    Procedure: LAPAROSCOPIC GASTRIC SLEEVE;  Surgeon: Cam Alvarez MD;  Location:  OR       Social History   Substance Use Topics     Smoking status: Former Smoker     Packs/day: 0.50      "Years: 10.00     Quit date: 7/20/2009     Smokeless tobacco: Never Used      Comment: social smoker for 10 yr     Alcohol use 0.0 oz/week     0 Standard drinks or equivalent per week      Comment: occasional ETOH use     Family History   Problem Relation Age of Onset     Osteoperosis Mother      Hypertension Mother      Cerebrovascular Disease Mother      Aneurysm     Substance Abuse Mother      HEART DISEASE Maternal Grandmother      Hypertension Maternal Grandmother      Arthritis Maternal Grandmother      RA      Respiratory Maternal Grandmother      Asthma           Reviewed and updated as needed this visit by clinical staff  Tobacco  Allergies  Meds  Problems  Med Hx  Surg Hx  Fam Hx  Soc Hx        Reviewed and updated as needed this visit by Provider  Allergies  Meds  Problems         ROS:  Constitutional, HEENT, cardiovascular, pulmonary, gi and gu systems are negative, except as otherwise noted.    OBJECTIVE:     /82  Pulse 71  Ht 5' 6\" (1.676 m)  Wt 198 lb (89.8 kg)  LMP 06/25/2018  SpO2 98%  BMI 31.96 kg/m2  Body mass index is 31.96 kg/(m^2).  GENERAL: alert and no distress   (female): normal female external genitalia, normal urethral meatus , vaginal mucosa pink, moist, well rugated, vaginal discharge - copious and clear and uterine vs ovarian enlargement noted on exam today    Diagnostic Test Results:  Results for orders placed or performed in visit on 07/06/18 (from the past 24 hour(s))   Wet prep   Result Value Ref Range    Specimen Description Vagina     Wet Prep No Trichomonas seen     Wet Prep No clue cells seen     Wet Prep No yeast seen    *UA reflex to Microscopic and Culture ([facility] (except [place] and [place])   Result Value Ref Range    Color Urine Yellow     Appearance Urine Clear     Glucose Urine Negative NEG^Negative mg/dL    Bilirubin Urine Moderate (A) NEG^Negative    Ketones Urine 15 (A) NEG^Negative mg/dL    Specific Gravity Urine 1.025 " 1.003 - 1.035    Blood Urine Negative NEG^Negative    pH Urine 5.5 5.0 - 7.0 pH    Protein Albumin Urine Negative NEG^Negative mg/dL    Urobilinogen Urine 1.0 0.2 - 1.0 EU/dL    Nitrite Urine Negative NEG^Negative    Leukocyte Esterase Urine Negative NEG^Negative    Source Midstream Urine        ASSESSMENT/PLAN:     1. Vaginal discharge  Wet prep is negative for BV   Will hold off on treatment  - Wet prep  - *UA reflex to Microscopic and Culture ([facility] [facility] (except [place] and [place])    2. Pelvic pain in female  Enlarged uterine vs ovarian area on exam today - will check pelvic ultraosund  Due to abnormal bleeding - consider endometrial biopsy   - US Pelvic Complete w Transvaginal; Future      Trish Callaway, DO  [facility]

## 2018-07-07 ASSESSMENT — ASTHMA QUESTIONNAIRES: ACT_TOTALSCORE: 23

## 2018-07-09 ENCOUNTER — HOSPITAL ENCOUNTER (OUTPATIENT)
Dept: ULTRASOUND IMAGING | Facility: CLINIC | Age: 38
Discharge: HOME | End: 2018-07-09
Attending: FAMILY MEDICINE | Admitting: FAMILY MEDICINE
Payer: COMMERCIAL

## 2018-07-09 DIAGNOSIS — R10.2 PELVIC PAIN IN FEMALE: ICD-10-CM

## 2018-07-09 PROCEDURE — 76830 TRANSVAGINAL US NON-OB: CPT

## 2018-07-10 ENCOUNTER — OFFICE VISIT (OUTPATIENT)
Dept: OBGYN | Facility: CLINIC | Age: 38
End: 2018-07-10
Attending: OBSTETRICS & GYNECOLOGY
Payer: COMMERCIAL

## 2018-07-10 ENCOUNTER — OFFICE VISIT (OUTPATIENT)
Dept: FAMILY MEDICINE | Facility: CLINIC | Age: 38
End: 2018-07-10
Payer: COMMERCIAL

## 2018-07-10 VITALS
HEART RATE: 63 BPM | TEMPERATURE: 98.5 F | DIASTOLIC BLOOD PRESSURE: 76 MMHG | BODY MASS INDEX: 31.79 KG/M2 | OXYGEN SATURATION: 98 % | WEIGHT: 197.8 LBS | HEIGHT: 66 IN | SYSTOLIC BLOOD PRESSURE: 115 MMHG

## 2018-07-10 VITALS
BODY MASS INDEX: 31.66 KG/M2 | HEART RATE: 78 BPM | DIASTOLIC BLOOD PRESSURE: 76 MMHG | HEIGHT: 66 IN | SYSTOLIC BLOOD PRESSURE: 115 MMHG | WEIGHT: 197 LBS

## 2018-07-10 DIAGNOSIS — N93.9 ABNORMAL UTERINE BLEEDING (AUB): Primary | ICD-10-CM

## 2018-07-10 DIAGNOSIS — R10.2 PELVIC PAIN IN FEMALE: Primary | ICD-10-CM

## 2018-07-10 DIAGNOSIS — N92.4 EXCESSIVE BLEEDING IN PREMENOPAUSAL PERIOD: Primary | ICD-10-CM

## 2018-07-10 PROCEDURE — 99213 OFFICE O/P EST LOW 20 MIN: CPT | Performed by: FAMILY MEDICINE

## 2018-07-10 RX ORDER — CEFAZOLIN SODIUM 1 G/3ML
1 INJECTION, POWDER, FOR SOLUTION INTRAMUSCULAR; INTRAVENOUS SEE ADMIN INSTRUCTIONS
Status: CANCELLED | OUTPATIENT
Start: 2018-07-10

## 2018-07-10 RX ORDER — PHENAZOPYRIDINE HYDROCHLORIDE 100 MG/1
200 TABLET, FILM COATED ORAL ONCE
Status: CANCELLED | OUTPATIENT
Start: 2018-07-10 | End: 2018-07-10

## 2018-07-10 RX ORDER — ACETAMINOPHEN 325 MG/1
975 TABLET ORAL ONCE
Status: CANCELLED | OUTPATIENT
Start: 2018-07-10 | End: 2018-07-10

## 2018-07-10 NOTE — PROGRESS NOTES
SUBJECTIVE:   Kasandra Winkler is a 37 year old female who presents to clinic today for the following health issues:      Ongoing vaginal discharge, pt also c/o cramping on lower R side of abdomen, with breast tenderness, bloating and fatigue    See last week note for details  Had some vaginal discharge with pelvic pain  Wet prep and UA were negative  Pelvic ultrasound showed a right ovarian cyst x2      -------------------------------------    Problem list and histories reviewed & adjusted, as indicated.  Additional history: as documented    Patient Active Problem List   Diagnosis     CARDIOVASCULAR SCREENING; LDL GOAL LESS THAN 160     Hypothyroidism     Obesity     Hashimoto's thyroiditis     Hypovitaminosis D     Fatigue     Ovarian cyst     Major depressive disorder, recurrent episode, mild (H)     Asthma with allergic rhinitis     Morbid obesity (H)     laparoscopic sleeve gastrectomy 6/29/15     Anxiety     Allergic rhinitis due to pollen     Iron deficiency anemia secondary to inadequate dietary iron intake     Migraine with aura and without status migrainosus, not intractable     Menorrhagia with irregular cycle     Encounter for IUD insertion     Past Surgical History:   Procedure Laterality Date      SECTION  10/10/10     CHOLECYSTECTOMY, LAPOROSCOPIC  2009    gallstones -      FINGER SURGERY      right little finger fracture     LAPAROSCOPIC GASTRIC SLEEVE N/A 2015    Procedure: LAPAROSCOPIC GASTRIC SLEEVE;  Surgeon: Cam Alvarez MD;  Location:  OR       Social History   Substance Use Topics     Smoking status: Former Smoker     Packs/day: 0.50     Years: 10.00     Quit date: 2009     Smokeless tobacco: Never Used      Comment: social smoker for 10 yr     Alcohol use 0.0 oz/week     0 Standard drinks or equivalent per week      Comment: occasional ETOH use     Family History   Problem Relation Age of Onset     Osteoperosis Mother      Hypertension Mother       "Cerebrovascular Disease Mother      Aneurysm     Substance Abuse Mother      HEART DISEASE Maternal Grandmother      Hypertension Maternal Grandmother      Arthritis Maternal Grandmother      RA      Respiratory Maternal Grandmother      Asthma           Reviewed and updated as needed this visit by clinical staff       Reviewed and updated as needed this visit by Provider         ROS:  Constitutional, HEENT, cardiovascular, pulmonary, gi and gu systems are negative, except as otherwise noted.    OBJECTIVE:     /76  Pulse 63  Temp 98.5  F (36.9  C) (Oral)  Ht 5' 6\" (1.676 m)  Wt 197 lb 12.8 oz (89.7 kg)  LMP 06/25/2018  SpO2 98%  BMI 31.93 kg/m2  Body mass index is 31.93 kg/(m^2).  GENERAL: healthy, alert and no distress    Diagnostic Test Results:  Results for orders placed or performed during the hospital encounter of 07/09/18   US Pelvic Complete w Transvaginal    Narrative    EXAMINATION: US PELVIC COMPLETE WITH TRANSVAGINAL, 7/9/2018 8:03 AM     COMPARISON: 11/16/2017    HISTORY: Pelvic pain, irregular bleeding, IUD placement December 20, 2017    TECHNIQUE: The pelvis was scanned in standard fashion with  transabdominal and transvaginal transducer(s) using both grey scale  and color Doppler techniques.    FINDINGS:  The uterus measures 8.7 x 4.9 x 4.5 cm, and there is no evidence of a  focal fibroid.  The endometrium is within normal limits and measures 5  mm. Intrauterine device in appropriate position within the  endometrium. There is no free fluid in the pelvis.    The right ovary measures 4.1 x 2.5 x 2.5 cm and the left ovary  measures 2.4 x 1.9 x 1.7 cm. There are 2 anechoic cystic structures in  the right ovary, one measuring 2.4 x 1.9 x 2.0 cm and a second  measuring 1.8 x 1.7 x 1.9 cm There is no solid adnexal mass. There is  normal blood flow to the ovaries.      Impression    IMPRESSION:   1.  Intrauterine device in appropriate position.  2. Simple cysts/dominant follicles in the right " ovary, the largest  measuring up to 2.4 cm.    I have personally reviewed the examination and initial interpretation  and I agree with the findings.    JANETTE MEJIA MD       ASSESSMENT/PLAN:     1. Pelvic pain in female  Pt with pelvic pain   She has hx of very heavy menses causing anemia and needing an iron infusion due to gastric sleeve surg hx   She had IUD placed which has helped lighten her bleeding however she continues to struggle with BV infections and intermittent pelvic pain  Recent pelvic ultrasound shows ovarian cyst x2  Discussed referral back to OB/GYN for possible discussion of other options including hysterectomy.  - OB/GYN REFERRAL    Pt will call or RTC if symptoms worsen or do not improve.      Trish Callaway,   [facility]

## 2018-07-10 NOTE — MR AVS SNAPSHOT
After Visit Summary   7/10/2018    Kasandra Winkler    MRN: [id number]           Patient Information     Date Of Birth          1980        Visit Information        Provider Department      7/10/2018 10:45 AM Sol Slaughter MD [facility]        Today's Diagnoses     Excessive bleeding in premenopausal period    -  1       Follow-ups after your visit        Future tests that were ordered for you today     Open Future Orders        Priority Expected Expires Ordered    CBC with platelets Routine  7/10/2019 7/10/2018    Creatinine Routine  7/10/2019 7/10/2018    Potassium Routine  7/10/2019 7/10/2018    CBC with platelets Routine  7/10/2019 7/10/2018    Potassium Routine  7/10/2019 7/10/2018    Creatinine Routine  7/10/2019 7/10/2018            Who to contact     Please call your clinic at [phone] to:    Ask questions about your health    Make or cancel appointments    Discuss your medicines    Learn about your test results    Speak to your doctor            Additional Information About Your Visit        [employer] Information     [employer] gives you secure access to your electronic health record. If you see a primary care provider, you can also send messages to your care team and make appointments. If you have questions, please call your primary care clinic.  If you do not have a primary care provider, please call [phone] and they will assist you.      [employer] is an electronic gateway that provides easy, online access to your medical records. With [employer], you can request a clinic appointment, read your test results, renew a prescription or communicate with your care team.     To access your existing account, please contact your [facility] or call [phone] for assistance.        Care EveryWhere ID     This is your Care EveryWhere ID. This could be used by other organizations to access your [facility]  "records  [id number]        Your Vitals Were     Pulse Height Last Period BMI (Body Mass Index)          78 1.676 m (5' 6\") 06/25/2018 31.8 kg/m2         Blood Pressure from Last 3 Encounters:   07/10/18 115/76   07/10/18 115/76   07/06/18 109/82    Weight from Last 3 Encounters:   07/10/18 89.4 kg (197 lb)   07/10/18 89.7 kg (197 lb 12.8 oz)   07/06/18 89.8 kg (198 lb)              Today, you had the following     No orders found for display       Primary Care Provider Office Phone # Fax #    Trish WILLOUGHBY Cesia,  [phone] [address] [address]        Equal Access to Services     KRIS SILVA : Hadii chrissy wells Sohumphrey, waaxda luqadaha, qaybta kaalmada adeegyada, chris will . So [facility] [phone].    ATENCIÓN: Si habla español, tiene a das disposición servicios gratuitos de asistencia lingüística. Sravan al [phone].    We comply with applicable federal civil rights laws and [place] laws. We do not discriminate on the basis of race, color, national origin, age, disability, sex, sexual orientation, or gender identity.            Thank you!     Thank you for choosing [facility]  for your care. Our goal is always to provide you with excellent care. Hearing back from our patients is one way we can continue to improve our services. Please take a few minutes to complete the written survey that you may receive in the mail after your visit with us. Thank you!             Your Updated Medication List - Protect others around you: Learn how to safely use, store and throw away your medicines at www.disposemymeds.org.          This list is accurate as of 7/10/18  4:21 PM.  Always use your most recent med list.                   Brand Name Dispense Instructions for use Diagnosis    amitriptyline 10 MG tablet    ELAVIL    30 tablet    Take 1 tablet (10 mg) by mouth At Bedtime    Major depressive disorder, recurrent episode, " mild (H)       azelastine 0.1 % spray    ASTELIN    30 mL    Spray 1-2 sprays into both nostrils 2 times daily    Allergic rhinitis due to pollen       budesonide 180 MCG/ACT inhaler    PULMICORT FLEXHALER    1 Inhaler    Inhale 2 puffs into the lungs 2 times daily    Moderate persistent asthma with allergic rhinitis without complication       calcium carbonate 500 MG tablet    OS-JONATAN 500 mg [place]     Take 500 mg by mouth 2 times daily        cetirizine 10 MG tablet    zyrTEC     Take 10 mg by mouth daily        clonazePAM 0.5 MG tablet    klonoPIN    20 tablet    Take 1 tablet (0.5 mg) by mouth as needed for anxiety    Major depressive disorder, recurrent episode, mild (H)       Cod Liver Oil 1000 MG Caps      Take 1 capsule by mouth daily.        fluticasone 110 MCG/ACT Inhaler    FLOVENT HFA    3 Inhaler    Inhale 2 puffs into the lungs 2 times daily    Moderate persistent asthma with allergic rhinitis without complication       fluticasone 50 MCG/ACT spray    FLONASE    16 mL    SHAKE LIQUID AND USE 1 TO 2 SPRAYS IN EACH NOSTRIL DAILY    Seasonal allergic rhinitis due to pollen, unspecified chronicity       HM MULTIVITAMIN ADULT GUMMY PO           levonorgestrel 20 MCG/24HR IUD    MIRENA (52 MG)    1 each    1 each (20 mcg) by Intrauterine route once for 1 dose    Encounter for IUD insertion       SYNTHROID 125 MCG tablet   Generic drug:  levothyroxine     30 tablet    Take 1 tablet (125 mcg) by mouth daily    Hypothyroidism due to Hashimoto's thyroiditis       Vitamin B-12 500 MCG Subl      Place 500 mcg under the tongue daily        Vitamin D3 3000 units Tabs      Take 1 tablet by mouth daily.

## 2018-07-10 NOTE — MR AVS SNAPSHOT
After Visit Summary   7/10/2018    Kasandra Winkler    MRN: [id number]           Patient Information     Date Of Birth          1980        Visit Information        Provider Department      7/10/2018 7:15 AM Trish Callaway, DO [facility]        Today's Diagnoses     Pelvic pain in female    -  1       Follow-ups after your visit        Additional Services     OB/GYN REFERRAL       Your provider has referred you to:  [facility]: [facility] [phone]   [email or web address]    Please be aware that coverage of these services is subject to the terms and limitations of your health insurance plan.  Call member services at your health plan with any benefit or coverage questions.      Please bring the following with you to your appointment:    (1) Any X-Rays, CTs or MRIs which have been performed.  Contact the facility where they were done to arrange for  prior to your scheduled appointment.   (2) List of current medications   (3) This referral request   (4) Any documents/labs given to you for this referral                  Who to contact     If you have questions or need follow up information about today's clinic visit or your schedule please contact [facility] directly at [phone].  Normal or non-critical lab and imaging results will be communicated to you by MyChart, letter or phone within 4 business days after the clinic has received the results. If you do not hear from us within 7 days, please contact the clinic through [employer] or phone. If you have a critical or abnormal lab result, we will notify you by phone as soon as possible.  Submit refill requests through [employer] or call your pharmacy and they will forward the refill request to us. Please allow 3 business days for your refill to be completed.          Additional Information About Your Visit        MyChart Information     [employer]  "gives you secure access to your electronic health record. If you see a primary care provider, you can also send messages to your care team and make appointments. If you have questions, please call your primary care clinic.  If you do not have a primary care provider, please call [phone] and they will assist you.        Care EveryWhere ID     This is your Care EveryWhere ID. This could be used by other organizations to access your [place] medical records  [id number]        Your Vitals Were     Pulse Temperature Height Last Period Pulse Oximetry BMI (Body Mass Index)    63 98.5  F (36.9  C) (Oral) 5' 6\" (1.676 m) 06/25/2018 98% 31.93 kg/m2       Blood Pressure from Last 3 Encounters:   07/10/18 115/76   07/06/18 109/82   06/19/18 110/78    Weight from Last 3 Encounters:   07/10/18 197 lb 12.8 oz (89.7 kg)   07/06/18 198 lb (89.8 kg)   06/19/18 200 lb 11.2 oz (91 kg)              We Performed the Following     OB/GYN REFERRAL        Primary Care Provider Office Phone # Fax #    Trish Callaway,  [phone] [address] [address]        Equal Access to Services     KRIS SILVA : Hadii chrissy ku hadasho Soomaali, waaxda luqadaha, qaybta kaalmada adeegyada, waxay idiin haygayathrin kellie will . So [facility] [phone].    ATENCIÓN: Si habla español, tiene a das disposición servicios gratuitos de asistencia lingüística. Llame al [phone].    We comply with applicable federal civil rights laws and [place] laws. We do not discriminate on the basis of race, color, national origin, age, disability, sex, sexual orientation, or gender identity.            Thank you!     Thank you for choosing [facility]  for your care. Our goal is always to provide you with excellent care. Hearing back from our patients is one way we can continue to improve our services. Please take a few minutes to complete the written survey that you may receive in the mail after your visit " with us. Thank you!             Your Updated Medication List - Protect others around you: Learn how to safely use, store and throw away your medicines at www.disposemymeds.org.          This list is accurate as of 7/10/18  7:50 AM.  Always use your most recent med list.                   Brand Name Dispense Instructions for use Diagnosis    amitriptyline 10 MG tablet    ELAVIL    30 tablet    Take 1 tablet (10 mg) by mouth At Bedtime    Major depressive disorder, recurrent episode, mild (H)       azelastine 0.1 % spray    ASTELIN    30 mL    Spray 1-2 sprays into both nostrils 2 times daily    Allergic rhinitis due to pollen       budesonide 180 MCG/ACT inhaler    PULMICORT FLEXHALER    1 Inhaler    Inhale 2 puffs into the lungs 2 times daily    Moderate persistent asthma with allergic rhinitis without complication       calcium carbonate 500 MG tablet    OS-JONATAN 500 mg [place]. [place]     Take 500 mg by mouth 2 times daily        cetirizine 10 MG tablet    zyrTEC     Take 10 mg by mouth daily        clonazePAM 0.5 MG tablet    klonoPIN    20 tablet    Take 1 tablet (0.5 mg) by mouth as needed for anxiety    Major depressive disorder, recurrent episode, mild (H)       Cod Liver Oil 1000 MG Caps      Take 1 capsule by mouth daily.        fluticasone 110 MCG/ACT Inhaler    FLOVENT HFA    3 Inhaler    Inhale 2 puffs into the lungs 2 times daily    Moderate persistent asthma with allergic rhinitis without complication       fluticasone 50 MCG/ACT spray    FLONASE    16 mL    SHAKE LIQUID AND USE 1 TO 2 SPRAYS IN EACH NOSTRIL DAILY    Seasonal allergic rhinitis due to pollen, unspecified chronicity       HM MULTIVITAMIN ADULT GUMMY PO           levonorgestrel 20 MCG/24HR IUD    MIRENA (52 MG)    1 each    1 each (20 mcg) by Intrauterine route once for 1 dose    Encounter for IUD insertion       SYNTHROID 125 MCG tablet   Generic drug:  levothyroxine     30 tablet    Take 1 tablet (125 mcg) by mouth daily    Hypothyroidism due to  Hashimoto's thyroiditis       Vitamin B-12 500 MCG Subl      Place 500 mcg under the tongue daily        Vitamin D3 3000 units Tabs      Take 1 tablet by mouth daily.

## 2018-07-10 NOTE — LETTER
"7/10/2018       RE: Kasandra Winkler  [address]  [facility] [address]     Dear Colleague,    Thank you for referring your patient, Kasandra Winkler, to the [facility] at [facility]. Please see a copy of my visit note below.    SUBJECTIVE:  36yo  presents with heavy and irregular menstrual bleeding and crampy pelvic pain that has worsened over the past few months and is interested in learning about possible hysterectomy. Currently, she has the Mirena IUD in place since 2017. She states that the IUD has helped some with menorrhagia but that she is still having an irregular bleeding pattern and pelvic pain. She also feels as though the IUD makes her feel as though she has \"PMS every day.\"  The patient reports menorrhagia since menarche, causing anemia in the past, which has since subsided after Mirena placement. She has also previously tried OCP's which she discontinued due to not feeling well.    OBJECTIVE:  Past Medical History:   Diagnosis Date     Allergic rhinitis      Asthma     Allergy induced     Hashimoto thyroiditis      Mild major depression (H) 2009     TMJ (temporomandibular joint disorder)      Past Surgical History:   Procedure Laterality Date      SECTION  10/10/10     CHOLECYSTECTOMY, LAPOROSCOPIC  2009    gallstones -      FINGER SURGERY      right little finger fracture     LAPAROSCOPIC GASTRIC SLEEVE N/A 2015    Procedure: LAPAROSCOPIC GASTRIC SLEEVE;  Surgeon: Cam Alvarez MD;  Location: [facility] OR     Current Outpatient Prescriptions   Medication     amitriptyline (ELAVIL) 10 MG tablet     azelastine (ASTELIN) 0.1 % spray     budesonide (PULMICORT FLEXHALER) 180 MCG/ACT inhaler     calcium carbonate (OS-JONATAN 500 MG [place]. [place]) 500 MG tablet     cetirizine (ZYRTEC) 10 MG tablet     Cholecalciferol (VITAMIN D3) 3000 UNITS TABS     clonazePAM (KLONOPIN) 0.5 MG tablet     Cod Liver Oil " 1000 MG CAPS     Cyanocobalamin (VITAMIN B-12) 500 MCG SUBL     fluticasone (FLONASE) 50 MCG/ACT spray     fluticasone (FLOVENT HFA) 110 MCG/ACT Inhaler     levonorgestrel (MIRENA, 52 MG,) 20 MCG/24HR IUD     Multiple Vitamins-Minerals (HM MULTIVITAMIN ADULT GUMMY PO)     SYNTHROID 125 MCG tablet     No current facility-administered medications for this visit.      Ultrasound Results from 2018:  FINDINGS:  The uterus measures 8.7 x 4.9 x 4.5 cm, and there is no evidence of a  focal fibroid.  The endometrium is within normal limits and measures 5  mm. Intrauterine device in appropriate position within the  endometrium. There is no free fluid in the pelvis.     The right ovary measures 4.1 x 2.5 x 2.5 cm and the left ovary  measures 2.4 x 1.9 x 1.7 cm. There are 2 anechoic cystic structures in  the right ovary, one measuring 2.4 x 1.9 x 2.0 cm and a second  measuring 1.8 x 1.7 x 1.9 cm There is no solid adnexal mass. There is  normal blood flow to the ovaries.     IMPRESSION:   1.  Intrauterine device in appropriate position.  2. Simple cysts/dominant follicles in the right ovary, the largest  measuring up to 2.4 cm.      ASSESSMENT:  36yo female with significant history of menorrhagia, irregular menstrual bleeding, and pelvic pain without significant relief using OCP's or the Mirena IUD. She has 1 child delivered by . The patient and her female partner do not have interest in having another child. We discussed hysterectomy and the risks/benefits ( ureteral injury, bowel injury, need to do open surgery, death, hyst may not relieve pain) of such a procedure with the patient and she voiced understanding of these and expressed desire to move forward with scheduling the procedure. The patient was introduced to Sol Maria MD during the visit today and the procedure was discussed with her then as well.      PLAN:  1) call the patient with available surgery dates  2) have the patient schedule a  pre-operative physical exam with her primary healthcare provider within 30 days of the chosen procedure date  3) call with any questions or concerns      I, Reji Florence, MS3 am serving as a scribe; to document services personally performed by  Sol Slaughter MD based on data collection and the provider's statements to me.   Total time with pt was 30 min greater than 1/2 in counseling regarding hysterectomy.     Sol Slaughter MD

## 2018-07-10 NOTE — PROGRESS NOTES
"SUBJECTIVE:  38yo  presents with heavy and irregular menstrual bleeding and crampy pelvic pain that has worsened over the past few months and is interested in learning about possible hysterectomy. Currently, she has the Mirena IUD in place since 2017. She states that the IUD has helped some with menorrhagia but that she is still having an irregular bleeding pattern and pelvic pain. She also feels as though the IUD makes her feel as though she has \"PMS every day.\"  The patient reports menorrhagia since menarche, causing anemia in the past, which has since subsided after Mirena placement. She has also previously tried OCP's which she discontinued due to not feeling well.    OBJECTIVE:  Past Medical History:   Diagnosis Date     Allergic rhinitis      Asthma     Allergy induced     Hashimoto thyroiditis      Mild major depression (H) 2009     TMJ (temporomandibular joint disorder)      Past Surgical History:   Procedure Laterality Date      SECTION  10/10/10     CHOLECYSTECTOMY, LAPOROSCOPIC  2009    gallstones -      FINGER SURGERY      right little finger fracture     LAPAROSCOPIC GASTRIC SLEEVE N/A 2015    Procedure: LAPAROSCOPIC GASTRIC SLEEVE;  Surgeon: Cam Alvarez MD;  Location: [facility] OR     Current Outpatient Prescriptions   Medication     amitriptyline (ELAVIL) 10 MG tablet     azelastine (ASTELIN) 0.1 % spray     budesonide (PULMICORT FLEXHALER) 180 MCG/ACT inhaler     calcium carbonate (OS-JONATAN 500 MG [place]. [place]) 500 MG tablet     cetirizine (ZYRTEC) 10 MG tablet     Cholecalciferol (VITAMIN D3) 3000 UNITS TABS     clonazePAM (KLONOPIN) 0.5 MG tablet     Cod Liver Oil 1000 MG CAPS     Cyanocobalamin (VITAMIN B-12) 500 MCG SUBL     fluticasone (FLONASE) 50 MCG/ACT spray     fluticasone (FLOVENT HFA) 110 MCG/ACT Inhaler     levonorgestrel (MIRENA, 52 MG,) 20 MCG/24HR IUD     Multiple Vitamins-Minerals (HM MULTIVITAMIN ADULT GUMMY PO)     SYNTHROID 125 MCG tablet "     No current facility-administered medications for this visit.      Ultrasound Results from 2018:  FINDINGS:  The uterus measures 8.7 x 4.9 x 4.5 cm, and there is no evidence of a  focal fibroid.  The endometrium is within normal limits and measures 5  mm. Intrauterine device in appropriate position within the  endometrium. There is no free fluid in the pelvis.     The right ovary measures 4.1 x 2.5 x 2.5 cm and the left ovary  measures 2.4 x 1.9 x 1.7 cm. There are 2 anechoic cystic structures in  the right ovary, one measuring 2.4 x 1.9 x 2.0 cm and a second  measuring 1.8 x 1.7 x 1.9 cm There is no solid adnexal mass. There is  normal blood flow to the ovaries.     IMPRESSION:   1.  Intrauterine device in appropriate position.  2. Simple cysts/dominant follicles in the right ovary, the largest  measuring up to 2.4 cm.      ASSESSMENT:  38yo female with significant history of menorrhagia, irregular menstrual bleeding, and pelvic pain without significant relief using OCP's or the Mirena IUD. She has 1 child delivered by . The patient and her female partner do not have interest in having another child. We discussed hysterectomy and the risks/benefits ( ureteral injury, bowel injury, need to do open surgery, death, hyst may not relieve pain) of such a procedure with the patient and she voiced understanding of these and expressed desire to move forward with scheduling the procedure. The patient was introduced to Sol Maria MD during the visit today and the procedure was discussed with her then as well.      PLAN:  1) call the patient with available surgery dates  2) have the patient schedule a pre-operative physical exam with her primary healthcare provider within 30 days of the chosen procedure date  3) call with any questions or concerns      IReji, MS3 am serving as a scribe; to document services personally performed by  Sol Slaughter MD based on data collection and  the provider's statements to me.   Total time with pt was 30 min greater than 1/2 in counseling regarding hysterectomy.     Sol Slaughter MD

## 2018-07-10 NOTE — NURSING NOTE
"Chief Complaint   Patient presents with     Vaginal Problem     still having ongoing discharge     /76  Pulse 63  Temp 98.5  F (36.9  C) (Oral)  Ht 5' 6\" (1.676 m)  Wt 197 lb 12.8 oz (89.7 kg)  LMP 06/25/2018  SpO2 98%  BMI 31.93 kg/m2 Estimated body mass index is 31.93 kg/(m^2) as calculated from the following:    Height as of this encounter: 5' 6\" (1.676 m).    Weight as of this encounter: 197 lb 12.8 oz (89.7 kg).        Health Maintenance due pending provider review:  NONE    n/a    Dariana Tran CMA  "

## 2018-07-11 ENCOUNTER — TELEPHONE (OUTPATIENT)
Dept: OBGYN | Facility: CLINIC | Age: 38
End: 2018-07-11

## 2018-07-11 ENCOUNTER — HOSPITAL ENCOUNTER (OUTPATIENT)
Facility: CLINIC | Age: 38
End: 2018-07-11
Attending: OBSTETRICS & GYNECOLOGY | Admitting: OBSTETRICS & GYNECOLOGY
Payer: COMMERCIAL

## 2018-07-11 NOTE — TELEPHONE ENCOUNTER
Confirmed surgery date with patient 8/1/18 with arrival time at 5:30a.m with nothing to eat eight hours before scheduled surgery time and clear liquids up to two hours before scheduled surgery time, informed patient that a pre op physical is needed within thirty days of surgery and that a surgery map and letter will be mailed out.     to complete the following fields:            CHECKLIST     Google Calendar : Yes     Resident notified: Not Applicable     Clinic schedule blocked:  Not Applicable    Patient notified:Yes      Pre op information sent: Yes     Given to patient over the phone.Yes    Comments:

## 2018-07-13 ENCOUNTER — TELEPHONE (OUTPATIENT)
Dept: OBGYN | Facility: CLINIC | Age: 38
End: 2018-07-13

## 2018-08-15 ENCOUNTER — MYC MEDICAL ADVICE (OUTPATIENT)
Dept: FAMILY MEDICINE | Facility: CLINIC | Age: 38
End: 2018-08-15

## 2018-08-18 DIAGNOSIS — E06.3 HYPOTHYROIDISM DUE TO HASHIMOTO'S THYROIDITIS: ICD-10-CM

## 2018-08-20 NOTE — TELEPHONE ENCOUNTER
"Note from 6/20/18 Mychart:  Lets decrease the dose from the 137mcg daily down to the 125mcg daily.   I will fax a new prescription to your pharmacy.  Schedule a lab recheck in 6 weeks.   PN      Florest message sent to patient asking her to schedule lab appointment.  Betsey Yepez RN    Requested Prescriptions   Pending Prescriptions Disp Refills     SYNTHROID 125 MCG tablet [Pharmacy Med Name: SYNTHROID 0.125MG (125MCG) TABLETS] 30 tablet 0     Sig: TAKE 1 TABLET(125 MCG) BY MOUTH DAILY    Thyroid Protocol Failed    8/18/2018  7:23 AM       Failed - Normal TSH on file in past 12 months    Recent Labs   Lab Test  06/19/18   0905   TSH  0.19*             Passed - Patient is 12 years or older       Passed - Recent (12 mo) or future (30 days) visit within the authorizing provider's specialty    Patient had office visit in the last 12 months or has a visit in the next 30 days with authorizing provider or within the authorizing provider's specialty.  See \"Patient Info\" tab in inbasket, or \"Choose Columns\" in Meds & Orders section of the refill encounter.           Passed - No active pregnancy on record    If patient is pregnant or has had a positive pregnancy test, please check TSH.         Passed - No positive pregnancy test in past 12 months    If patient is pregnant or has had a positive pregnancy test, please check TSH.            "

## 2018-08-24 DIAGNOSIS — E06.3 HYPOTHYROIDISM DUE TO HASHIMOTO'S THYROIDITIS: ICD-10-CM

## 2018-08-24 LAB
T4 FREE SERPL-MCNC: 1.51 NG/DL (ref 0.76–1.46)
TSH SERPL DL<=0.005 MIU/L-ACNC: 0.31 MU/L (ref 0.4–4)

## 2018-08-24 PROCEDURE — 84443 ASSAY THYROID STIM HORMONE: CPT | Performed by: FAMILY MEDICINE

## 2018-08-24 PROCEDURE — 84439 ASSAY OF FREE THYROXINE: CPT | Performed by: FAMILY MEDICINE

## 2018-08-24 PROCEDURE — 36415 COLL VENOUS BLD VENIPUNCTURE: CPT | Performed by: FAMILY MEDICINE

## 2018-08-26 ENCOUNTER — MYC MEDICAL ADVICE (OUTPATIENT)
Dept: FAMILY MEDICINE | Facility: CLINIC | Age: 38
End: 2018-08-26

## 2018-08-27 ENCOUNTER — MYC MEDICAL ADVICE (OUTPATIENT)
Dept: FAMILY MEDICINE | Facility: CLINIC | Age: 38
End: 2018-08-27

## 2018-08-28 RX ORDER — LEVOTHYROXINE SODIUM 112 UG/1
112 TABLET ORAL DAILY
Qty: 30 TABLET | Refills: 1 | Status: SHIPPED | OUTPATIENT
Start: 2018-08-28 | End: 2018-10-24

## 2018-08-28 NOTE — TELEPHONE ENCOUNTER
Looks like TSH still too low and need to decrease dose from 125mcg down to 112mcg.  Plan to recheck lab in 6 weeks.  PN

## 2018-08-28 NOTE — TELEPHONE ENCOUNTER
PN  Patient sent a 2nd Codementor message:    Hi Dr Callaway,    Can we decrease my Synthroid dose as much as aggressively possible? I've been feeling very hyperthyroid lately (pounding heart, nervousness, sweating, sleeplessness, etc.) which my labs are confirming. I feel best when my TSH is around 1.3-1.5.     Thanks for your help,  Kasandra Owens, Betsey Yepez RN

## 2018-08-28 NOTE — PROGRESS NOTES
Dear Kasandra,   Your test results are all back -   Looks like TSH still too low and need to decrease dose from 125mcg down to 112mcg.  Plan to recheck lab in 6 weeks.  Let us know if you have any questions.  -Trish Callaway, DO

## 2018-09-05 ENCOUNTER — RADIANT APPOINTMENT (OUTPATIENT)
Dept: GENERAL RADIOLOGY | Facility: CLINIC | Age: 38
End: 2018-09-05
Attending: FAMILY MEDICINE
Payer: COMMERCIAL

## 2018-09-05 ENCOUNTER — OFFICE VISIT (OUTPATIENT)
Dept: FAMILY MEDICINE | Facility: CLINIC | Age: 38
End: 2018-09-05
Payer: COMMERCIAL

## 2018-09-05 VITALS
SYSTOLIC BLOOD PRESSURE: 108 MMHG | HEIGHT: 66 IN | DIASTOLIC BLOOD PRESSURE: 58 MMHG | HEART RATE: 67 BPM | BODY MASS INDEX: 31.5 KG/M2 | RESPIRATION RATE: 14 BRPM | TEMPERATURE: 98.8 F | OXYGEN SATURATION: 99 % | WEIGHT: 196 LBS

## 2018-09-05 DIAGNOSIS — R10.2 PELVIC PAIN IN FEMALE: ICD-10-CM

## 2018-09-05 DIAGNOSIS — B96.89 BV (BACTERIAL VAGINOSIS): ICD-10-CM

## 2018-09-05 DIAGNOSIS — N76.0 BV (BACTERIAL VAGINOSIS): ICD-10-CM

## 2018-09-05 DIAGNOSIS — N89.8 VAGINAL ITCHING: Primary | ICD-10-CM

## 2018-09-05 LAB
SPECIMEN SOURCE: ABNORMAL
WET PREP SPEC: ABNORMAL

## 2018-09-05 PROCEDURE — 87210 SMEAR WET MOUNT SALINE/INK: CPT | Performed by: FAMILY MEDICINE

## 2018-09-05 PROCEDURE — 74019 RADEX ABDOMEN 2 VIEWS: CPT | Mod: FY

## 2018-09-05 PROCEDURE — 99214 OFFICE O/P EST MOD 30 MIN: CPT | Performed by: FAMILY MEDICINE

## 2018-09-05 RX ORDER — METRONIDAZOLE 500 MG/1
500 TABLET ORAL 2 TIMES DAILY
Qty: 14 TABLET | Refills: 0 | Status: SHIPPED | OUTPATIENT
Start: 2018-09-05 | End: 2018-10-03

## 2018-09-05 NOTE — NURSING NOTE
"Chief Complaint   Patient presents with     Vaginal Problem     possible infection-hysterectomy 2 weeks ago       Initial /58  Pulse 67  Temp 98.8  F (37.1  C) (Oral)  Resp 14  Ht 5' 6\" (1.676 m)  Wt 196 lb (88.9 kg)  LMP 06/25/2018  SpO2 99%  Breastfeeding? No  BMI 31.64 kg/m2 Estimated body mass index is 31.64 kg/(m^2) as calculated from the following:    Height as of this encounter: 5' 6\" (1.676 m).    Weight as of this encounter: 196 lb (88.9 kg).  BP completed using cuff size: large    Health Maintenance that is potentially due pending provider review:  Health Maintenance Due   Topic Date Due     ASTHMA ACTION PLAN Q1 YR  12/09/1983     PHQ-9 Q6 MONTHS  06/13/2018     INFLUENZA VACCINE (1) 09/01/2018     PAP Q5 YEARS  09/27/2018     HPV Q5 YEARS (Complete with PAP)  09/27/2018         Pap- not needed-hysterectomy  "

## 2018-09-05 NOTE — MR AVS SNAPSHOT
After Visit Summary   9/5/2018    Kasandra Winkler    MRN: [id number]           Patient Information     Date Of Birth          1980        Visit Information        Provider Department      9/5/2018 10:00 AM Trish Callaway, DO [facility]        Today's Diagnoses     Vaginal itching    -  1    Pelvic pain in female        BV (bacterial vaginosis)           Follow-ups after your visit        Your next 10 appointments already scheduled     Sep 27, 2018  7:45 AM CDT   Lab visit with [facility]   [facility] ([facility])    [address]  [facility] [address] [phone]           Please do not eat 10-12 hours before your appointment if you are coming in fasting for labs on lipids, cholesterol, or glucose (sugar). Does not apply to pregnant women.  Water with medications is okay. Do not drink coffee or other fluids.  If you have concerns about taking your medications, please send a message by clicking on Secure Messaging, Message Your Care Team.              Who to contact     If you have questions or need follow up information about today's clinic visit or your schedule please contact [facility] directly at [phone].  Normal or non-critical lab and imaging results will be communicated to you by MyChart, letter or phone within 4 business days after the clinic has received the results. If you do not hear from us within 7 days, please contact the clinic through [employer] or phone. If you have a critical or abnormal lab result, we will notify you by phone as soon as possible.  Submit refill requests through [employer] or call your pharmacy and they will forward the refill request to us. Please allow 3 business days for your refill to be completed.          Additional Information About Your Visit        [employer] Information     [employer] gives you secure access to your electronic health record. If you see a primary care provider, you can also send  "messages to your care team and make appointments. If you have questions, please call your primary care clinic.  If you do not have a primary care provider, please call [phone] and they will assist you.        Care EveryWhere ID     This is your Care EveryWhere ID. This could be used by other organizations to access your [place] medical records  [id number]        Your Vitals Were     Pulse Temperature Respirations Height Last Period Pulse Oximetry    67 98.8  F (37.1  C) (Oral) 14 5' 6\" (1.676 m) 06/25/2018 99%    Breastfeeding? BMI (Body Mass Index)                No 31.64 kg/m2           Blood Pressure from Last 3 Encounters:   09/05/18 108/58   07/10/18 115/76   07/10/18 115/76    Weight from Last 3 Encounters:   09/05/18 196 lb (88.9 kg)   07/10/18 197 lb (89.4 kg)   07/10/18 197 lb 12.8 oz (89.7 kg)              We Performed the Following     Wet prep          Today's Medication Changes          These changes are accurate as of 9/5/18  5:11 PM.  If you have any questions, ask your nurse or doctor.               Start taking these medicines.        Dose/Directions    metroNIDAZOLE 500 MG tablet   Commonly known as:  FLAGYL   Used for:  BV (bacterial vaginosis)   Started by:  Trish Callaway DO        Dose:  500 mg   Take 1 tablet (500 mg) by mouth 2 times daily   Quantity:  14 tablet   Refills:  0            Where to get your medicines      These medications were sent to [facility] [address] [place] [address] AT [address] [address]    Hours:  24-hours Phone:  [phone]     metroNIDAZOLE 500 MG tablet                Primary Care Provider Office Phone # Fax #    Trish Callaway [phone] [address] [address]        Equal Access to Services     KRIS SILVA AH: Krishna christianson hadasho Soomaali, waaxda luqadaha, qaybta kaalmada adeegyada, chris grimm. " So [facility] [phone].    ATENCIÓN: Si tere helton, tiene a das disposición servicios gratuitos de asistencia lingüística. Sravan dominguez [phone].    We comply with applicable federal civil rights laws and [place] laws. We do not discriminate on the basis of race, color, national origin, age, disability, sex, sexual orientation, or gender identity.            Thank you!     Thank you for choosing [facility]  for your care. Our goal is always to provide you with excellent care. Hearing back from our patients is one way we can continue to improve our services. Please take a few minutes to complete the written survey that you may receive in the mail after your visit with us. Thank you!             Your Updated Medication List - Protect others around you: Learn how to safely use, store and throw away your medicines at www.disposemymeds.org.          This list is accurate as of 9/5/18  5:11 PM.  Always use your most recent med list.                   Brand Name Dispense Instructions for use Diagnosis    amitriptyline 10 MG tablet    ELAVIL    30 tablet    Take 1 tablet (10 mg) by mouth At Bedtime    Major depressive disorder, recurrent episode, mild (H)       azelastine 0.1 % nasal spray    ASTELIN    30 mL    Spray 1-2 sprays into both nostrils 2 times daily    Allergic rhinitis due to pollen       budesonide 180 MCG/ACT inhaler    PULMICORT FLEXHALER    1 Inhaler    Inhale 2 puffs into the lungs 2 times daily    Moderate persistent asthma with allergic rhinitis without complication       calcium carbonate 500 mg {elemental} 500 MG tablet    OS-JONATAN     Take 500 mg by mouth 2 times daily        cetirizine 10 MG tablet    zyrTEC     Take 10 mg by mouth daily        clonazePAM 0.5 MG tablet    klonoPIN    20 tablet    Take 1 tablet (0.5 mg) by mouth as needed for anxiety    Major depressive disorder, recurrent episode, mild (H)       Cod Liver Oil 1000 MG Caps      Take 1 capsule by mouth daily.         fluticasone 110 MCG/ACT Inhaler    FLOVENT HFA    3 Inhaler    Inhale 2 puffs into the lungs 2 times daily    Moderate persistent asthma with allergic rhinitis without complication       fluticasone 50 MCG/ACT spray    FLONASE    16 mL    SHAKE LIQUID AND USE 1 TO 2 SPRAYS IN EACH NOSTRIL DAILY    Seasonal allergic rhinitis due to pollen, unspecified chronicity       HM MULTIVITAMIN ADULT GUMMY PO           levonorgestrel 20 MCG/24HR IUD    MIRENA (52 MG)    1 each    1 each (20 mcg) by Intrauterine route once for 1 dose    Encounter for IUD insertion       levothyroxine 112 MCG tablet    SYNTHROID/LEVOTHROID    30 tablet    Take 1 tablet (112 mcg) by mouth daily    Hypothyroidism due to Hashimoto's thyroiditis       metroNIDAZOLE 500 MG tablet    FLAGYL    14 tablet    Take 1 tablet (500 mg) by mouth 2 times daily    BV (bacterial vaginosis)       Vitamin B-12 500 MCG Subl      Place 500 mcg under the tongue daily        Vitamin D3 3000 units Tabs      Take 1 tablet by mouth daily.

## 2018-09-05 NOTE — PROGRESS NOTES
SUBJECTIVE:   Kasandra Winkler is a 37 year old female who presents to clinic today for the following health issues:      Vaginal Symptoms      Duration: 2 weeks    Description    Vaginal  Hysterectomy done 2 weeks ago    Constipation, bloating, pain using toilet paper  vaginal discharge - watery yellow discharge and itching    Intensity:  moderate, severe    Accompanying signs and symptoms (fever/dysuria/abdominal or back pain): low back pain, pelvic pain    History  Sexually active: not at present  Possibility of pregnancy: No  Recent antibiotic use: YES- possible via IV during surgery    Precipitating or alleviating factors: see above    Therapies tried and outcome: none   Outcome:  minimal    Pt here to check for vaginal discharge -   She states she is having some discharge and pain and itching that started this week.  She had a vaginal hysterectomy two weeks ago and was feeling a little better.    This week she is feeling worse.   Some lower back pain and some pelvic pain.  Constipation-   MOM and senna tablets - taking for 1.5 weeks  Eating prunes and prune juice, water and fiber  Has done fleets enema and suppositories  Hasn't tried Miralax      -------------------------------------    Problem list and histories reviewed & adjusted, as indicated.  Additional history: as documented    Patient Active Problem List   Diagnosis     CARDIOVASCULAR SCREENING; LDL GOAL LESS THAN 160     Hypothyroidism     Obesity     Hashimoto's thyroiditis     Hypovitaminosis D     Fatigue     Ovarian cyst     Major depressive disorder, recurrent episode, mild (H)     Asthma with allergic rhinitis     Morbid obesity (H)     laparoscopic sleeve gastrectomy 6/29/15     Anxiety     Allergic rhinitis due to pollen     Iron deficiency anemia secondary to inadequate dietary iron intake     Migraine with aura and without status migrainosus, not intractable     Menorrhagia with irregular cycle     Encounter for IUD insertion     Past  "Surgical History:   Procedure Laterality Date      SECTION  10/10/10     CHOLECYSTECTOMY, LAPOROSCOPIC  2009    gallstones -      FINGER SURGERY      right little finger fracture     LAPAROSCOPIC GASTRIC SLEEVE N/A 2015    Procedure: LAPAROSCOPIC GASTRIC SLEEVE;  Surgeon: Cam Alvarez MD;  Location:  OR       Social History   Substance Use Topics     Smoking status: Former Smoker     Packs/day: 0.50     Years: 10.00     Quit date: 2009     Smokeless tobacco: Never Used      Comment: social smoker for 10 yr     Alcohol use 0.0 oz/week     0 Standard drinks or equivalent per week      Comment: occasional ETOH use     Family History   Problem Relation Age of Onset     Osteoporosis Mother      Hypertension Mother      Cerebrovascular Disease Mother      Aneurysm     Substance Abuse Mother      HEART DISEASE Maternal Grandmother      Hypertension Maternal Grandmother      Arthritis Maternal Grandmother      RA      Respiratory Maternal Grandmother      Asthma           Reviewed and updated as needed this visit by clinical staff       Reviewed and updated as needed this visit by Provider         ROS:  Constitutional, HEENT, cardiovascular, pulmonary, gi and gu systems are negative, except as otherwise noted.    OBJECTIVE:     /58  Pulse 67  Temp 98.8  F (37.1  C) (Oral)  Resp 14  Ht 5' 6\" (1.676 m)  Wt 196 lb (88.9 kg)  LMP 2018  SpO2 99%  Breastfeeding? No  BMI 31.64 kg/m2  Body mass index is 31.64 kg/(m^2).  GENERAL: alert and no distress  ABDOMEN: soft, nontender, without hepatosplenomegaly or masses and bowel sounds normal  Lower pelvic pain but no g.r.r   (female): normal female external genitalia, normal urethral meatus , vaginal mucosa pink, moist, well rugated and wet prep obtained gently from the lower vaginal area    Diagnostic Test Results:  Results for orders placed or performed in visit on 18 (from the past 24 hour(s))   Wet prep   Result Value Ref " Range    Specimen Description Vagina     Wet Prep No Trichomonas seen     Wet Prep Clue cells seen (A)     Wet Prep No yeast seen        ASSESSMENT/PLAN:     1. Vaginal itching     - Wet prep    2. Pelvic pain in female  Pelvic pain after hysterectomy with some vaginal symptoms  Xray shows some stool   The wet  Prep did show clue cells and has hx of clue cells  Will treat with flagyl BID for 7 days  - XR Abdomen 2 Views; Future    3. BV (bacterial vaginosis)   as above  - metroNIDAZOLE (FLAGYL) 500 MG tablet; Take 1 tablet (500 mg) by mouth 2 times daily  Dispense: 14 tablet; Refill: 0    Pt will call or RTC if symptoms worsen or do not improve.      Trish Callaway, DO  [facility]

## 2018-09-07 ENCOUNTER — MYC MEDICAL ADVICE (OUTPATIENT)
Dept: FAMILY MEDICINE | Facility: CLINIC | Age: 38
End: 2018-09-07

## 2018-09-07 DIAGNOSIS — R30.0 DYSURIA: Primary | ICD-10-CM

## 2018-09-15 DIAGNOSIS — J30.1 SEASONAL ALLERGIC RHINITIS DUE TO POLLEN, UNSPECIFIED CHRONICITY: ICD-10-CM

## 2018-09-17 RX ORDER — FLUTICASONE PROPIONATE 50 MCG
SPRAY, SUSPENSION (ML) NASAL
Qty: 16 ML | Refills: 0 | Status: SHIPPED | OUTPATIENT
Start: 2018-09-17 | End: 2018-10-21

## 2018-09-17 NOTE — TELEPHONE ENCOUNTER
"Prescription approved per [facility] Refill Protocol.  Aria DEL TORO RN    Requested Prescriptions   Pending Prescriptions Disp Refills     fluticasone (FLONASE) 50 MCG/ACT spray [Pharmacy Med Name: FLUTICASONE 50MCG NASAL SP (120) RX] 16 mL 0     Sig: SHAKE LIQUID AND USE 1 TO 2 SPRAYS IN EACH NOSTRIL DAILY    Inhaled Steroids Protocol Passed    9/15/2018  3:52 AM       Passed - Patient is age 12 or older       Passed - Asthma control assessment score within normal limits in last 6 months    Please review ACT score.          Passed - Recent (6 mo) or future (30 days) visit within the authorizing provider's specialty    Patient had office visit in the last 6 months or has a visit in the next 30 days with authorizing provider or within the authorizing provider's specialty.  See \"Patient Info\" tab in inbasket, or \"Choose Columns\" in Meds & Orders section of the refill encounter.            Next 5 appointments (look out 90 days)     Sep 27, 2018  7:45 AM CDT   Lab visit with [facility]   [facility] ([facility])    [address]  [facility] [id number] [phone]                  "

## 2018-09-27 DIAGNOSIS — E06.3 HYPOTHYROIDISM DUE TO HASHIMOTO'S THYROIDITIS: ICD-10-CM

## 2018-09-27 LAB — TSH SERPL DL<=0.005 MIU/L-ACNC: 1.61 MU/L (ref 0.4–4)

## 2018-09-27 PROCEDURE — 84443 ASSAY THYROID STIM HORMONE: CPT | Performed by: FAMILY MEDICINE

## 2018-09-27 PROCEDURE — 36415 COLL VENOUS BLD VENIPUNCTURE: CPT | Performed by: FAMILY MEDICINE

## 2018-09-28 NOTE — PROGRESS NOTES
Dear Kasandra,   Your test results are all back -   Thyroid looks great!  Let us know if you have any questions.  -Trish Callaway, DO

## 2018-10-02 ENCOUNTER — HOSPITAL ENCOUNTER (EMERGENCY)
Facility: CLINIC | Age: 38
Discharge: HOME | End: 2018-10-03
Attending: EMERGENCY MEDICINE | Admitting: EMERGENCY MEDICINE
Payer: COMMERCIAL

## 2018-10-02 ENCOUNTER — NURSE TRIAGE (OUTPATIENT)
Dept: NURSING | Facility: CLINIC | Age: 38
End: 2018-10-02

## 2018-10-02 VITALS
HEIGHT: 66 IN | SYSTOLIC BLOOD PRESSURE: 138 MMHG | HEART RATE: 78 BPM | DIASTOLIC BLOOD PRESSURE: 73 MMHG | BODY MASS INDEX: 31.34 KG/M2 | WEIGHT: 195 LBS | TEMPERATURE: 97.8 F | RESPIRATION RATE: 20 BRPM | OXYGEN SATURATION: 98 %

## 2018-10-02 DIAGNOSIS — R10.9 LEFT FLANK PAIN: ICD-10-CM

## 2018-10-02 LAB
ALBUMIN UR-MCNC: NEGATIVE MG/DL
ANION GAP SERPL CALCULATED.3IONS-SCNC: 10 MMOL/L (ref 3–14)
APPEARANCE UR: CLEAR
BACTERIA #/AREA URNS HPF: ABNORMAL /HPF
BILIRUB UR QL STRIP: NEGATIVE
BUN SERPL-MCNC: 15 MG/DL (ref 7–30)
CALCIUM SERPL-MCNC: 8.7 MG/DL (ref 8.5–10.1)
CHLORIDE SERPL-SCNC: 108 MMOL/L (ref 94–109)
CO2 SERPL-SCNC: 24 MMOL/L (ref 20–32)
COLOR UR AUTO: ABNORMAL
CREAT SERPL-MCNC: 0.61 MG/DL (ref 0.52–1.04)
ERYTHROCYTE [DISTWIDTH] IN BLOOD BY AUTOMATED COUNT: 13.1 % (ref 10–15)
GFR SERPL CREATININE-BSD FRML MDRD: >90 ML/MIN/1.7M2
GLUCOSE SERPL-MCNC: 100 MG/DL (ref 70–99)
GLUCOSE UR STRIP-MCNC: NEGATIVE MG/DL
HCT VFR BLD AUTO: 37.2 % (ref 35–47)
HGB BLD-MCNC: 12.4 G/DL (ref 11.7–15.7)
HGB UR QL STRIP: NEGATIVE
KETONES UR STRIP-MCNC: NEGATIVE MG/DL
LEUKOCYTE ESTERASE UR QL STRIP: NEGATIVE
MCH RBC QN AUTO: 29.2 PG (ref 26.5–33)
MCHC RBC AUTO-ENTMCNC: 33.3 G/DL (ref 31.5–36.5)
MCV RBC AUTO: 88 FL (ref 78–100)
MUCOUS THREADS #/AREA URNS LPF: PRESENT /LPF
NITRATE UR QL: NEGATIVE
PH UR STRIP: 5.5 PH (ref 5–7)
PLATELET # BLD AUTO: 258 10E9/L (ref 150–450)
POTASSIUM SERPL-SCNC: 3.6 MMOL/L (ref 3.4–5.3)
RBC # BLD AUTO: 4.24 10E12/L (ref 3.8–5.2)
RBC #/AREA URNS AUTO: <1 /HPF (ref 0–2)
SODIUM SERPL-SCNC: 142 MMOL/L (ref 133–144)
SOURCE: ABNORMAL
SP GR UR STRIP: 1.01 (ref 1–1.03)
SQUAMOUS #/AREA URNS AUTO: 1 /HPF (ref 0–1)
UROBILINOGEN UR STRIP-MCNC: NORMAL MG/DL (ref 0–2)
WBC # BLD AUTO: 9.5 10E9/L (ref 4–11)
WBC #/AREA URNS AUTO: 1 /HPF (ref 0–5)

## 2018-10-02 PROCEDURE — 96374 THER/PROPH/DIAG INJ IV PUSH: CPT

## 2018-10-02 PROCEDURE — 81001 URINALYSIS AUTO W/SCOPE: CPT | Performed by: EMERGENCY MEDICINE

## 2018-10-02 PROCEDURE — 99284 EMERGENCY DEPT VISIT MOD MDM: CPT | Mod: 25

## 2018-10-02 PROCEDURE — 80048 BASIC METABOLIC PNL TOTAL CA: CPT | Performed by: EMERGENCY MEDICINE

## 2018-10-02 PROCEDURE — 25000128 H RX IP 250 OP 636: Performed by: EMERGENCY MEDICINE

## 2018-10-02 PROCEDURE — 85027 COMPLETE CBC AUTOMATED: CPT | Performed by: EMERGENCY MEDICINE

## 2018-10-02 RX ORDER — KETOROLAC TROMETHAMINE 15 MG/ML
15 INJECTION, SOLUTION INTRAMUSCULAR; INTRAVENOUS ONCE
Status: COMPLETED | OUTPATIENT
Start: 2018-10-02 | End: 2018-10-02

## 2018-10-02 RX ADMIN — KETOROLAC TROMETHAMINE 15 MG: 15 INJECTION, SOLUTION INTRAMUSCULAR; INTRAVENOUS at 23:18

## 2018-10-02 ASSESSMENT — ENCOUNTER SYMPTOMS: FLANK PAIN: 1

## 2018-10-02 NOTE — ED AVS SNAPSHOT
Emergency Department    [address] [facility] [address]    Phone:  [phone]    Fax:  [phone]                                       Kasandra Winkler   MRN: [id number]    Department:   Emergency Department   Date of Visit:  10/2/2018           After Visit Summary Signature Page     I have received my discharge instructions, and my questions have been answered. I have discussed any challenges I see with this plan with the nurse or doctor.    ..........................................................................................................................................  Patient/Patient Representative Signature      ..........................................................................................................................................  Patient Representative Print Name and Relationship to Patient    ..................................................               ................................................  Date                                   Time    ..........................................................................................................................................  Reviewed by Signature/Title    ...................................................              ..............................................  Date                                               Time          22EPIC Rev 08/18

## 2018-10-03 ENCOUNTER — OFFICE VISIT (OUTPATIENT)
Dept: FAMILY MEDICINE | Facility: CLINIC | Age: 38
End: 2018-10-03
Payer: COMMERCIAL

## 2018-10-03 VITALS
OXYGEN SATURATION: 100 % | HEART RATE: 53 BPM | WEIGHT: 196.4 LBS | SYSTOLIC BLOOD PRESSURE: 110 MMHG | HEIGHT: 66 IN | TEMPERATURE: 98.4 F | BODY MASS INDEX: 31.57 KG/M2 | DIASTOLIC BLOOD PRESSURE: 71 MMHG | RESPIRATION RATE: 16 BRPM

## 2018-10-03 DIAGNOSIS — Z23 FLU VACCINE NEED: ICD-10-CM

## 2018-10-03 DIAGNOSIS — E06.3 HYPOTHYROIDISM DUE TO HASHIMOTO'S THYROIDITIS: ICD-10-CM

## 2018-10-03 DIAGNOSIS — M94.0 COSTOCHONDRITIS: Primary | ICD-10-CM

## 2018-10-03 PROCEDURE — 99213 OFFICE O/P EST LOW 20 MIN: CPT | Mod: 25 | Performed by: FAMILY MEDICINE

## 2018-10-03 PROCEDURE — 90471 IMMUNIZATION ADMIN: CPT | Performed by: FAMILY MEDICINE

## 2018-10-03 PROCEDURE — 90686 IIV4 VACC NO PRSV 0.5 ML IM: CPT | Performed by: FAMILY MEDICINE

## 2018-10-03 RX ORDER — LEVOTHYROXINE SODIUM 112 MCG
112 TABLET ORAL DAILY
Qty: 30 TABLET | Refills: 1 | Status: SHIPPED | OUTPATIENT
Start: 2018-10-03 | End: 2018-12-26

## 2018-10-03 NOTE — ED PROVIDER NOTES
"  History     Chief Complaint:  Flank Pain    The history is provided by the patient.      Kasandra Winkler is a 37 year old female who presents for evaluation of left flank pain which began this evening. The patient describes her pain as sharp and \"under her rib\" and states that she has no past history of this pain. The patient works as an  and denies any recent abnormal strain/bending/twisting to her back or overworking. Of note, the patient states that she underwent a hysterectomy on 2018 and yesterday was her first day back at work following this procedure. Denies having taken medication for this yet. Patient denies other complaint. She later reports she had a gastric sleeve procedure but denies any abdominal pain.  No vomiting.     Allergies:  Dust Mites  Epinephrine  Grass  Maple Tree  Seasonal Allergies      Medications:    Elavil  Zyrtec  Klonopin  Vitamin B  Flonase  Flovent  Mirena  Levothyroxine  Flagyl  Multivitamin      Past Medical History:    Menorrhagia w/ irregular cycle  Iron deficiency anemia   Migraine w/ aura w/o status migrainosus  Allergic rhinitis due to pollen  Anxiety  Morbid obesity  Asthma  Major depression   Ovarian cyst  Hypovitaminosis D  Hashimoto's thyroiditis  Hypothyroidism   TMJ    Past Surgical History:      Laparoscopic cholecystectomy  Laparoscopic gastric sleeve    Family History:    Osteoporosis  Hypertension  Aneurysm  Substance abuse  Heat disease  Rheumatoid arthritis  Asthma     Social History:  Presents alone   Tobacco use: Former smoker (0.5 ppd for 10 years, quit 2009)  Alcohol use: Yes (occasionally)   PCP: Trish Callaway    Marital Status:       Review of Systems   Genitourinary: Positive for flank pain.   10 point review of systems performed and is negative except as above and in HPI.     Physical Exam     Patient Vitals for the past 24 hrs:   BP Temp Temp src Pulse Resp SpO2 Height Weight   10/02/18 2245 138/73 97.8  F (36.6 " " C) Oral 78 20 98 % 1.676 m (5' 6\") 88.5 kg (195 lb)        Physical Exam  General: Resting on the gurney, appears uncomfortable  Head:  The scalp, face, and head appear normal  Mouth/Throat: Mucus membranes are moist  CV:  Regular rate    Normal S1 and S2  No pathological murmur   Resp:  Breath sounds clear and equal bilaterally    Non-labored, no retractions or accessory muscle use    No coarseness    No wheezing   GI:  Mild lower abdominal tenderness to palpation. No rigidity    No guarding, no rebound. No focal tenderness at McBurney's point.  :  No CVA tenderness to percussion.  MS:  Normal motor assessment of all extremities.    Good capillary refill noted.  Skin:  No rash or lesions noted.  Neuro:   Speech is normal and fluent. No apparent deficit.  Psych: Awake. Alert.  Normal affect.      Appropriate interactions.     Emergency Department Course     Laboratory:  CBC: AWNL (WBC 9.5, HGB 12.4, )   BMP:  (H) o/w WNL (Creatinine 0.61)      UA with micro: Bacteria many, Mucous present o/w negative     Interventions:  2318: Toradol 15 mg IV      Emergency Department Course:  Past medical records, nursing notes, and vitals reviewed.  2300: I performed an exam of the patient and obtained history, as documented above.    IV inserted and blood drawn. Above interventions provided. Blood was sent to the lab for further testing, results above.    The patient provided a urine sample here in the emergency department. This was sent for laboratory testing, findings above.    0022: I rechecked the patient. Findings and plan explained to the Patient. Patient discharged home with instructions regarding supportive care, medications, and reasons to return. The importance of close follow-up was reviewed.      Impression & Plan      Medical Decision Making:  Kasandra Winkler is a 37 year old female who presents with left flank pain.  A broad differential diagnosis was considered including diverticulitis, " ureterolithiasis, tumor, colitis, cholecystitis, aneurysm, dissection, hydronephrosis, pneumonia, rib fracture, UTI, pyelonephritis amongst many other etiologies. The workup in the ED is at this point, including blood work and urinalysis, is negative.  No etiology for the patients pain is found at this point and my suspicion of an intraabdominal or intrathoracic catastrophe or other worrisome etiology is very low.  I did discuss advanced imaging with her, and after much conversation and mutual shared decision making she has opted to wait on CT and obtain when should her symptoms worsen.  Given her labs and exam I believe that this is reasonable even with her history of gastric sleeve procedure.  I will not therefore admit for serial exams and further workup. Patient is hemodynamically stable in ED. Plan is home with flank pain recheck by primary care physician or return to ED at that time.   Return for fevers greater than 102, increasing pain, other new symptoms develop.  Flank pain handout given.  Questions were answered.      Diagnosis:    ICD-10-CM   1. Left flank pain R10.9       Disposition:  Discharged to home with plan as outlined.    Scribe Disclosure:  KELTON, Murtaza Castro, am serving as a scribe at 12:52 AM on 10/3/2018 to document services personally performed by Carri Carrion based on my observations and the provider's statements to me.  7/24/2018    EMERGENCY DEPARTMENT      Sheyla Christina MD  10/03/18 0510

## 2018-10-03 NOTE — PROGRESS NOTES
SUBJECTIVE:   Kasandra Winkler is a 37 year old female who presents to clinic today for the following health issues:      ED/UC Followup:    Facility:  [facility] ER  Date of visit: 10/2/2018  Reason for visit: Left sided flank pain  Current Status: still having pain- Tylenol not helping.     Left flank pain - started yesterday in evening and progressed to more severe pain by time she went to bed  Went to ER at [facility] - had evaluation - labs and UA  Was given IV ibuprofen in ER  Overnight -  Pain was controlled  Took some tylenol today and not as good pain control  Aggravating factors - nothing noticed  No relieving factors other than the IV ibuprofen  Felt a little nausea the night before when pain started    Had hysterectomy -   Some trouble emptying bladder after -   Took longer to urinate      Low back pain - wonders if this could be contributing      -------------------------------------    Problem list and histories reviewed & adjusted, as indicated.  Additional history: as documented    Patient Active Problem List   Diagnosis     CARDIOVASCULAR SCREENING; LDL GOAL LESS THAN 160     Hypothyroidism     Obesity     Hashimoto's thyroiditis     Hypovitaminosis D     Fatigue     Ovarian cyst     Major depressive disorder, recurrent episode, mild (H)     Asthma with allergic rhinitis     Morbid obesity (H)     laparoscopic sleeve gastrectomy 6/29/15     Anxiety     Allergic rhinitis due to pollen     Iron deficiency anemia secondary to inadequate dietary iron intake     Migraine with aura and without status migrainosus, not intractable     Menorrhagia with irregular cycle     Encounter for IUD insertion     Past Surgical History:   Procedure Laterality Date      SECTION  10/10/10     CHOLECYSTECTOMY, LAPOROSCOPIC  2009    gallstones -      FINGER SURGERY      right little finger fracture     HYSTERECTOMY, PAP NO LONGER INDICATED  2018    ovaries remain - uterus and cervix removed      "LAPAROSCOPIC GASTRIC SLEEVE N/A 6/29/2015    Procedure: LAPAROSCOPIC GASTRIC SLEEVE;  Surgeon: Cam Alvarez MD;  Location:  OR       Social History   Substance Use Topics     Smoking status: Former Smoker     Packs/day: 0.50     Years: 10.00     Quit date: 7/20/2009     Smokeless tobacco: Never Used      Comment: social smoker for 10 yr     Alcohol use 0.0 oz/week     0 Standard drinks or equivalent per week      Comment: occasional ETOH use     Family History   Problem Relation Age of Onset     Osteoporosis Mother      Hypertension Mother      Cerebrovascular Disease Mother      Aneurysm     Substance Abuse Mother      HEART DISEASE Maternal Grandmother      Hypertension Maternal Grandmother      Arthritis Maternal Grandmother      RA      Respiratory Maternal Grandmother      Asthma           Reviewed and updated as needed this visit by clinical staff       Reviewed and updated as needed this visit by Provider         ROS:  Constitutional, HEENT, cardiovascular, pulmonary, GI, , musculoskeletal, neuro, skin, endocrine and psych systems are negative, except as otherwise noted.    OBJECTIVE:     /71  Pulse 53  Temp 98.4  F (36.9  C) (Oral)  Resp 16  Ht 5' 6\" (1.676 m)  Wt 196 lb 6.4 oz (89.1 kg)  LMP 06/25/2018  SpO2 100%  Breastfeeding? No  BMI 31.7 kg/m2  Body mass index is 31.7 kg/(m^2).  GENERAL: healthy, alert and no distress  RESP: lungs clear to auscultation - no rales, rhonchi or wheezes  CV: regular rate and rhythm, normal S1 S2, no S3 or S4, no murmur, click or rub, no peripheral edema and peripheral pulses strong  SKIN: no suspicious lesions or rashes  Comprehensive back pain exam:  Over the left lateral rib area some reproducible tenderness at the lateral costochondral junction   Abd - BS present - soft NT, ND    Diagnostic Test Results:  Results for orders placed or performed during the hospital encounter of 10/02/18   UA with Microscopic   Result Value Ref Range    Color Urine " Light Yellow     Appearance Urine Clear     Glucose Urine Negative NEG^Negative mg/dL    Bilirubin Urine Negative NEG^Negative    Ketones Urine Negative NEG^Negative mg/dL    Specific Gravity Urine 1.012 1.003 - 1.035    Blood Urine Negative NEG^Negative    pH Urine 5.5 5.0 - 7.0 pH    Protein Albumin Urine Negative NEG^Negative mg/dL    Urobilinogen mg/dL Normal 0.0 - 2.0 mg/dL    Nitrite Urine Negative NEG^Negative    Leukocyte Esterase Urine Negative NEG^Negative    Source Midstream Urine     WBC Urine 1 0 - 5 /HPF    RBC Urine <1 0 - 2 /HPF    Bacteria Urine Many (A) NEG^Negative /HPF    Squamous Epithelial /HPF Urine 1 0 - 1 /HPF    Mucous Urine Present (A) NEG^Negative /LPF   CBC (+ platelets, no diff)   Result Value Ref Range    WBC 9.5 4.0 - 11.0 10e9/L    RBC Count 4.24 3.8 - 5.2 10e12/L    Hemoglobin 12.4 11.7 - 15.7 g/dL    Hematocrit 37.2 35.0 - 47.0 %    MCV 88 78 - 100 fl    MCH 29.2 26.5 - 33.0 pg    MCHC 33.3 31.5 - 36.5 g/dL    RDW 13.1 10.0 - 15.0 %    Platelet Count 258 150 - 450 10e9/L   Basic metabolic panel   Result Value Ref Range    Sodium 142 133 - 144 mmol/L    Potassium 3.6 3.4 - 5.3 mmol/L    Chloride 108 94 - 109 mmol/L    Carbon Dioxide 24 20 - 32 mmol/L    Anion Gap 10 3 - 14 mmol/L    Glucose 100 (H) 70 - 99 mg/dL    Urea Nitrogen 15 7 - 30 mg/dL    Creatinine 0.61 0.52 - 1.04 mg/dL    GFR Estimate >90 >60 mL/min/1.7m2    GFR Estimate If Black >90 >60 mL/min/1.7m2    Calcium 8.7 8.5 - 10.1 mg/dL       ASSESSMENT/PLAN:     1. Costochondritis  Patient has reproducible left lateral costochondral pain.  Suspect this is costochondritis.  Plan to treat with topical diclofenac since she is unable to tolerate oral NSAIDs.  Discussed if this is too expensive or not covered she could try topical lidocaine patches 4% over-the-counter.  Advised of warning signs and symptoms to monitor for  Pt will call or RTC if symptoms worsen or do not improve.   - diclofenac (VOLTAREN) 1 % GEL topical gel;  Apply 4 grams to knees or 2 grams to hands four times daily using enclosed dosing card.  Dispense: 100 g; Refill: 1    2. Hypothyroidism due to Hashimoto's thyroiditis  Patient needs brand name Synthroid and was given generic levothyroxine with her last fill 1 day ago.  - SYNTHROID 112 MCG tablet; Take 1 tablet (112 mcg) by mouth daily  Dispense: 30 tablet; Refill: 1    3. Flu vaccine need  given  - FLU VAC PRESRV FREE QUAD SPLIT VIR, IM (3+ YRS)    Pt will call or RTC if symptoms worsen or do not improve.      Trish Callaway, DO  [facility]9686

## 2018-10-03 NOTE — MR AVS SNAPSHOT
"              After Visit Summary   10/3/2018    Kasandra Winkler    MRN: [id number]           Patient Information     Date Of Birth          1980        Visit Information        Provider Department      10/3/2018 11:30 AM Trish Callaway, DO [facility]        Today's Diagnoses     Costochondritis    -  1    Hypothyroidism due to Hashimoto's thyroiditis        Flu vaccine need           Follow-ups after your visit        Who to contact     If you have questions or need follow up information about today's clinic visit or your schedule please contact [facility] directly at [phone].  Normal or non-critical lab and imaging results will be communicated to you by MyChart, letter or phone within 4 business days after the clinic has received the results. If you do not hear from us within 7 days, please contact the clinic through [email or web address] or phone. If you have a critical or abnormal lab result, we will notify you by phone as soon as possible.  Submit refill requests through [employer] or call your pharmacy and they will forward the refill request to us. Please allow 3 business days for your refill to be completed.          Additional Information About Your Visit        MyChart Information     [employer] gives you secure access to your electronic health record. If you see a primary care provider, you can also send messages to your care team and make appointments. If you have questions, please call your primary care clinic.  If you do not have a primary care provider, please call [phone] and they will assist you.        Care EveryWhere ID     This is your Care EveryWhere ID. This could be used by other organizations to access your [place] medical records  [id number]        Your Vitals Were     Pulse Temperature Respirations Height Last Period Pulse Oximetry    53 98.4  F (36.9  C) (Oral) 16 5' 6\" (1.676 m) 06/25/2018 100%    Breastfeeding? BMI (Body Mass Index)                No 31.7 kg/m2   "         Blood Pressure from Last 3 Encounters:   10/03/18 110/71   10/02/18 138/73   09/05/18 108/58    Weight from Last 3 Encounters:   10/03/18 196 lb 6.4 oz (89.1 kg)   10/02/18 195 lb (88.5 kg)   09/05/18 196 lb (88.9 kg)              We Performed the Following     ADMIN 1st VACCINE     FLU VAC PRESRV FREE QUAD SPLIT VIR, IM (3+ YRS)          Today's Medication Changes          These changes are accurate as of 10/3/18 11:59 PM.  If you have any questions, ask your nurse or doctor.               Start taking these medicines.        Dose/Directions    diclofenac 1 % Gel topical gel   Commonly known as:  VOLTAREN   Used for:  Costochondritis   Started by:  Trish Callaway DO        Apply 4 grams to knees or 2 grams to hands four times daily using enclosed dosing card.   Quantity:  100 g   Refills:  1         These medicines have changed or have updated prescriptions.        Dose/Directions    * levothyroxine 112 MCG tablet   Commonly known as:  SYNTHROID/LEVOTHROID   This may have changed:  Another medication with the same name was added. Make sure you understand how and when to take each.   Used for:  Hypothyroidism due to Hashimoto's thyroiditis   Changed by:  Trish Callaway DO        Dose:  112 mcg   Take 1 tablet (112 mcg) by mouth daily   Quantity:  30 tablet   Refills:  1       * SYNTHROID 112 MCG tablet   This may have changed:  You were already taking a medication with the same name, and this prescription was added. Make sure you understand how and when to take each.   Used for:  Hypothyroidism due to Hashimoto's thyroiditis   Generic drug:  levothyroxine   Changed by:  Trish Callaway DO        Dose:  112 mcg   Take 1 tablet (112 mcg) by mouth daily   Quantity:  30 tablet   Refills:  1       * Notice:  This list has 2 medication(s) that are the same as other medications prescribed for you. Read the directions carefully, and ask your doctor or other care provider to review them with you.         Where to  get your medicines      These medications were sent to [id number] - [facility] [address] AT [facility] [address]  [address] [place], [place] [address]BLAINE LU, [facility] [address]     Phone:  [phone]     diclofenac 1 % Gel topical gel    SYNTHROID 112 MCG tablet                Primary Care Provider Office Phone # Fax #    Trish Callaway [phone] [address] [facility] [address] [address]        Equal Access to Services     KRIS SILVA : Hadii aad ku hadasho Soomaali, waaxda luqadaha, qaybta kaalmada adeegyada, waxay idiin hayaan adeeg khyvonne will . So [facility] [phone].    ATENCIÓN: Si habla español, tiene a das disposición servicios gratuitos de asistencia lingüística. Sravan al [phone].    We comply with applicable federal civil rights laws and [place] laws. We do not discriminate on the basis of race, color, national origin, age, disability, sex, sexual orientation, or gender identity.            Thank you!     Thank you for choosing [facility]  for your care. Our goal is always to provide you with excellent care. Hearing back from our patients is one way we can continue to improve our services. Please take a few minutes to complete the written survey that you may receive in the mail after your visit with us. Thank you!             Your Updated Medication List - Protect others around you: Learn how to safely use, store and throw away your medicines at www.disposemymeds.org.          This list is accurate as of 10/3/18 11:59 PM.  Always use your most recent med list.                   Brand Name Dispense Instructions for use Diagnosis    amitriptyline 10 MG tablet    ELAVIL    30 tablet    Take 1 tablet (10 mg) by mouth At Bedtime    Major depressive disorder, recurrent episode, mild (H)       azelastine 0.1 % nasal spray    ASTELIN    30 mL    Spray 1-2 sprays into both nostrils 2 times daily    Allergic rhinitis due to pollen        budesonide 180 MCG/ACT inhaler    PULMICORT FLEXHALER    1 Inhaler    Inhale 2 puffs into the lungs 2 times daily    Moderate persistent asthma with allergic rhinitis without complication       calcium carbonate 500 mg (elemental) 500 MG tablet    OS-JONATAN     Take 500 mg by mouth 2 times daily        cetirizine 10 MG tablet    zyrTEC     Take 10 mg by mouth daily        clonazePAM 0.5 MG tablet    klonoPIN    20 tablet    Take 1 tablet (0.5 mg) by mouth as needed for anxiety    Major depressive disorder, recurrent episode, mild (H)       Cod Liver Oil 1000 MG Caps      Take 1 capsule by mouth daily.        diclofenac 1 % Gel topical gel    VOLTAREN    100 g    Apply 4 grams to knees or 2 grams to hands four times daily using enclosed dosing card.    Costochondritis       fluticasone 110 MCG/ACT Inhaler    FLOVENT HFA    3 Inhaler    Inhale 2 puffs into the lungs 2 times daily    Moderate persistent asthma with allergic rhinitis without complication       fluticasone 50 MCG/ACT spray    FLONASE    16 mL    SHAKE LIQUID AND USE 1 TO 2 SPRAYS IN EACH NOSTRIL DAILY    Seasonal allergic rhinitis due to pollen, unspecified chronicity       HM MULTIVITAMIN ADULT GUMMY PO           * levothyroxine 112 MCG tablet    SYNTHROID/LEVOTHROID    30 tablet    Take 1 tablet (112 mcg) by mouth daily    Hypothyroidism due to Hashimoto's thyroiditis       * SYNTHROID 112 MCG tablet   Generic drug:  levothyroxine     30 tablet    Take 1 tablet (112 mcg) by mouth daily    Hypothyroidism due to Hashimoto's thyroiditis       Vitamin B-12 500 MCG Subl      Place 500 mcg under the tongue daily        Vitamin D3 3000 units Tabs      Take 1 tablet by mouth daily.        * Notice:  This list has 2 medication(s) that are the same as other medications prescribed for you. Read the directions carefully, and ask your doctor or other care provider to review them with you.

## 2018-10-05 DIAGNOSIS — J30.1 ALLERGIC RHINITIS DUE TO POLLEN, UNSPECIFIED SEASONALITY: Primary | ICD-10-CM

## 2018-10-08 RX ORDER — AZELASTINE 1 MG/ML
SPRAY, METERED NASAL
Qty: 30 ML | Refills: 3 | Status: SHIPPED | OUTPATIENT
Start: 2018-10-08 | End: 2019-10-06

## 2018-10-08 NOTE — TELEPHONE ENCOUNTER
"PN  Routing refill request to provider for review/approval because:  Patient also has Flonase on her medication list.  Is she to use both Astelin and Flonase?  Last OV 10/3/18  Thanks, Betsey Yepez RN    Requested Prescriptions   Pending Prescriptions Disp Refills     azelastine (ASTELIN) 0.1 % nasal spray [Pharmacy Med Name: AZELASTINE 0.1%(137MCG) NASAL-200SP] 30 mL 0     Sig: USE 1 TO 2 SPRAYS IN EACH NOSTRIL TWICE DAILY    Antihistamines Protocol Passed    10/5/2018  3:35 AM       Passed - Patient is 3-64 years of age    Apply weight-based dosing for peds patients age 3 - 12 years of age.    Forward request to provider for patients under the age of 3 or over the age of 64.         Passed - Recent (12 mo) or future (30 days) visit within the authorizing provider's specialty    Patient had office visit in the last 12 months or has a visit in the next 30 days with authorizing provider or within the authorizing provider's specialty.  See \"Patient Info\" tab in inbasket, or \"Choose Columns\" in Meds & Orders section of the refill encounter.                    "

## 2018-10-21 DIAGNOSIS — J30.1 SEASONAL ALLERGIC RHINITIS DUE TO POLLEN: ICD-10-CM

## 2018-10-22 RX ORDER — FLUTICASONE PROPIONATE 50 MCG
SPRAY, SUSPENSION (ML) NASAL
Qty: 16 ML | Refills: 0 | Status: SHIPPED | OUTPATIENT
Start: 2018-10-22 | End: 2018-11-23

## 2018-10-22 NOTE — TELEPHONE ENCOUNTER
"Prescription approved per [facility] Refill Protocol.  Betsey Yepez RN    Requested Prescriptions   Signed Prescriptions Disp Refills     fluticasone (FLONASE) 50 MCG/ACT spray 16 mL 0     Sig: SHAKE LIQUID AND USE 1 TO 2 SPRAYS IN EACH NOSTRIL DAILY    Inhaled Steroids Protocol Passed    10/21/2018  3:36 AM       Passed - Patient is age 12 or older       Passed - Asthma control assessment score within normal limits in last 6 months    Please review ACT score.          Passed - Recent (6 mo) or future (30 days) visit within the authorizing provider's specialty    Patient had office visit in the last 6 months or has a visit in the next 30 days with authorizing provider or within the authorizing provider's specialty.  See \"Patient Info\" tab in inbasket, or \"Choose Columns\" in Meds & Orders section of the refill encounter.              "

## 2018-10-24 ENCOUNTER — RESULTS ONLY (OUTPATIENT)
Dept: OTHER | Facility: CLINIC | Age: 38
End: 2018-10-24

## 2018-10-24 ENCOUNTER — OFFICE VISIT (OUTPATIENT)
Dept: FAMILY MEDICINE | Facility: CLINIC | Age: 38
End: 2018-10-24
Payer: COMMERCIAL

## 2018-10-24 VITALS
HEIGHT: 66 IN | HEART RATE: 65 BPM | DIASTOLIC BLOOD PRESSURE: 76 MMHG | SYSTOLIC BLOOD PRESSURE: 111 MMHG | TEMPERATURE: 97.6 F | OXYGEN SATURATION: 97 % | BODY MASS INDEX: 31.31 KG/M2 | WEIGHT: 194.8 LBS

## 2018-10-24 DIAGNOSIS — F33.0 MAJOR DEPRESSIVE DISORDER, RECURRENT EPISODE, MILD (H): ICD-10-CM

## 2018-10-24 DIAGNOSIS — R07.0 THROAT PAIN: Primary | ICD-10-CM

## 2018-10-24 DIAGNOSIS — M25.50 POLYARTHRALGIA: ICD-10-CM

## 2018-10-24 DIAGNOSIS — M53.3 SACRAL PAIN: ICD-10-CM

## 2018-10-24 DIAGNOSIS — M54.89 MIDLINE BACK PAIN, UNSPECIFIED BACK LOCATION, UNSPECIFIED CHRONICITY: ICD-10-CM

## 2018-10-24 LAB
CRP SERPL-MCNC: <2.9 MG/L (ref 0–8)
DEPRECATED S PYO AG THROAT QL EIA: NORMAL
ERYTHROCYTE [SEDIMENTATION RATE] IN BLOOD BY WESTERGREN METHOD: 16 MM/H (ref 0–20)
SPECIMEN SOURCE: NORMAL

## 2018-10-24 PROCEDURE — 85652 RBC SED RATE AUTOMATED: CPT | Performed by: FAMILY MEDICINE

## 2018-10-24 PROCEDURE — 36415 COLL VENOUS BLD VENIPUNCTURE: CPT | Performed by: FAMILY MEDICINE

## 2018-10-24 PROCEDURE — 86140 C-REACTIVE PROTEIN: CPT | Performed by: FAMILY MEDICINE

## 2018-10-24 PROCEDURE — 87880 STREP A ASSAY W/OPTIC: CPT | Performed by: FAMILY MEDICINE

## 2018-10-24 PROCEDURE — 99214 OFFICE O/P EST MOD 30 MIN: CPT | Performed by: FAMILY MEDICINE

## 2018-10-24 PROCEDURE — 81374 HLA I TYPING 1 ANTIGEN LR: CPT | Performed by: FAMILY MEDICINE

## 2018-10-24 PROCEDURE — 87081 CULTURE SCREEN ONLY: CPT | Performed by: FAMILY MEDICINE

## 2018-10-24 ASSESSMENT — PATIENT HEALTH QUESTIONNAIRE - PHQ9: SUM OF ALL RESPONSES TO PHQ QUESTIONS 1-9: 5

## 2018-10-24 NOTE — MR AVS SNAPSHOT
After Visit Summary   10/24/2018    Kasandra Winkler    MRN: [id number]           Patient Information     Date Of Birth          1980        Visit Information        Provider Department      10/24/2018 12:30 PM Trish Callaway DO [facility]        Today's Diagnoses     Throat pain    -  1    Major depressive disorder, recurrent episode, mild (H)        Midline back pain, unspecified back location, unspecified chronicity        Sacral pain        Polyarthralgia           Follow-ups after your visit        Additional Services     RHEUMATOLOGY REFERRAL       Your provider has referred you to: [facility] [phone]   [email or web address]    Please be aware that coverage of these services is subject to the terms and limitations of your health insurance plan.  Call member services at your health plan with any benefit or coverage questions.      Please bring the following with you to your appointment:    (1) Any X-Rays, CTs or MRIs which have been performed.  Contact the facility where they were done to arrange for  prior to your scheduled appointment.    (2) List of current medications   (3) This referral request   (4) Any documents/labs given to you for this referral                  Your next 10 appointments already scheduled     Nov 15, 2018  1:00 PM CST   (Arrive by 12:45 PM)   New Patient Visit with [facility]KTOR[facility] ([facility])    [address]  [address]  [facility] [address] [phone]            Nov 20, 2018  9:20 AM CST   [facility]AC[facility] Extremity with Jason Horan PT   [facility]    [address] [place] [address] [phone]              Who to contact     If you have questions or need follow up information about today's clinic visit or your schedule please contact [facility] directly  "at [phone].  Normal or non-critical lab and imaging results will be communicated to you by MyChart, letter or phone within 4 business days after the clinic has received the results. If you do not hear from us within 7 days, please contact the clinic through [employer] or phone. If you have a critical or abnormal lab result, we will notify you by phone as soon as possible.  Submit refill requests through [employer] or call your pharmacy and they will forward the refill request to us. Please allow 3 business days for your refill to be completed.          Additional Information About Your Visit        [employer] Information     [employer] gives you secure access to your electronic health record. If you see a primary care provider, you can also send messages to your care team and make appointments. If you have questions, please call your primary care clinic.  If you do not have a primary care provider, please call [phone] and they will assist you.        Care EveryWhere ID     This is your Care EveryWhere ID. This could be used by other organizations to access your [place] medical records  [id number]        Your Vitals Were     Pulse Temperature Height Last Period Pulse Oximetry BMI (Body Mass Index)    65 97.6  F (36.4  C) (Oral) 5' 6\" (1.676 m) 06/25/2018 97% 31.44 kg/m2       Blood Pressure from Last 3 Encounters:   10/24/18 111/76   10/03/18 110/71   10/02/18 138/73    Weight from Last 3 Encounters:   11/08/18 194 lb (88 kg)   10/24/18 194 lb 12.8 oz (88.4 kg)   10/03/18 196 lb 6.4 oz (89.1 kg)              We Performed the Following     Beta strep group A culture     CRP, inflammation     ESR: Erythrocyte sedimentation rate     HLA-B27 Typing     RHEUMATOLOGY REFERRAL     Strep, Rapid Screen        Primary Care Provider Office Phone # Fax #    Trish CASE Callaway [phone] [address] [address]        Equal Access to Services     KRIS SILVA AH: Krishna wells " Ladyhumphrey, waemyda luqadaha, qaybta kaalchencho lopez, chris lopezlaney sirisha. So [facility] [phone].    ATENCIÓN: Si tere helton, tiene a das disposición servicios gratuitos de asistencia lingüística. Sravan al [phone].    We comply with applicable federal civil rights laws and [place] laws. We do not discriminate on the basis of race, color, national origin, age, disability, sex, sexual orientation, or gender identity.            Thank you!     Thank you for choosing [facility]  for your care. Our goal is always to provide you with excellent care. Hearing back from our patients is one way we can continue to improve our services. Please take a few minutes to complete the written survey that you may receive in the mail after your visit with us. Thank you!             Your Updated Medication List - Protect others around you: Learn how to safely use, store and throw away your medicines at www.disposemymeds.org.          This list is accurate as of 10/24/18 11:59 PM.  Always use your most recent med list.                   Brand Name Dispense Instructions for use Diagnosis    amitriptyline 10 MG tablet    ELAVIL    30 tablet    Take 1 tablet (10 mg) by mouth At Bedtime    Major depressive disorder, recurrent episode, mild (H)       azelastine 0.1 % nasal spray    ASTELIN    30 mL    USE 1 TO 2 SPRAYS IN EACH NOSTRIL TWICE DAILY    Allergic rhinitis due to pollen, unspecified seasonality       budesonide 180 MCG/ACT inhaler    PULMICORT FLEXHALER    1 Inhaler    Inhale 2 puffs into the lungs 2 times daily    Moderate persistent asthma with allergic rhinitis without complication       calcium carbonate 500 mg (elemental) 500 MG tablet    OS-JONATAN     Take 500 mg by mouth 2 times daily        cetirizine 10 MG tablet    zyrTEC     Take 10 mg by mouth daily        clonazePAM 0.5 MG tablet    klonoPIN    20 tablet    Take 1 tablet (0.5 mg) by mouth as needed for anxiety    Major depressive  disorder, recurrent episode, mild (H)       Cod Liver Oil 1000 MG Caps      Take 1 capsule by mouth daily.        diclofenac 1 % Gel topical gel    VOLTAREN    100 g    Apply 4 grams to knees or 2 grams to hands four times daily using enclosed dosing card.    Costochondritis       fluticasone 110 MCG/ACT Inhaler    FLOVENT HFA    3 Inhaler    Inhale 2 puffs into the lungs 2 times daily    Moderate persistent asthma with allergic rhinitis without complication       fluticasone 50 MCG/ACT spray    FLONASE    16 mL    SHAKE LIQUID AND USE 1 TO 2 SPRAYS IN EACH NOSTRIL DAILY    Seasonal allergic rhinitis due to pollen       HM MULTIVITAMIN ADULT GUMMY PO           SYNTHROID 112 MCG tablet   Generic drug:  levothyroxine     30 tablet    Take 1 tablet (112 mcg) by mouth daily    Hypothyroidism due to Hashimoto's thyroiditis       Vitamin B-12 500 MCG Subl      Place 500 mcg under the tongue daily        Vitamin D3 3000 units Tabs      Take 1 tablet by mouth daily.

## 2018-10-24 NOTE — PROGRESS NOTES
SUBJECTIVE:   Kasandra Winkler is a 37 year old female who presents to clinic today for the following health issues:    Patient also requesting throat swab due to sore throat x2 days and has been fatigued x2 weeks    Back Pain       Duration: on and off for years years, worsened in the past couple of years         Specific cause: turning/bending    Description:   Location of pain: low back bilateral  Character of pain: sharp  Pain radiation:none  New numbness or weakness in legs, not attributed to pain:  no     Intensity: Currently 7/10, At its worst 10/10    History:   Pain interferes with job: YES, has a standing desk at work   History of back problems: recurrent self limited episodes of low back pain in the past and possible previous herniated disc  Any previous MRI or X-rays: None  Sees a specialist for back pain:  Orthology   Therapies tried without relief: acetaminophen (Tylenol)    Alleviating factors:   Improved by: lying down, heat and Physical Therapy      Precipitating factors:  Worsened by: Bending, Standing, Sitting and Walking          Accompanying Signs & Symptoms:  Risk of Fracture:  None  Risk of Cauda Equina:  None  Risk of Infection:  None  Risk of Cancer:  None  Risk of Ankylosing Spondylitis:  Onset at age <35, male, AND morning back stiffness. YES - morning stiffness    Back   Still having   Saw physical therapy - switched to orthology for PT        -------------------------------------    Problem list and histories reviewed & adjusted, as indicated.  Additional history: as documented    Patient Active Problem List   Diagnosis     CARDIOVASCULAR SCREENING; LDL GOAL LESS THAN 160     Hypothyroidism     Obesity     Hashimoto's thyroiditis     Hypovitaminosis D     Fatigue     Ovarian cyst     Major depressive disorder, recurrent episode, mild (H)     Asthma with allergic rhinitis     Morbid obesity (H)     laparoscopic sleeve gastrectomy 6/29/15     Anxiety     Allergic rhinitis due to pollen  "    Iron deficiency anemia secondary to inadequate dietary iron intake     Migraine with aura and without status migrainosus, not intractable     Menorrhagia with irregular cycle     Encounter for IUD insertion     Past Surgical History:   Procedure Laterality Date      SECTION  10/10/10     CHOLECYSTECTOMY, LAPOROSCOPIC  2009    gallstones -      FINGER SURGERY      right little finger fracture     HYSTERECTOMY, PAP NO LONGER INDICATED  2018    ovaries remain - uterus and cervix removed     LAPAROSCOPIC GASTRIC SLEEVE N/A 2015    Procedure: LAPAROSCOPIC GASTRIC SLEEVE;  Surgeon: Cam Alvarez MD;  Location:  OR       Social History   Substance Use Topics     Smoking status: Former Smoker     Packs/day: 0.50     Years: 10.00     Quit date: 2009     Smokeless tobacco: Never Used      Comment: social smoker for 10 yr     Alcohol use 0.0 oz/week     0 Standard drinks or equivalent per week      Comment: occasional ETOH use     Family History   Problem Relation Age of Onset     Osteoporosis Mother      Hypertension Mother      Cerebrovascular Disease Mother      Aneurysm     Substance Abuse Mother      HEART DISEASE Maternal Grandmother      Hypertension Maternal Grandmother      Arthritis Maternal Grandmother      RA      Respiratory Maternal Grandmother      Asthma           Reviewed and updated as needed this visit by clinical staff  Tobacco  Allergies  Meds       Reviewed and updated as needed this visit by Provider         ROS:  Constitutional, HEENT, cardiovascular, pulmonary, gi and gu systems are negative, except as otherwise noted.    OBJECTIVE:     /76  Pulse 65  Temp 97.6  F (36.4  C) (Oral)  Ht 5' 6\" (1.676 m)  Wt 194 lb 12.8 oz (88.4 kg)  LMP 2018  SpO2 97%  BMI 31.44 kg/m2  Body mass index is 31.44 kg/(m^2).  GENERAL: alert and no distress  Comprehensive back pain exam:  No tenderness, Range of motion not limited by pain, Lower extremity strength " functional and equal on both sides, Lower extremity reflexes within normal limits bilaterally and Lower extremity sensation normal and equal on both sides    Diagnostic Test Results:  Results for orders placed or performed in visit on 10/24/18   HLA-B27 Typing   Result Value Ref Range    HLA B27       Specimen received - Immunology report to follow upon completion.   ESR: Erythrocyte sedimentation rate   Result Value Ref Range    Sed Rate 16 0 - 20 mm/h   CRP, inflammation   Result Value Ref Range    CRP Inflammation <2.9 0.0 - 8.0 mg/L   Strep, Rapid Screen   Result Value Ref Range    Specimen Description Throat     Rapid Strep A Screen       NEGATIVE: No Group A streptococcal antigen detected by immunoassay, await culture report.   Beta strep group A culture   Result Value Ref Range    Specimen Description Throat     Culture Micro No beta hemolytic Streptococcus Group A isolated        ASSESSMENT/PLAN:       1. Throat pain     - Strep, Rapid Screen  - Beta strep group A culture    2. Major depressive disorder, recurrent episode, mild (H)   stable    3. Midline back pain, unspecified back location, unspecified chronicity  Back pain - long history   Has more stiffness than typical - wonder about possible inflammatory arthritis   Will check some labs and have her seen rheumatology  - HLA-B27 Typing  - ESR: Erythrocyte sedimentation rate  - CRP, inflammation  - RHEUMATOLOGY REFERRAL    4. Sacral pain      - HLA-B27 Typing  - ESR: Erythrocyte sedimentation rate  - CRP, inflammation  - RHEUMATOLOGY REFERRAL    5. Polyarthralgia     - RHEUMATOLOGY REFERRAL    Pt will call or RTC if symptoms worsen or do not improve.     Trish Callaway, [facility]

## 2018-10-24 NOTE — NURSING NOTE
"Chief Complaint   Patient presents with     Back Pain     /76  Pulse 65  Temp 97.6  F (36.4  C) (Oral)  Ht 5' 6\" (1.676 m)  Wt 194 lb 12.8 oz (88.4 kg)  LMP 06/25/2018  SpO2 97%  BMI 31.44 kg/m2 Estimated body mass index is 31.44 kg/(m^2) as calculated from the following:    Height as of this encounter: 5' 6\" (1.676 m).    Weight as of this encounter: 194 lb 12.8 oz (88.4 kg).  Medication Reconciliation: complete      Health Maintenance that is potentially due pending provider review:  PHQ9    Completing PHQ9 today.    RICO Ramires  "

## 2018-10-25 LAB
BACTERIA SPEC CULT: NORMAL
HLA-B27 QL NAA+PROBE: NORMAL
SPECIMEN SOURCE: NORMAL

## 2018-10-29 LAB
B LOCUS: NORMAL
B27TEST METHOD: NORMAL

## 2018-11-03 DIAGNOSIS — M94.0 COSTOCHONDRITIS: ICD-10-CM

## 2018-11-05 ENCOUNTER — MYC MEDICAL ADVICE (OUTPATIENT)
Dept: FAMILY MEDICINE | Facility: CLINIC | Age: 38
End: 2018-11-05

## 2018-11-05 DIAGNOSIS — E06.3 HASHIMOTO'S THYROIDITIS: Primary | ICD-10-CM

## 2018-11-05 NOTE — TELEPHONE ENCOUNTER
"Denied  Too early; pt just filled this 10/29/2018  Aria DEL TORO RN    Requested Prescriptions   Pending Prescriptions Disp Refills     diclofenac (VOLTAREN) 1 % GEL topical gel [Pharmacy Med Name: DICLOFENAC 1% GEL 100GM] 100 g 0     Sig: APPLY 4 GRAMS TO KNEES OR 2 GRAMS TO HANDS FOUR TIMES DAILY USING ENCLOSED DOSING CARD    Topical Steroids and Nonsteroidals Protocol Passed    11/3/2018  3:36 AM       Passed - Patient is age 6 or older       Passed - Authorizing prescriber's most recent note related to this medication read.    If refill request is for ophthalmic use, please forward request to provider for approval.         Passed - High potency steroid not ordered       Passed - Recent (12 mo) or future (30 days) visit within the authorizing provider's specialty    Patient had office visit in the last 12 months or has a visit in the next 30 days with authorizing provider or within the authorizing provider's specialty.  See \"Patient Info\" tab in inbasket, or \"Choose Columns\" in Meds & Orders section of the refill encounter.                  "

## 2018-11-05 NOTE — TELEPHONE ENCOUNTER
TSH done 9/27/18  TSH   Date Value Ref Range Status   09/27/2018 1.61 0.40 - 4.00 mU/L Final     Betsey Yepez RN

## 2018-11-06 NOTE — TELEPHONE ENCOUNTER
FUTURE VISIT INFORMATION      FUTURE VISIT INFORMATION:    Date: 11/8/18    Time:     Location: [facility]  REFERRAL INFORMATION:    Referring provider:  self    Referring providers clinic:      Reason for visit/diagnosis  R knee pain- appt per pt    RECORDS REQUESTED FROM:       Clinic name Comments Records Status Imaging Status     n/a n/a

## 2018-11-08 ENCOUNTER — RADIANT APPOINTMENT (OUTPATIENT)
Dept: GENERAL RADIOLOGY | Facility: CLINIC | Age: 38
End: 2018-11-08
Payer: COMMERCIAL

## 2018-11-08 ENCOUNTER — PRE VISIT (OUTPATIENT)
Dept: ORTHOPEDICS | Facility: CLINIC | Age: 38
End: 2018-11-08

## 2018-11-08 ENCOUNTER — OFFICE VISIT (OUTPATIENT)
Dept: ORTHOPEDICS | Facility: CLINIC | Age: 38
End: 2018-11-08
Payer: COMMERCIAL

## 2018-11-08 VITALS — HEIGHT: 66 IN | RESPIRATION RATE: 16 BRPM | BODY MASS INDEX: 31.18 KG/M2 | WEIGHT: 194 LBS

## 2018-11-08 DIAGNOSIS — M25.861 PATELLOFEMORAL DYSFUNCTION, RIGHT: Primary | ICD-10-CM

## 2018-11-08 DIAGNOSIS — M25.561 RIGHT KNEE PAIN, UNSPECIFIED CHRONICITY: ICD-10-CM

## 2018-11-08 DIAGNOSIS — Z98.84 S/P LAPAROSCOPIC SLEEVE GASTRECTOMY: ICD-10-CM

## 2018-11-08 DIAGNOSIS — M25.561 RIGHT KNEE PAIN, UNSPECIFIED CHRONICITY: Primary | ICD-10-CM

## 2018-11-08 NOTE — MR AVS SNAPSHOT
After Visit Summary   11/8/2018    Kasandra Winkler    MRN: [id number]           Patient Information     Date Of Birth          1980        Visit Information        Provider Department      11/8/2018 7:00 AM Homero Gan MD [facility]        Today's Diagnoses     Patellofemoral dysfunction, right    -  1    laparoscopic sleeve gastrectomy 6/29/15           Follow-ups after your visit        Additional Services     PHYSICAL THERAPY REFERRAL (Internal)       Physical Therapy Referral                  Your next 10 appointments already scheduled     Nov 15, 2018  1:00 PM CST   (Arrive by 12:45 PM)   New Patient Visit with VIKTORIA Stanford [facility] ([facility])    [address]  [address]  [place] [address] [phone]              Future tests that were ordered for you today     Open Future Orders        Priority Expected Expires Ordered    PHYSICAL THERAPY REFERRAL (Internal) Routine  11/8/2019 11/8/2018            Who to contact     Please call your clinic at [phone] to:    Ask questions about your health    Make or cancel appointments    Discuss your medicines    Learn about your test results    Speak to your doctor            Additional Information About Your Visit        MyChart Information     Nivela gives you secure access to your electronic health record. If you see a primary care provider, you can also send messages to your care team and make appointments. If you have questions, please call your primary care clinic.  If you do not have a primary care provider, please call [phone] and they will assist you.      Nivela is an electronic gateway that provides easy, online access to your medical records. With Nivela, you can request a clinic appointment, read your test results, renew a prescription or communicate with your care team.     To access your existing account, please contact your  "[facility] or call [phone] for assistance.        Care EveryWhere ID     This is your Care EveryWhere ID. This could be used by other organizations to access your [place] medical records  [id number]        Your Vitals Were     Respirations Height Last Period BMI (Body Mass Index)          16 5' 6\" (1.676 m) 06/25/2018 31.31 kg/m2         Blood Pressure from Last 3 Encounters:   10/24/18 111/76   10/03/18 110/71   10/02/18 138/73    Weight from Last 3 Encounters:   11/08/18 194 lb (88 kg)   10/24/18 194 lb 12.8 oz (88.4 kg)   10/03/18 196 lb 6.4 oz (89.1 kg)               Primary Care Provider Office Phone # Fax #    Trish WILLOUGHBY Cesia  [phone]       [address] [address]        Equal Access to Services     KRIS SILVA : Hadii aad ku hadasho Soomaali, waaxda luqadaha, qaybta kaalmada adeegyada, waxay idiin hayaan kellie will . So [facility] [phone].    ATENCIÓN: Si habla español, tiene a das disposición servicios gratuitos de asistencia lingüística. Llame al [phone].    We comply with applicable federal civil rights laws and [place] laws. We do not discriminate on the basis of race, color, national origin, age, disability, sex, sexual orientation, or gender identity.            Thank you!     Thank you for choosing [facility]  for your care. Our goal is always to provide you with excellent care. Hearing back from our patients is one way we can continue to improve our services. Please take a few minutes to complete the written survey that you may receive in the mail after your visit with us. Thank you!             Your Updated Medication List - Protect others around you: Learn how to safely use, store and throw away your medicines at www.disposemymeds.org.          This list is accurate as of 11/8/18 10:56 AM.  Always use your most recent med list.                   Brand Name Dispense Instructions for use " Diagnosis    amitriptyline 10 MG tablet    ELAVIL    30 tablet    Take 1 tablet (10 mg) by mouth At Bedtime    Major depressive disorder, recurrent episode, mild (H)       azelastine 0.1 % nasal spray    ASTELIN    30 mL    USE 1 TO 2 SPRAYS IN EACH NOSTRIL TWICE DAILY    Allergic rhinitis due to pollen, unspecified seasonality       budesonide 180 MCG/ACT inhaler    PULMICORT FLEXHALER    1 Inhaler    Inhale 2 puffs into the lungs 2 times daily    Moderate persistent asthma with allergic rhinitis without complication       calcium carbonate 500 mg (elemental) 500 MG tablet    OS-JONATAN     Take 500 mg by mouth 2 times daily        cetirizine 10 MG tablet    zyrTEC     Take 10 mg by mouth daily        clonazePAM 0.5 MG tablet    klonoPIN    20 tablet    Take 1 tablet (0.5 mg) by mouth as needed for anxiety    Major depressive disorder, recurrent episode, mild (H)       Cod Liver Oil 1000 MG Caps      Take 1 capsule by mouth daily.        diclofenac 1 % Gel topical gel    VOLTAREN    100 g    Apply 4 grams to knees or 2 grams to hands four times daily using enclosed dosing card.    Costochondritis       fluticasone 110 MCG/ACT Inhaler    FLOVENT HFA    3 Inhaler    Inhale 2 puffs into the lungs 2 times daily    Moderate persistent asthma with allergic rhinitis without complication       fluticasone 50 MCG/ACT spray    FLONASE    16 mL    SHAKE LIQUID AND USE 1 TO 2 SPRAYS IN EACH NOSTRIL DAILY    Seasonal allergic rhinitis due to pollen       HM MULTIVITAMIN ADULT GUMMY PO           SYNTHROID 112 MCG tablet   Generic drug:  levothyroxine     30 tablet    Take 1 tablet (112 mcg) by mouth daily    Hypothyroidism due to Hashimoto's thyroiditis       Vitamin B-12 500 MCG Subl      Place 500 mcg under the tongue daily        Vitamin D3 3000 units Tabs      Take 1 tablet by mouth daily.

## 2018-11-08 NOTE — LETTER
2018      RE: Kasandra Winkler  [address]  [place] [address]        Subjective:   Kasandra Winkler is a 37 year old female who presents with right anterior knee pain. No FARIDEH. She sits at a desk. She likes to walk. She normally does not walk long distances but notes that on Halloween she was trick or treating with her kids and was walking for about 90 minutes and noted she started having right anterior knee pain with stairs and inclines.  Since then she has had this anterior knee pain with a sense of instability.  No swelling, no locking.  Pain is anterior in nature.  She is generally improving but now has noted general aching in the anterior knee such that she is avoiding exercise.  She came in for further evaluation.  She is status post gastric bypass and avoids nonsteroidals orally.  She did try a couple of doses of topical diclofenac without marked relief.       Background:   Date of injury: NA   Duration of symptoms: 4 weeks  Mechanism of Injury: Insidious Onset; Unknown   Aggravating factors: Walking, standing, stairs  Relieving Factors: lying down, ice   Prior Evaluation: None    PAST MEDICAL, SOCIAL, SURGICAL AND FAMILY HISTORY: She  has a past medical history of Allergic rhinitis; Asthma; Hashimoto thyroiditis; Mild major depression (H) (2009); and TMJ (temporomandibular joint disorder).  She  has a past surgical history that includes cholecystectomy, laporoscopic (2009);  section (10/10/10); Finger surgery (); Laparoscopic gastric sleeve (N/A, 2015); and hysterectomy, pap no longer indicated (2018).  Her family history includes Arthritis in her maternal grandmother; Cerebrovascular Disease in her mother; HEART DISEASE in her maternal grandmother; Hypertension in her maternal grandmother and mother; Osteoporosis in her mother; Respiratory in her maternal grandmother; Substance Abuse in her mother.  She reports that she quit smoking about 9 years ago. She  "has a 5.00 pack-year smoking history. She has never used smokeless tobacco. She reports that she drinks alcohol. She reports that she does not use illicit drugs.    ALLERGIES: She is allergic to dust mites; epinephrine; grass; maple tree; nsaids; seasonal allergies; and tolmetin.    CURRENT MEDICATIONS: She has a current medication list which includes the following prescription(s): amitriptyline, azelastine, budesonide, calcium carbonate 500 mg (elemental), cetirizine, vitamin d3, clonazepam, cod liver oil, vitamin b-12, diclofenac, fluticasone, fluticasone, multiple vitamins-minerals, and synthroid.     REVIEW OF SYSTEMS: 10 point review of systems is negative except as noted above.     Exam:   Resp 16  Ht 5' 6\" (1.676 m)  Wt 194 lb (88 kg)  LMP 06/25/2018  BMI 31.31 kg/m2     CONSTITUTIONAL: healthy, alert and no distress  SKIN: no suspicious lesions or rashes  GAIT: broad based  NEUROLOGIC: Non-focal  PSYCHIATRIC: affect normal/bright and mentation appears normal.    MUSCULOSKELETAL:   RIGHT KNEE: Comprehensive knee examination demonstrates FROM, (-) effusion, (++) medial/lateral patellar facet tenderness, (+) patellar inhibition, (-) apprehension; (-) pain or laxity with valgus stress testing in 0 or 30 degrees of knee flexion, (-) pain or laxity with varus stress testing in 0 or 30 degrees of knee flexion, (-) lachman, (-) pivot shift, (-) PD; (+) mild diffuse medial joint line tenderness, (-) lateral joint line tenderness, (-) bounce test, (-) Candie test.       Assessment/Plan:   (M25.861) Patellofemoral dysfunction, right  (primary encounter diagnosis)  Plan: PHYSICAL THERAPY REFERRAL (Internal)        We discussed patellofemoral pain and her general overuse.  She will be placed in a patellofemoral J tracker to help with her immediate discomfort and can pursue some patellar taping if this is not satisfactory when she starts her physical therapy.  She also wants to transition her therapy for her lumbar " spine from orthology to this facility.  I have asked her to just communicate that with the therapist but they will have to be separate visits as she goes through her treatment and she is aware of that.  She will use diclofenac, which she has available at home 4 grams to the anterior knee q.i.d.  She can also use acetaminophen for pain relief.  I suspect she will do well and we have no followup planned.         Homero Gan MD

## 2018-11-08 NOTE — PROGRESS NOTES
Subjective:   Kasandra Winkler is a 37 year old female who presents with right anterior knee pain. No FARIDEH. She sits at a desk. She likes to walk. She normally does not walk long distances but notes that on Halloween she was trick or treating with her kids and was walking for about 90 minutes and noted she started having right anterior knee pain with stairs and inclines.  Since then she has had this anterior knee pain with a sense of instability.  No swelling, no locking.  Pain is anterior in nature.  She is generally improving but now has noted general aching in the anterior knee such that she is avoiding exercise.  She came in for further evaluation.  She is status post gastric bypass and avoids nonsteroidals orally.  She did try a couple of doses of topical diclofenac without marked relief.       Background:   Date of injury: NA   Duration of symptoms: 4 weeks  Mechanism of Injury: Insidious Onset; Unknown   Aggravating factors: Walking, standing, stairs  Relieving Factors: lying down, ice   Prior Evaluation: None    PAST MEDICAL, SOCIAL, SURGICAL AND FAMILY HISTORY: She  has a past medical history of Allergic rhinitis; Asthma; Hashimoto thyroiditis; Mild major depression (H) (2009); and TMJ (temporomandibular joint disorder).  She  has a past surgical history that includes cholecystectomy, laporoscopic (2009);  section (10/10/10); Finger surgery (); Laparoscopic gastric sleeve (N/A, 2015); and hysterectomy, pap no longer indicated (2018).  Her family history includes Arthritis in her maternal grandmother; Cerebrovascular Disease in her mother; HEART DISEASE in her maternal grandmother; Hypertension in her maternal grandmother and mother; Osteoporosis in her mother; Respiratory in her maternal grandmother; Substance Abuse in her mother.  She reports that she quit smoking about 9 years ago. She has a 5.00 pack-year smoking history. She has never used smokeless tobacco. She reports  "that she drinks alcohol. She reports that she does not use illicit drugs.    ALLERGIES: She is allergic to dust mites; epinephrine; grass; maple tree; nsaids; seasonal allergies; and tolmetin.    CURRENT MEDICATIONS: She has a current medication list which includes the following prescription(s): amitriptyline, azelastine, budesonide, calcium carbonate 500 mg (elemental), cetirizine, vitamin d3, clonazepam, cod liver oil, vitamin b-12, diclofenac, fluticasone, fluticasone, multiple vitamins-minerals, and synthroid.     REVIEW OF SYSTEMS: 10 point review of systems is negative except as noted above.     Exam:   Resp 16  Ht 5' 6\" (1.676 m)  Wt 194 lb (88 kg)  LMP 06/25/2018  BMI 31.31 kg/m2     CONSTITUTIONAL: healthy, alert and no distress  SKIN: no suspicious lesions or rashes  GAIT: broad based  NEUROLOGIC: Non-focal  PSYCHIATRIC: affect normal/bright and mentation appears normal.    MUSCULOSKELETAL:   RIGHT KNEE: Comprehensive knee examination demonstrates FROM, (-) effusion, (++) medial/lateral patellar facet tenderness, (+) patellar inhibition, (-) apprehension; (-) pain or laxity with valgus stress testing in 0 or 30 degrees of knee flexion, (-) pain or laxity with varus stress testing in 0 or 30 degrees of knee flexion, (-) lachman, (-) pivot shift, (-) PD; (+) mild diffuse medial joint line tenderness, (-) lateral joint line tenderness, (-) bounce test, (-) Candie test.       Assessment/Plan:   (M25.861) Patellofemoral dysfunction, right  (primary encounter diagnosis)  Plan: PHYSICAL THERAPY REFERRAL (Internal)        We discussed patellofemoral pain and her general overuse.  She will be placed in a patellofemoral J tracker to help with her immediate discomfort and can pursue some patellar taping if this is not satisfactory when she starts her physical therapy.  She also wants to transition her therapy for her lumbar spine from [facility] to this facility.  I have asked her to just communicate that with " the therapist but they will have to be separate visits as she goes through her treatment and she is aware of that.  She will use diclofenac, which she has available at home 4 grams to the anterior knee q.i.d.  She can also use acetaminophen for pain relief.  I suspect she will do well and we have no followup planned.

## 2018-11-15 ENCOUNTER — OFFICE VISIT (OUTPATIENT)
Dept: RHEUMATOLOGY | Facility: CLINIC | Age: 38
End: 2018-11-15
Attending: NURSE PRACTITIONER
Payer: COMMERCIAL

## 2018-11-15 ENCOUNTER — RADIANT APPOINTMENT (OUTPATIENT)
Dept: GENERAL RADIOLOGY | Facility: CLINIC | Age: 38
End: 2018-11-15
Payer: COMMERCIAL

## 2018-11-15 ENCOUNTER — THERAPY VISIT (OUTPATIENT)
Dept: PHYSICAL THERAPY | Facility: CLINIC | Age: 38
End: 2018-11-15
Attending: FAMILY MEDICINE
Payer: COMMERCIAL

## 2018-11-15 VITALS
BODY MASS INDEX: 31.78 KG/M2 | SYSTOLIC BLOOD PRESSURE: 121 MMHG | WEIGHT: 196.9 LBS | DIASTOLIC BLOOD PRESSURE: 79 MMHG | TEMPERATURE: 98.6 F | HEART RATE: 70 BPM | OXYGEN SATURATION: 97 %

## 2018-11-15 DIAGNOSIS — M22.2X9 PATELLOFEMORAL PAIN SYNDROME: Primary | ICD-10-CM

## 2018-11-15 DIAGNOSIS — M25.861 PATELLOFEMORAL DYSFUNCTION, RIGHT: ICD-10-CM

## 2018-11-15 DIAGNOSIS — H16.229 KCS (KERATOCONJUNCTIVITIS SICCA): ICD-10-CM

## 2018-11-15 DIAGNOSIS — M25.50 POLYARTHRALGIA: ICD-10-CM

## 2018-11-15 DIAGNOSIS — M54.89 MIDLINE BACK PAIN, UNSPECIFIED BACK LOCATION, UNSPECIFIED CHRONICITY: ICD-10-CM

## 2018-11-15 DIAGNOSIS — Z15.89 HLA B27 POSITIVE: ICD-10-CM

## 2018-11-15 DIAGNOSIS — E06.3 HASHIMOTO'S THYROIDITIS: ICD-10-CM

## 2018-11-15 DIAGNOSIS — H16.229 KCS (KERATOCONJUNCTIVITIS SICCA): Primary | ICD-10-CM

## 2018-11-15 LAB
ALBUMIN SERPL-MCNC: 3.7 G/DL (ref 3.4–5)
ALT SERPL W P-5'-P-CCNC: 20 U/L (ref 0–50)
AST SERPL W P-5'-P-CCNC: 14 U/L (ref 0–45)
CREAT SERPL-MCNC: 0.69 MG/DL (ref 0.52–1.04)
CRP SERPL-MCNC: <2.9 MG/L (ref 0–8)
ERYTHROCYTE [DISTWIDTH] IN BLOOD BY AUTOMATED COUNT: 12.6 % (ref 10–15)
ERYTHROCYTE [SEDIMENTATION RATE] IN BLOOD BY WESTERGREN METHOD: 14 MM/H (ref 0–20)
GFR SERPL CREATININE-BSD FRML MDRD: >90 ML/MIN/1.7M2
HCT VFR BLD AUTO: 40.6 % (ref 35–47)
HGB BLD-MCNC: 13 G/DL (ref 11.7–15.7)
MCH RBC QN AUTO: 28.6 PG (ref 26.5–33)
MCHC RBC AUTO-ENTMCNC: 32 G/DL (ref 31.5–36.5)
MCV RBC AUTO: 89 FL (ref 78–100)
PLATELET # BLD AUTO: 305 10E9/L (ref 150–450)
RBC # BLD AUTO: 4.54 10E12/L (ref 3.8–5.2)
TSH SERPL DL<=0.005 MIU/L-ACNC: 3.41 MU/L (ref 0.4–4)
WBC # BLD AUTO: 7 10E9/L (ref 4–11)

## 2018-11-15 PROCEDURE — 82040 ASSAY OF SERUM ALBUMIN: CPT | Performed by: FAMILY MEDICINE

## 2018-11-15 PROCEDURE — 84443 ASSAY THYROID STIM HORMONE: CPT | Performed by: FAMILY MEDICINE

## 2018-11-15 PROCEDURE — 97112 NEUROMUSCULAR REEDUCATION: CPT | Mod: GP | Performed by: PHYSICAL THERAPIST

## 2018-11-15 PROCEDURE — 36415 COLL VENOUS BLD VENIPUNCTURE: CPT | Performed by: FAMILY MEDICINE

## 2018-11-15 PROCEDURE — 86235 NUCLEAR ANTIGEN ANTIBODY: CPT | Performed by: NURSE PRACTITIONER

## 2018-11-15 PROCEDURE — 84450 TRANSFERASE (AST) (SGOT): CPT | Performed by: FAMILY MEDICINE

## 2018-11-15 PROCEDURE — 84460 ALANINE AMINO (ALT) (SGPT): CPT | Performed by: FAMILY MEDICINE

## 2018-11-15 PROCEDURE — 85027 COMPLETE CBC AUTOMATED: CPT | Performed by: FAMILY MEDICINE

## 2018-11-15 PROCEDURE — G0463 HOSPITAL OUTPT CLINIC VISIT: HCPCS | Mod: ZF

## 2018-11-15 PROCEDURE — 86039 ANTINUCLEAR ANTIBODIES (ANA): CPT | Performed by: FAMILY MEDICINE

## 2018-11-15 PROCEDURE — 85652 RBC SED RATE AUTOMATED: CPT | Performed by: FAMILY MEDICINE

## 2018-11-15 PROCEDURE — 86200 CCP ANTIBODY: CPT | Performed by: FAMILY MEDICINE

## 2018-11-15 PROCEDURE — 86140 C-REACTIVE PROTEIN: CPT | Performed by: FAMILY MEDICINE

## 2018-11-15 PROCEDURE — 86431 RHEUMATOID FACTOR QUANT: CPT | Performed by: FAMILY MEDICINE

## 2018-11-15 PROCEDURE — 86038 ANTINUCLEAR ANTIBODIES: CPT | Performed by: FAMILY MEDICINE

## 2018-11-15 PROCEDURE — 97161 PT EVAL LOW COMPLEX 20 MIN: CPT | Mod: GP | Performed by: PHYSICAL THERAPIST

## 2018-11-15 PROCEDURE — 82565 ASSAY OF CREATININE: CPT | Performed by: FAMILY MEDICINE

## 2018-11-15 ASSESSMENT — ACTIVITIES OF DAILY LIVING (ADL)
AS_A_RESULT_OF_YOUR_KNEE_INJURY,_HOW_WOULD_YOU_RATE_YOUR_CURRENT_LEVEL_OF_DAILY_ACTIVITY?: SEVERELY ABNORMAL
SWELLING: I DO NOT HAVE THE SYMPTOM
SIT WITH YOUR KNEE BENT: ACTIVITY IS VERY DIFFICULT
KNEE_ACTIVITY_OF_DAILY_LIVING_SUM: 38
RAW_SCORE: 38
SQUAT: I AM UNABLE TO DO THE ACTIVITY
GO DOWN STAIRS: ACTIVITY IS VERY DIFFICULT
WEAKNESS: I DO NOT HAVE THE SYMPTOM
HOW_WOULD_YOU_RATE_THE_CURRENT_FUNCTION_OF_YOUR_KNEE_DURING_YOUR_USUAL_DAILY_ACTIVITIES_ON_A_SCALE_FROM_0_TO_100_WITH_100_BEING_YOUR_LEVEL_OF_KNEE_FUNCTION_PRIOR_TO_YOUR_INJURY_AND_0_BEING_THE_INABILITY_TO_PERFORM_ANY_OF_YOUR_USUAL_DAILY_ACTIVITIES?: 20
LIMPING: I DO NOT HAVE THE SYMPTOM
GO UP STAIRS: ACTIVITY IS VERY DIFFICULT
KNEE_ACTIVITY_OF_DAILY_LIVING_SCORE: 54.29
GIVING WAY, BUCKLING OR SHIFTING OF KNEE: I DO NOT HAVE THE SYMPTOM
WALK: ACTIVITY IS SOMEWHAT DIFFICULT
HOW_WOULD_YOU_RATE_THE_OVERALL_FUNCTION_OF_YOUR_KNEE_DURING_YOUR_USUAL_DAILY_ACTIVITIES?: SEVERELY ABNORMAL
STAND: ACTIVITY IS FAIRLY DIFFICULT
KNEEL ON THE FRONT OF YOUR KNEE: ACTIVITY IS VERY DIFFICULT
PAIN: THE SYMPTOM AFFECTS MY ACTIVITY SEVERELY
STIFFNESS: I DO NOT HAVE THE SYMPTOM
RISE FROM A CHAIR: ACTIVITY IS SOMEWHAT DIFFICULT

## 2018-11-15 ASSESSMENT — PAIN SCALES - GENERAL: PAINLEVEL: SEVERE PAIN (7)

## 2018-11-15 NOTE — PROGRESS NOTES
"[facility] - Rheumatology Clinic Visit    Kasandra Winkler  is a 37 year old here for consultation back pain. -RF -CCP -C3 C4 -DsDNA -smith -RNP 2011. -RF/MANDIE 2011. -hep b 2014 +HLA B27 with NL CRP/ESR 10-. Seen Dr. Henning  (negative work-up with NL labs and x-rays). Seen Dr. [facility] 7- 2016 -record reviewed (unremarkable; non-specific arthralgia and low back pain; I dont have labs or x-rays results). TSH normal 9-2018    Past-ibuprofen not able take now and no NSAIDs due to gastrectomy, tylenol and heat/rest helps    Chronic progressive pain in knees, now dx PFS and tendonitis, hands small joints and wrists to the tops of the hands at times not as often, morning back pain improves with movements, SI joints, inside hip joints, knees worse going up and down the stairs, \"butt hurts\", worse in the mornings everything hurts jim the back, hard to get out of bed but does not notice much in her hands; eyes gritty and at times will get intermittent red with blur vision did see opthalmologist  At [facility] told was dry eyes, cold intolerance. Maybe some swelling behind the knees. No psoriaisis or skin changes. WIll get flares. Not able to take NSAIDs. Some hairloss, has had crowns and cavities before. Some dry mouth. Denies any fever, chills, SOB, TREJO, night sweats, or chest pain, or cough. Reports healthy. Some brain fog at times she reports. No tick bites. No recent infections. She goes to [facility] for her back pain. This had not helped. No neuro signs or symptoms. Does feel her symptoms are progressive over the years.     PMH:  Injury-None   Medical- asthma, hashimotos thyroiditis, depression, TMJ, allergic rhinitis, LUCIEN (adiomyosis so had HYST due to  Heavy periods at [place]), migraine, irregular menses, bad anxiety, lap sleeve gastrectomy 6-2015, low vitamin D, PFS right knee, DDD cervical spine C5-6, C6-7, mild foraminal narrowing C5-6 C6-7 MRI cervical 2016, " costrochondritis.      Surgical-  Past Surgical History:   Procedure Laterality Date      SECTION  10/10/10     CHOLECYSTECTOMY, LAPOROSCOPIC  2009    gallstones -      FINGER SURGERY      right little finger fracture     HYSTERECTOMY, PAP NO LONGER INDICATED  2018    ovaries remain - uterus and cervix removed     LAPAROSCOPIC GASTRIC SLEEVE N/A 2015    Procedure: LAPAROSCOPIC GASTRIC SLEEVE;  Surgeon: Cam Alvarez MD;  Location: [facility] OR       FH:  No autoimmune disorders, psoriasis, UC, crohn's, SLE,  PsA, gout, autoimmune thyroid.  No MS in family  Mother-osteoporosis, hypertension, CVD brain aneursym with CVIs, substance abuse-passed Aug 2018   Father-unknown   Siblings-1/2 siblings unknown  Children-1 healthy  MGM-heart disease, hypertension, RHEUMATOID ARTHRITIS (RA) with deformed hands,asthma   Cousin UC and fibromyalgia   Cousin thyroid removed     SH:  1-2 month Alcohol. Former Smoking quit  (10 yr). No IVDU. 1 Children. Right handed.  wife patient here. Work in . At times hands bother      [facility] personally reviewed and updated by me.    ROS:  Negative raynaud s phenomena, sun sensitivities,  pleurisy, inflammatory joint symptoms, significant rashes like malar, oral/nasal or ulcerations, inflammatory eye disease, inflammatory bowel disease, dactylitis, tenosynovitis, or enthespathy. Negative for miscarriages over 2 months into pregnancy, blood clots, gout, psoriasis, UC, crohn's. No temporal headache, no jaw claudication, no scalp tenderness, vision changes, carotidynia, cough. No STD. No Parotid swelling.   +dry gritty eyes at times with red and blur vision, seen opthalmologist  [facility]  +cold easy  +brain fog   +chronic hairloss   +constipated and at times diarrhea, at times blood seen [facility] eat fibro  +costrochondritis 2 months ago, gets this few times   CONSTITUTIONAL: No fevers, night sweats or unintentional weight change. No acute distress,  swollen glands  EYES: No vision change, diplopia, pain in eyes or red eyes   EARS, NOSE, MOUTH, THROAT: No tinnitus or hearing change, no epistaxis or nasal discharge, no oral lesions, throat clear. Normal saliva pool.  No drymouth. No thyroid enlargement.   CARDIOVASCULAR: No chest pain, palpitations, or pain with walking, no orthopnea or PND   RESPIRATORY: No dyspnea, cough, shortness of breath or wheezing. No pleurisy.   GI: No nausea, vomiting, diarrhea or constipation, no abdominal pain, or blood in stools.   : No change in urine, no dysuria or hematuria   MUSCKL: See above. No nodules. No enthesitis, plantar fascitis or heel pain.   INTEGUMENTARY: No concerning lesions or moles   NEURO: No loss of strength or sensation, no numbness or tingling, no tremor, no dizziness, no headache. No falls   ENDO: No polyuria or polydipsia, no temperature intolerance   HEME/LYMPH:No concerning bumps, bleeding problems, or swollen lymph nodes. No recent infections, hospitalizations or new illnesses.   Otherwise 14 point ROS obtained, reviewed and found negative.     Physical exam:  Vitals: Blood pressure 121/79, pulse 70, temperature 98.6  F (37  C), temperature source Oral, weight 89.3 kg (196 lb 14.4 oz), last menstrual period 06/25/2018, SpO2 97 %, not currently breastfeeding.  Wt Readings from Last 4 Encounters:   11/15/18 89.3 kg (196 lb 14.4 oz)   11/08/18 88 kg (194 lb)   10/24/18 88.4 kg (194 lb 12.8 oz)   10/03/18 89.1 kg (196 lb 6.4 oz)     Constitutional: WD-WN-WG cooperative  Eyes: nl EOM, PERRLA, vision, conjunctiva, sclera, No Unilateral or bilateral external ear inflammation   ENT: nl external ears, nose, hearing, lips, teeth, gums, throat. No saddle nose   Neck: no mass or thyroid enlargement  Resp: lungs clear to auscultation, nl to palpation, nl effort  CV: RRR, no murmurs, rubs or gallops, no edema  GI: no ABD mass or tenderness, no HSM  : not tested  Lymph: no cervical or epitrochlear nodes  MS: TEnder  "in lumber spine and SI joints. Tender in MTPs. And diffuse tenderness. All TMJ, neck, shoulder, elbow, wrist, hand, spine, hip, knee, ankle, and foot joints were examined and otherwise found normal. Normal  strength. No active synovitis or deformity. Full ROM. Normal prayer sign. Negative Negative Lhermitte's sign. No periuncle erythema  Skin: no nail pitting, alopecia, rash  Neuro: nl cranial nerves, strength, sensation, DTRs.   Psych: nl judgement, orientation, memory, affect.      Labs/Imaging:    Results for orders placed or performed in visit on 11/08/18   X-ray Knee Right 3 Views    Narrative    3 views right knee radiographs 11/8/2018 8:28 AM    History: Standing: AP/LAT/[place]; Right knee pain, unspecified  chronicity    Comparison: None    Findings:    AP, lateral and patellofemoral views of the right knee were obtained.     No acute osseous abnormality. Trace joint effusion.    Minimal patellofemoral spurring. Joint spaces are relatively  preserved.    Slight lateral patellar tilt.  Soft tissue is unremarkable.      Impression    Impression:  No acute osseous abnormality.         OLIVIA GILMORE MD       Impression/Plan:    1. +HLA B27 with polyarthralgias, costrochondritis; hashimotos, PFS knees. Exam does not show any active Inflammatory arthritis, synovitis or tendonitis. No clear inflammatory back signs or symptoms   2. Low back pain, referral to spine provider who can assess her 1st to decide on imaging   3. Episodes of \"dry eye\" with blurred vision and red. Past seen [facility], will refer her to opthalmologist here when this happens and she will take a photos. Risk of inflammatory eye disease with +HLA B27 and sjogrens. Will do MANDIE, SSa, and SSb.      Past seen rheumatologist without clear dx of autoimmune process. DDX undifferentiated spondy, HLA associated spondyarthropathy, fibromyalgia, RHEUMATOID ARTHRITIS (RA), sjogrens, Inflammatory arthritis nor coming from her back (does have DDD " cervical spine). No clear autoimmune process today. I would like her to see opthalmologist  Here to evaluate her eyes for sjogrens and inflammatory eye process. I will do x-rays of her SI joints to look for erosions or sclerosis, and xrays of the feet to look for erosions or damage from possible Inflammatory arthritis  Process. She is not able to take NSAIDs due to her past gastrectomy and no steroids she is concerned with her severe FOREST. We will proceed with labs and imaging first. Some RHEUMATOID ARTHRITIS (RA) and autoimmune disease risk factor in family, and some of her family history is not known; and she has hashimotos thyroiditis (recent TSH normal). She does not wish for steroid trial     Will have her RTC after testing done   [facility] for [facility]     The patient understood the rationale for the diagnosis and treatment plan. Patient shared in the decision making. All questions were answered to best of my ability and the patient's satisfaction  Dominick BLUM, CNP, MSN  [facility]  Department of Rheumatology & Autoimmune Disorders    1. Immunization: Reviewed CDC guidelines with patient updated, information provided to patient based on CDC guidelines for vaccination recommendations, patient will discuss with primary provider  2. Bone Health:Educated on adequate calcium and vitamin D intake, Educated on exercise, Glucocorticoid education discussed risks, benefits & potential long term side effects from use  3. Discussed routine annual physicals, cancer screening, cardiac risk monitoring, bone health and primary care with primary care provider.   4. Educated on diagnosis, prognosis, labs, imaging, treatment options (including risks, benefits, risk of no treatment), medications (use, dose, side-effects, risks of medications including infection/cancer/bone marrow suppression, lab monitoring), infections what to do, plan of cares, goals of treatment.

## 2018-11-15 NOTE — LETTER
"11/15/2018      RE: Kasandra Winkler  [address]  [facility] [address]       [facility] - Rheumatology Clinic Visit    Kasandra Winkler  is a 37 year old here for consultation back pain. -RF -CCP -C3 C4 -DsDNA -smith -RNP 2011. -RF/MANDIE 2011. -hep b 2014 +HLA B27 with NL CRP/ESR 10-. Seen Dr. Henning  (negative work-up with NL labs and x-rays). Seen Dr. Souza [facility] Rheum 7- 2016 -record reviewed (unremarkable; non-specific arthralgia and low back pain; I dont have labs or x-rays results). TSH normal 9-2018    Past-ibuprofen not able take now and no NSAIDs due to gastrectomy, tylenol and heat/rest helps    Chronic progressive pain in knees, now dx PFS and tendonitis, hands small joints and wrists to the tops of the hands at times not as often, morning back pain improves with movements, SI joints, inside hip joints, knees worse going up and down the stairs, \"butt hurts\", worse in the mornings everything hurts jim the back, hard to get out of bed but does not notice much in her hands; eyes gritty and at times will get intermittent red with blur vision did see opthalmologist  At [facility] told was dry eyes, cold intolerance. Maybe some swelling behind the knees. No psoriaisis or skin changes. WIll get flares. Not able to take NSAIDs. Some hairloss, has had crowns and cavities before. Some dry mouth. Denies any fever, chills, SOB, TREJO, night sweats, or chest pain, or cough. Reports healthy. Some brain fog at times she reports. No tick bites. No recent infections. She goes to [facility] for her back pain. This had not helped. No neuro signs or symptoms. Does feel her symptoms are progressive over the years.     PMH:  Injury-None   Medical- asthma, hashimotos thyroiditis, depression, TMJ, allergic rhinitis, LUCIEN (adiomyosis so had HYST due to  Heavy periods at [place]), migraine, irregular menses, bad anxiety, lap sleeve gastrectomy 6-2015, low vitamin D, PFS right knee, " DDD cervical spine C5-6, C6-7, mild foraminal narrowing C5-6 C6-7 MRI cervical 2016, costrochondritis.      Surgical-  Past Surgical History:   Procedure Laterality Date      SECTION  10/10/10     CHOLECYSTECTOMY, LAPOROSCOPIC  2009    gallstones -      FINGER SURGERY      right little finger fracture     HYSTERECTOMY, PAP NO LONGER INDICATED  2018    ovaries remain - uterus and cervix removed     LAPAROSCOPIC GASTRIC SLEEVE N/A 2015    Procedure: LAPAROSCOPIC GASTRIC SLEEVE;  Surgeon: Cam Alvarez MD;  Location: [facility] OR       FH:  No autoimmune disorders, psoriasis, UC, crohn's, SLE,  PsA, gout, autoimmune thyroid.  No MS in family  Mother-osteoporosis, hypertension, CVD brain aneursym with CVIs, substance abuse-passed Aug 2018   Father-unknown   Siblings-1/2 siblings unknown  Children-1 healthy  MGM-heart disease, hypertension, RHEUMATOID ARTHRITIS (RA) with deformed hands,asthma   Cousin UC and fibromyalgia   Cousin thyroid removed     SH:  1-2 month Alcohol. Former Smoking quit  (10 yr). No IVDU. 1 Children. Right handed.  wife patient here. Work in . At times hands bother      [facility] personally reviewed and updated by me.    ROS:  Negative raynaud s phenomena, sun sensitivities,  pleurisy, inflammatory joint symptoms, significant rashes like malar, oral/nasal or ulcerations, inflammatory eye disease, inflammatory bowel disease, dactylitis, tenosynovitis, or enthespathy. Negative for miscarriages over 2 months into pregnancy, blood clots, gout, psoriasis, UC, crohn's. No temporal headache, no jaw claudication, no scalp tenderness, vision changes, carotidynia, cough. No STD. No Parotid swelling.   +dry gritty eyes at times with red and blur vision, seen opthalmologist  [facility]  +cold easy  +brain fog   +chronic hairloss   +constipated and at times diarrhea, at times blood seen [place] eat fibro  +costrochondritis 2 months ago, gets this few times    CONSTITUTIONAL: No fevers, night sweats or unintentional weight change. No acute distress, swollen glands  EYES: No vision change, diplopia, pain in eyes or red eyes   EARS, NOSE, MOUTH, THROAT: No tinnitus or hearing change, no epistaxis or nasal discharge, no oral lesions, throat clear. Normal saliva pool.  No drymouth. No thyroid enlargement.   CARDIOVASCULAR: No chest pain, palpitations, or pain with walking, no orthopnea or PND   RESPIRATORY: No dyspnea, cough, shortness of breath or wheezing. No pleurisy.   GI: No nausea, vomiting, diarrhea or constipation, no abdominal pain, or blood in stools.   : No change in urine, no dysuria or hematuria   MUSCKL: See above. No nodules. No enthesitis, plantar fascitis or heel pain.   INTEGUMENTARY: No concerning lesions or moles   NEURO: No loss of strength or sensation, no numbness or tingling, no tremor, no dizziness, no headache. No falls   ENDO: No polyuria or polydipsia, no temperature intolerance   HEME/LYMPH:No concerning bumps, bleeding problems, or swollen lymph nodes. No recent infections, hospitalizations or new illnesses.   Otherwise 14 point ROS obtained, reviewed and found negative.     Physical exam:  Vitals: Blood pressure 121/79, pulse 70, temperature 98.6  F (37  C), temperature source Oral, weight 89.3 kg (196 lb 14.4 oz), last menstrual period 06/25/2018, SpO2 97 %, not currently breastfeeding.  Wt Readings from Last 4 Encounters:   11/15/18 89.3 kg (196 lb 14.4 oz)   11/08/18 88 kg (194 lb)   10/24/18 88.4 kg (194 lb 12.8 oz)   10/03/18 89.1 kg (196 lb 6.4 oz)     Constitutional: WD-WN-WG cooperative  Eyes: nl EOM, PERRLA, vision, conjunctiva, sclera, No Unilateral or bilateral external ear inflammation   ENT: nl external ears, nose, hearing, lips, teeth, gums, throat. No saddle nose   Neck: no mass or thyroid enlargement  Resp: lungs clear to auscultation, nl to palpation, nl effort  CV: RRR, no murmurs, rubs or gallops, no edema  GI: no ABD mass  "or tenderness, no HSM  : not tested  Lymph: no cervical or epitrochlear nodes  MS: TEnder in lumber spine and SI joints. Tender in MTPs. And diffuse tenderness. All TMJ, neck, shoulder, elbow, wrist, hand, spine, hip, knee, ankle, and foot joints were examined and otherwise found normal. Normal  strength. No active synovitis or deformity. Full ROM. Normal prayer sign. Negative Negative Lhermitte's sign. No periuncle erythema  Skin: no nail pitting, alopecia, rash  Neuro: nl cranial nerves, strength, sensation, DTRs.   Psych: nl judgement, orientation, memory, affect.      Labs/Imaging:    Results for orders placed or performed in visit on 11/08/18   X-ray Knee Right 3 Views    Narrative    3 views right knee radiographs 11/8/2018 8:28 AM    History: Standing: AP/LAT/[place]; Right knee pain, unspecified  chronicity    Comparison: None    Findings:    AP, lateral and patellofemoral views of the right knee were obtained.     No acute osseous abnormality. Trace joint effusion.    Minimal patellofemoral spurring. Joint spaces are relatively  preserved.    Slight lateral patellar tilt.  Soft tissue is unremarkable.      Impression    Impression:  No acute osseous abnormality.         OLIVIA GILMORE MD       Impression/Plan:    1. +HLA B27 with polyarthralgias, costrochondritis; hashimotos, PFS knees. Exam does not show any active Inflammatory arthritis, synovitis or tendonitis. No clear inflammatory back signs or symptoms   2. Low back pain, referral to spine provider who can assess her 1st to decide on imaging   3. Episodes of \"dry eye\" with blurred vision and red. Past seen [facility], will refer her to opthalmologist here when this happens and she will take a photos. Risk of inflammatory eye disease with +HLA B27 and sjogrens. Will do MANDIE, SSa, and SSb.      Past seen rheumatologist without clear dx of autoimmune process. DDX undifferentiated spondy, HLA associated spondyarthropathy, fibromyalgia, RHEUMATOID " ARTHRITIS (RA), sjogrens, Inflammatory arthritis nor coming from her back (does have DDD cervical spine). No clear autoimmune process today. I would like her to see opthalmologist  Here to evaluate her eyes for sjogrens and inflammatory eye process. I will do x-rays of her SI joints to look for erosions or sclerosis, and xrays of the feet to look for erosions or damage from possible Inflammatory arthritis  Process. She is not able to take NSAIDs due to her past gastrectomy and no steroids she is concerned with her severe FOREST. We will proceed with labs and imaging first. Some RHEUMATOID ARTHRITIS (RA) and autoimmune disease risk factor in family, and some of her family history is not known; and she has hashimotos thyroiditis (recent TSH normal). She does not wish for steroid trial     Will have her RTC after testing done   [facility] for [facility]     The patient understood the rationale for the diagnosis and treatment plan. Patient shared in the decision making. All questions were answered to best of my ability and the patient's satisfaction  Dominick BLUM, CNP, MSN  [facility]  Department of Rheumatology & Autoimmune Disorders    1. Immunization: Reviewed CDC guidelines with patient updated, information provided to patient based on CDC guidelines for vaccination recommendations, patient will discuss with primary provider  2. Bone Health:Educated on adequate calcium and vitamin D intake, Educated on exercise, Glucocorticoid education discussed risks, benefits & potential long term side effects from use  3. Discussed routine annual physicals, cancer screening, cardiac risk monitoring, bone health and primary care with primary care provider.   4. Educated on diagnosis, prognosis, labs, imaging, treatment options (including risks, benefits, risk of no treatment), medications (use, dose, side-effects, risks of medications including infection/cancer/bone marrow suppression, lab monitoring), infections  what to do, plan of cares, goals of treatment.       Dominick Cordero, APRN CNP

## 2018-11-15 NOTE — PROGRESS NOTES
"KEY PT FINDINGS:  1) R patellofemoral pain  2) Lateral patellar tilt/tracking  3) Impaired hip neuromotor control    Physical Therapy Initial Evaluation: Subjective History     Injury/Condition Details:  Presenting Complaint R knee pain that started 10/31/18, walking up and down hills and steps trick or treating. Notes some mild bilateral knee pain prior to this incident. Felt that her knee was going to give out while going up and down stairs. Driving and sitting makes it worse. Notes some recent calf \"twitching.\" Some tingling in anterior knee.    Onset Timing/Date 10/31/18   Mechanism  Walking up and down hills     Symptom Behavior Details    Primary Symptoms Sporadic symptoms; Activity/position dependent, pain (Location: anterior knee, Quality: Sharp), weakness   Worst Pain 7/10 (with walking)   Symptom Provocators Walking, sitting, driving   Best Pain 2/10    Symptom Relievers Rest, first thing in the morning    Time of day dependent? Worse in evening after activity   Recent symptom change? symptoms improving     Prior Testing/Intervention for current condition:  Prior Tests  x-ray   Prior Treatment none     Lifestyle & General Medical History:  Employment     Usual physical activities  (within past year)     Orthopaedic history Mild bilateral knee pain   Notable medical history See Epic Chart   Patient Reported Health good     KNEE:    PROM:   L  R   Hyperextension     Extension 0 0   Flexion 130 135     Strength:   L R   HIP     Flex     Ext     Abd 4+/5 4/5   KNEE     Flex 5/5 4+/5   Ext 5/5 4/5         Special tests:   L R   Anterior Drawer  -   Posterior Drawer  -   Lachman's     Valgus 0 degrees  -   Valgus 30 degrees  -   Varus 0 degrees  -   Varus 30 degrees  -   Candie's  -   Appley's     Lateral Compression     Patellar Compression  +       Palpation: Mild TTP retropatellar facets, patellar tendons , infrapatellar fat pad    Patellar tracking: Lateral tracking, lateral tilt    Functional: " Squat - quad dominant, anterior knee translation     Gait: Antalgic    Patient is a 37 year old female with right side knee complaints.    Patient has the following significant findings with corresponding treatment plan.                Diagnosis 1:  R knee pain  Pain -  hot/cold therapy, manual therapy, splint/taping/bracing/orthotics, self management, education and home program  Decreased ROM/flexibility - manual therapy, therapeutic exercise, therapeutic activity and home program  Decreased strength - therapeutic exercise, therapeutic activities and home program  Impaired muscle performance - neuro re-education and home program  Decreased function - therapeutic activities, home program and functional performance testing    Therapy Evaluation Codes:   1) History comprised of:   Personal factors that impact the plan of care:      None.    Comorbidity factors that impact the plan of care are:      Asthma, Migraines/headaches and Overweight.     Medications impacting care: Anti-inflammatory, Pain and Thyroid.  2) Examination of Body Systems comprised of:   Body structures and functions that impact the plan of care:      Knee.   Activity limitations that impact the plan of care are:      Lifting, Sitting, Squatting/kneeling, Stairs and Walking.  3) Clinical presentation characteristics are:   Stable/Uncomplicated.  4) Decision-Making    Low complexity using standardized patient assessment instrument and/or measureable assessment of functional outcome.  Cumulative Therapy Evaluation is: Low complexity.    Previous and current functional limitations:  (See Goal Flow Sheet for this information)    Short term and Long term goals: (See Goal Flow Sheet for this information)     Communication ability:  Patient appears to be able to clearly communicate and understand verbal and written communication and follow directions correctly.  Treatment Explanation - The following has been discussed with the patient:   RX ordered/plan of  care  Anticipated outcomes  Possible risks and side effects  This patient would benefit from PT intervention to resume normal activities.   Rehab potential is good.    Frequency:  1 X week, once daily  Duration:  for 8 weeks  Discharge Plan:  Achieve all LTG.  Independent in home treatment program.  Reach maximal therapeutic benefit.    Please refer to the daily flowsheet for treatment today, total treatment time and time spent performing 1:1 timed codes.

## 2018-11-15 NOTE — MR AVS SNAPSHOT
After Visit Summary   11/15/2018    Kasandra Winkler    MRN: [id number]           Patient Information     Date Of Birth          1980        Visit Information        Provider Department      11/15/2018 1:00 PM Dominick Cordero APRN CNP [facility]        Today's Diagnoses     KCS (keratoconjunctivitis sicca) (H)    -  1    Midline back pain, unspecified back location, unspecified chronicity        Polyarthralgia        HLA B27 positive           Follow-ups after your visit        Additional Services     ORTHO  REFERRAL       [facility] is referring you to the Orthopedic  Services at [facility].       The  Representative will assist you in the coordination of your Orthopedic and Musculoskeletal Care as prescribed by your physician.    The  Representative will call you within 1 business day to help schedule your appointment, or you may contact the  Representative at:    All areas ~ [phone]     Type of Referral : Spine: Lumbar: Medical Spine/Non-Surgical Specialist        Timeframe requested: Routine    Coverage of these services is subject to the terms and limitations of your health insurance plan.  Please call member services at your health plan with any benefit or coverage questions.      If X-rays, CT or MRI's have been performed, please contact the facility where they were done to arrange for , prior to your scheduled appointment.  Please bring this referral request to your appointment and present it to your specialist.                  Follow-up notes from your care team     Return for Labs + X-rays Today.      Your next 10 appointments already scheduled     Nov 20, 2018  9:20 AM CST   [facility] Extremity with Jason Horan, PT   [facility] [place] ([facility] [place])    [address] [place] [address] [phone]              Future tests that were  ordered for you today     Open Future Orders        Priority Expected Expires Ordered    XR Sacroiliac Joint G/E 3 Views Routine 11/15/2018 12/15/2018 11/15/2018            Who to contact     If you have questions or need follow up information about today's clinic visit or your schedule please contact [facility] RHEUMATOLOG[facility] directly at [phone].  Normal or non-critical lab and imaging results will be communicated to you by [employer], letter or phone within 4 business days after the clinic has received the results. If you do not hear from us within 7 days, please contact the clinic through [employer] or phone. If you have a critical or abnormal lab result, we will notify you by phone as soon as possible.  Submit refill requests through [employer] or call your pharmacy and they will forward the refill request to us. Please allow 3 business days for your refill to be completed.          Additional Information About Your Visit        [employer] Information     [employer] gives you secure access to your electronic health record. If you see a primary care provider, you can also send messages to your care team and make appointments. If you have questions, please call your primary care clinic.  If you do not have a primary care provider, please call [phone] and they will assist you.        Care EveryWhere ID     This is your Care EveryWhere ID. This could be used by other organizations to access your [place] medical records  [id number]        Your Vitals Were     Pulse Temperature Last Period Pulse Oximetry BMI (Body Mass Index)       70 98.6  F (37  C) (Oral) 06/25/2018 97% 31.78 kg/m2        Blood Pressure from Last 3 Encounters:   11/15/18 121/79   10/24/18 111/76   10/03/18 110/71    Weight from Last 3 Encounters:   11/15/18 89.3 kg (196 lb 14.4 oz)   11/08/18 88 kg (194 lb)   10/24/18 88.4 kg (194 lb 12.8 oz)              We Performed the Following     ORTHO  REFERRAL        Primary Care Provider Office Phone #  Fax #    Trish Callaway [phone] [address] [address]        Equal Access to Services     KRIS SILVA : Krishna christianson priscilla Sohumphrey, waemyda luqadaha, qaybta kaalmada jessica, chris blankenship laUmmdahlia grimm. So [facility] [phone].    ATENCIÓN: Si habla español, tiene a das disposición servicios gratuitos de asistencia lingüística. Llame al [phone].    We comply with applicable federal civil rights laws and [place] laws. We do not discriminate on the basis of race, color, national origin, age, disability, sex, sexual orientation, or gender identity.            Thank you!     Thank you for choosing [facility] RHEUMATOLOGY  for your care. Our goal is always to provide you with excellent care. Hearing back from our patients is one way we can continue to improve our services. Please take a few minutes to complete the written survey that you may receive in the mail after your visit with us. Thank you!             Your Updated Medication List - Protect others around you: Learn how to safely use, store and throw away your medicines at www.disposemymeds.org.          This list is accurate as of 11/15/18  3:09 PM.  Always use your most recent med list.                   Brand Name Dispense Instructions for use Diagnosis    amitriptyline 10 MG tablet    ELAVIL    30 tablet    Take 1 tablet (10 mg) by mouth At Bedtime    Major depressive disorder, recurrent episode, mild (H)       azelastine 0.1 % nasal spray    ASTELIN    30 mL    USE 1 TO 2 SPRAYS IN EACH NOSTRIL TWICE DAILY    Allergic rhinitis due to pollen, unspecified seasonality       budesonide 180 MCG/ACT inhaler    PULMICORT FLEXHALER    1 Inhaler    Inhale 2 puffs into the lungs 2 times daily    Moderate persistent asthma with allergic rhinitis without complication       calcium carbonate 500 mg (elemental) 500 MG tablet    OS-JONATAN     Take 500 mg by mouth 2 times daily        cetirizine 10 MG  tablet    zyrTEC     Take 10 mg by mouth daily        clonazePAM 0.5 MG tablet    klonoPIN    20 tablet    Take 1 tablet (0.5 mg) by mouth as needed for anxiety    Major depressive disorder, recurrent episode, mild (H)       Cod Liver Oil 1000 MG Caps      Take 1 capsule by mouth daily.        diclofenac 1 % Gel topical gel    VOLTAREN    100 g    Apply 4 grams to knees or 2 grams to hands four times daily using enclosed dosing card.    Costochondritis       fluticasone 110 MCG/ACT Inhaler    FLOVENT HFA    3 Inhaler    Inhale 2 puffs into the lungs 2 times daily    Moderate persistent asthma with allergic rhinitis without complication       fluticasone 50 MCG/ACT spray    FLONASE    16 mL    SHAKE LIQUID AND USE 1 TO 2 SPRAYS IN EACH NOSTRIL DAILY    Seasonal allergic rhinitis due to pollen       HM MULTIVITAMIN ADULT GUMMY PO           SYNTHROID 112 MCG tablet   Generic drug:  levothyroxine     30 tablet    Take 1 tablet (112 mcg) by mouth daily    Hypothyroidism due to Hashimoto's thyroiditis       Vitamin B-12 500 MCG Subl      Place 500 mcg under the tongue daily        Vitamin D3 3000 units Tabs      Take 1 tablet by mouth daily.

## 2018-11-15 NOTE — NURSING NOTE
Chief Complaint   Patient presents with     Consult     midline back pain     /79  Pulse 70  Temp 98.6  F (37  C) (Oral)  Wt 89.3 kg (196 lb 14.4 oz)  LMP 06/25/2018  SpO2 97%  BMI 31.78 kg/m2  Octavia Lassiter MA

## 2018-11-16 LAB
ANA PAT SER IF-IMP: ABNORMAL
ANA SER QL IF: ABNORMAL
ANA TITR SER IF: ABNORMAL {TITER}
CCP AB SER IA-ACNC: 1 U/ML
ENA SS-A IGG SER IA-ACNC: <0.2 AI (ref 0–0.9)
ENA SS-B IGG SER IA-ACNC: <0.2 AI (ref 0–0.9)
RHEUMATOID FACT SER NEPH-ACNC: <20 IU/ML (ref 0–20)

## 2018-11-19 ENCOUNTER — OFFICE VISIT (OUTPATIENT)
Dept: OPHTHALMOLOGY | Facility: CLINIC | Age: 38
End: 2018-11-19
Payer: COMMERCIAL

## 2018-11-19 DIAGNOSIS — H10.45 CHRONIC ALLERGIC CONJUNCTIVITIS: Primary | ICD-10-CM

## 2018-11-19 ASSESSMENT — TONOMETRY
OD_IOP_MMHG: 11
IOP_METHOD: ICARE
OS_IOP_MMHG: 11

## 2018-11-19 ASSESSMENT — VISUAL ACUITY
OD_SC: 20/20
METHOD: SNELLEN - LINEAR
OS_SC: 20/15

## 2018-11-19 ASSESSMENT — SLIT LAMP EXAM - LIDS
COMMENTS: NORMAL
COMMENTS: NORMAL

## 2018-11-19 ASSESSMENT — CONF VISUAL FIELD
OS_NORMAL: 1
METHOD: COUNTING FINGERS
OD_NORMAL: 1

## 2018-11-19 ASSESSMENT — EXTERNAL EXAM - LEFT EYE: OS_EXAM: NORMAL

## 2018-11-19 ASSESSMENT — EXTERNAL EXAM - RIGHT EYE: OD_EXAM: NORMAL

## 2018-11-19 NOTE — PROGRESS NOTES
-SSa -SSB -CCP -RF. Normal inflammatory labs, kidney, liver, blood counts. +borderline positive MANDIE   Needs to do SI x-rays. Achilles tendon insertional and plantar calcaneal enthesopathy.

## 2018-11-19 NOTE — MR AVS SNAPSHOT
After Visit Summary   11/19/2018    Kasandra Winkler    MRN: [id number]           Patient Information     Date Of Birth          1980        Visit Information        Provider Department      11/19/2018 3:00 PM Jason Maza, DAGO [facility]        Today's Diagnoses     Chronic allergic conjunctivitis    -  1       Follow-ups after your visit        Your next 10 appointments already scheduled     Nov 20, 2018  9:20 AM CST   [facility] Extremity with Jason Horan PT   [facility] [place] ([facility] [place])    [address] [place] [address] [phone]            Nov 30, 2018  7:30 AM CST   [facility] Extremity Follow Up Tx with Jason Horan PT   [facility] [place] ([facility] [place])    [address] [place] [address] [phone]           If you have your physician's order in hand, please bring it with you to your appointment              Who to contact     Please call your clinic at [phone] to:    Ask questions about your health    Make or cancel appointments    Discuss your medicines    Learn about your test results    Speak to your doctor            Additional Information About Your Visit        MyChart Information     [employer] gives you secure access to your electronic health record. If you see a primary care provider, you can also send messages to your care team and make appointments. If you have questions, please call your primary care clinic.  If you do not have a primary care provider, please call [phone] and they will assist you.      riskmethods is an electronic gateway that provides easy, online access to your medical records. With riskmethods, you can request a clinic appointment, read your test results, renew a prescription or communicate with your care team.     To access your existing account, please contact your [facility] or call [phone] for  assistance.        Care EveryWhere ID     This is your Care EveryWhere ID. This could be used by other organizations to access your [place] medical records  [id number]        Your Vitals Were     Last Period                   06/25/2018            Blood Pressure from Last 3 Encounters:   11/15/18 121/79   10/24/18 111/76   10/03/18 110/71    Weight from Last 3 Encounters:   11/15/18 89.3 kg (196 lb 14.4 oz)   11/08/18 88 kg (194 lb)   10/24/18 88.4 kg (194 lb 12.8 oz)              Today, you had the following     No orders found for display       Primary Care Provider Office Phone # Fax #    Trish WILLOUGHBY Cesia,  [phone] [address] [address]        Equal Access to Services     [facility] : Hadii aad sammy hadasho Soomaali, waaxda luqadaha, qaybta kaalmada adeegyada, chris will . So [facility] [phone].    ATENCIÓN: Si habla español, tiene a das disposición servicios gratuitos de asistencia lingüística. Sravan al [phone].    We comply with applicable federal civil rights laws and [place] laws. We do not discriminate on the basis of race, color, national origin, age, disability, sex, sexual orientation, or gender identity.            Thank you!     Thank you for choosing [facility] OPHTHALMOLOGY  for your care. Our goal is always to provide you with excellent care. Hearing back from our patients is one way we can continue to improve our services. Please take a few minutes to complete the written survey that you may receive in the mail after your visit with us. Thank you!             Your Updated Medication List - Protect others around you: Learn how to safely use, store and throw away your medicines at www.disposemymeds.org.          This list is accurate as of 11/19/18  3:16 PM.  Always use your most recent med list.                   Brand Name Dispense Instructions for use Diagnosis    amitriptyline 10 MG tablet    ELAVIL    30 tablet     Take 1 tablet (10 mg) by mouth At Bedtime    Major depressive disorder, recurrent episode, mild (H)       azelastine 0.1 % nasal spray    ASTELIN    30 mL    USE 1 TO 2 SPRAYS IN EACH NOSTRIL TWICE DAILY    Allergic rhinitis due to pollen, unspecified seasonality       budesonide 180 MCG/ACT inhaler    PULMICORT FLEXHALER    1 Inhaler    Inhale 2 puffs into the lungs 2 times daily    Moderate persistent asthma with allergic rhinitis without complication       calcium carbonate 500 mg (elemental) 500 MG tablet    OS-JONATAN     Take 500 mg by mouth 2 times daily        cetirizine 10 MG tablet    zyrTEC     Take 10 mg by mouth daily        clonazePAM 0.5 MG tablet    klonoPIN    20 tablet    Take 1 tablet (0.5 mg) by mouth as needed for anxiety    Major depressive disorder, recurrent episode, mild (H)       Cod Liver Oil 1000 MG Caps      Take 1 capsule by mouth daily.        diclofenac 1 % Gel topical gel    VOLTAREN    100 g    Apply 4 grams to knees or 2 grams to hands four times daily using enclosed dosing card.    Costochondritis       fluticasone 110 MCG/ACT Inhaler    FLOVENT HFA    3 Inhaler    Inhale 2 puffs into the lungs 2 times daily    Moderate persistent asthma with allergic rhinitis without complication       fluticasone 50 MCG/ACT spray    FLONASE    16 mL    SHAKE LIQUID AND USE 1 TO 2 SPRAYS IN EACH NOSTRIL DAILY    Seasonal allergic rhinitis due to pollen       HM MULTIVITAMIN ADULT GUMMY PO           SYNTHROID 112 MCG tablet   Generic drug:  levothyroxine     30 tablet    Take 1 tablet (112 mcg) by mouth daily    Hypothyroidism due to Hashimoto's thyroiditis       Vitamin B-12 500 MCG Subl      Place 500 mcg under the tongue daily        Vitamin D3 3000 units Tabs      Take 1 tablet by mouth daily.

## 2018-11-19 NOTE — PROGRESS NOTES
Assessment/Plan  (H10.45) Chronic allergic conjunctivitis  (primary encounter diagnosis)  Comment: No signs of uveitis today  Plan: Discussed findings with patient. Recommended patient use ketotifen twice daily in both eyes. Patient ok to supplement with artificial tears as needed. Patient ok to continue using cetirizine orally for seasonal/dust allergies. Recommended patient RTC with any increase in pain/redness/light sensitivity. Sample of Systane AT's dispensed.       Complete documentation of historical and exam elements from today's encounter can  be found in the full encounter summary report (not reduplicated in this progress  note). I personally obtained the chief complaint(s) and history of present illness. I  confirmed and edited as necessary the review of systems, past medical/surgical  history, family history, social history, and examination findings as documented by  others; and I examined the patient myself. I personally reviewed the relevant tests,  images, and reports as documented above. I formulated and edited as necessary the  assessment and plan and discussed the findings and management plan with the  patient and family.    Jason Maza, OD

## 2018-11-19 NOTE — NURSING NOTE
Chief Complaints and History of Present Illnesses   Patient presents with     Red Eye Both Eyes     HPI    Affected eye(s):  Both   Symptoms:     No blurred vision   Redness      Frequency:  Constant       Do you have eye pain now?:  No      Comments:  Redness of left eye for 2 days which went away today. Then today a little redness in right eye. Irritation in left eye today and a little pain. Sometimes gritty left eye. No blurred vision now, has had some blurring that comes and goes in the past. Using Zatidor PRN, using allergy meds daily.    See other eye doctors in the past for it as well. Comes and goes.    Ashley Ortiz COT 2:28 PM November 19, 2018

## 2018-11-20 ENCOUNTER — OFFICE VISIT (OUTPATIENT)
Dept: ORTHOPEDICS | Facility: CLINIC | Age: 38
End: 2018-11-20
Payer: COMMERCIAL

## 2018-11-20 ENCOUNTER — THERAPY VISIT (OUTPATIENT)
Dept: PHYSICAL THERAPY | Facility: CLINIC | Age: 38
End: 2018-11-20
Attending: FAMILY MEDICINE
Payer: COMMERCIAL

## 2018-11-20 DIAGNOSIS — M22.2X9 PATELLOFEMORAL PAIN SYNDROME: ICD-10-CM

## 2018-11-20 DIAGNOSIS — M54.50 CHRONIC MIDLINE LOW BACK PAIN WITHOUT SCIATICA: Primary | ICD-10-CM

## 2018-11-20 DIAGNOSIS — G89.29 CHRONIC MIDLINE LOW BACK PAIN WITHOUT SCIATICA: Primary | ICD-10-CM

## 2018-11-20 PROCEDURE — 97530 THERAPEUTIC ACTIVITIES: CPT | Mod: GP | Performed by: PHYSICAL THERAPIST

## 2018-11-20 PROCEDURE — 97110 THERAPEUTIC EXERCISES: CPT | Mod: GP | Performed by: PHYSICAL THERAPIST

## 2018-11-20 PROCEDURE — 97112 NEUROMUSCULAR REEDUCATION: CPT | Mod: GP | Performed by: PHYSICAL THERAPIST

## 2018-11-20 NOTE — MR AVS SNAPSHOT
After Visit Summary   11/20/2018    Kasandra Winkler    MRN: [id number]           Patient Information     Date Of Birth          1980        Visit Information        Provider Department      11/20/2018 9:20 AM Jason Horan PT [facility]        Today's Diagnoses     Patellofemoral pain syndrome           Follow-ups after your visit        Your next 10 appointments already scheduled     Nov 26, 2018  2:30 PM CST   (Arrive by 2:00 PM)   MR LUMBAR SPINE W/O & W CONTRAST with [address] [facility] [place] [facility] ([facility] [place])    [address] [place] [address] [phone]           How do I prepare for my exam? (Food and drink instructions) **If you will be receiving sedation or general anesthesia, please see special notes below.**  How do I prepare for my exam? (Other instructions) Take your medicines as usual, unless your doctor tells you not to. You may or may not receive intravenous (IV) contrast for this exam pending the discretion of the Radiologist.  You do not need to do anything special to prepare.  **If you will be receiving sedation or general anesthesia, please see special notes below.**  What should I wear: The MRI machine uses a strong magnet. Please wear clothes without metal (snaps, zippers). A sweatsuit works well, or we may give you a hospital gown. Please remove any body piercings and hair extensions before you arrive. You will also remove watches, jewelry, hairpins, wallets, dentures, partial dental plates and hearing aids. You may wear contact lenses, and you may be able to wear your rings. We have a safe place to keep your personal items, but it is safer to leave them at home.  How long does the exam take: Most tests take 30 to 60 minutes.  HOWEVER, IF YOUR DOCTOR PRESCRIBES ANESTHESIA please plan on spending four to five hours in the recovery room.  What should I bring:  Bring a list of your  current medicines to your exam (including vitamins, minerals and over-the-counter drugs).  Do I need a :  **If you will be receiving sedation or general anesthesia, please see special notes below.**  What should I do after the exam: No Restrictions, You may resume normal activities.  What is this test: MRI (magnetic resonance imaging) uses a strong magnet and radio waves to look inside the body. An MRA (magnetic resonance angiogram) does the same thing, but it lets us look at your blood vessels. A computer turns the radio waves into pictures showing cross sections of the body, much like slices of bread. This helps us see any problems more clearly. You may receive fluid (called  contrast ) before or during your scan. The fluid helps us see the pictures better. We give the fluid through an IV (small needle in your arm).  Who should I call with questions:  Please call the Imaging Department at your exam site with any questions. Directions, parking instructions, and other information is available on our website, [email or web address].  How do I prepare if I m having sedation or anesthesia? **IMPORTANT** THE INSTRUCTIONS BELOW ARE ONLY FOR THOSE PATIENTS WHO HAVE BEEN TOLD THEY WILL RECEIVE SEDATION OR GENERAL ANESTHESIA DURING THEIR MRI PROCEDURE:  IF YOU WILL RECEIVE SEDATION (take medicine to help you relax during your exam): You must get the medicine from your doctor before you arrive. Bring the medicine to the exam. Do not take it at home. Arrive one hour early. Bring someone who can take you home after the test. Your medicine will make you sleepy. After the exam, you may not drive, take a bus or take a taxi by yourself. No eating 8 hours before your exam. You may have clear liquids up until 4 hours before your exam. (Clear liquids include water, clear tea, black coffee and fruit juice without pulp.)  IF YOU WILL RECEIVE ANESTHESIA (be asleep for your exam): Arrive 1 1/2 hours early. Bring someone who can take  you home after the test. You may not drive, take a bus or take a taxi by yourself. No eating 8 hours before your exam. You may have clear liquids up until 4 hours before your exam. (Clear liquids include water, clear tea, black coffee and fruit juice without pulp.)            Nov 27, 2018 12:45 PM CST   (Arrive by 12:30 PM)   [facility] Sports Medicine Return with Homero Gan MD   [facility] ([facility] [place])    [address] [facility]  5th [facility] [id number] [phone]            Nov 30, 2018  7:30 AM CST   [facility] Physical Therapy Extremity Follow Up Tx with Jason Horan PT   [place] [facility] ([facility])    [address] [place] [address] [phone]           If you have your physician's order in hand, please bring it with you to your appointment              Future tests that were ordered for you today     Open Future Orders        Priority Expected Expires Ordered    MR Lumbar Spine w/o & w Contrast Routine  11/20/2019 11/20/2018            Who to contact     If you have questions or need follow up information about today's clinic visit or your schedule please contact [facility] directly at [phone].  Normal or non-critical lab and imaging results will be communicated to you by [employer], letter or phone within 4 business days after the clinic has received the results. If you do not hear from us within 7 days, please contact the clinic through [employer] or phone. If you have a critical or abnormal lab result, we will notify you by phone as soon as possible.  Submit refill requests through [employer] or call your pharmacy and they will forward the refill request to us. Please allow 3 business days for your refill to be completed.          Additional Information About Your Visit        [employer] Information     [employer] gives you secure access to your electronic health record. If you see  a primary care provider, you can also send messages to your care team and make appointments. If you have questions, please call your primary care clinic.  If you do not have a primary care provider, please call [phone] and they will assist you.        Care EveryWhere ID     This is your Care EveryWhere ID. This could be used by other organizations to access your [place] medical records  [id number]        Your Vitals Were     Last Period                   06/25/2018            Blood Pressure from Last 3 Encounters:   11/15/18 121/79   10/24/18 111/76   10/03/18 110/71    Weight from Last 3 Encounters:   11/15/18 89.3 kg (196 lb 14.4 oz)   11/08/18 88 kg (194 lb)   10/24/18 88.4 kg (194 lb 12.8 oz)              We Performed the Following     NEUROMUSCULAR RE-EDUCATION     THERAPEUTIC ACTIVITIES     THERAPEUTIC EXERCISES        Primary Care Provider Office Phone # Fax #    Trish CASE Callaway,  [phone] [address] [address]        Equal Access to Services     JESSICA [facility] Hospital[facility] : Hadii aad ku hadasho Soomaali, waaxda luqadaha, qaybta kaalmada adeegyada, waxay idiin hayaan kellie will . So [facility] [phone].    ATENCIÓN: Si habla español, tiene a das disposición servicios gratuitos de asistencia lingüística. Llame al [phone].    We comply with applicable federal civil rights laws and [place] laws. We do not discriminate on the basis of race, color, national origin, age, disability, sex, sexual orientation, or gender identity.            Thank you!     Thank you for choosing [facility]  for your care. Our goal is always to provide you with excellent care. Hearing back from our patients is one way we can continue to improve our services. Please take a few minutes to complete the written survey that you may receive in the mail after your visit with us. Thank you!             Your Updated Medication List - Protect others around you:  Learn how to safely use, store and throw away your medicines at www.disposemymeds.org.          This list is accurate as of 11/20/18  1:26 PM.  Always use your most recent med list.                   Brand Name Dispense Instructions for use Diagnosis    amitriptyline 10 MG tablet    ELAVIL    30 tablet    Take 1 tablet (10 mg) by mouth At Bedtime    Major depressive disorder, recurrent episode, mild (H)       azelastine 0.1 % nasal spray    ASTELIN    30 mL    USE 1 TO 2 SPRAYS IN EACH NOSTRIL TWICE DAILY    Allergic rhinitis due to pollen, unspecified seasonality       budesonide 180 MCG/ACT inhaler    PULMICORT FLEXHALER    1 Inhaler    Inhale 2 puffs into the lungs 2 times daily    Moderate persistent asthma with allergic rhinitis without complication       calcium carbonate 500 mg (elemental) 500 MG tablet    OS-JONATAN     Take 500 mg by mouth 2 times daily        cetirizine 10 MG tablet    zyrTEC     Take 10 mg by mouth daily        clonazePAM 0.5 MG tablet    klonoPIN    20 tablet    Take 1 tablet (0.5 mg) by mouth as needed for anxiety    Major depressive disorder, recurrent episode, mild (H)       Cod Liver Oil 1000 MG Caps      Take 1 capsule by mouth daily.        diclofenac 1 % Gel topical gel    VOLTAREN    100 g    Apply 4 grams to knees or 2 grams to hands four times daily using enclosed dosing card.    Costochondritis       fluticasone 110 MCG/ACT Inhaler    FLOVENT HFA    3 Inhaler    Inhale 2 puffs into the lungs 2 times daily    Moderate persistent asthma with allergic rhinitis without complication       fluticasone 50 MCG/ACT spray    FLONASE    16 mL    SHAKE LIQUID AND USE 1 TO 2 SPRAYS IN EACH NOSTRIL DAILY    Seasonal allergic rhinitis due to pollen       HM MULTIVITAMIN ADULT GUMMY PO           SYNTHROID 112 MCG tablet   Generic drug:  levothyroxine     30 tablet    Take 1 tablet (112 mcg) by mouth daily    Hypothyroidism due to Hashimoto's thyroiditis       Vitamin B-12 500 MCG Subl      Place  500 mcg under the tongue daily        Vitamin D3 3000 units Tabs      Take 1 tablet by mouth daily.

## 2018-11-20 NOTE — MR AVS SNAPSHOT
After Visit Summary   11/20/2018    Kasandra Winkler    MRN: [id number]           Patient Information     Date Of Birth          1980        Visit Information        Provider Department      11/20/2018 10:45 AM Homero Gan MD [facility] Sports Medicine        Today's Diagnoses     Chronic midline low back pain without sciatica    -  1       Follow-ups after your visit        Your next 10 appointments already scheduled     Nov 26, 2018  2:30 PM CST   (Arrive by 2:00 PM)   MR LUMBAR SPINE W/O & W CONTRAST with [address] [facility] [place] [facility] ([facility])    [address] [place] [address] [phone]           How do I prepare for my exam? (Food and drink instructions) **If you will be receiving sedation or general anesthesia, please see special notes below.**  How do I prepare for my exam? (Other instructions) Take your medicines as usual, unless your doctor tells you not to. You may or may not receive intravenous (IV) contrast for this exam pending the discretion of the Radiologist.  You do not need to do anything special to prepare.  **If you will be receiving sedation or general anesthesia, please see special notes below.**  What should I wear: The MRI machine uses a strong magnet. Please wear clothes without metal (snaps, zippers). A sweatsuit works well, or we may give you a hospital gown. Please remove any body piercings and hair extensions before you arrive. You will also remove watches, jewelry, hairpins, wallets, dentures, partial dental plates and hearing aids. You may wear contact lenses, and you may be able to wear your rings. We have a safe place to keep your personal items, but it is safer to leave them at home.  How long does the exam take: Most tests take 30 to 60 minutes.  HOWEVER, IF YOUR DOCTOR PRESCRIBES ANESTHESIA please plan on spending four to five hours in the recovery room.  What should I bring:  Bring a  list of your current medicines to your exam (including vitamins, minerals and over-the-counter drugs).  Do I need a :  **If you will be receiving sedation or general anesthesia, please see special notes below.**  What should I do after the exam: No Restrictions, You may resume normal activities.  What is this test: MRI (magnetic resonance imaging) uses a strong magnet and radio waves to look inside the body. An MRA (magnetic resonance angiogram) does the same thing, but it lets us look at your blood vessels. A computer turns the radio waves into pictures showing cross sections of the body, much like slices of bread. This helps us see any problems more clearly. You may receive fluid (called  contrast ) before or during your scan. The fluid helps us see the pictures better. We give the fluid through an IV (small needle in your arm).  Who should I call with questions:  Please call the Imaging Department at your exam site with any questions. Directions, parking instructions, and other information is available on our website, [email or web address].  How do I prepare if I m having sedation or anesthesia? **IMPORTANT** THE INSTRUCTIONS BELOW ARE ONLY FOR THOSE PATIENTS WHO HAVE BEEN TOLD THEY WILL RECEIVE SEDATION OR GENERAL ANESTHESIA DURING THEIR MRI PROCEDURE:  IF YOU WILL RECEIVE SEDATION (take medicine to help you relax during your exam): You must get the medicine from your doctor before you arrive. Bring the medicine to the exam. Do not take it at home. Arrive one hour early. Bring someone who can take you home after the test. Your medicine will make you sleepy. After the exam, you may not drive, take a bus or take a taxi by yourself. No eating 8 hours before your exam. You may have clear liquids up until 4 hours before your exam. (Clear liquids include water, clear tea, black coffee and fruit juice without pulp.)  IF YOU WILL RECEIVE ANESTHESIA (be asleep for your exam): Arrive 1 1/2 hours early. Bring someone  who can take you home after the test. You may not drive, take a bus or take a taxi by yourself. No eating 8 hours before your exam. You may have clear liquids up until 4 hours before your exam. (Clear liquids include water, clear tea, black coffee and fruit juice without pulp.)            Nov 27, 2018 12:45 PM CST   (Arrive by 12:30 PM)   [facility] Return with Homero Gan MD   [facility] ([facility])    [address] [facility]  5th [facility] [address] [phone]            Nov 30, 2018  7:30 AM CST   [place] [facility] Therapy Extremity Follow Up Tx with Jason Horan PT   [facility] ([facility])    [address] [place] [address] [phone]           If you have your physician's order in hand, please bring it with you to your appointment              Future tests that were ordered for you today     Open Future Orders        Priority Expected Expires Ordered    MR Lumbar Spine w/o & w Contrast Routine  11/20/2019 11/20/2018            Who to contact     Please call your clinic at [phone] to:    Ask questions about your health    Make or cancel appointments    Discuss your medicines    Learn about your test results    Speak to your doctor            Additional Information About Your Visit        [employer] Information     [employer] gives you secure access to your electronic health record. If you see a primary care provider, you can also send messages to your care team and make appointments. If you have questions, please call your primary care clinic.  If you do not have a primary care provider, please call [phone] and they will assist you.      [employer] is an electronic gateway that provides easy, online access to your medical records. With [employer], you can request a clinic appointment, read your test results, renew a prescription or communicate with your care team.     To access your  existing account, please contact your [facility] or call [phone] for assistance.        Care EveryWhere ID     This is your Care EveryWhere ID. This could be used by other organizations to access your [place] medical records  [id number]        Your Vitals Were     Last Period                   06/25/2018            Blood Pressure from Last 3 Encounters:   11/15/18 121/79   10/24/18 111/76   10/03/18 110/71    Weight from Last 3 Encounters:   11/15/18 196 lb 14.4 oz (89.3 kg)   11/08/18 194 lb (88 kg)   10/24/18 194 lb 12.8 oz (88.4 kg)               Primary Care Provider Office Phone # Fax #    Trish WILLOUGHBY DO [facility] [phone] [address] [address]        Equal Access to Services     KRIS SILVA : Hadii aad ku hadasho Soomaali, waaxda luqadaha, qaybta kaalmada adeegyada, chris granadosin hayaan kellie will . So [facility] [phone].    ATENCIÓN: Si habla español, tiene a das disposición servicios gratuitos de asistencia lingüística. Llame al [phone].    We comply with applicable federal civil rights laws and [place] laws. We do not discriminate on the basis of race, color, national origin, age, disability, sex, sexual orientation, or gender identity.            Thank you!     Thank you for choosing [facility]  for your care. Our goal is always to provide you with excellent care. Hearing back from our patients is one way we can continue to improve our services. Please take a few minutes to complete the written survey that you may receive in the mail after your visit with us. Thank you!             Your Updated Medication List - Protect others around you: Learn how to safely use, store and throw away your medicines at www.disposemymeds.org.          This list is accurate as of 11/20/18 11:12 AM.  Always use your most recent med list.                   Brand Name Dispense Instructions for use Diagnosis     amitriptyline 10 MG tablet    ELAVIL    30 tablet    Take 1 tablet (10 mg) by mouth At Bedtime    Major depressive disorder, recurrent episode, mild (H)       azelastine 0.1 % nasal spray    ASTELIN    30 mL    USE 1 TO 2 SPRAYS IN EACH NOSTRIL TWICE DAILY    Allergic rhinitis due to pollen, unspecified seasonality       budesonide 180 MCG/ACT inhaler    PULMICORT FLEXHALER    1 Inhaler    Inhale 2 puffs into the lungs 2 times daily    Moderate persistent asthma with allergic rhinitis without complication       calcium carbonate 500 mg (elemental) 500 MG tablet    OS-JONATAN     Take 500 mg by mouth 2 times daily        cetirizine 10 MG tablet    zyrTEC     Take 10 mg by mouth daily        clonazePAM 0.5 MG tablet    klonoPIN    20 tablet    Take 1 tablet (0.5 mg) by mouth as needed for anxiety    Major depressive disorder, recurrent episode, mild (H)       Cod Liver Oil 1000 MG Caps      Take 1 capsule by mouth daily.        diclofenac 1 % Gel topical gel    VOLTAREN    100 g    Apply 4 grams to knees or 2 grams to hands four times daily using enclosed dosing card.    Costochondritis       fluticasone 110 MCG/ACT Inhaler    FLOVENT HFA    3 Inhaler    Inhale 2 puffs into the lungs 2 times daily    Moderate persistent asthma with allergic rhinitis without complication       fluticasone 50 MCG/ACT spray    FLONASE    16 mL    SHAKE LIQUID AND USE 1 TO 2 SPRAYS IN EACH NOSTRIL DAILY    Seasonal allergic rhinitis due to pollen       HM MULTIVITAMIN ADULT GUMMY PO           SYNTHROID 112 MCG tablet   Generic drug:  levothyroxine     30 tablet    Take 1 tablet (112 mcg) by mouth daily    Hypothyroidism due to Hashimoto's thyroiditis       Vitamin B-12 500 MCG Subl      Place 500 mcg under the tongue daily        Vitamin D3 3000 units Tabs      Take 1 tablet by mouth daily.

## 2018-11-20 NOTE — PROGRESS NOTES
Dear Kasandra,   Your test results are all back -   Your TSH is off a little again but not sure if we want to adjust again?  It seems to fluctuate in this range a little.  Let us know if you have any questions.  -Trish Callaway, DO

## 2018-11-20 NOTE — LETTER
2018      RE: Kasandra Winkler  [address]  [facility] [address]        Subjective:   Kasandra Winkler is a 37 year old female who is here for low back pain after picking up dryer sheet and couldn't stand up 20 years ago. Having left sided radiating pain. She has had a longstanding centralized low back discomfort with radiation into the bilateral or unilateral buttocks p.r.n.  She denies cauda equina symptoms or radiculopathy.  She has been to physical therapy several times in the past and generally has not had any improvement or relief with therapy.  She does try to remain active.  She notes that she has discomfort with prolonged standing, with prolonged walking, and with lifting, pushing, pulling type activities.  She does have stiffness of the lower lumbar spine in the morning.  She feels very cautious and hesitant in activities because the wrong movement can aggravate her lower lumbar spine.     Background:   Date of injury: 20 years ago     Aggravating factors: stepping out of car  Relieving Factors: rest  Prior Evaluation: Physical Therapy    PAST MEDICAL, SOCIAL, SURGICAL AND FAMILY HISTORY: She  has a past medical history of Allergic rhinitis; Asthma; Hashimoto thyroiditis; Mild major depression (H) (2009); and TMJ (temporomandibular joint disorder).  She  has a past surgical history that includes cholecystectomy, laporoscopic (2009);  section (10/10/10); Finger surgery (); Laparoscopic gastric sleeve (N/A, 2015); and hysterectomy, pap no longer indicated (2018).  Her family history includes Arthritis in her maternal grandmother; Cerebrovascular Disease in her mother; HEART DISEASE in her maternal grandmother; Hypertension in her maternal grandmother and mother; Osteoporosis in her mother; Respiratory in her maternal grandmother; Substance Abuse in her mother. There is no history of Glaucoma or Macular Degeneration.  She reports that she quit smoking about  9 years ago. She has a 5.00 pack-year smoking history. She has never used smokeless tobacco. She reports that she drinks alcohol. She reports that she does not use illicit drugs.    ALLERGIES: She is allergic to dust mites; epinephrine; grass; maple tree; nsaids; seasonal allergies; and tolmetin.    CURRENT MEDICATIONS: She has a current medication list which includes the following prescription(s): amitriptyline, azelastine, budesonide, calcium carbonate 500 mg (elemental), cetirizine, vitamin d3, clonazepam, cod liver oil, vitamin b-12, diclofenac, fluticasone, fluticasone, multiple vitamins-minerals, and synthroid.     REVIEW OF SYSTEMS: 10 point review of systems is negative except as noted above.     Exam:   [facility] 06/25/2018     CONSTITUTIONAL: healthy, alert and no distress  SKIN: no suspicious lesions or rashes  GAIT: normal  NEUROLOGIC: Non-focal, Normal muscle tone and strength, reflexes normal, sensation grossly normal.  PSYCHIATRIC: affect normal/bright and mentation appears normal.    MUSCULOSKELETAL:   LS SPINE: Examination of the lumbar spine demonstrates no discrete tenderness along the lumbar spine. SI joints are mildly ttp bilateraly. Compression test is negative. LS motion to include flexion, extension and extension rotation to the right and left demonstrates centralized LBP. Dural tension signs are (-). Contralateral SLR test is (-). LeSeague's maneuver is (-). L4-S1 tested and intact. She is diffusely ttp over the right and left sided posterior elements of the lower lumbar spine.       Assessment/Plan:   (M54.5,  G89.29) Chronic midline low back pain without sciatica  (primary encounter diagnosis)  Plan: MR Lumbar Spine w/o & w Contrast        Kasandra is a 37-year-old female with a history of Hashimoto's thyroiditis and possible underlying rheumatologic disorder with no clear diagnosis at this point.  Question is that she could have a spondyloarthropathy.  At this juncture, I suspect that all of her  low back pain is primarily related to facet arthropathy or posterior element involvement.  She has been involved in physical therapy extensively without any relief.  Her symptoms do seem to be worsening to some degree.  I am sure she has some underlying disk disease.  However, at this juncture it is prudent to image her.  I would like to include the upper to mid portion of the SI joints and if there is any inflammatory changes there then we will consider IV contrast as well.  She will follow up with me pending the MRI and then will get her setup with physical therapy, which she will do with Caleb Londono, PT.       Homero Gan MD

## 2018-11-20 NOTE — PROGRESS NOTES
Subjective:   Kasandra Winkler is a 37 year old female who is here for low back pain after picking up dryer sheet and couldn't stand up 20 years ago. Having left sided radiating pain. She has had a longstanding centralized low back discomfort with radiation into the bilateral or unilateral buttocks p.r.n.  She denies cauda equina symptoms or radiculopathy.  She has been to physical therapy several times in the past and generally has not had any improvement or relief with therapy.  She does try to remain active.  She notes that she has discomfort with prolonged standing, with prolonged walking, and with lifting, pushing, pulling type activities.  She does have stiffness of the lower lumbar spine in the morning.  She feels very cautious and hesitant in activities because the wrong movement can aggravate her lower lumbar spine.     Background:   Date of injury: 20 years ago     Aggravating factors: stepping out of car  Relieving Factors: rest  Prior Evaluation: Physical Therapy    PAST MEDICAL, SOCIAL, SURGICAL AND FAMILY HISTORY: She  has a past medical history of Allergic rhinitis; Asthma; Hashimoto thyroiditis; Mild major depression (H) (2009); and TMJ (temporomandibular joint disorder).  She  has a past surgical history that includes cholecystectomy, laporoscopic (2009);  section (10/10/10); Finger surgery (); Laparoscopic gastric sleeve (N/A, 2015); and hysterectomy, pap no longer indicated (2018).  Her family history includes Arthritis in her maternal grandmother; Cerebrovascular Disease in her mother; HEART DISEASE in her maternal grandmother; Hypertension in her maternal grandmother and mother; Osteoporosis in her mother; Respiratory in her maternal grandmother; Substance Abuse in her mother. There is no history of Glaucoma or Macular Degeneration.  She reports that she quit smoking about 9 years ago. She has a 5.00 pack-year smoking history. She has never used smokeless  She cancelled her appointment 2/2 to bad weather. Con't with POC at next visit. tobacco. She reports that she drinks alcohol. She reports that she does not use illicit drugs.    ALLERGIES: She is allergic to dust mites; epinephrine; grass; maple tree; nsaids; seasonal allergies; and tolmetin.    CURRENT MEDICATIONS: She has a current medication list which includes the following prescription(s): amitriptyline, azelastine, budesonide, calcium carbonate 500 mg (elemental), cetirizine, vitamin d3, clonazepam, cod liver oil, vitamin b-12, diclofenac, fluticasone, fluticasone, multiple vitamins-minerals, and synthroid.     REVIEW OF SYSTEMS: 10 point review of systems is negative except as noted above.     Exam:   [facility] 06/25/2018     CONSTITUTIONAL: healthy, alert and no distress  SKIN: no suspicious lesions or rashes  GAIT: normal  NEUROLOGIC: Non-focal, Normal muscle tone and strength, reflexes normal, sensation grossly normal.  PSYCHIATRIC: affect normal/bright and mentation appears normal.    MUSCULOSKELETAL:   LS SPINE: Examination of the lumbar spine demonstrates no discrete tenderness along the lumbar spine. SI joints are mildly ttp bilateraly. Compression test is negative. LS motion to include flexion, extension and extension rotation to the right and left demonstrates centralized LBP. Dural tension signs are (-). Contralateral SLR test is (-). LeSeague's maneuver is (-). L4-S1 tested and intact. She is diffusely ttp over the right and left sided posterior elements of the lower lumbar spine.       Assessment/Plan:   (M54.5,  G89.29) Chronic midline low back pain without sciatica  (primary encounter diagnosis)  Plan: MR Lumbar Spine w/o & w Contrast        Kasandra is a 37-year-old female with a history of Hashimoto's thyroiditis and possible underlying rheumatologic disorder with no clear diagnosis at this point.  Question is that she could have a spondyloarthropathy.  At this juncture, I suspect that all of her low back pain is primarily related to facet arthropathy or posterior element  involvement.  She has been involved in physical therapy extensively without any relief.  Her symptoms do seem to be worsening to some degree.  I am sure she has some underlying disk disease.  However, at this juncture it is prudent to image her.  I would like to include the upper to mid portion of the SI joints and if there is any inflammatory changes there then we will consider IV contrast as well.  She will follow up with me pending the MRI and then will get her setup with physical therapy, which she will do with Caleb Londono PT.

## 2018-11-21 ENCOUNTER — MYC MEDICAL ADVICE (OUTPATIENT)
Dept: FAMILY MEDICINE | Facility: CLINIC | Age: 38
End: 2018-11-21

## 2018-11-21 DIAGNOSIS — E06.3 HASHIMOTO'S THYROIDITIS: Primary | ICD-10-CM

## 2018-11-23 DIAGNOSIS — J30.1 SEASONAL ALLERGIC RHINITIS DUE TO POLLEN: ICD-10-CM

## 2018-11-23 RX ORDER — FLUTICASONE PROPIONATE 50 MCG
SPRAY, SUSPENSION (ML) NASAL
Qty: 16 ML | Refills: 0 | Status: SHIPPED | OUTPATIENT
Start: 2018-11-23 | End: 2018-12-25

## 2018-11-23 NOTE — TELEPHONE ENCOUNTER
"Prescription approved per [facility] Refill Protocol.  Betsey Yepez RN    Requested Prescriptions   Signed Prescriptions Disp Refills     fluticasone (FLONASE) 50 MCG/ACT spray 16 mL 0     Sig: SHAKE LIQUID AND USE 1 TO 2 SPRAYS IN EACH NOSTRIL DAILY    Inhaled Steroids Protocol Passed    11/23/2018  3:36 AM       Passed - Patient is age 12 or older       Passed - Asthma control assessment score within normal limits in last 6 months    Please review ACT score.          Passed - Recent (6 mo) or future (30 days) visit within the authorizing provider's specialty    Patient had office visit in the last 6 months or has a visit in the next 30 days with authorizing provider or within the authorizing provider's specialty.  See \"Patient Info\" tab in inbasket, or \"Choose Columns\" in Meds & Orders section of the refill encounter.                "

## 2018-11-26 ENCOUNTER — MYC MEDICAL ADVICE (OUTPATIENT)
Dept: FAMILY MEDICINE | Facility: CLINIC | Age: 38
End: 2018-11-26

## 2018-11-26 ENCOUNTER — RADIANT APPOINTMENT (OUTPATIENT)
Dept: MRI IMAGING | Facility: CLINIC | Age: 38
End: 2018-11-26
Attending: FAMILY MEDICINE
Payer: COMMERCIAL

## 2018-11-26 DIAGNOSIS — M54.50 CHRONIC MIDLINE LOW BACK PAIN WITHOUT SCIATICA: ICD-10-CM

## 2018-11-26 DIAGNOSIS — J45.40 MODERATE PERSISTENT ASTHMA WITH ALLERGIC RHINITIS WITHOUT COMPLICATION: ICD-10-CM

## 2018-11-26 DIAGNOSIS — G89.29 CHRONIC MIDLINE LOW BACK PAIN WITHOUT SCIATICA: ICD-10-CM

## 2018-11-27 ENCOUNTER — OFFICE VISIT (OUTPATIENT)
Dept: ORTHOPEDICS | Facility: CLINIC | Age: 38
End: 2018-11-27
Payer: COMMERCIAL

## 2018-11-27 DIAGNOSIS — G89.29 CHRONIC MIDLINE LOW BACK PAIN WITHOUT SCIATICA: Primary | ICD-10-CM

## 2018-11-27 DIAGNOSIS — M54.50 CHRONIC MIDLINE LOW BACK PAIN WITHOUT SCIATICA: Primary | ICD-10-CM

## 2018-11-27 DIAGNOSIS — M47.816 FACET ARTHRITIS OF LUMBAR REGION: ICD-10-CM

## 2018-11-27 DIAGNOSIS — M51.369 DDD (DEGENERATIVE DISC DISEASE), LUMBAR: ICD-10-CM

## 2018-11-27 RX ORDER — BUDESONIDE 180 UG/1
AEROSOL, POWDER RESPIRATORY (INHALATION)
Qty: 1 INHALER | Refills: 0 | Status: SHIPPED | OUTPATIENT
Start: 2018-11-27 | End: 2019-06-18

## 2018-11-27 NOTE — MR AVS SNAPSHOT
After Visit Summary   11/27/2018    Kasandra Winkler    MRN: [id number]           Patient Information     Date Of Birth          1980        Visit Information        Provider Department      11/27/2018 12:45 PM Homero Gan MD [facility] Sports Medicine        Today's Diagnoses     Chronic midline low back pain without sciatica    -  1    DDD (degenerative disc disease), lumbar        Facet arthritis of lumbar region (H)           Follow-ups after your visit        Your next 10 appointments already scheduled     Nov 28, 2018 10:50 AM CST   (Arrive by 10:35 AM)   [facility] Spine with Caleb Romero, PT   [facility] [facility] ([facility]  )    [address] [place] [address] [phone]            Nov 30, 2018  7:30 AM CST   [facility] Physical Therapy Extremity Follow Up Tx with Jason Horan, PT   [place] [facility] ([facility])    [address] [place] [address] [phone]           If you have your physician's order in hand, please bring it with you to your appointment              Who to contact     Please call your clinic at [phone] to:    Ask questions about your health    Make or cancel appointments    Discuss your medicines    Learn about your test results    Speak to your doctor            Additional Information About Your Visit        MyChart Information     [employer] gives you secure access to your electronic health record. If you see a primary care provider, you can also send messages to your care team and make appointments. If you have questions, please call your primary care clinic.  If you do not have a primary care provider, please call [phone] and they will assist you.      [employer] is an electronic gateway that provides easy, online access to your medical records. With [employer], you can request a clinic appointment, read your test results, renew a prescription or  communicate with your care team.     To access your existing account, please contact your [facility] or call [phone] for assistance.        Care EveryWhere ID     This is your Care EveryWhere ID. This could be used by other organizations to access your [place] medical records  [id number]        Your Vitals Were     Last Period                   06/25/2018            Blood Pressure from Last 3 Encounters:   11/15/18 121/79   10/24/18 111/76   10/03/18 110/71    Weight from Last 3 Encounters:   11/15/18 196 lb 14.4 oz (89.3 kg)   11/08/18 194 lb (88 kg)   10/24/18 194 lb 12.8 oz (88.4 kg)              Today, you had the following     No orders found for display       Primary Care Provider Office Phone # Fax #    Trish WILLOUGHBY DO [facility] [phone] [address] [address]        Equal Access to Services     KRIS SILVA : Hadii aad ku hadasho Soomaali, waaxda luqadaha, qaybta kaalmada adeegyada, waxay idiin hayaan kellie kharajocelyn will . So [facility] [phone].    ATENCIÓN: Si habla español, tiene a das disposición servicios gratuitos de asistencia lingüística. Llame al [phone].    We comply with applicable federal civil rights laws and [place] laws. We do not discriminate on the basis of race, color, national origin, age, disability, sex, sexual orientation, or gender identity.            Thank you!     Thank you for choosing [facility]  for your care. Our goal is always to provide you with excellent care. Hearing back from our patients is one way we can continue to improve our services. Please take a few minutes to complete the written survey that you may receive in the mail after your visit with us. Thank you!             Your Updated Medication List - Protect others around you: Learn how to safely use, store and throw away your medicines at www.disposemymeds.org.          This list is accurate as of 11/27/18  2:43 PM.   Always use your most recent med list.                   Brand Name Dispense Instructions for use Diagnosis    amitriptyline 10 MG tablet    ELAVIL    30 tablet    Take 1 tablet (10 mg) by mouth At Bedtime    Major depressive disorder, recurrent episode, mild (H)       azelastine 0.1 % nasal spray    ASTELIN    30 mL    USE 1 TO 2 SPRAYS IN EACH NOSTRIL TWICE DAILY    Allergic rhinitis due to pollen, unspecified seasonality       calcium carbonate 500 MG tablet    OS-JONATAN     Take 500 mg by mouth 2 times daily        cetirizine 10 MG tablet    zyrTEC     Take 10 mg by mouth daily        clonazePAM 0.5 MG tablet    klonoPIN    20 tablet    Take 1 tablet (0.5 mg) by mouth as needed for anxiety    Major depressive disorder, recurrent episode, mild (H)       Cod Liver Oil 1000 MG Caps      Take 1 capsule by mouth daily.        diclofenac 1 % topical gel    VOLTAREN    100 g    Apply 4 grams to knees or 2 grams to hands four times daily using enclosed dosing card.    Costochondritis       fluticasone 110 MCG/ACT inhaler    FLOVENT HFA    3 Inhaler    Inhale 2 puffs into the lungs 2 times daily    Moderate persistent asthma with allergic rhinitis without complication       fluticasone 50 MCG/ACT nasal spray    FLONASE    16 mL    SHAKE LIQUID AND USE 1 TO 2 SPRAYS IN EACH NOSTRIL DAILY    Seasonal allergic rhinitis due to pollen       HM MULTIVITAMIN ADULT GUMMY PO           PULMICORT FLEXHALER 180 MCG/ACT inhaler   Generic drug:  budesonide     1 Inhaler    INHALE 2 PUFFS INTO LUNGS 2 TIMES DAILY    Moderate persistent asthma with allergic rhinitis without complication       SYNTHROID 112 MCG tablet   Generic drug:  levothyroxine     30 tablet    Take 1 tablet (112 mcg) by mouth daily    Hypothyroidism due to Hashimoto's thyroiditis       Vitamin B-12 500 MCG Subl      Place 500 mcg under the tongue daily        Vitamin D3 3000 units Tabs      Take 1 tablet by mouth daily.

## 2018-11-27 NOTE — PROGRESS NOTES
Subjective:   Kasandra Winkler is a 37 year old female who follows up with lower back pain to review MRI results.     We have reviewed her MRI in detail.  She has degenerative disk disease at the lower lumbar levels as well as disk herniation at L4-L5 predominantly but also at L3-L4 and L5-S1.  There is no evidence of nerve root impingement.  We have discussed the facet arthrosis findings.  There is specifically no SI joint inflammation or chronic change of the SI joints.  Subsequently, no active sacroiliitis.      Kasandra is a 37-year-old female who has had several years of low back pain who has acutely worsened in 12/2017 and underwent physical therapy at [place] [facility] and then [facility] and was having no benefit.  She was seen by Rheumatology for further evaluation who sent her back for evaluation in Sports Medicine.  They had ordered SI joint films but she did not obtain them as she wanted to try and avoid further radiation therapy.  She has been taking Tylenol for pain without significant relief.  She is status post gastric sleeve placement, so she does not take oral nonsteroidals.  She also uses intermittent diclofenac cream to the area without relief.      She also presents to me a denial from [facility] for her MRI and so I will submit a response to her insurer.      She will start physical therapy again with Ronnie Londono and I will see her back in 2-3 months.      Greater than 30 minutes spent with the patient, and all that time was spent in counseling and coordination of care.

## 2018-11-27 NOTE — LETTER
11/27/2018      RE: Kasandra Winkler  [address]  [facility] [address]        Subjective:   Kasandra Winkler is a 37 year old female who follows up with lower back pain to review MRI results.     We have reviewed her MRI in detail.  She has degenerative disk disease at the lower lumbar levels as well as disk herniation at L4-L5 predominantly but also at L3-L4 and L5-S1.  There is no evidence of nerve root impingement.  We have discussed the facet arthrosis findings.  There is specifically no SI joint inflammation or chronic change of the SI joints.  Subsequently, no active sacroiliitis.      Kasandra is a 37-year-old female who has had several years of low back pain who has acutely worsened in 12/2017 and underwent physical therapy at [place] [facility] and then [facility] and was having no benefit.  She was seen by Rheumatology for further evaluation who sent her back for evaluation in Sports Medicine.  They had ordered SI joint films but she did not obtain them as she wanted to try and avoid further radiation therapy.  She has been taking Tylenol for pain without significant relief.  She is status post gastric sleeve placement, so she does not take oral nonsteroidals.  She also uses intermittent diclofenac cream to the area without relief.      She also presents to me a denial from [facility] for her MRI and so I will submit a response to her insurer.      She will start physical therapy again with Ronnie Londono and I will see her back in 2-3 months.      Greater than 30 minutes spent with the patient, and all that time was spent in counseling and coordination of care.       Homero Gan MD

## 2018-11-27 NOTE — LETTER
[facility] SPORTS MEDICINE  [address] [facility]  [address] [facility] [address]        November 27, 2018    Regarding:  Kasandra Winkler  [address]  [facility] [address]    Service Reference #: [id number]        To Whom It May Concern;  I am writing on behalf of my patient, Kasandra Winkler on whom I ordered a lumbar spine MRI which was completed recently. She has had longstanding low back pain that acutely worsened in December 2017. She had taken vicodin, engaged in physical therapy ([facility] and then [facility]), and failed acetaminophen and topical diclofenac for pain relief (she is status post gastric sleeve and cannot take oral NSAIDs). She saw Rheumatology for her back pain. They communicated their concern for possible sacroiliitis. In my medical opinion, a lumbar spine MRI (including the bilateral SI joints) was warranted in order to better direct her therapy.    Please feel free to contact me with any specific questions if I can be of help.    Sincerely,        Homero Gan MD, FACSM

## 2018-11-27 NOTE — TELEPHONE ENCOUNTER
"Prescription approved per [facility] Refill Protocol.  Due for ACT in January.  Betsey Yepez RN    Requested Prescriptions   Signed Prescriptions Disp Refills     PULMICORT FLEXHALER 180 MCG/ACT inhaler 1 Inhaler 0     Sig: INHALE 2 PUFFS INTO LUNGS 2 TIMES DAILY    Inhaled Steroids Protocol Passed    11/26/2018  5:52 AM       Passed - Patient is age 12 or older       Passed - Asthma control assessment score within normal limits in last 6 months    Please review ACT score.          Passed - Recent (6 mo) or future (30 days) visit within the authorizing provider's specialty    Patient had office visit in the last 6 months or has a visit in the next 30 days with authorizing provider or within the authorizing provider's specialty.  See \"Patient Info\" tab in inbasket, or \"Choose Columns\" in Meds & Orders section of the refill encounter.              "

## 2018-11-28 ENCOUNTER — TELEPHONE (OUTPATIENT)
Dept: ORTHOPEDICS | Facility: CLINIC | Age: 38
End: 2018-11-28

## 2018-11-28 ENCOUNTER — THERAPY VISIT (OUTPATIENT)
Dept: PHYSICAL THERAPY | Facility: CLINIC | Age: 38
End: 2018-11-28
Payer: COMMERCIAL

## 2018-11-28 DIAGNOSIS — M54.50 CHRONIC BILATERAL LOW BACK PAIN WITHOUT SCIATICA: Primary | ICD-10-CM

## 2018-11-28 DIAGNOSIS — G89.29 CHRONIC BILATERAL LOW BACK PAIN WITHOUT SCIATICA: Primary | ICD-10-CM

## 2018-11-28 PROCEDURE — 97110 THERAPEUTIC EXERCISES: CPT | Mod: GP | Performed by: PHYSICAL THERAPIST

## 2018-11-28 PROCEDURE — 97161 PT EVAL LOW COMPLEX 20 MIN: CPT | Mod: GP | Performed by: PHYSICAL THERAPIST

## 2018-11-28 NOTE — TELEPHONE ENCOUNTER
[facility] Call Center    Phone Message    May a detailed message be left on voicemail: no    Reason for Call: Order(s): PT orders   Reason for requested: [place] [facility] would like PT orders from Dr. Gan for Pt. Pt currently has orders there for knee pain. Please call with questions/ confirming orders at [phone]. Fax orders to [phone].  Date needed: At earliest convenience   Provider name: Dr. Homero Gan.      Action Taken: Message routed to:  [facility] ([facility] Med

## 2018-11-28 NOTE — MR AVS SNAPSHOT
After Visit Summary   11/28/2018    Kasandra Winkler    MRN: [id number]           Patient Information     Date Of Birth          1980        Visit Information        Provider Department      11/28/2018 10:50 AM Caleb Romero PT [facility] Physical [facility]        Today's Diagnoses     Chronic bilateral low back pain without sciatica    -  1       Follow-ups after your visit        Who to contact     If you have questions or need follow up information about today's clinic visit or your schedule please contact [facility] PHYSICAL [facility] directly at [phone].  Normal or non-critical lab and imaging results will be communicated to you by stickappshart, letter or phone within 4 business days after the clinic has received the results. If you do not hear from us within 7 days, please contact the clinic through [employer] or phone. If you have a critical or abnormal lab result, we will notify you by phone as soon as possible.  Submit refill requests through [employer] or call your pharmacy and they will forward the refill request to us. Please allow 3 business days for your refill to be completed.          Additional Information About Your Visit        MyChart Information     [employer] gives you secure access to your electronic health record. If you see a primary care provider, you can also send messages to your care team and make appointments. If you have questions, please call your primary care clinic.  If you do not have a primary care provider, please call [phone] and they will assist you.        Care EveryWhere ID     This is your Care EveryWhere ID. This could be used by other organizations to access your [facility] medical records  [id number]        Your Vitals Were     Last Period                   06/25/2018            Blood Pressure from Last 3 Encounters:   11/15/18 121/79   10/24/18 111/76   10/03/18 110/71    Weight  from Last 3 Encounters:   11/15/18 89.3 kg (196 lb 14.4 oz)   11/08/18 88 kg (194 lb)   10/24/18 88.4 kg (194 lb 12.8 oz)              We Performed the Following     CHACHA Inital Eval Report     PT Eval, Low Complexity ([id number])     Therapeutic Exercises        Primary Care Provider Office Phone # Fax #    Trish Callaway [phone]       [address] [address]        Equal Access to Services     KRIS SILVA : Hadii aad ku hadasho Soomaali, waaxda luqadaha, qaybta kaalmada adeegyada, waxay idiin hayaan adeeg kharash nicolette . So [facility] [phone].    ATENCIÓN: Si habla español, tiene a das disposición servicios gratuitos de asistencia lingüística. Sravan al [phone].    We comply with applicable federal civil rights laws and [place] laws. We do not discriminate on the basis of race, color, national origin, age, disability, sex, sexual orientation, or gender identity.            Thank you!     Thank you for choosing [facility]ERAP[facility]  for your care. Our goal is always to provide you with excellent care. Hearing back from our patients is one way we can continue to improve our services. Please take a few minutes to complete the written survey that you may receive in the mail after your visit with us. Thank you!             Your Updated Medication List - Protect others around you: Learn how to safely use, store and throw away your medicines at www.disposemymeds.org.          This list is accurate as of 11/28/18 11:59 PM.  Always use your most recent med list.                   Brand Name Dispense Instructions for use Diagnosis    amitriptyline 10 MG tablet    ELAVIL    30 tablet    Take 1 tablet (10 mg) by mouth At Bedtime    Major depressive disorder, recurrent episode, mild (H)       azelastine 0.1 % nasal spray    ASTELIN    30 mL    USE 1 TO 2 SPRAYS IN EACH NOSTRIL TWICE DAILY    Allergic rhinitis due to pollen, unspecified  seasonality       calcium carbonate 500 MG tablet    OS-JONATAN     Take 500 mg by mouth 2 times daily        cetirizine 10 MG tablet    zyrTEC     Take 10 mg by mouth daily        clonazePAM 0.5 MG tablet    klonoPIN    20 tablet    Take 1 tablet (0.5 mg) by mouth as needed for anxiety    Major depressive disorder, recurrent episode, mild (H)       Cod Liver Oil 1000 MG Caps      Take 1 capsule by mouth daily.        diclofenac 1 % topical gel    VOLTAREN    100 g    Apply 4 grams to knees or 2 grams to hands four times daily using enclosed dosing card.    Costochondritis       fluticasone 110 MCG/ACT inhaler    FLOVENT HFA    3 Inhaler    Inhale 2 puffs into the lungs 2 times daily    Moderate persistent asthma with allergic rhinitis without complication       fluticasone 50 MCG/ACT nasal spray    FLONASE    16 mL    SHAKE LIQUID AND USE 1 TO 2 SPRAYS IN EACH NOSTRIL DAILY    Seasonal allergic rhinitis due to pollen       HM MULTIVITAMIN ADULT GUMMY PO           PULMICORT FLEXHALER 180 MCG/ACT inhaler   Generic drug:  budesonide     1 Inhaler    INHALE 2 PUFFS INTO LUNGS 2 TIMES DAILY    Moderate persistent asthma with allergic rhinitis without complication       SYNTHROID 112 MCG tablet   Generic drug:  levothyroxine     30 tablet    Take 1 tablet (112 mcg) by mouth daily    Hypothyroidism due to Hashimoto's thyroiditis       Vitamin B-12 500 MCG Subl      Place 500 mcg under the tongue daily        Vitamin D3 3000 units Tabs      Take 1 tablet by mouth daily.

## 2018-11-28 NOTE — PROGRESS NOTES
[facility] Initial Evaluation  Subjective:  Patient is a 37 year old female presenting with rehab back hpi. The history is provided by the patient. No  was used.   Kasandra Winkler is a 37 year old female with a lumbar condition.  Condition occurred with:  Bending.  Condition occurred: at home.  This is a chronic condition  18. Patient initially injured her low back 20 years ago with bending forward to  a dryer sheet. Since that time, she has had recurring episodes of LBP. She experienced pain down her L leg while vacuuming in 2017. She had PT at that time with mild improvement. She was referred to PT on 18..    Patient reports pain:  Central lumbar spine and lower lumbar spine.    Pain is described as sharp  and reported as 3/10 and 8/10.  Associated symptoms:  Loss of motion/stiffness. Pain is the same all the time.  Symptoms are exacerbated by bending, twisting and other (transfers, bed mobility) and relieved by other and heat (lying supine in bed).  Since onset symptoms are gradually worsening.  Special tests:  MRI.  Previous treatment includes physical therapy.    General health as reported by patient is good.  Pertinent medical history includes:  Anemia, asthma, depression, migraines/headaches, osteoarthritis and thyroid problems.  Medical allergies: yes (see chart for complete list).  Other surgeries include:  Orthopedic surgery and other (Small toe, , hysterectomy, gastroectomy).  Current medications:  Thyroid medication and pain medication.  Current occupation is .  Patient is working in normal job without restrictions.  Primary job tasks include:  Driving, lifting, prolonged sitting and other (computer work, pushing/pulling).    Barriers include:  None as reported by the patient.    Red flags:  None as reported by the patient.                        Objective:  Standing Alignment:    Cervical/Thoracic:  Forward  head  Shoulder/UE:  Rounded shoulders  Lumbar:  Normal                           Lumbar/SI Evaluation  ROM:    AROM Lumbar:   Flexion:          Min loss +  Ext:                    Mod loss +   Side Bend:        Left:     Right:   Rotation:           Left:     Right:   Side Glide:        Left:  Mod loss +    Right:  Min loss -/+                                                                           Yany Lumbar Evaluation        Test Movements:    EIS: During: increases  After: no worse    Repeat EIS: During: increases  After: no worse  Mechanical Response: no effect    EIL: During: increases  After: no worse    Repeat EIL: During: decreases  After: better  Mechanical Response: IncROM        Conclusion: derangement  Principle of Treatment:  Posture Correction: postuer correction    Extension: REIL                                           ROS    Assessment/Plan:    Patient is a 37 year old female with lumbar complaints.    Patient has the following significant findings with corresponding treatment plan.                Diagnosis 1:  Chronic LBP  Pain -  hot/cold therapy, manual therapy, self management, education, directional preference exercise and home program  Decreased ROM/flexibility - manual therapy, therapeutic exercise and home program  Decreased proprioception - neuro re-education, therapeutic activities and home program  Impaired muscle performance - neuro re-education and home program  Decreased function - therapeutic activities and home program  Impaired posture - neuro re-education and home program    Therapy Evaluation Codes:   1) History comprised of:   Personal factors that impact the plan of care:      Time since onset of symptoms.    Comorbidity factors that impact the plan of care are:      Asthma, Depression, Migraines/headaches and Osteoarthritis.     Medications impacting care: Pain.  2) Examination of Body Systems comprised of:   Body structures and functions that impact the plan of care:       Lumbar spine.   Activity limitations that impact the plan of care are:      Bending, Lifting, Sitting, Standing and Walking.  3) Clinical presentation characteristics are:   Stable/Uncomplicated.  4) Decision-Making    Low complexity using standardized patient assessment instrument and/or measureable assessment of functional outcome.  Cumulative Therapy Evaluation is: Low complexity.    Previous and current functional limitations:  (See Goal Flow Sheet for this information)    Short term and Long term goals: (See Goal Flow Sheet for this information)     Communication ability:  Patient appears to be able to clearly communicate and understand verbal and written communication and follow directions correctly.  Treatment Explanation - The following has been discussed with the patient:   RX ordered/plan of care  Anticipated outcomes  Possible risks and side effects  This patient would benefit from PT intervention to resume normal activities.   Rehab potential is good.    Frequency:  1 X week, once daily  Duration:  for 6 weeks tapering to 1 X every other week over 4 weeks  Discharge Plan:  Achieve all LTG.  Independent in home treatment program.  Reach maximal therapeutic benefit.    Please refer to the daily flowsheet for treatment today, total treatment time and time spent performing 1:1 timed codes.

## 2018-12-03 PROBLEM — M54.50 CHRONIC BILATERAL LOW BACK PAIN WITHOUT SCIATICA: Status: ACTIVE | Noted: 2018-12-03

## 2018-12-03 PROBLEM — G89.29 CHRONIC BILATERAL LOW BACK PAIN WITHOUT SCIATICA: Status: ACTIVE | Noted: 2018-12-03

## 2018-12-06 ENCOUNTER — OFFICE VISIT (OUTPATIENT)
Dept: RHEUMATOLOGY | Facility: CLINIC | Age: 38
End: 2018-12-06
Attending: NURSE PRACTITIONER
Payer: COMMERCIAL

## 2018-12-06 VITALS
HEIGHT: 66 IN | TEMPERATURE: 98.6 F | WEIGHT: 199.1 LBS | HEART RATE: 65 BPM | DIASTOLIC BLOOD PRESSURE: 63 MMHG | BODY MASS INDEX: 32 KG/M2 | SYSTOLIC BLOOD PRESSURE: 127 MMHG

## 2018-12-06 DIAGNOSIS — M54.50 CHRONIC BILATERAL LOW BACK PAIN WITHOUT SCIATICA: ICD-10-CM

## 2018-12-06 DIAGNOSIS — M22.2X1 PATELLOFEMORAL PAIN SYNDROME OF BOTH KNEES: Primary | ICD-10-CM

## 2018-12-06 DIAGNOSIS — M22.2X2 PATELLOFEMORAL PAIN SYNDROME OF BOTH KNEES: Primary | ICD-10-CM

## 2018-12-06 DIAGNOSIS — G89.29 CHRONIC BILATERAL LOW BACK PAIN WITHOUT SCIATICA: ICD-10-CM

## 2018-12-06 PROCEDURE — G0463 HOSPITAL OUTPT CLINIC VISIT: HCPCS | Mod: ZF

## 2018-12-06 ASSESSMENT — PAIN SCALES - GENERAL: PAINLEVEL: SEVERE PAIN (6)

## 2018-12-06 NOTE — PROGRESS NOTES
"[facility] - Rheumatology Clinic Visit    Kasandra Winkler  is a 37 year old here for consultation back pain. Borderline MANDIE. -SSa -SSb Normal inflammation labs. -RF -CCP -C3 C4 -DsDNA -smith -RNP 2011. -RF/MANDIE 2011. -hep b 2014 +HLA B27 with NL CRP/ESR 10-. Seen Dr. Henning  (negative work-up with NL labs and x-rays). Seen Dr. Souza [facility] 7- 2016 -record reviewed (unremarkable; non-specific arthralgia and low back pain; I dont have labs or x-rays results). TSH normal 9-2018    Past-ibuprofen not able take now and no NSAIDs due to gastrectomy, tylenol and heat/rest helps    Initial visit 11-: Chronic progressive pain in knees, now dx PFS and tendonitis, hands small joints and wrists to the tops of the hands at times not as often, morning back pain improves with movements, SI joints, inside hip joints, knees worse going up and down the stairs, \"butt hurts\", worse in the mornings everything hurts jim the back, hard to get out of bed but does not notice much in her hands; eyes gritty and at times will get intermittent red with blur vision did see opthalmologist  At [place] [facility] told was dry eyes, cold intolerance. Maybe some swelling behind the knees. No psoriaisis or skin changes. WIll get flares. Not able to take NSAIDs. Some hairloss, has had crowns and cavities before. Some dry mouth. Denies any fever, chills, SOB, TREJO, night sweats, or chest pain, or cough. Reports healthy. Some brain fog at times she reports. No tick bites. No recent infections. She [facility]. This had not helped. No neuro signs or symptoms. Does feel her symptoms are progressive over the years.     December 6, 2018  Seen Dr. Gan for chronic low back pain--MRI more degenerative disc at lumbar areas and disk herniation L4-5, L3-4 and L5-S1 and facet arthrosis. No SI inflammation or changes to her SI joints.   Seen opthalmologist  -dx allergic conjunctivitis. Has 3 " cats and 1 dog. Started Physical Therapy working on lumbar spine and her knees. No inflammation or swelling. Pain is mostly in her back with movement, and some in her knees. Does have allergy symptoms. No EAS       PMH:  Injury-None   Medical- asthma, hashimotos thyroiditis, depression, TMJ, allergic rhinitis, LUCIEN (adiomyosis so had HYST due to  Heavy periods at [place]), migraine, irregular menses, bad anxiety, lap sleeve gastrectomy -, low vitamin D, PFS right knee, DDD cervical spine C5-6, C6-7, mild foraminal narrowing C5-6 C6-7 MRI cervical 2016, costrochondritis, lumbar DDD and herniation.      Surgical-  Past Surgical History:   Procedure Laterality Date      SECTION  10/10/10     CHOLECYSTECTOMY, LAPOROSCOPIC  2009    gallstones -      FINGER SURGERY      right little finger fracture     HYSTERECTOMY, PAP NO LONGER INDICATED  2018    ovaries remain - uterus and cervix removed     LAPAROSCOPIC GASTRIC SLEEVE N/A 2015    Procedure: LAPAROSCOPIC GASTRIC SLEEVE;  Surgeon: Cam Alvarez MD;  Location: [facility] OR       FH:  No autoimmune disorders, psoriasis, UC, crohn's, SLE,  PsA, gout, autoimmune thyroid.  No MS in family  Mother-osteoporosis, hypertension, CVD brain aneursym with CVIs, substance abuse-passed Aug 2018   Father-unknown   Siblings-1/2 siblings unknown  Children-1 healthy  MGM-heart disease, hypertension, RHEUMATOID ARTHRITIS (RA) with deformed hands,asthma   Cousin UC and fibromyalgia   Cousin thyroid removed     SH:  1-2 month Alcohol. Former Smoking quit  (10 yr). No IVDU. 1 Children. Right handed.  wife patient here. Work in . At times hands bother      PMSH personally reviewed and updated by me.    ROS:  Negative raynaud s phenomena, sun sensitivities,  pleurisy, inflammatory joint symptoms, significant rashes like malar, oral/nasal or ulcerations, inflammatory eye disease, inflammatory bowel disease, dactylitis, tenosynovitis, or enthespathy.  "Negative for miscarriages over 2 months into pregnancy, blood clots, gout, psoriasis, UC, crohn's. No temporal headache, no jaw claudication, no scalp tenderness, vision changes, carotidynia, cough. No STD. No Parotid swelling.   +dry gritty eyes at times with red and blur vision, seen opthalmologist  MARCUS smith  CONSTITUTIONAL: No fevers, night sweats or unintentional weight change. No acute distress, swollen glands  EYES: No vision change, diplopia, pain in eyes or red eyes   EARS, NOSE, MOUTH, THROAT: No tinnitus or hearing change, no epistaxis or nasal discharge, no oral lesions, throat clear. Normal saliva pool.  No drymouth. No thyroid enlargement.   CARDIOVASCULAR: No chest pain, palpitations, or pain with walking, no orthopnea or PND   RESPIRATORY: No dyspnea, cough, shortness of breath or wheezing. No pleurisy.   GI: No nausea, vomiting, diarrhea or constipation, no abdominal pain, or blood in stools.   : No change in urine, no dysuria or hematuria   MUSCKL: See above. No nodules. No enthesitis, plantar fascitis or heel pain.   INTEGUMENTARY: No concerning lesions or moles   NEURO: No loss of strength or sensation, no numbness or tingling, no tremor, no dizziness, no headache. No falls   ENDO: No polyuria or polydipsia, no temperature intolerance   HEME/LYMPH:No concerning bumps, bleeding problems, or swollen lymph nodes. No recent infections, hospitalizations or new illnesses.   Otherwise 14 point ROS obtained, reviewed and found negative.     Physical exam:  Vitals: Blood pressure 127/63, pulse 65, temperature 98.6  F (37  C), temperature source Oral, height 1.676 m (5' 6\"), weight 90.3 kg (199 lb 1.6 oz), last menstrual period 06/25/2018, not currently breastfeeding.  Wt Readings from Last 4 Encounters:   12/06/18 90.3 kg (199 lb 1.6 oz)   11/15/18 89.3 kg (196 lb 14.4 oz)   11/08/18 88 kg (194 lb)   10/24/18 88.4 kg (194 lb 12.8 oz)     Constitutional: WD-WN-WG cooperative  Eyes: nl EOM, GERRY, " vision, conjunctiva, sclera, No Unilateral or bilateral external ear inflammation   ENT: nl external ears, nose, hearing, lips, teeth, gums, throat. No saddle nose   Neck: no mass or thyroid enlargement  Resp: lungs clear to auscultation, nl to palpation, nl effort  CV: RRR, no murmurs, rubs or gallops, no edema  GI: no ABD mass or tenderness, no HSM  : not tested  Lymph: no cervical or epitrochlear nodes  MS:  Tender lumbar spine. Patella shift slight lateral All TMJ, neck, shoulder, elbow, wrist, hand, spine, hip, knee, ankle, and foot joints were examined and otherwise found normal. Normal  strength. No active synovitis or deformity. Full ROM. Normal prayer sign. Negative Negative Lhermitte's sign. No periuncle erythema  Skin: no nail pitting, alopecia, rash  Neuro: nl cranial nerves, strength, sensation, DTRs.   Psych: nl judgement, orientation, memory, affect.      Labs/Imaging:    Results for orders placed or performed in visit on 11/26/18   MR Lumbar Spine w/o Contrast    Narrative    MRI lumbar spine without contrast 11/26/2018 2:59 PM    Provided History: low back pain after picking up dryer sheet and  couldn't stand up 20 years ago. Having left sided radiating pain.  She  has had a longstanding centralized low back discomfort with radiation  into the bilateral or unilateral buttocks    Comparison: None available    Technique: Sagittal T1-weighted, sagittal STIR, 3D volumetric axial  and sagittal reconstructed T2-weighted images of the lumbar spine were  obtained without intravenous contrast.     Findings:   12 thoracic rib-bearing and 5 lumbar-type vertebrae. The tip of the  conus medullaris is at L1-2. Cauda equina nerve roots and visualized  thoracic cord are normal.    Normal alignment of the lumbar vertebrae. Normal lumbar lordosis. Disc  degeneration at L3-4 and to a greater degree at L4-5 with loss of disc  height and intradiscal T2 signal. Opposing endplate Schmorl's node  deformities and  degenerative/reactive marrow edema centered about the  L4-5 disc space. No fracture, destructive bone lesion or infection.  Paraspinous soft tissues are unremarkable. Findings on a level by  level basis are as follows:    T12-L1: No spinal canal or neural foraminal stenosis.    L1-2: No spinal canal or neural foraminal stenosis.    L2-3: No spinal canal or neural foraminal stenosis.    L3-4: 5 mm AP dimension left foraminal disc protrusion. Mild left  neural foraminal stenosis. No significant spinal canal stenosis.    L4-5: Disc bulge with superimposed 7 mm AP dimension central disc  extrusion. Facet arthrosis. Mild/moderate bilateral neural foraminal  stenosis. No significant spinal canal stenosis.    L5-S1: No spinal canal or neural foraminal stenosis.      Impression    IMPRESSION:  1. Multilevel lumbar spondylosis.  2. Mild-to-moderate bilateral neural foraminal stenosis at L4-5 and  mild left neural foraminal stenosis at L3-4.  3. No significant spinal canal stenosis.    I have personally reviewed the examination and initial interpretation  and I agree with the findings.    KANDICE PAVON MD       Impression/Plan:    1. +HLA B27 with history hashimotos, PFS knees. Exam does not show any active Inflammatory arthritis, synovitis or tendonitis. No clear inflammatory back signs or symptoms and imaging/MRI and labs do not indicate autoimmune process.   2. Low back pain with lumbar DDD per ortho.   3. PFS, per PHYSICAL THERAPY  4. Not able to take NSAIDs due to her past gastrectomy and no steroids she is concerned with her severe FOREST. 5. Autoimmune disease risk factor in family, and some of her family history is not known; and she has hashimotos thyroiditis (recent TSH normal). No clear evidence of this.     RTC 1 yr, prn  Reviewed all the labs, imaging results with her. No immunosuppression warrented.     The patient understood the rationale for the diagnosis and treatment plan. Patient shared in the decision  making. All questions were answered to best of my ability and the patient's satisfaction  Dominick BLUM, CNP, MSN  [facility]  Department of Rheumatology & Autoimmune Disorders    1. Immunization: Reviewed CDC guidelines with patient updated, information provided to patient based on CDC guidelines for vaccination recommendations, patient will discuss with primary provider  2. Bone Health:Educated on adequate calcium and vitamin D intake, Educated on exercise, Glucocorticoid education discussed risks, benefits & potential long term side effects from use  3. Discussed routine annual physicals, cancer screening, cardiac risk monitoring, bone health and primary care with primary care provider.   4. Educated on diagnosis, prognosis, labs, imaging, treatment options (including risks, benefits, risk of no treatment), medications (use, dose, side-effects, risks of medications including infection/cancer/bone marrow suppression, lab monitoring), infections what to do, plan of cares, goals of treatment.

## 2018-12-06 NOTE — NURSING NOTE
"/63  Pulse 65  Temp 98.6  F (37  C) (Oral)  Ht 1.676 m (5' 6\")  Wt 90.3 kg (199 lb 1.6 oz)  LMP 06/25/2018  BMI 32.14 kg/m2  Chief Complaint   Patient presents with     RECHECK     follow up with MRI, jayson dos santos       "

## 2018-12-06 NOTE — MR AVS SNAPSHOT
After Visit Summary   12/6/2018    Kasandra Winkler    MRN: [id number]           Patient Information     Date Of Birth          1980        Visit Information        Provider Department      12/6/2018 4:00 PM Dominick Cordero APRN CNP [facility]        Today's Diagnoses     Patellofemoral pain syndrome of both knees    -  1    Chronic bilateral low back pain without sciatica           Follow-ups after your visit        Follow-up notes from your care team     Return in about 1 year (around 12/6/2019).      Who to contact     If you have questions or need follow up information about today's clinic visit or your schedule please contact [facility] directly at [phone].  Normal or non-critical lab and imaging results will be communicated to you by Hiptypehart, letter or phone within 4 business days after the clinic has received the results. If you do not hear from us within 7 days, please contact the clinic through Hiptypehart or phone. If you have a critical or abnormal lab result, we will notify you by phone as soon as possible.  Submit refill requests through LocalCircles or call your pharmacy and they will forward the refill request to us. Please allow 3 business days for your refill to be completed.          Additional Information About Your Visit        MyChart Information     LocalCircles gives you secure access to your electronic health record. If you see a primary care provider, you can also send messages to your care team and make appointments. If you have questions, please call your primary care clinic.  If you do not have a primary care provider, please call [phone] and they will assist you.        Care EveryWhere ID     This is your Care EveryWhere ID. This could be used by other organizations to access your [place] medical records  [id number]        Your Vitals Were     Pulse Temperature Height Last Period BMI (Body Mass Index)       65 98.6  F (37  C) (Oral) 1.676 m  "(5' 6\") 06/25/2018 32.14 kg/m2        Blood Pressure from Last 3 Encounters:   12/06/18 127/63   11/15/18 121/79   10/24/18 111/76    Weight from Last 3 Encounters:   12/06/18 90.3 kg (199 lb 1.6 oz)   11/15/18 89.3 kg (196 lb 14.4 oz)   11/08/18 88 kg (194 lb)              Today, you had the following     No orders found for display       Primary Care Provider Office Phone # Fax #    Trish Callaway [phone]       [address] [facility]  [address]  [facility] [address]        Equal Access to Services     KRIS SILVA : Krishna vergarao Sohumphrey, waemyda jerrellqadaha, qaybta kaalmada adeluis alfreodyada, chris will . So [facility] [phone].    ATENCIÓN: Si habla español, tiene a das disposición servicios gratuitos de asistencia lingüística. Llame al [phone].    We comply with applicable federal civil rights laws and [place] laws. We do not discriminate on the basis of race, color, national origin, age, disability, sex, sexual orientation, or gender identity.            Thank you!     Thank you for choosing [facility]  for your care. Our goal is always to provide you with excellent care. Hearing back from our patients is one way we can continue to improve our services. Please take a few minutes to complete the written survey that you may receive in the mail after your visit with us. Thank you!             Your Updated Medication List - Protect others around you: Learn how to safely use, store and throw away your medicines at www.disposemymeds.org.          This list is accurate as of 12/6/18 11:59 PM.  Always use your most recent med list.                   Brand Name Dispense Instructions for use Diagnosis    amitriptyline 10 MG tablet    ELAVIL    30 tablet    Take 1 tablet (10 mg) by mouth At Bedtime    Major depressive disorder, recurrent episode, mild (H)       azelastine 0.1 % nasal spray    ASTELIN    30 mL    USE 1 TO 2 SPRAYS IN EACH NOSTRIL TWICE DAILY    Allergic " rhinitis due to pollen, unspecified seasonality       calcium carbonate 500 MG tablet    OS-JONATAN     Take 500 mg by mouth 2 times daily        cetirizine 10 MG tablet    zyrTEC     Take 10 mg by mouth daily        clonazePAM 0.5 MG tablet    klonoPIN    20 tablet    Take 1 tablet (0.5 mg) by mouth as needed for anxiety    Major depressive disorder, recurrent episode, mild (H)       Cod Liver Oil 1000 MG Caps      Take 1 capsule by mouth daily.        diclofenac 1 % topical gel    VOLTAREN    100 g    Apply 4 grams to knees or 2 grams to hands four times daily using enclosed dosing card.    Costochondritis       fluticasone 110 MCG/ACT inhaler    FLOVENT HFA    3 Inhaler    Inhale 2 puffs into the lungs 2 times daily    Moderate persistent asthma with allergic rhinitis without complication       fluticasone 50 MCG/ACT nasal spray    FLONASE    16 mL    SHAKE LIQUID AND USE 1 TO 2 SPRAYS IN EACH NOSTRIL DAILY    Seasonal allergic rhinitis due to pollen       HM MULTIVITAMIN ADULT GUMMY PO           PULMICORT FLEXHALER 180 MCG/ACT inhaler   Generic drug:  budesonide     1 Inhaler    INHALE 2 PUFFS INTO LUNGS 2 TIMES DAILY    Moderate persistent asthma with allergic rhinitis without complication       SYNTHROID 112 MCG tablet   Generic drug:  levothyroxine     30 tablet    Take 1 tablet (112 mcg) by mouth daily    Hypothyroidism due to Hashimoto's thyroiditis       Vitamin B-12 500 MCG Subl      Place 500 mcg under the tongue daily        Vitamin D3 3000 units Tabs      Take 1 tablet by mouth daily.

## 2018-12-06 NOTE — LETTER
"12/6/2018      RE: Kasandra Winkler  [address]  [facility] [address]       [facility] - Rheumatology Clinic Visit    Kasandra Winkler  is a 37 year old here for consultation back pain. Borderline MANDIE. -SSa -SSb Normal inflammation labs. -RF -CCP -C3 C4 -DsDNA -smith -RNP 2011. -RF/MANDIE 2011. -hep b 2014 +HLA B27 with NL CRP/ESR 10-. Seen Dr. Henning  (negative work-up with NL labs and x-rays). Seen Dr. Souza [facility] 7- 2016 -record reviewed (unremarkable; non-specific arthralgia and low back pain; I dont have labs or x-rays results). TSH normal 9-2018    Past-ibuprofen not able take now and no NSAIDs due to gastrectomy, tylenol and heat/rest helps    Initial visit 11-: Chronic progressive pain in knees, now dx PFS and tendonitis, hands small joints and wrists to the tops of the hands at times not as often, morning back pain improves with movements, SI joints, inside hip joints, knees worse going up and down the stairs, \"butt hurts\", worse in the mornings everything hurts jim the back, hard to get out of bed but does not notice much in her hands; eyes gritty and at times will get intermittent red with blur vision did see opthalmologist  At [facility] told was dry eyes, cold intolerance. Maybe some swelling behind the knees. No psoriaisis or skin changes. WIll get flares. Not able to take NSAIDs. Some hairloss, has had crowns and cavities before. Some dry mouth. Denies any fever, chills, SOB, TREJO, night sweats, or chest pain, or cough. Reports healthy. Some brain fog at times she reports. No tick bites. No recent infections. She goes to [facility] for her back pain. This had not helped. No neuro signs or symptoms. Does feel her symptoms are progressive over the years.     December 6, 2018  Seen Dr. Gan for chronic low back pain--MRI more degenerative disc at lumbar areas and disk herniation L4-5, L3-4 and L5-S1 and facet arthrosis. No SI inflammation " or changes to her SI joints.   Seen opthalmologist  -dx allergic conjunctivitis. Has 3 cats and 1 dog. Started Physical Therapy working on lumbar spine and her knees. No inflammation or swelling. Pain is mostly in her back with movement, and some in her knees. Does have allergy symptoms. No EAS       PMH:  Injury-None   Medical- asthma, hashimotos thyroiditis, depression, TMJ, allergic rhinitis, LUCIEN (adiomyosis so had HYST due to  Heavy periods at [facility]), migraine, irregular menses, bad anxiety, lap sleeve gastrectomy -, low vitamin D, PFS right knee, DDD cervical spine C5-6, C6-7, mild foraminal narrowing C5-6 C6-7 MRI cervical 2016, costrochondritis, lumbar DDD and herniation.      Surgical-  Past Surgical History:   Procedure Laterality Date      SECTION  10/10/10     CHOLECYSTECTOMY, LAPOROSCOPIC  2009    gallstones -      FINGER SURGERY      right little finger fracture     HYSTERECTOMY, PAP NO LONGER INDICATED  2018    ovaries remain - uterus and cervix removed     LAPAROSCOPIC GASTRIC SLEEVE N/A 2015    Procedure: LAPAROSCOPIC GASTRIC SLEEVE;  Surgeon: Cam Alvarez MD;  Location: [facility] OR     FH:  No autoimmune disorders, psoriasis, UC, crohn's, SLE,  PsA, gout, autoimmune thyroid.  No MS in family  Mother-osteoporosis, hypertension, CVD brain aneursym with CVIs, substance abuse-passed Aug 2018   Father-unknown   Siblings-1/2 siblings unknown  Children-1 healthy  MGM-heart disease, hypertension, RHEUMATOID ARTHRITIS (RA) with deformed hands,asthma   Cousin UC and fibromyalgia   Cousin thyroid removed     SH:  1-2 month Alcohol. Former Smoking quit  (10 yr). No IVDU. 1 Children. Right handed.  wife patient here. Work in . At times hands bother      PMSH personally reviewed and updated by me.    ROS:  Negative raynaud s phenomena, sun sensitivities,  pleurisy, inflammatory joint symptoms, significant rashes like malar, oral/nasal or ulcerations, inflammatory  "eye disease, inflammatory bowel disease, dactylitis, tenosynovitis, or enthespathy. Negative for miscarriages over 2 months into pregnancy, blood clots, gout, psoriasis, UC, crohn's. No temporal headache, no jaw claudication, no scalp tenderness, vision changes, carotidynia, cough. No STD. No Parotid swelling.   +dry gritty eyes at times with red and blur vision, seen opthalmologist  MARCUS smith  CONSTITUTIONAL: No fevers, night sweats or unintentional weight change. No acute distress, swollen glands  EYES: No vision change, diplopia, pain in eyes or red eyes   EARS, NOSE, MOUTH, THROAT: No tinnitus or hearing change, no epistaxis or nasal discharge, no oral lesions, throat clear. Normal saliva pool.  No drymouth. No thyroid enlargement.   CARDIOVASCULAR: No chest pain, palpitations, or pain with walking, no orthopnea or PND   RESPIRATORY: No dyspnea, cough, shortness of breath or wheezing. No pleurisy.   GI: No nausea, vomiting, diarrhea or constipation, no abdominal pain, or blood in stools.   : No change in urine, no dysuria or hematuria   MUSCKL: See above. No nodules. No enthesitis, plantar fascitis or heel pain.   INTEGUMENTARY: No concerning lesions or moles   NEURO: No loss of strength or sensation, no numbness or tingling, no tremor, no dizziness, no headache. No falls   ENDO: No polyuria or polydipsia, no temperature intolerance   HEME/LYMPH:No concerning bumps, bleeding problems, or swollen lymph nodes. No recent infections, hospitalizations or new illnesses.   Otherwise 14 point ROS obtained, reviewed and found negative.     Physical exam:  Vitals: Blood pressure 127/63, pulse 65, temperature 98.6  F (37  C), temperature source Oral, height 1.676 m (5' 6\"), weight 90.3 kg (199 lb 1.6 oz), last menstrual period 06/25/2018, not currently breastfeeding.  Wt Readings from Last 4 Encounters:   12/06/18 90.3 kg (199 lb 1.6 oz)   11/15/18 89.3 kg (196 lb 14.4 oz)   11/08/18 88 kg (194 lb)   10/24/18 88.4 kg " (194 lb 12.8 oz)     Constitutional: WD-WN-WG cooperative  Eyes: nl EOM, PERRLA, vision, conjunctiva, sclera, No Unilateral or bilateral external ear inflammation   ENT: nl external ears, nose, hearing, lips, teeth, gums, throat. No saddle nose   Neck: no mass or thyroid enlargement  Resp: lungs clear to auscultation, nl to palpation, nl effort  CV: RRR, no murmurs, rubs or gallops, no edema  GI: no ABD mass or tenderness, no HSM  : not tested  Lymph: no cervical or epitrochlear nodes  MS:  Tender lumbar spine. Patella shift slight lateral All TMJ, neck, shoulder, elbow, wrist, hand, spine, hip, knee, ankle, and foot joints were examined and otherwise found normal. Normal  strength. No active synovitis or deformity. Full ROM. Normal prayer sign. Negative Negative Lhermitte's sign. No periuncle erythema  Skin: no nail pitting, alopecia, rash  Neuro: nl cranial nerves, strength, sensation, DTRs.   Psych: nl judgement, orientation, memory, affect.    Labs/Imaging:    Results for orders placed or performed in visit on 11/26/18   MR Lumbar Spine w/o Contrast    Narrative    MRI lumbar spine without contrast 11/26/2018 2:59 PM    Provided History: low back pain after picking up dryer sheet and  couldn't stand up 20 years ago. Having left sided radiating pain.  She  has had a longstanding centralized low back discomfort with radiation  into the bilateral or unilateral buttocks    Comparison: None available    Technique: Sagittal T1-weighted, sagittal STIR, 3D volumetric axial  and sagittal reconstructed T2-weighted images of the lumbar spine were  obtained without intravenous contrast.     Findings:   12 thoracic rib-bearing and 5 lumbar-type vertebrae. The tip of the  conus medullaris is at L1-2. Cauda equina nerve roots and visualized  thoracic cord are normal.    Normal alignment of the lumbar vertebrae. Normal lumbar lordosis. Disc  degeneration at L3-4 and to a greater degree at L4-5 with loss of disc  height  and intradiscal T2 signal. Opposing endplate Schmorl's node  deformities and degenerative/reactive marrow edema centered about the  L4-5 disc space. No fracture, destructive bone lesion or infection.  Paraspinous soft tissues are unremarkable. Findings on a level by  level basis are as follows:    T12-L1: No spinal canal or neural foraminal stenosis.    L1-2: No spinal canal or neural foraminal stenosis.    L2-3: No spinal canal or neural foraminal stenosis.    L3-4: 5 mm AP dimension left foraminal disc protrusion. Mild left  neural foraminal stenosis. No significant spinal canal stenosis.    L4-5: Disc bulge with superimposed 7 mm AP dimension central disc  extrusion. Facet arthrosis. Mild/moderate bilateral neural foraminal  stenosis. No significant spinal canal stenosis.    L5-S1: No spinal canal or neural foraminal stenosis.      Impression    IMPRESSION:  1. Multilevel lumbar spondylosis.  2. Mild-to-moderate bilateral neural foraminal stenosis at L4-5 and  mild left neural foraminal stenosis at L3-4.  3. No significant spinal canal stenosis.    I have personally reviewed the examination and initial interpretation  and I agree with the findings.    KANDICE PAVON MD     Impression/Plan:    1. +HLA B27 with history hashimotos, PFS knees. Exam does not show any active Inflammatory arthritis, synovitis or tendonitis. No clear inflammatory back signs or symptoms and imaging/MRI and labs do not indicate autoimmune process.   2. Low back pain with lumbar DDD per ortho.   3. PFS, per PHYSICAL THERAPY  4. Not able to take NSAIDs due to her past gastrectomy and no steroids she is concerned with her severe FOREST. 5. Autoimmune disease risk factor in family, and some of her family history is not known; and she has hashimotos thyroiditis (recent TSH normal). No clear evidence of this.     RTC 1 yr, prn  Reviewed all the labs, imaging results with her. No immunosuppression warrented.     The patient understood the  rationale for the diagnosis and treatment plan. Patient shared in the decision making. All questions were answered to best of my ability and the patient's satisfaction  Dominick BLUM, CNP, MSN  [facility]  Department of Rheumatology & Autoimmune Disorders    1. Immunization: Reviewed CDC guidelines with patient updated, information provided to patient based on CDC guidelines for vaccination recommendations, patient will discuss with primary provider  2. Bone Health:Educated on adequate calcium and vitamin D intake, Educated on exercise, Glucocorticoid education discussed risks, benefits & potential long term side effects from use  3. Discussed routine annual physicals, cancer screening, cardiac risk monitoring, bone health and primary care with primary care provider.   4. Educated on diagnosis, prognosis, labs, imaging, treatment options (including risks, benefits, risk of no treatment), medications (use, dose, side-effects, risks of medications including infection/cancer/bone marrow suppression, lab monitoring), infections what to do, plan of cares, goals of treatment.       VIKTORIA Magaña CNP

## 2018-12-07 ENCOUNTER — TELEPHONE (OUTPATIENT)
Dept: ORTHOPEDICS | Facility: CLINIC | Age: 38
End: 2018-12-07

## 2018-12-07 NOTE — TELEPHONE ENCOUNTER
Tried to call and get MRI approved, they need xrays first and then they will approve MRI. The case number for her is [id number].  did speak with  and according to them they need the plain films and then the MRI should be approved per  at the insurance company. This was relayed to the patient.

## 2018-12-07 NOTE — TELEPHONE ENCOUNTER
[facility] Call Center    Phone Message    May a detailed message be left on voicemail: yes    Reason for Call: Other: Pt called and stated that her insurance provider is needing more documentaion from  in order to cover recent MRI. Pt was sent a letter from [facility] that stated the provider needs to document a suspected spinal disease, and documentation that pt has been on anti immflamitory meds and that they are not working. Pt's insuarnce needs this done by 12/11/18. Pt said insurance company will also accept a peer to peer call via [phone] option 4 to discuss. Fax number is [phone]. Please followup with pt.     Action Taken: Message routed to:  [facility] sports med

## 2018-12-10 ENCOUNTER — ANCILLARY PROCEDURE (OUTPATIENT)
Dept: GENERAL RADIOLOGY | Facility: CLINIC | Age: 38
End: 2018-12-10
Attending: NURSE PRACTITIONER
Payer: COMMERCIAL

## 2018-12-10 DIAGNOSIS — Z15.89 HLA B27 POSITIVE: ICD-10-CM

## 2018-12-10 DIAGNOSIS — M54.89 MIDLINE BACK PAIN, UNSPECIFIED BACK LOCATION, UNSPECIFIED CHRONICITY: ICD-10-CM

## 2018-12-10 DIAGNOSIS — M25.50 POLYARTHRALGIA: ICD-10-CM

## 2018-12-10 DIAGNOSIS — H16.229 KCS (KERATOCONJUNCTIVITIS SICCA): ICD-10-CM

## 2018-12-10 NOTE — TELEPHONE ENCOUNTER
Called to inform patient that after calling [facility] they just needed to get the xray report faxed to them [phone] and then they can appeal the denial and get the MRI for her lumbar spine. She was happy and thankful and she will contact the insurance company tomorrow, and will follow up with us if anything else needs to be done.

## 2018-12-10 NOTE — TELEPHONE ENCOUNTER
[facility] Call Center    Phone Message    May a detailed message be left on voicemail: yes    Reason for Call: Other: Pt called stating she needs Dr. Gan to either call insurance or send fax about proving that pt needed MRI. Pt stated every , including fax, phone numbers, and what insurance company needs from Dr. Gan (Peer to peer review). Her insurance company is [employer]. She also said the deadline is tomorrow around 3 p.m, or else she has to go through appeals process.       Action Taken: Message routed to:  [facility] Med

## 2018-12-12 DIAGNOSIS — E06.3 HASHIMOTO'S THYROIDITIS: ICD-10-CM

## 2018-12-12 DIAGNOSIS — R30.0 DYSURIA: ICD-10-CM

## 2018-12-12 LAB — TSH SERPL DL<=0.005 MIU/L-ACNC: 2.4 MU/L (ref 0.4–4)

## 2018-12-12 PROCEDURE — 84443 ASSAY THYROID STIM HORMONE: CPT | Performed by: FAMILY MEDICINE

## 2018-12-12 PROCEDURE — 36415 COLL VENOUS BLD VENIPUNCTURE: CPT | Performed by: FAMILY MEDICINE

## 2018-12-12 NOTE — RESULT ENCOUNTER NOTE
Dear Kasandra,   Your test results are all back -   Thyroid is looking good.  Let us know if you have any questions.  -Trish Callaway, DO

## 2018-12-17 ENCOUNTER — TRANSFERRED RECORDS (OUTPATIENT)
Dept: HEALTH INFORMATION MANAGEMENT | Facility: CLINIC | Age: 38
End: 2018-12-17

## 2018-12-25 DIAGNOSIS — J30.1 SEASONAL ALLERGIC RHINITIS DUE TO POLLEN: ICD-10-CM

## 2018-12-26 DIAGNOSIS — E06.3 HYPOTHYROIDISM DUE TO HASHIMOTO'S THYROIDITIS: ICD-10-CM

## 2018-12-26 RX ORDER — FLUTICASONE PROPIONATE 50 MCG
SPRAY, SUSPENSION (ML) NASAL
Qty: 16 ML | Refills: 3 | Status: SHIPPED | OUTPATIENT
Start: 2018-12-26 | End: 2019-10-28

## 2018-12-26 NOTE — TELEPHONE ENCOUNTER
"Requested Prescriptions   Pending Prescriptions Disp Refills     fluticasone (FLONASE) 50 MCG/ACT nasal spray [Pharmacy Med Name: FLUTICASONE 50MCG NASAL SP (120) RX] 16 mL 3     Sig: SHAKE LIQUID AND USE 1 TO 2 SPRAYS IN EACH NOSTRIL DAILY    Inhaled Steroids Protocol Passed - 12/25/2018  3:37 AM       Passed - Patient is age 12 or older       Passed - Asthma control assessment score within normal limits in last 6 months    Please review ACT score.          Passed - Recent (6 mo) or future (30 days) visit within the authorizing provider's specialty    Patient had office visit in the last 6 months or has a visit in the next 30 days with authorizing provider or within the authorizing provider's specialty.  See \"Patient Info\" tab in inbasket, or \"Choose Columns\" in Meds & Orders section of the refill encounter.            "

## 2018-12-27 RX ORDER — LEVOTHYROXINE SODIUM 112 MCG
TABLET ORAL
Qty: 30 TABLET | Refills: 1 | Status: SHIPPED | OUTPATIENT
Start: 2018-12-27 | End: 2019-03-01 | Stop reason: DRUGHIGH

## 2018-12-27 NOTE — TELEPHONE ENCOUNTER
"Prescription approved per [facility] Refill Protocol.  Betsey Yepez RN    Requested Prescriptions   Signed Prescriptions Disp Refills     SYNTHROID 112 MCG tablet 30 tablet 1     Sig: TAKE 1 TABLET(112 MCG) BY MOUTH DAILY    Thyroid Protocol Passed - 12/26/2018  5:24 AM       Passed - Patient is 12 years or older       Passed - Recent (12 mo) or future (30 days) visit within the authorizing provider's specialty    Patient had office visit in the last 12 months or has a visit in the next 30 days with authorizing provider or within the authorizing provider's specialty.  See \"Patient Info\" tab in inbasket, or \"Choose Columns\" in Meds & Orders section of the refill encounter.             Passed - Normal TSH on file in past 12 months    Recent Labs   Lab Test 12/12/18  1044   TSH 2.40             Passed - No active pregnancy on record    If patient is pregnant or has had a positive pregnancy test, please check TSH.         Passed - No positive pregnancy test in past 12 months    If patient is pregnant or has had a positive pregnancy test, please check TSH.            "

## 2019-01-23 ENCOUNTER — MYC MEDICAL ADVICE (OUTPATIENT)
Dept: FAMILY MEDICINE | Facility: CLINIC | Age: 39
End: 2019-01-23

## 2019-01-23 DIAGNOSIS — E06.3 HASHIMOTO'S THYROIDITIS: Primary | ICD-10-CM

## 2019-02-22 DIAGNOSIS — E06.3 HYPOTHYROIDISM DUE TO HASHIMOTO'S THYROIDITIS: ICD-10-CM

## 2019-02-22 RX ORDER — LEVOTHYROXINE SODIUM 112 MCG
TABLET ORAL
Qty: 30 TABLET | Refills: 0 | Status: CANCELLED | OUTPATIENT
Start: 2019-02-22

## 2019-02-22 NOTE — TELEPHONE ENCOUNTER
"Requested Prescriptions   Pending Prescriptions Disp Refills     SYNTHROID 112 MCG tablet [Pharmacy Med Name: SYNTHROID 0.112MG (112MCG) TABLETS]  Last Written Prescription Date:  12/27/2018  Last Fill Quantity: 30 tablet,  # refills: 1   Last Office Visit: 10/24/2018 Cesia  Future Office Visit:      30 tablet 0     Sig: TAKE 1 TABLET(112 MCG) BY MOUTH DAILY    Thyroid Protocol Passed - 2/22/2019  6:35 AM       Passed - Patient is 12 years or older       Passed - Recent (12 mo) or future (30 days) visit within the authorizing provider's specialty    Patient had office visit in the last 12 months or has a visit in the next 30 days with authorizing provider or within the authorizing provider's specialty.  See \"Patient Info\" tab in inbasket, or \"Choose Columns\" in Meds & Orders section of the refill encounter.             Passed - Medication is active on med list       Passed - Normal TSH on file in past 12 months    Recent Labs   Lab Test 12/12/18  1044   TSH 2.40             Passed - No active pregnancy on record    If patient is pregnant or has had a positive pregnancy test, please check TSH.         Passed - No positive pregnancy test in past 12 months    If patient is pregnant or has had a positive pregnancy test, please check TSH.            "

## 2019-02-25 ENCOUNTER — MYC MEDICAL ADVICE (OUTPATIENT)
Dept: FAMILY MEDICINE | Facility: CLINIC | Age: 39
End: 2019-02-25

## 2019-02-25 DIAGNOSIS — E06.3 HASHIMOTO'S THYROIDITIS: ICD-10-CM

## 2019-02-25 PROCEDURE — 36415 COLL VENOUS BLD VENIPUNCTURE: CPT | Performed by: FAMILY MEDICINE

## 2019-02-25 PROCEDURE — 84439 ASSAY OF FREE THYROXINE: CPT | Performed by: FAMILY MEDICINE

## 2019-02-25 PROCEDURE — 84443 ASSAY THYROID STIM HORMONE: CPT | Performed by: FAMILY MEDICINE

## 2019-02-25 NOTE — TELEPHONE ENCOUNTER
TSH order futured  Pt was symptomatic per MyChart from 1/23/2019  Sent MyChart to pt to see if still having symptoms  Aria DEL TORO RN

## 2019-02-26 LAB
T4 FREE SERPL-MCNC: 1.01 NG/DL (ref 0.76–1.46)
TSH SERPL DL<=0.005 MIU/L-ACNC: 3.02 MU/L (ref 0.4–4)

## 2019-02-26 NOTE — TELEPHONE ENCOUNTER
Note below sent by patient via [employer]:  I got labs done today for my thyroid. (Following up on Ana DEL TORO's message)    Labs in process.  Betsey Yepez RN

## 2019-02-27 ENCOUNTER — MYC MEDICAL ADVICE (OUTPATIENT)
Dept: FAMILY MEDICINE | Facility: CLINIC | Age: 39
End: 2019-02-27

## 2019-02-27 DIAGNOSIS — E06.3 HYPOTHYROIDISM DUE TO HASHIMOTO'S THYROIDITIS: ICD-10-CM

## 2019-02-27 NOTE — RESULT ENCOUNTER NOTE
Dear Kasandra,   Your test results are all back -   -how is your thyroid feeling?  Let us know if you have any questions.  -Trish Callaway, DO

## 2019-02-27 NOTE — TELEPHONE ENCOUNTER
TSH and T4 normal  Per MyChart from end of Jan though pt having symptoms  Do you want to adjust Levothyroxine dose at all?  Aria DEL TORO RN

## 2019-02-27 NOTE — TELEPHONE ENCOUNTER
The patient called to get her lab results so she can get her medication refill  Please review labs ans send refill please           [facility] [id number] [facility] [address] AT [address]

## 2019-02-28 RX ORDER — LEVOTHYROXINE SODIUM 125 MCG
125 TABLET ORAL DAILY
Qty: 30 TABLET | Refills: 1 | Status: SHIPPED | OUTPATIENT
Start: 2019-02-28 | End: 2019-04-27

## 2019-03-01 ENCOUNTER — OFFICE VISIT (OUTPATIENT)
Dept: FAMILY MEDICINE | Facility: CLINIC | Age: 39
End: 2019-03-01
Payer: COMMERCIAL

## 2019-03-01 VITALS
TEMPERATURE: 98.8 F | HEIGHT: 66 IN | HEART RATE: 71 BPM | WEIGHT: 199 LBS | DIASTOLIC BLOOD PRESSURE: 77 MMHG | BODY MASS INDEX: 31.98 KG/M2 | SYSTOLIC BLOOD PRESSURE: 110 MMHG | OXYGEN SATURATION: 100 %

## 2019-03-01 DIAGNOSIS — J45.40 MODERATE PERSISTENT ASTHMA WITH ALLERGIC RHINITIS WITHOUT COMPLICATION: Primary | ICD-10-CM

## 2019-03-01 DIAGNOSIS — J32.0 MAXILLARY SINUSITIS, UNSPECIFIED CHRONICITY: ICD-10-CM

## 2019-03-01 PROCEDURE — 99214 OFFICE O/P EST MOD 30 MIN: CPT | Performed by: FAMILY MEDICINE

## 2019-03-01 RX ORDER — MONTELUKAST SODIUM 10 MG/1
10 TABLET ORAL AT BEDTIME
Qty: 30 TABLET | Refills: 1 | Status: SHIPPED | OUTPATIENT
Start: 2019-03-01 | End: 2019-04-27

## 2019-03-01 ASSESSMENT — MIFFLIN-ST. JEOR: SCORE: 1589.41

## 2019-03-01 NOTE — PROGRESS NOTES
SUBJECTIVE:   Kasandra Winkler is a 38 year old female who presents to clinic today for the following health issues:      RESPIRATORY SYMPTOMS      Duration: ongoing    Description  nasal congestion, cough, wheezing, fatigue/malaise and feels like not getting a full breath    Severity: moderate    Accompanying signs and symptoms: as above    History (predisposing factors):  none    Precipitating or alleviating factors: asthma    Therapies tried and outcome:  Inhalers, rest, tylenol severe sinus-helps with sx, but always return  Symptoms >1 week   Feels like she is having some trouble breathing  Using albuterol inhaler and doesn't feel like it is helping  Very tight in her lungs   Felt better but now feeling worse  Using the pulmicort twice a day  Taking flonase & azelastine and zyrtec everyday  Using aiyana pot  Also doing fire cider       This past fall went back to see allergist -   Allergic to dust and recommended allergy shots      -------------------------------------    Problem list and histories reviewed & adjusted, as indicated.  Additional history: as documented    Patient Active Problem List   Diagnosis     CARDIOVASCULAR SCREENING; LDL GOAL LESS THAN 160     Hypothyroidism     Obesity     Hashimoto's thyroiditis     Hypovitaminosis D     Fatigue     Ovarian cyst     Major depressive disorder, recurrent episode, mild (H)     Asthma with allergic rhinitis     Morbid obesity (H)     laparoscopic sleeve gastrectomy 6/29/15     Anxiety     Allergic rhinitis due to pollen     Iron deficiency anemia secondary to inadequate dietary iron intake     Migraine with aura and without status migrainosus, not intractable     Menorrhagia with irregular cycle     Encounter for IUD insertion     Patellofemoral pain syndrome     Chronic bilateral low back pain without sciatica     Past Surgical History:   Procedure Laterality Date      SECTION  10/10/10     CHOLECYSTECTOMY, LAPOROSCOPIC  2009    gallstones -   "    FINGER SURGERY      right little finger fracture     HYSTERECTOMY, PAP NO LONGER INDICATED  2018    ovaries remain - uterus and cervix removed     LAPAROSCOPIC GASTRIC SLEEVE N/A 2015    Procedure: LAPAROSCOPIC GASTRIC SLEEVE;  Surgeon: Cam Alvarez MD;  Location:  OR       Social History     Tobacco Use     Smoking status: Former Smoker     Packs/day: 0.50     Years: 10.00     Pack years: 5.00     Last attempt to quit: 2009     Years since quittin.6     Smokeless tobacco: Never Used     Tobacco comment: social smoker for 10 yr   Substance Use Topics     Alcohol use: Yes     Alcohol/week: 0.0 oz     Comment: occasional ETOH use     Family History   Problem Relation Age of Onset     Osteoporosis Mother      Hypertension Mother      Cerebrovascular Disease Mother         Aneurysm     Substance Abuse Mother      Heart Disease Maternal Grandmother      Hypertension Maternal Grandmother      Arthritis Maternal Grandmother         RA      Respiratory Maternal Grandmother         Asthma     Glaucoma No family hx of      Macular Degeneration No family hx of            Reviewed and updated as needed this visit by clinical staff  Tobacco  Allergies  Meds  Problems  Med Hx  Surg Hx  Fam Hx  Soc Hx        Reviewed and updated as needed this visit by Provider  Tobacco  Allergies  Meds  Problems  Med Hx  Surg Hx  Fam Hx         ROS:  Constitutional, HEENT, cardiovascular, pulmonary, GI, , musculoskeletal, neuro, skin, endocrine and psych systems are negative, except as otherwise noted.    OBJECTIVE:     /77   Pulse 71   Temp 98.8  F (37.1  C) (Oral)   Ht 1.676 m (5' 6\")   Wt 90.3 kg (199 lb)   LMP 2018   SpO2 100%   BMI 32.12 kg/m    Body mass index is 32.12 kg/m .  GENERAL: healthy, alert and no distress  HENT: ear canals and TM's normal, nose and mouth without ulcers or lesions  Maxillary sinus tenderness  NECK: no adenopathy, no asymmetry, masses, or scars " and thyroid normal to palpation  RESP: lungs clear to auscultation - no rales, rhonchi or wheezes  CV: regular rate and rhythm, normal S1 S2, no S3 or S4, no murmur, click or rub, no peripheral edema and peripheral pulses strong    Diagnostic Test Results:  none     ASSESSMENT/PLAN:     1. Moderate persistent asthma with allergic rhinitis without complication  Pt having flare of allergies and asthma -   Discussed adding singulair for a few days to see how she does.  She did see allergist who recommended dust shots but location is not feasible  Will have her establish with new allergist in [facility] so can get before or after work.  Continue the flovent or pulmicort (depending on which one she has)  Albuterol prn  Zyrtec, flonase and astelin  - montelukast (SINGULAIR) 10 MG tablet; Take 1 tablet (10 mg) by mouth At Bedtime  Dispense: 30 tablet; Refill: 1    2. Maxillary sinusitis, unspecified chronicity  Discussed >1 week of sinus pressure symptoms  Already taking and doing everything she can   If not improving with the singulair can add augmentin BID for 10 days  Discussed risk vs benefits  - amoxicillin-clavulanate (AUGMENTIN) 875-125 MG tablet; Take 1 tablet by mouth 2 times daily for 10 days  Dispense: 20 tablet; Refill: 0    I spent over 25 minutes with patient and over 50% time in counseling regarding above issues.     Pt will call or RTC if symptoms worsen or do not improve.      Trish Callaway, DO  [facility]

## 2019-03-01 NOTE — NURSING NOTE
"Chief Complaint   Patient presents with     URI     /77   Pulse 71   Temp 98.8  F (37.1  C) (Oral)   Ht 1.676 m (5' 6\")   Wt 90.3 kg (199 lb)   LMP 06/25/2018   SpO2 100%   BMI 32.12 kg/m   Estimated body mass index is 32.12 kg/m  as calculated from the following:    Height as of this encounter: 1.676 m (5' 6\").    Weight as of this encounter: 90.3 kg (199 lb).        Health Maintenance due pending provider review:  ACT    Not completed today    Dariana Tran CMA  "

## 2019-03-08 DIAGNOSIS — F33.0 MAJOR DEPRESSIVE DISORDER, RECURRENT EPISODE, MILD (H): ICD-10-CM

## 2019-03-11 ENCOUNTER — MYC MEDICAL ADVICE (OUTPATIENT)
Dept: FAMILY MEDICINE | Facility: CLINIC | Age: 39
End: 2019-03-11

## 2019-03-11 NOTE — TELEPHONE ENCOUNTER
Reviewed MN   Last Rx from [facility] Nirav  Pt has been consistently filling Clonazepam and Ambien from him   Sent MyChart to pt  Aria DEL TORO RN

## 2019-03-12 RX ORDER — CLONAZEPAM 0.5 MG/1
TABLET ORAL
Start: 2019-03-12

## 2019-03-12 NOTE — TELEPHONE ENCOUNTER
PN,   See below refill request  See other MyChart too if needed  Pt states Dr Gastelum was her psychiatrist - no longer seeing him but can if needed  Pt was getting Clonazepam from him  Last refill per MN  was 2/4/2019 #60   Pt consistently filling #60 each month  Do you want an evisit or phone visit to discuss?  Or do you prefer pt see psych again and have them continue Rx'ing this?  Aria DEL TORO RN

## 2019-03-13 ENCOUNTER — OFFICE VISIT (OUTPATIENT)
Dept: FAMILY MEDICINE | Facility: CLINIC | Age: 39
End: 2019-03-13
Payer: COMMERCIAL

## 2019-03-13 VITALS
SYSTOLIC BLOOD PRESSURE: 118 MMHG | DIASTOLIC BLOOD PRESSURE: 83 MMHG | WEIGHT: 202.3 LBS | OXYGEN SATURATION: 98 % | TEMPERATURE: 97.8 F | BODY MASS INDEX: 32.51 KG/M2 | HEART RATE: 68 BPM | HEIGHT: 66 IN

## 2019-03-13 DIAGNOSIS — R07.0 THROAT PAIN: Primary | ICD-10-CM

## 2019-03-13 LAB
DEPRECATED S PYO AG THROAT QL EIA: NORMAL
SPECIMEN SOURCE: NORMAL

## 2019-03-13 PROCEDURE — 87880 STREP A ASSAY W/OPTIC: CPT | Performed by: FAMILY MEDICINE

## 2019-03-13 PROCEDURE — 99213 OFFICE O/P EST LOW 20 MIN: CPT | Performed by: FAMILY MEDICINE

## 2019-03-13 PROCEDURE — 87081 CULTURE SCREEN ONLY: CPT | Performed by: FAMILY MEDICINE

## 2019-03-13 ASSESSMENT — MIFFLIN-ST. JEOR: SCORE: 1604.38

## 2019-03-13 NOTE — PROGRESS NOTES
SUBJECTIVE:   Kasandra Winkler is a 38 year old female who presents to clinic today for the following health issues:      RESPIRATORY SYMPTOMS Follow Up      Duration: x3 days     Description  nasal congestion, rhinorrhea, sore throat, facial pain/pressure, wheezing, chills, ear pain both, headache, fatigue/malaise, hoarse voice, conjunctival irritation, nausea and stomach ache    Severity: moderate    Accompanying signs and symptoms: None    History (predisposing factors):  asthma    Precipitating or alleviating factors: None    Therapies tried and outcome:  Tylenol, oral decongestant and antihistamine - helps with sx       -------------------------------------    Problem list and histories reviewed & adjusted, as indicated.  Additional history: as documented    Patient Active Problem List   Diagnosis     CARDIOVASCULAR SCREENING; LDL GOAL LESS THAN 160     Hypothyroidism     Obesity     Hashimoto's thyroiditis     Hypovitaminosis D     Fatigue     Ovarian cyst     Major depressive disorder, recurrent episode, mild (H)     Asthma with allergic rhinitis     Morbid obesity (H)     laparoscopic sleeve gastrectomy 6/29/15     Anxiety     Allergic rhinitis due to pollen     Iron deficiency anemia secondary to inadequate dietary iron intake     Migraine with aura and without status migrainosus, not intractable     Menorrhagia with irregular cycle     Encounter for IUD insertion     Patellofemoral pain syndrome     Chronic bilateral low back pain without sciatica     Past Surgical History:   Procedure Laterality Date      SECTION  10/10/10     CHOLECYSTECTOMY, LAPOROSCOPIC  2009    gallstones -      FINGER SURGERY      right little finger fracture     HYSTERECTOMY, PAP NO LONGER INDICATED  2018    ovaries remain - uterus and cervix removed     LAPAROSCOPIC GASTRIC SLEEVE N/A 2015    Procedure: LAPAROSCOPIC GASTRIC SLEEVE;  Surgeon: Cam Alvarez MD;  Location:  OR       Social History  "    Tobacco Use     Smoking status: Former Smoker     Packs/day: 0.50     Years: 10.00     Pack years: 5.00     Last attempt to quit: 2009     Years since quittin.6     Smokeless tobacco: Never Used     Tobacco comment: social smoker for 10 yr   Substance Use Topics     Alcohol use: Yes     Alcohol/week: 0.0 oz     Comment: occasional ETOH use     Family History   Problem Relation Age of Onset     Osteoporosis Mother      Hypertension Mother      Cerebrovascular Disease Mother         Aneurysm     Substance Abuse Mother      Heart Disease Maternal Grandmother      Hypertension Maternal Grandmother      Arthritis Maternal Grandmother         RA      Respiratory Maternal Grandmother         Asthma     Glaucoma No family hx of      Macular Degeneration No family hx of            Reviewed and updated as needed this visit by clinical staff       Reviewed and updated as needed this visit by Provider         ROS:  Constitutional, HEENT, cardiovascular, pulmonary, gi and gu systems are negative, except as otherwise noted.    OBJECTIVE:     /83   Pulse 68   Temp 97.8  F (36.6  C) (Oral)   Ht 1.676 m (5' 6\")   Wt 91.8 kg (202 lb 4.8 oz)   LMP 2018   SpO2 98%   BMI 32.65 kg/m    Body mass index is 32.65 kg/m .  GENERAL: healthy, alert and no distress  HENT: normal cephalic/atraumatic, ear canals and TM's normal, nose and mouth without ulcers or lesions, oral mucous membranes moist and tonsillar erythema  NECK: no adenopathy, no asymmetry, masses, or scars and thyroid normal to palpation  RESP: lungs clear to auscultation - no rales, rhonchi or wheezes  CV: regular rate and rhythm, normal S1 S2, no S3 or S4, no murmur, click or rub, no peripheral edema and peripheral pulses strong    Diagnostic Test Results:  Results for orders placed or performed in visit on 19 (from the past 24 hour(s))   Strep, Rapid Screen   Result Value Ref Range    Specimen Description Throat     Rapid Strep A Screen      "  NEGATIVE: No Group A streptococcal antigen detected by immunoassay, await culture report.       ASSESSMENT/PLAN:     1. Throat pain  Viral URI - vs PND from allergies  Will treat conservatively  - Strep, Rapid Screen  - Beta strep group A culture    Pt will call or RTC if symptoms worsen or do not improve.      Trish Callaway, [facility]

## 2019-03-13 NOTE — NURSING NOTE
"Chief Complaint   Patient presents with     Pharyngitis     /83   Pulse 68   Temp 97.8  F (36.6  C) (Oral)   Ht 1.676 m (5' 6\")   Wt 91.8 kg (202 lb 4.8 oz)   LMP 06/25/2018   SpO2 98%   BMI 32.65 kg/m   Estimated body mass index is 32.65 kg/m  as calculated from the following:    Height as of this encounter: 1.676 m (5' 6\").    Weight as of this encounter: 91.8 kg (202 lb 4.8 oz).  Medication Reconciliation: complete      Health Maintenance that is potentially due pending provider review:  GAD7 and ACT    Did not complete today.    RICO Ramires  "

## 2019-03-14 LAB
BACTERIA SPEC CULT: NORMAL
SPECIMEN SOURCE: NORMAL

## 2019-04-24 DIAGNOSIS — E06.3 HYPOTHYROIDISM DUE TO HASHIMOTO'S THYROIDITIS: ICD-10-CM

## 2019-04-24 LAB — TSH SERPL DL<=0.005 MIU/L-ACNC: 2.42 MU/L (ref 0.4–4)

## 2019-04-24 PROCEDURE — 36415 COLL VENOUS BLD VENIPUNCTURE: CPT | Performed by: FAMILY MEDICINE

## 2019-04-24 PROCEDURE — 84443 ASSAY THYROID STIM HORMONE: CPT | Performed by: FAMILY MEDICINE

## 2019-04-24 NOTE — RESULT ENCOUNTER NOTE
Dear Kasandra,   Your test results are all back -   Thyroid looks pretty good.  Let us know if you have any questions.  -Trish Callaway, DO

## 2019-04-27 DIAGNOSIS — E06.3 HYPOTHYROIDISM DUE TO HASHIMOTO'S THYROIDITIS: ICD-10-CM

## 2019-04-27 DIAGNOSIS — J45.40 MODERATE PERSISTENT ASTHMA WITH ALLERGIC RHINITIS WITHOUT COMPLICATION: ICD-10-CM

## 2019-04-29 RX ORDER — LEVOTHYROXINE SODIUM 125 MCG
TABLET ORAL
Qty: 30 TABLET | Refills: 1 | Status: SHIPPED | OUTPATIENT
Start: 2019-04-29 | End: 2019-07-25

## 2019-04-29 RX ORDER — MONTELUKAST SODIUM 10 MG/1
TABLET ORAL
Qty: 30 TABLET | Refills: 1 | Status: SHIPPED | OUTPATIENT
Start: 2019-04-29 | End: 2019-06-19

## 2019-04-29 NOTE — TELEPHONE ENCOUNTER
"Prescription approved per [facility] Refill Protocol.  Betsey Yepez RN    Requested Prescriptions   Signed Prescriptions Disp Refills    SYNTHROID 125 MCG tablet 30 tablet 1     Sig: TAKE 1 TABLET(125 MCG) BY MOUTH DAILY       Thyroid Protocol Passed - 4/27/2019  3:35 AM        Passed - Patient is 12 years or older        Passed - Recent (12 mo) or future (30 days) visit within the authorizing provider's specialty     Patient had office visit in the last 12 months or has a visit in the next 30 days with authorizing provider or within the authorizing provider's specialty.  See \"Patient Info\" tab in inbasket, or \"Choose Columns\" in Meds & Orders section of the refill encounter.              Passed - Medication is active on med list        Passed - Normal TSH on file in past 12 months     Recent Labs   Lab Test 04/24/19  0727   TSH 2.42              Passed - No active pregnancy on record     If patient is pregnant or has had a positive pregnancy test, please check TSH.          Passed - No positive pregnancy test in past 12 months     If patient is pregnant or has had a positive pregnancy test, please check TSH.         montelukast (SINGULAIR) 10 MG tablet 30 tablet 1     Sig: TAKE 1 TABLET(10 MG) BY MOUTH AT BEDTIME       Leukotriene Inhibitors Protocol Failed - 4/27/2019  3:35 AM        Failed - Asthma control assessment score within normal limits in last 6 months     Please review ACT score.           Passed - Patient is age 12 or older     If patient is under 16, ok to refill using age based dosing.           Passed - Medication is active on med list        Passed - Recent (6 mo) or future (30 days) visit within the authorizing provider's specialty     Patient had office visit in the last 6 months or has a visit in the next 30 days with authorizing provider or within the authorizing provider's specialty.  See \"Patient Info\" tab in inbasket, or \"Choose Columns\" in Meds & Orders section of the refill encounter.        "

## 2019-05-21 ENCOUNTER — MYC MEDICAL ADVICE (OUTPATIENT)
Dept: FAMILY MEDICINE | Facility: CLINIC | Age: 39
End: 2019-05-21

## 2019-05-21 DIAGNOSIS — E06.3 HYPOTHYROIDISM DUE TO HASHIMOTO'S THYROIDITIS: ICD-10-CM

## 2019-05-22 RX ORDER — LEVOTHYROXINE SODIUM 137 MCG
137 TABLET ORAL DAILY
Qty: 30 TABLET | Refills: 1 | Status: SHIPPED | OUTPATIENT
Start: 2019-05-22 | End: 2019-06-21

## 2019-05-24 PROBLEM — M22.2X9 PATELLOFEMORAL PAIN SYNDROME: Status: RESOLVED | Noted: 2018-11-15 | Resolved: 2019-05-24

## 2019-05-24 NOTE — PROGRESS NOTES
DISCHARGE SUMMARY    Kasandra Winkler was seen 2 times for evaluation and treatment.  Patient did not return for further treatment and current status is unknown.  Due to short treatment duration, no objective or functional changes were made.  Please see goal flow sheet from episode noted date below and initial evaluation for further information.  Patient is discharged from therapy and therapy episode is resolved as of 05/24/19.      Linked Episodes   Type: Episode: Status: Noted: Resolved: Last update: Updated by:   PHYSICAL THERAPY R Knee Pain Active 11/15/2018  11/20/2018  9:21 AM Jason Horan, PT      Comments:

## 2019-06-18 DIAGNOSIS — J45.40 MODERATE PERSISTENT ASTHMA WITH ALLERGIC RHINITIS WITHOUT COMPLICATION: ICD-10-CM

## 2019-06-19 DIAGNOSIS — E06.3 HYPOTHYROIDISM DUE TO HASHIMOTO'S THYROIDITIS: ICD-10-CM

## 2019-06-19 DIAGNOSIS — J45.40 MODERATE PERSISTENT ASTHMA WITH ALLERGIC RHINITIS WITHOUT COMPLICATION: ICD-10-CM

## 2019-06-19 LAB — TSH SERPL DL<=0.005 MIU/L-ACNC: 0.44 MU/L (ref 0.4–4)

## 2019-06-19 PROCEDURE — 84443 ASSAY THYROID STIM HORMONE: CPT | Performed by: FAMILY MEDICINE

## 2019-06-19 PROCEDURE — 36415 COLL VENOUS BLD VENIPUNCTURE: CPT | Performed by: FAMILY MEDICINE

## 2019-06-19 RX ORDER — BUDESONIDE 180 UG/1
AEROSOL, POWDER RESPIRATORY (INHALATION)
Qty: 1 EACH | Refills: 0 | Status: SHIPPED | OUTPATIENT
Start: 2019-06-19 | End: 2020-03-17

## 2019-06-19 RX ORDER — MONTELUKAST SODIUM 10 MG/1
TABLET ORAL
Qty: 30 TABLET | Refills: 0 | Status: SHIPPED | OUTPATIENT
Start: 2019-06-19 | End: 2019-07-29

## 2019-06-19 NOTE — TELEPHONE ENCOUNTER
"Prescription approved per [facility] Refill Protocol.  Due for ACT in July  Betsey Yepez RN    Requested Prescriptions   Signed Prescriptions Disp Refills    montelukast (SINGULAIR) 10 MG tablet 30 tablet 0     Sig: TAKE 1 TABLET(10 MG) BY MOUTH AT BEDTIME       Leukotriene Inhibitors Protocol Failed - 6/19/2019  3:37 AM        Failed - Asthma control assessment score within normal limits in last 6 months     Please review ACT score.           Passed - Patient is age 12 or older     If patient is under 16, ok to refill using age based dosing.           Passed - Medication is active on med list        Passed - Recent (6 mo) or future (30 days) visit within the authorizing provider's specialty     Patient had office visit in the last 6 months or has a visit in the next 30 days with authorizing provider or within the authorizing provider's specialty.  See \"Patient Info\" tab in inbasket, or \"Choose Columns\" in Meds & Orders section of the refill encounter.                "

## 2019-06-19 NOTE — TELEPHONE ENCOUNTER
Medication is being filled for 1 time refill only due to:  Patient needs to be seen because due for physical .   Aria DEL TORO RN

## 2019-06-19 NOTE — TELEPHONE ENCOUNTER
"Last Written Prescription Date:  11/27/2018  Last Fill Quantity: 1,  # refills: 0   Last office visit: 3/13/2019 with prescribing provider:     Future Office Visit:   Next 5 appointments (look out 90 days)    Jun 19, 2019  8:00 AM CDT  Lab visit with [facility]  [facility] ([facility]) [address]  [facility] [address] [phone]       Requested Prescriptions   Pending Prescriptions Disp Refills     PULMICORT FLEXHALER 180 MCG/ACT inhaler [Pharmacy Med Name: PULMICORT 180MCG FLEXHALR(120PUFFS)]  0     Sig: INHALE 2 PUFFS TWICE DAILY       Inhaled Steroids Protocol Failed - 6/18/2019  3:58 PM        Failed - Asthma control assessment score within normal limits in last 6 months     Please review ACT score.   ACT Total Scores 1/24/2018 7/6/2018   ACT TOTAL SCORE (Goal Greater than or Equal to 20) 22 23   In the past 12 months, how many times did you visit the emergency room for your asthma without being admitted to the hospital? 0 0   In the past 12 months, how many times were you hospitalized overnight because of your asthma? 0 0             Passed - Patient is age 12 or older        Passed - Medication is active on med list        Passed - Recent (6 mo) or future (30 days) visit within the authorizing provider's specialty     Patient had office visit in the last 6 months or has a visit in the next 30 days with authorizing provider or within the authorizing provider's specialty.  See \"Patient Info\" tab in inbasket, or \"Choose Columns\" in Meds & Orders section of the refill encounter.            "

## 2019-06-20 ENCOUNTER — MYC MEDICAL ADVICE (OUTPATIENT)
Dept: FAMILY MEDICINE | Facility: CLINIC | Age: 39
End: 2019-06-20

## 2019-06-20 DIAGNOSIS — E06.3 HYPOTHYROIDISM DUE TO HASHIMOTO'S THYROIDITIS: ICD-10-CM

## 2019-06-20 DIAGNOSIS — J45.40 MODERATE PERSISTENT ASTHMA WITH ALLERGIC RHINITIS WITHOUT COMPLICATION: ICD-10-CM

## 2019-06-21 RX ORDER — MONTELUKAST SODIUM 10 MG/1
TABLET ORAL
Start: 2019-06-21

## 2019-06-21 RX ORDER — LEVOTHYROXINE SODIUM 137 MCG
137 TABLET ORAL DAILY
Qty: 30 TABLET | Refills: 1 | Status: SHIPPED | OUTPATIENT
Start: 2019-06-21 | End: 2019-08-02

## 2019-06-21 NOTE — RESULT ENCOUNTER NOTE
Dear Kasandra,   Your test results are all back -   Thyroid is in normal range.  Let us know if you have any questions.  -Trish Callaway, DO

## 2019-06-21 NOTE — TELEPHONE ENCOUNTER
"Singulair   Last Written Prescription Date:  06/19/2019  Last Fill Quantity: 30,  # refills: 0   Last office visit: 3/13/2019 with prescribing provider:  Yes    Future Office Visit:    Requested Prescriptions   Pending Prescriptions Disp Refills     montelukast (SINGULAIR) 10 MG tablet [Pharmacy Med Name: MONTELUKAST 10MG TABLETS] 30 tablet 0     Sig: TAKE 1 TABLET(10 MG) BY MOUTH AT BEDTIME       Leukotriene Inhibitors Protocol Failed - 6/20/2019  4:46 PM        Failed - Asthma control assessment score within normal limits in last 6 months     Please review ACT score.           Passed - Patient is age 12 or older     If patient is under 16, ok to refill using age based dosing.           Passed - Medication is active on med list        Passed - Recent (6 mo) or future (30 days) visit within the authorizing provider's specialty     Patient had office visit in the last 6 months or has a visit in the next 30 days with authorizing provider or within the authorizing provider's specialty.  See \"Patient Info\" tab in inbasket, or \"Choose Columns\" in Meds & Orders section of the refill encounter.              "

## 2019-07-19 DIAGNOSIS — E06.3 HYPOTHYROIDISM DUE TO HASHIMOTO'S THYROIDITIS: ICD-10-CM

## 2019-07-19 RX ORDER — LEVOTHYROXINE SODIUM 125 MCG
TABLET ORAL
Start: 2019-07-19

## 2019-07-19 NOTE — TELEPHONE ENCOUNTER
"Synthroid 125MCG  Last Written Prescription Date:  04/29/2019  Last Fill Quantity: 30,  # refills: 1   Last office visit: 3/13/2019 with prescribing provider:  Yes    Future Office Visit:    Requested Prescriptions   Pending Prescriptions Disp Refills     SYNTHROID 125 MCG tablet [Pharmacy Med Name: SYNTHROID 0.125MG (125MCG) TABLETS] 30 tablet 0     Sig: TAKE 1 TABLET(125 MCG) BY MOUTH DAILY       Thyroid Protocol Passed - 7/19/2019  3:36 AM        Passed - Patient is 12 years or older        Passed - Recent (12 mo) or future (30 days) visit within the authorizing provider's specialty     Patient had office visit in the last 12 months or has a visit in the next 30 days with authorizing provider or within the authorizing provider's specialty.  See \"Patient Info\" tab in inbasket, or \"Choose Columns\" in Meds & Orders section of the refill encounter.              Passed - Medication is active on med list        Passed - Normal TSH on file in past 12 months     Recent Labs   Lab Test 06/19/19  0753   TSH 0.44              Passed - No active pregnancy on record     If patient is pregnant or has had a positive pregnancy test, please check TSH.          Passed - No positive pregnancy test in past 12 months     If patient is pregnant or has had a positive pregnancy test, please check TSH.            "

## 2019-07-25 DIAGNOSIS — E06.3 HYPOTHYROIDISM DUE TO HASHIMOTO'S THYROIDITIS: ICD-10-CM

## 2019-07-25 NOTE — TELEPHONE ENCOUNTER
"Last Written Prescription Date:  04/29/2019  Last Fill Quantity: 30,  # refills: 1   Last office visit: 3/13/2019 with prescribing provider:     Future Office Visit:   Next 5 appointments (look out 90 days)    Aug 02, 2019  1:45 PM CDT  Teodoro Fernando with Trish Callaway DO  [facility] ([facility]) [address]  [facility] [address] [phone]       Requested Prescriptions   Pending Prescriptions Disp Refills     SYNTHROID 125 MCG tablet [Pharmacy Med Name: SYNTHROID 0.125MG (125MCG) TABLETS] 30 tablet 0     Sig: TAKE 1 TABLET(125 MCG) BY MOUTH DAILY       Thyroid Protocol Passed - 7/25/2019  9:29 AM        Passed - Patient is 12 years or older        Passed - Recent (12 mo) or future (30 days) visit within the authorizing provider's specialty     Patient had office visit in the last 12 months or has a visit in the next 30 days with authorizing provider or within the authorizing provider's specialty.  See \"Patient Info\" tab in inbasket, or \"Choose Columns\" in Meds & Orders section of the refill encounter.              Passed - Medication is active on med list        Passed - Normal TSH on file in past 12 months     Recent Labs   Lab Test 06/19/19  0753   TSH 0.44              Passed - No active pregnancy on record     If patient is pregnant or has had a positive pregnancy test, please check TSH.          Passed - No positive pregnancy test in past 12 months     If patient is pregnant or has had a positive pregnancy test, please check TSH.          "

## 2019-07-26 RX ORDER — LEVOTHYROXINE SODIUM 125 MCG
TABLET ORAL
Qty: 30 TABLET | Refills: 0 | Status: SHIPPED | OUTPATIENT
Start: 2019-07-26 | End: 2019-08-02

## 2019-07-26 NOTE — TELEPHONE ENCOUNTER
Medication is being filled for 1 time refill only due to:  Patient needs to be seen because due for OV.

## 2019-07-29 ENCOUNTER — MYC REFILL (OUTPATIENT)
Dept: FAMILY MEDICINE | Facility: CLINIC | Age: 39
End: 2019-07-29

## 2019-07-29 DIAGNOSIS — J45.40 MODERATE PERSISTENT ASTHMA WITH ALLERGIC RHINITIS WITHOUT COMPLICATION: ICD-10-CM

## 2019-07-29 RX ORDER — MONTELUKAST SODIUM 10 MG/1
TABLET ORAL
Qty: 30 TABLET | Refills: 0 | Status: CANCELLED | OUTPATIENT
Start: 2019-07-29

## 2019-07-30 RX ORDER — MONTELUKAST SODIUM 10 MG/1
TABLET ORAL
Qty: 30 TABLET | Refills: 0 | Status: SHIPPED | OUTPATIENT
Start: 2019-07-30 | End: 2019-08-27

## 2019-07-30 NOTE — TELEPHONE ENCOUNTER
"Last Written Prescription Date:  6/19/2019  Last Fill Quantity: 30,  # refills: 0   Last office visit: 3/13/2019 with prescribing provider:  Cesia   Future Office Visit:   Next 5 appointments (look out 90 days)    Aug 02, 2019  1:45 PM CDT  Teodoro Fernando with Trish Callaway DO  [facility] ([facility]) [address] [facility] [address] [phone]           Requested Prescriptions   Pending Prescriptions Disp Refills     montelukast (SINGULAIR) 10 MG tablet [Pharmacy Med Name: MONTELUKAST 10MG TABLETS] 30 tablet 0     Sig: TAKE 1 TABLET(10 MG) BY MOUTH AT BEDTIME       Leukotriene Inhibitors Protocol Failed - 7/29/2019  7:56 PM        Failed - Asthma control assessment score within normal limits in last 6 months     Please review ACT score.           Passed - Patient is age 12 or older     If patient is under 16, ok to refill using age based dosing.           Passed - Medication is active on med list        Passed - Recent (6 mo) or future (30 days) visit within the authorizing provider's specialty     Patient had office visit in the last 6 months or has a visit in the next 30 days with authorizing provider or within the authorizing provider's specialty.  See \"Patient Info\" tab in inbasket, or \"Choose Columns\" in Meds & Orders section of the refill encounter.              "

## 2019-08-01 DIAGNOSIS — E06.3 HYPOTHYROIDISM DUE TO HASHIMOTO'S THYROIDITIS: ICD-10-CM

## 2019-08-01 LAB
T4 FREE SERPL-MCNC: 1.08 NG/DL (ref 0.76–1.46)
TSH SERPL DL<=0.005 MIU/L-ACNC: 4.1 MU/L (ref 0.4–4)

## 2019-08-01 PROCEDURE — 84439 ASSAY OF FREE THYROXINE: CPT | Performed by: FAMILY MEDICINE

## 2019-08-01 PROCEDURE — 84443 ASSAY THYROID STIM HORMONE: CPT | Performed by: FAMILY MEDICINE

## 2019-08-01 PROCEDURE — 36415 COLL VENOUS BLD VENIPUNCTURE: CPT | Performed by: FAMILY MEDICINE

## 2019-08-02 ENCOUNTER — OFFICE VISIT (OUTPATIENT)
Dept: FAMILY MEDICINE | Facility: CLINIC | Age: 39
End: 2019-08-02
Payer: COMMERCIAL

## 2019-08-02 VITALS
SYSTOLIC BLOOD PRESSURE: 121 MMHG | HEART RATE: 68 BPM | BODY MASS INDEX: 31.72 KG/M2 | OXYGEN SATURATION: 97 % | DIASTOLIC BLOOD PRESSURE: 85 MMHG | WEIGHT: 197.38 LBS | RESPIRATION RATE: 16 BRPM | TEMPERATURE: 98 F | HEIGHT: 66 IN

## 2019-08-02 DIAGNOSIS — E06.3 HYPOTHYROIDISM DUE TO HASHIMOTO'S THYROIDITIS: ICD-10-CM

## 2019-08-02 PROCEDURE — 99213 OFFICE O/P EST LOW 20 MIN: CPT | Performed by: FAMILY MEDICINE

## 2019-08-02 RX ORDER — LEVOTHYROXINE SODIUM 125 MCG
TABLET ORAL
Qty: 30 TABLET | Refills: 0 | Status: SHIPPED | OUTPATIENT
Start: 2019-08-02 | End: 2019-10-25

## 2019-08-02 RX ORDER — ZOLPIDEM TARTRATE 5 MG/1
5 TABLET ORAL
Qty: 1 TABLET | Refills: 0 | COMMUNITY
Start: 2019-08-02 | End: 2021-10-25

## 2019-08-02 RX ORDER — LEVOTHYROXINE SODIUM 137 MCG
137 TABLET ORAL DAILY
Qty: 30 TABLET | Refills: 1 | Status: SHIPPED | OUTPATIENT
Start: 2019-08-02 | End: 2019-10-25

## 2019-08-02 ASSESSMENT — ANXIETY QUESTIONNAIRES
2. NOT BEING ABLE TO STOP OR CONTROL WORRYING: NEARLY EVERY DAY
GAD7 TOTAL SCORE: 16
7. FEELING AFRAID AS IF SOMETHING AWFUL MIGHT HAPPEN: MORE THAN HALF THE DAYS
1. FEELING NERVOUS, ANXIOUS, OR ON EDGE: NEARLY EVERY DAY
5. BEING SO RESTLESS THAT IT IS HARD TO SIT STILL: SEVERAL DAYS
6. BECOMING EASILY ANNOYED OR IRRITABLE: SEVERAL DAYS
IF YOU CHECKED OFF ANY PROBLEMS ON THIS QUESTIONNAIRE, HOW DIFFICULT HAVE THESE PROBLEMS MADE IT FOR YOU TO DO YOUR WORK, TAKE CARE OF THINGS AT HOME, OR GET ALONG WITH OTHER PEOPLE: VERY DIFFICULT
3. WORRYING TOO MUCH ABOUT DIFFERENT THINGS: NEARLY EVERY DAY

## 2019-08-02 ASSESSMENT — PATIENT HEALTH QUESTIONNAIRE - PHQ9
5. POOR APPETITE OR OVEREATING: NEARLY EVERY DAY
SUM OF ALL RESPONSES TO PHQ QUESTIONS 1-9: 7

## 2019-08-02 ASSESSMENT — PAIN SCALES - GENERAL: PAINLEVEL: NO PAIN (0)

## 2019-08-02 ASSESSMENT — MIFFLIN-ST. JEOR: SCORE: 1582.04

## 2019-08-02 NOTE — NURSING NOTE
"Chief Complaint   Patient presents with     RECHECK     /85 (BP Location: Right arm, Patient Position: Sitting, Cuff Size: Adult Regular)   Pulse 68   Temp 98  F (36.7  C) (Oral)   Resp 16   Ht 1.676 m (5' 6\")   Wt 89.5 kg (197 lb 6 oz)   LMP 06/25/2018 (Approximate)   SpO2 97%   Breastfeeding? No   BMI 31.86 kg/m   Estimated body mass index is 31.86 kg/m  as calculated from the following:    Height as of this encounter: 1.676 m (5' 6\").    Weight as of this encounter: 89.5 kg (197 lb 6 oz).  bp completed using cuff size: regular      Health Maintenance addressed:  NONE    N/a  Maria De Jesus Drake CMA on 8/2/2019 at 1:44 PM                "

## 2019-08-02 NOTE — PROGRESS NOTES
Subjective     Kasandra Winkler is a 38 year old female who presents to clinic today for the following health issues:    HPI   Patient is here today to review lab results    Taking 125mg 6 days ad 137 on the   Feeling hypothyroid - brain fog    Patient is here to follow-up on her thyroid.  Patient has a history of Hashimoto's thyroiditis.  She has had extensive trouble trying to maintain her TSH in the 1-2 range.  She has tried levothyroxine, [place] [facility] and is currently on Synthroid.  She was supposed to be taking a different dose however was taking 125 mcg for 6 days with 137 mcg on Sundays.  Patient feels like she has teetered over into hypothyroid because she has noticed more bright brain fog and difficulty focusing.  In the past taking 137 mcg/day caused her to have hyper thyroid symptoms with increased anxiety.      -------------------------------------    Patient Active Problem List   Diagnosis     CARDIOVASCULAR SCREENING; LDL GOAL LESS THAN 160     Hypothyroidism     Obesity     Hashimoto's thyroiditis     Hypovitaminosis D     Fatigue     Ovarian cyst     Major depressive disorder, recurrent episode, mild (H)     Asthma with allergic rhinitis     Morbid obesity (H)     laparoscopic sleeve gastrectomy 6/29/15     Anxiety     Allergic rhinitis due to pollen     Iron deficiency anemia secondary to inadequate dietary iron intake     Migraine with aura and without status migrainosus, not intractable     Menorrhagia with irregular cycle     Encounter for IUD insertion     Chronic bilateral low back pain without sciatica     Past Surgical History:   Procedure Laterality Date      SECTION  10/10/10     CHOLECYSTECTOMY, LAPOROSCOPIC  2009    gallstones -      FINGER SURGERY      right little finger fracture     HYSTERECTOMY, PAP NO LONGER INDICATED  2018    ovaries remain - uterus and cervix removed     LAPAROSCOPIC GASTRIC SLEEVE N/A 2015    Procedure: LAPAROSCOPIC GASTRIC  "SLEEVE;  Surgeon: Cam Alvarez MD;  Location:  OR       Social History     Tobacco Use     Smoking status: Former Smoker     Packs/day: 0.50     Years: 10.00     Pack years: 5.00     Last attempt to quit: 7/20/2009     Years since quitting: 10.0     Smokeless tobacco: Never Used     Tobacco comment: social smoker for 10 yr   Substance Use Topics     Alcohol use: Yes     Alcohol/week: 0.0 oz     Comment: occasional ETOH use     Family History   Problem Relation Age of Onset     Osteoporosis Mother      Hypertension Mother      Cerebrovascular Disease Mother         Aneurysm     Substance Abuse Mother      Heart Disease Maternal Grandmother      Hypertension Maternal Grandmother      Arthritis Maternal Grandmother         RA      Respiratory Maternal Grandmother         Asthma     Glaucoma No family hx of      Macular Degeneration No family hx of            -------------------------------------  Reviewed and updated as needed this visit by Provider  Tobacco  Allergies  Meds  Problems  Med Hx  Surg Hx  Fam Hx         Review of Systems   ROS COMP: Constitutional, HEENT, cardiovascular, pulmonary, gi and gu systems are negative, except as otherwise noted.      Objective    /85 (BP Location: Right arm, Patient Position: Sitting, Cuff Size: Adult Regular)   Pulse 68   Temp 98  F (36.7  C) (Oral)   Resp 16   Ht 1.676 m (5' 6\")   Wt 89.5 kg (197 lb 6 oz)   LMP 06/25/2018 (Approximate)   SpO2 97%   Breastfeeding? No   BMI 31.86 kg/m    Body mass index is 31.86 kg/m .  Physical Exam   GENERAL: healthy, alert and no distress    Diagnostic Test Results:  Labs reviewed in Epic  Results for orders placed or performed in visit on 08/01/19   **TSH with free T4 reflex FUTURE anytime   Result Value Ref Range    TSH 4.10 (H) 0.40 - 4.00 mU/L   T4 free   Result Value Ref Range    T4 Free 1.08 0.76 - 1.46 ng/dL           Assessment & Plan     1. Hypothyroidism due to Hashimoto's thyroiditis  Difficult to " "control Hashimoto's thyroiditis.  Plan to try alternating 125 mcg with 137 mcg.  Specifically she will take 125 mcg on Monday Wednesday and Friday and 137 mcg all other days..  Plan to check labs in 6 weeks.  - SYNTHROID 125 MCG tablet; Take 125mg daily on Mon, Wed, Friday (take 137mcg on all other days)  Dispense: 30 tablet; Refill: 0  - SYNTHROID 137 MCG tablet; Take 1 tablet (137 mcg) by mouth daily Take 137mg daily on Sat, Sun, Tues, Thursday (other days taking 125mcg)  Dispense: 30 tablet; Refill: 1  - **TSH with free T4 reflex FUTURE anytime; Future     BMI:   Estimated body mass index is 31.86 kg/m  as calculated from the following:    Height as of this encounter: 1.676 m (5' 6\").    Weight as of this encounter: 89.5 kg (197 lb 6 oz).           Pt will call or RTC if symptoms worsen or do not improve.      Return in about 6 weeks (around 9/13/2019) for lab only visit.    Trish Callaway, DO  [facility]      "

## 2019-08-03 ASSESSMENT — ASTHMA QUESTIONNAIRES: ACT_TOTALSCORE: 17

## 2019-08-03 ASSESSMENT — ANXIETY QUESTIONNAIRES: GAD7 TOTAL SCORE: 16

## 2019-08-21 ENCOUNTER — TELEPHONE (OUTPATIENT)
Dept: SCHEDULING | Facility: CLINIC | Age: 39
End: 2019-08-21

## 2019-08-21 NOTE — TELEPHONE ENCOUNTER
8/21/2019    Attempt 1    Contacted patient in regards to scheduling VIP mammogram, AUTUMN for 9/26.    Message on voicemail     Patient is also due for -     Comments:       Outreach   HYACINTH

## 2019-08-22 DIAGNOSIS — E06.3 HYPOTHYROIDISM DUE TO HASHIMOTO'S THYROIDITIS: ICD-10-CM

## 2019-08-22 RX ORDER — LEVOTHYROXINE SODIUM 125 MCG
TABLET ORAL
Start: 2019-08-22

## 2019-08-22 NOTE — TELEPHONE ENCOUNTER
"Too soon.  Rx sent 8/2/19 for #30.  Takes 3 per week.  Betsey Yepez RN    Requested Prescriptions   Refused Prescriptions Disp Refills     SYNTHROID 125 MCG tablet [Pharmacy Med Name: SYNTHROID 0.125MG (125MCG) TABLETS]       Sig: TAKE 1 TABLET(125 MCG) BY MOUTH DAILY       Thyroid Protocol Failed - 8/22/2019  3:35 AM        Failed - Normal TSH on file in past 12 months     Recent Labs   Lab Test 08/01/19  0730   TSH 4.10*              Passed - Patient is 12 years or older        Passed - Recent (12 mo) or future (30 days) visit within the authorizing provider's specialty     Patient had office visit in the last 12 months or has a visit in the next 30 days with authorizing provider or within the authorizing provider's specialty.  See \"Patient Info\" tab in inbasket, or \"Choose Columns\" in Meds & Orders section of the refill encounter.              Passed - Medication is active on med list        Passed - No active pregnancy on record     If patient is pregnant or has had a positive pregnancy test, please check TSH.          Passed - No positive pregnancy test in past 12 months     If patient is pregnant or has had a positive pregnancy test, please check TSH.          "

## 2019-08-27 DIAGNOSIS — J45.40 MODERATE PERSISTENT ASTHMA WITH ALLERGIC RHINITIS WITHOUT COMPLICATION: ICD-10-CM

## 2019-08-28 RX ORDER — MONTELUKAST SODIUM 10 MG/1
TABLET ORAL
Qty: 30 TABLET | Refills: 3 | Status: SHIPPED | OUTPATIENT
Start: 2019-08-28 | End: 2019-12-26

## 2019-08-28 NOTE — TELEPHONE ENCOUNTER
"Singulair 10MG  Last Written Prescription Date:  07/30/2019  Last Fill Quantity: 30,  # refills: 0   Last office visit: 8/2/2019 with prescribing provider:  Yes    Future Office Visit:      Requested Prescriptions   Pending Prescriptions Disp Refills     montelukast (SINGULAIR) 10 MG tablet [Pharmacy Med Name: MONTELUKAST 10MG TABLETS] 30 tablet 0     Sig: TAKE 1 TABLET(10 MG) BY MOUTH AT BEDTIME       Leukotriene Inhibitors Protocol Failed - 8/27/2019 10:07 PM        Failed - Asthma control assessment score within normal limits in last 6 months     Please review ACT score.           Passed - Patient is age 12 or older     If patient is under 16, ok to refill using age based dosing.           Passed - Medication is active on med list        Passed - Recent (6 mo) or future (30 days) visit within the authorizing provider's specialty     Patient had office visit in the last 6 months or has a visit in the next 30 days with authorizing provider or within the authorizing provider's specialty.  See \"Patient Info\" tab in inbasket, or \"Choose Columns\" in Meds & Orders section of the refill encounter.              "

## 2019-09-18 DIAGNOSIS — E06.3 HYPOTHYROIDISM DUE TO HASHIMOTO'S THYROIDITIS: ICD-10-CM

## 2019-09-18 LAB — TSH SERPL DL<=0.005 MIU/L-ACNC: 2.34 MU/L (ref 0.4–4)

## 2019-09-18 PROCEDURE — 84443 ASSAY THYROID STIM HORMONE: CPT | Performed by: FAMILY MEDICINE

## 2019-09-18 PROCEDURE — 36415 COLL VENOUS BLD VENIPUNCTURE: CPT | Performed by: FAMILY MEDICINE

## 2019-09-20 NOTE — RESULT ENCOUNTER NOTE
Dear Kasandra,   Your test results are all back -   Thyroid looks great.  Let us know if you have any questions.  -Trish Callaway, DO

## 2019-09-25 ENCOUNTER — OFFICE VISIT (OUTPATIENT)
Dept: OPHTHALMOLOGY | Facility: CLINIC | Age: 39
End: 2019-09-25
Payer: COMMERCIAL

## 2019-09-25 DIAGNOSIS — H10.45 CHRONIC ALLERGIC CONJUNCTIVITIS: Primary | ICD-10-CM

## 2019-09-25 PROBLEM — G89.29 CHRONIC BILATERAL LOW BACK PAIN WITHOUT SCIATICA: Status: RESOLVED | Noted: 2018-12-03 | Resolved: 2019-09-25

## 2019-09-25 PROBLEM — M54.50 CHRONIC BILATERAL LOW BACK PAIN WITHOUT SCIATICA: Status: RESOLVED | Noted: 2018-12-03 | Resolved: 2019-09-25

## 2019-09-25 RX ORDER — OLOPATADINE HYDROCHLORIDE 1 MG/ML
1 SOLUTION/ DROPS OPHTHALMIC 2 TIMES DAILY
Qty: 1 BOTTLE | Refills: 11 | Status: SHIPPED | OUTPATIENT
Start: 2019-09-25

## 2019-09-25 ASSESSMENT — CONF VISUAL FIELD
OD_NORMAL: 1
METHOD: COUNTING FINGERS
OS_NORMAL: 1

## 2019-09-25 ASSESSMENT — SLIT LAMP EXAM - LIDS
COMMENTS: NORMAL
COMMENTS: NORMAL

## 2019-09-25 ASSESSMENT — CUP TO DISC RATIO
OS_RATIO: 0.2
OD_RATIO: 0.2

## 2019-09-25 ASSESSMENT — TONOMETRY
OD_IOP_MMHG: 18
IOP_METHOD: ICARE
OS_IOP_MMHG: 12

## 2019-09-25 ASSESSMENT — VISUAL ACUITY
METHOD: SNELLEN - LINEAR
OD_SC: 20/25
OS_SC: 20/20

## 2019-09-25 ASSESSMENT — EXTERNAL EXAM - RIGHT EYE: OD_EXAM: NORMAL

## 2019-09-25 ASSESSMENT — EXTERNAL EXAM - LEFT EYE: OS_EXAM: NORMAL

## 2019-09-25 NOTE — PROGRESS NOTES
Assessment/Plan  (H10.45) Chronic allergic conjunctivitis  (primary encounter diagnosis)  Plan: olopatadine (PATANOL) 0.1 % ophthalmic solution        Start Patanol twice daily in both eyes. Call or return to clinic if symptoms do not improve. Recommend artificial tears as needed for lubrication- sample dispensed.       Complete documentation of historical and exam elements from today's encounter can  be found in the full encounter summary report (not reduplicated in this progress  note). I personally obtained the chief complaint(s) and history of present illness. I  confirmed and edited as necessary the review of systems, past medical/surgical  history, family history, social history, and examination findings as documented by  others; and I examined the patient myself. I personally reviewed the relevant tests,  images, and reports as documented above. I formulated and edited as necessary the  assessment and plan and discussed the findings and management plan with the  patient and family.    Jason Maza, OD

## 2019-09-25 NOTE — NURSING NOTE
Chief Complaints and History of Present Illnesses   Patient presents with     Dry Eye(s) Both Eyes     Chief Complaint(s) and History of Present Illness(es)     Dry Eye(s) Both Eyes     Laterality: left eye    Characteristics: constant    Associated symptoms: irritation, red eyes, dryness, foreign body sensation and eye pain    Frequency: constantly    Timing: throughout the day    Duration: years    Context: dry eyes    Course: gradually worsening    Treatments tried: allergy drops    Response to treatment: no improvement    Pain scale: 0/10              Comments     Pt continues to have really bad eye allergies. Pt states left eye has a constant pain and irritation. Pt on 6 other oral allergy medication for seasonal allergies. Optcon-A using 6 or more times a day , zaditor PRN each eye (unable to find).    ERNIE West COT 8:45 AM September 25, 2019

## 2019-09-26 ENCOUNTER — HOSPITAL ENCOUNTER (OUTPATIENT)
Dept: MAMMOGRAPHY | Facility: CLINIC | Age: 39
Discharge: HOME | End: 2019-09-26
Attending: FAMILY MEDICINE | Admitting: FAMILY MEDICINE
Payer: COMMERCIAL

## 2019-09-26 DIAGNOSIS — Z12.31 VISIT FOR SCREENING MAMMOGRAM: ICD-10-CM

## 2019-09-26 PROCEDURE — 77063 BREAST TOMOSYNTHESIS BI: CPT

## 2019-10-01 ENCOUNTER — HEALTH MAINTENANCE LETTER (OUTPATIENT)
Age: 39
End: 2019-10-01

## 2019-10-06 DIAGNOSIS — J30.1 ALLERGIC RHINITIS DUE TO POLLEN, UNSPECIFIED SEASONALITY: ICD-10-CM

## 2019-10-07 RX ORDER — AZELASTINE 1 MG/ML
SPRAY, METERED NASAL
Qty: 30 ML | Refills: 0 | Status: SHIPPED | OUTPATIENT
Start: 2019-10-07 | End: 2019-11-25

## 2019-10-07 NOTE — TELEPHONE ENCOUNTER
"Requested Prescriptions   Pending Prescriptions Disp Refills     azelastine (ASTELIN) 0.1 % nasal spray [Pharmacy Med Name: AZELASTINE  Last Written Prescription Date:  10/8/18  Last Fill Quantity: 30ml,  # refills: 3   Last Office Visit: 8/2/2019   Future Office Visit:      0.1%(137MCG) NASAL-200SP] 30 mL 0     Sig: USE 1 TO 2 SPRAYS IN EACH NOSTRIL TWICE DAILY       Antihistamines Protocol Passed - 10/6/2019  3:37 AM        Passed - Patient is 3-64 years of age     Apply weight-based dosing for peds patients age 3 - 12 years of age.    Forward request to provider for patients under the age of 3 or over the age of 64.          Passed - Recent (12 mo) or future (30 days) visit within the authorizing provider's specialty     Patient has had an office visit with the authorizing provider or a provider within the authorizing providers department within the previous 12 mos or has a future within next 30 days. See \"Patient Info\" tab in inbasket, or \"Choose Columns\" in Meds & Orders section of the refill encounter.            Passed - Medication is active on med list          "

## 2019-10-22 ENCOUNTER — MYC MEDICAL ADVICE (OUTPATIENT)
Dept: FAMILY MEDICINE | Facility: CLINIC | Age: 39
End: 2019-10-22

## 2019-10-22 DIAGNOSIS — E06.3 HYPOTHYROIDISM DUE TO HASHIMOTO'S THYROIDITIS: Primary | ICD-10-CM

## 2019-10-22 NOTE — TELEPHONE ENCOUNTER
PN,  Please see below [employer] message and advise.  Last TSH 9/18/2019: 2.34  Pended order if you do want to recheck level  Thanks,  Aria DEL TORO RN

## 2019-10-23 ENCOUNTER — ALLIED HEALTH/NURSE VISIT (OUTPATIENT)
Dept: NURSING | Facility: CLINIC | Age: 39
End: 2019-10-23
Payer: COMMERCIAL

## 2019-10-23 ENCOUNTER — MYC MEDICAL ADVICE (OUTPATIENT)
Dept: FAMILY MEDICINE | Facility: CLINIC | Age: 39
End: 2019-10-23

## 2019-10-23 DIAGNOSIS — E06.3 HYPOTHYROIDISM DUE TO HASHIMOTO'S THYROIDITIS: ICD-10-CM

## 2019-10-23 DIAGNOSIS — Z23 NEED FOR PROPHYLACTIC VACCINATION AND INOCULATION AGAINST INFLUENZA: Primary | ICD-10-CM

## 2019-10-23 LAB — TSH SERPL DL<=0.005 MIU/L-ACNC: 0.79 MU/L (ref 0.4–4)

## 2019-10-23 PROCEDURE — 99207 ZZC NO CHARGE NURSE ONLY: CPT

## 2019-10-23 PROCEDURE — 84443 ASSAY THYROID STIM HORMONE: CPT | Performed by: FAMILY MEDICINE

## 2019-10-23 PROCEDURE — 90471 IMMUNIZATION ADMIN: CPT

## 2019-10-23 PROCEDURE — 90686 IIV4 VACC NO PRSV 0.5 ML IM: CPT

## 2019-10-23 PROCEDURE — 36415 COLL VENOUS BLD VENIPUNCTURE: CPT | Performed by: FAMILY MEDICINE

## 2019-10-23 NOTE — RESULT ENCOUNTER NOTE
Dear Kasandra,   Your test results are all back -   Thyroid is perfect.  Let us know if you have any questions.  -Trish Callaway, DO

## 2019-10-25 RX ORDER — LEVOTHYROXINE SODIUM 125 MCG
125 TABLET ORAL DAILY
Qty: 30 TABLET | Refills: 1 | Status: SHIPPED | OUTPATIENT
Start: 2019-10-25 | End: 2019-11-04

## 2019-10-25 RX ORDER — LIOTHYRONINE SODIUM 5 UG/1
5 TABLET ORAL DAILY
Qty: 30 TABLET | Refills: 1 | Status: SHIPPED | OUTPATIENT
Start: 2019-10-25 | End: 2019-12-26

## 2019-10-28 DIAGNOSIS — J30.1 SEASONAL ALLERGIC RHINITIS DUE TO POLLEN: ICD-10-CM

## 2019-10-28 RX ORDER — FLUTICASONE PROPIONATE 50 MCG
SPRAY, SUSPENSION (ML) NASAL
Qty: 9.9 ML | Refills: 0 | Status: SHIPPED | OUTPATIENT
Start: 2019-10-28 | End: 2019-11-29

## 2019-10-28 NOTE — TELEPHONE ENCOUNTER
"  Medication is being filled for 1 time refill only due to:  Patient needs to be seen because due for physical..  Due for updated ACT also.  Anjelica Vaughan RN       Return in about 6 months (around 9/1/2019) for Physical Exam      Requested Prescriptions   Pending Prescriptions Disp Refills     fluticasone (FLONASE) 50 MCG/ACT nasal spray [Pharmacy Med Name: FLUTICASONE 50MCG NASAL SP (120) RX] 16 mL 0     Sig: SHAKE LIQUID AND USE 1 TO 2 SPRAYS IN EACH NOSTRIL DAILY       Inhaled Steroids Protocol Failed - 10/28/2019  3:36 AM        Failed - Asthma control assessment score within normal limits in last 6 months     Please review ACT score.           Passed - Patient is age 12 or older        Passed - Medication is active on med list        Passed - Recent (6 mo) or future (30 days) visit within the authorizing provider's specialty     Patient had office visit in the last 6 months or has a visit in the next 30 days with authorizing provider or within the authorizing provider's specialty.  See \"Patient Info\" tab in inbasket, or \"Choose Columns\" in Meds & Orders section of the refill encounter.                "

## 2019-11-04 DIAGNOSIS — E06.3 HYPOTHYROIDISM DUE TO HASHIMOTO'S THYROIDITIS: ICD-10-CM

## 2019-11-04 RX ORDER — LEVOTHYROXINE SODIUM 125 MCG
125 TABLET ORAL DAILY
Qty: 30 TABLET | Refills: 1 | Status: CANCELLED | OUTPATIENT
Start: 2019-11-04

## 2019-11-04 NOTE — TELEPHONE ENCOUNTER
PN,   Pharmacy needs new Rx  Levothyroxine 125mcg has two directions listed  Pended 125mcg daily as you wrote in recent MyChart to pt  Please authorize if appropriate.  Thanks,  Aria DEL TORO RN

## 2019-11-05 RX ORDER — LEVOTHYROXINE SODIUM 125 MCG
125 TABLET ORAL DAILY
Qty: 30 TABLET | Refills: 1 | Status: SHIPPED | OUTPATIENT
Start: 2019-11-05 | End: 2019-12-26

## 2019-11-20 ENCOUNTER — MYC MEDICAL ADVICE (OUTPATIENT)
Dept: FAMILY MEDICINE | Facility: CLINIC | Age: 39
End: 2019-11-20

## 2019-11-25 DIAGNOSIS — J30.1 ALLERGIC RHINITIS DUE TO POLLEN, UNSPECIFIED SEASONALITY: ICD-10-CM

## 2019-11-26 RX ORDER — AZELASTINE 1 MG/ML
SPRAY, METERED NASAL
Qty: 30 ML | Refills: 1 | Status: SHIPPED | OUTPATIENT
Start: 2019-11-26 | End: 2020-01-20

## 2019-11-26 NOTE — TELEPHONE ENCOUNTER
"Astelin 0.1%  Last Written Prescription Date:  10/07/2019  Last Fill Quantity: 30ml,  # refills: 0   Last office visit: 8/2/2019 with prescribing provider:  Yes    Future Office Visit:      Requested Prescriptions   Pending Prescriptions Disp Refills     azelastine (ASTELIN) 0.1 % nasal spray [Pharmacy Med Name: AZELASTINE 0.1%(137MCG) NASAL-200SP] 30 mL 0     Sig: USE 1 TO 2 SPRAYS IN EACH NOSTRIL TWICE DAILY       Antihistamines Protocol Passed - 11/25/2019  3:37 AM        Passed - Patient is 3-64 years of age     Apply weight-based dosing for peds patients age 3 - 12 years of age.    Forward request to provider for patients under the age of 3 or over the age of 64.          Passed - Recent (12 mo) or future (30 days) visit within the authorizing provider's specialty     Patient has had an office visit with the authorizing provider or a provider within the authorizing providers department within the previous 12 mos or has a future within next 30 days. See \"Patient Info\" tab in inbasket, or \"Choose Columns\" in Meds & Orders section of the refill encounter.              Passed - Medication is active on med list          "

## 2019-11-27 ASSESSMENT — ASTHMA QUESTIONNAIRES: ACT_TOTALSCORE: 22

## 2019-11-28 ENCOUNTER — MYC MEDICAL ADVICE (OUTPATIENT)
Dept: FAMILY MEDICINE | Facility: CLINIC | Age: 39
End: 2019-11-28

## 2019-11-29 DIAGNOSIS — J30.1 SEASONAL ALLERGIC RHINITIS DUE TO POLLEN: ICD-10-CM

## 2019-11-29 RX ORDER — FLUTICASONE PROPIONATE 50 MCG
SPRAY, SUSPENSION (ML) NASAL
Qty: 9.9 ML | Refills: 0 | Status: SHIPPED | OUTPATIENT
Start: 2019-11-29 | End: 2019-12-26

## 2019-11-29 NOTE — TELEPHONE ENCOUNTER
"Fluticasone  Last Written Prescription Date:  10/28/19  Last Fill Quantity: 9.9,  # refills: 0   Last office visit: 8/2/2019 with prescribing provider:  yes   Future Office Visit:      Requested Prescriptions   Pending Prescriptions Disp Refills     fluticasone (FLONASE) 50 MCG/ACT nasal spray [Pharmacy Med Name: FLUTICASONE 50MCG NASAL SP (120) RX] 16 mL 0     Sig: INSTILL 1-2 SPRAYS IN EACH NOSTRIL DAILY       Inhaled Steroids Protocol Passed - 11/29/2019  3:37 AM        Passed - Patient is age 12 or older        Passed - Asthma control assessment score within normal limits in last 6 months     Please review ACT score.           Passed - Medication is active on med list        Passed - Recent (6 mo) or future (30 days) visit within the authorizing provider's specialty     Patient had office visit in the last 6 months or has a visit in the next 30 days with authorizing provider or within the authorizing provider's specialty.  See \"Patient Info\" tab in inbasket, or \"Choose Columns\" in Meds & Orders section of the refill encounter.            "

## 2019-12-03 DIAGNOSIS — E06.3 HYPOTHYROIDISM DUE TO HASHIMOTO'S THYROIDITIS: ICD-10-CM

## 2019-12-03 LAB — T3FREE SERPL-MCNC: 3 PG/ML (ref 2.3–4.2)

## 2019-12-03 PROCEDURE — 84439 ASSAY OF FREE THYROXINE: CPT | Performed by: FAMILY MEDICINE

## 2019-12-03 PROCEDURE — 84481 FREE ASSAY (FT-3): CPT | Performed by: FAMILY MEDICINE

## 2019-12-03 PROCEDURE — 36415 COLL VENOUS BLD VENIPUNCTURE: CPT | Performed by: FAMILY MEDICINE

## 2019-12-03 PROCEDURE — 84443 ASSAY THYROID STIM HORMONE: CPT | Performed by: FAMILY MEDICINE

## 2019-12-04 ENCOUNTER — MYC MEDICAL ADVICE (OUTPATIENT)
Dept: FAMILY MEDICINE | Facility: CLINIC | Age: 39
End: 2019-12-04

## 2019-12-04 LAB
T4 FREE SERPL-MCNC: 1.34 NG/DL (ref 0.76–1.46)
TSH SERPL DL<=0.005 MIU/L-ACNC: 0.43 MU/L (ref 0.4–4)

## 2019-12-04 NOTE — RESULT ENCOUNTER NOTE
Dear Kasandra,   Your test results are all back -   Thyroid looks good.  Let us know if you have any questions.  -Trish Callaway, DO

## 2019-12-26 DIAGNOSIS — E06.3 HYPOTHYROIDISM DUE TO HASHIMOTO'S THYROIDITIS: ICD-10-CM

## 2019-12-26 DIAGNOSIS — J45.40 MODERATE PERSISTENT ASTHMA WITH ALLERGIC RHINITIS WITHOUT COMPLICATION: ICD-10-CM

## 2019-12-26 DIAGNOSIS — J30.1 SEASONAL ALLERGIC RHINITIS DUE TO POLLEN: ICD-10-CM

## 2019-12-26 RX ORDER — LIOTHYRONINE SODIUM 5 UG/1
TABLET ORAL
Qty: 30 TABLET | Refills: 3 | Status: SHIPPED | OUTPATIENT
Start: 2019-12-26 | End: 2020-05-04

## 2019-12-26 RX ORDER — LEVOTHYROXINE SODIUM 125 MCG
TABLET ORAL
Qty: 30 TABLET | Refills: 3 | Status: SHIPPED | OUTPATIENT
Start: 2019-12-26 | End: 2020-01-17 | Stop reason: DRUGHIGH

## 2019-12-26 RX ORDER — FLUTICASONE PROPIONATE 50 MCG
SPRAY, SUSPENSION (ML) NASAL
Qty: 16 G | Refills: 3 | Status: SHIPPED | OUTPATIENT
Start: 2019-12-26

## 2019-12-26 RX ORDER — MONTELUKAST SODIUM 10 MG/1
TABLET ORAL
Qty: 90 TABLET | Refills: 1 | Status: SHIPPED | OUTPATIENT
Start: 2019-12-26 | End: 2020-07-28

## 2019-12-26 NOTE — TELEPHONE ENCOUNTER
MONTELUKAST 10MG TABLETS  Last Written Prescription Date:  08/28/2019  Last Fill Quantity: 30,  # refills: 3   Last office visit: 8/2/2019 with prescribing provider:  TONY   Future Office Visit: Nothing at this time scheduled.    Next 5 appointments (look out 90 days)    Andry 15, 2020  7:30 AM CST  [facility]  [facility] [address]  [facility] [address]  [phone]         LIOTHYRONINE 5MCG TABLETS  Last Written Prescription Date:  10/25/2019  Last Fill Quantity: 30,  # refills: 1   Last office visit: 8/2/2019 with prescribing provider:  TONY   Future Office Visit: Nothing at this time scheduled.    Next 5 appointments (look out 90 days)    Andry 15, 2020  7:30 AM CST  [facility] [address] [facility] [address]  [phone]         SYNTHROID 0.125MG (125MCG) TABLETS  Last Written Prescription Date:  11/05/2019  Last Fill Quantity: 30,  # refills: 1   Last office visit: 8/2/2019 with prescribing provider:  TONY   Future Office Visit: Nothing at this time scheduled.    Next 5 appointments (look out 90 days)    Andry 15, 2020  7:30 AM CST  Lab visit with [facility]  [facility] ([facility]) [address] [facility] [address]  [phone]         FLUTICASONE 50MCG NASAL SP (120) RX  Last Written Prescription Date:  11/29/2019  Last Fill Quantity: 9.9ML,  # refills: 0   Last office visit: 8/2/2019 with prescribing provider:  TONY   Future Office Visit: Nothing at this time scheduled.    Next 5 appointments (look out 90 days)    Andry 15, 2020  7:30 AM CST  [facility]  [facility]) [address]  [facility] [address]  [phone]         Requested Prescriptions   Pending Prescriptions Disp Refills     montelukast (SINGULAIR) 10 MG tablet [Pharmacy Med Name: MONTELUKAST 10MG TABLETS] 30 tablet 3      "Sig: TAKE 1 TABLET(10 MG) BY MOUTH AT BEDTIME       Leukotriene Inhibitors Protocol Passed - 12/26/2019 10:51 AM        Passed - Patient is age 12 or older     If patient is under 16, ok to refill using age based dosing.           Passed - Asthma control assessment score within normal limits in last 6 months     Please review ACT score.           Passed - Medication is active on med list        Passed - Recent (6 mo) or future (30 days) visit within the authorizing provider's specialty     Patient had office visit in the last 6 months or has a visit in the next 30 days with authorizing provider or within the authorizing provider's specialty.  See \"Patient Info\" tab in inbasket, or \"Choose Columns\" in Meds & Orders section of the refill encounter.            liothyronine (CYTOMEL) 5 MCG tablet [Pharmacy Med Name: LIOTHYRONINE 5MCG TABLETS] 30 tablet 1     Sig: TAKE 1 TABLET(5 MCG) BY MOUTH DAILY       Thyroid Protocol Passed - 12/26/2019 10:51 AM        Passed - Patient is 12 years or older        Passed - Recent (12 mo) or future (30 days) visit within the authorizing provider's specialty     Patient has had an office visit with the authorizing provider or a provider within the authorizing providers department within the previous 12 mos or has a future within next 30 days. See \"Patient Info\" tab in inbasket, or \"Choose Columns\" in Meds & Orders section of the refill encounter.              Passed - Medication is active on med list        Passed - Normal TSH on file in past 12 months     Recent Labs   Lab Test 12/03/19  1206   TSH 0.43              Passed - No active pregnancy on record     If patient is pregnant or has had a positive pregnancy test, please check TSH.          Passed - No positive pregnancy test in past 12 months     If patient is pregnant or has had a positive pregnancy test, please check TSH.          SYNTHROID 125 MCG tablet [Pharmacy Med Name: SYNTHROID 0.125MG (125MCG) TABLETS] 30 tablet 1     " "Sig: TAKE 1 TABLET(125 MCG) BY MOUTH DAILY       Thyroid Protocol Passed - 12/26/2019 10:51 AM        Passed - Patient is 12 years or older        Passed - Recent (12 mo) or future (30 days) visit within the authorizing provider's specialty     Patient has had an office visit with the authorizing provider or a provider within the authorizing providers department within the previous 12 mos or has a future within next 30 days. See \"Patient Info\" tab in inbasket, or \"Choose Columns\" in Meds & Orders section of the refill encounter.              Passed - Medication is active on med list        Passed - Normal TSH on file in past 12 months     Recent Labs   Lab Test 12/03/19  1206   TSH 0.43              Passed - No active pregnancy on record     If patient is pregnant or has had a positive pregnancy test, please check TSH.          Passed - No positive pregnancy test in past 12 months     If patient is pregnant or has had a positive pregnancy test, please check TSH.          fluticasone (FLONASE) 50 MCG/ACT nasal spray [Pharmacy Med Name: FLUTICASONE 50MCG NASAL SP (120) RX] 16 g      Sig: SHAKE LIQUID AND USE 1 TO 2 SPRAYS IN EACH NOSTRIL DAILY       Inhaled Steroids Protocol Passed - 12/26/2019 10:51 AM        Passed - Patient is age 12 or older        Passed - Asthma control assessment score within normal limits in last 6 months     Please review ACT score.           Passed - Medication is active on med list        Passed - Recent (6 mo) or future (30 days) visit within the authorizing provider's specialty     Patient had office visit in the last 6 months or has a visit in the next 30 days with authorizing provider or within the authorizing provider's specialty.  See \"Patient Info\" tab in inbasket, or \"Choose Columns\" in Meds & Orders section of the refill encounter.                  "

## 2020-01-07 ENCOUNTER — DOCUMENTATION ONLY (OUTPATIENT)
Dept: FAMILY MEDICINE | Facility: CLINIC | Age: 40
End: 2020-01-07

## 2020-01-07 DIAGNOSIS — E06.3 HASHIMOTO'S THYROIDITIS: Primary | ICD-10-CM

## 2020-01-08 ENCOUNTER — TRANSFERRED RECORDS (OUTPATIENT)
Dept: HEALTH INFORMATION MANAGEMENT | Facility: CLINIC | Age: 40
End: 2020-01-08

## 2020-01-15 ENCOUNTER — MYC MEDICAL ADVICE (OUTPATIENT)
Dept: FAMILY MEDICINE | Facility: CLINIC | Age: 40
End: 2020-01-15

## 2020-01-15 DIAGNOSIS — E06.3 HYPOTHYROIDISM DUE TO HASHIMOTO'S THYROIDITIS: ICD-10-CM

## 2020-01-15 DIAGNOSIS — E06.3 HASHIMOTO'S THYROIDITIS: ICD-10-CM

## 2020-01-15 LAB — TSH SERPL DL<=0.005 MIU/L-ACNC: 0.55 MU/L (ref 0.4–4)

## 2020-01-15 PROCEDURE — 84443 ASSAY THYROID STIM HORMONE: CPT | Performed by: FAMILY MEDICINE

## 2020-01-15 PROCEDURE — 36415 COLL VENOUS BLD VENIPUNCTURE: CPT | Performed by: FAMILY MEDICINE

## 2020-01-15 NOTE — RESULT ENCOUNTER NOTE
Dear Kasandra,   Your test results are all back -   TSH is very stable  Let us know if you have any questions.  -Trish Callaway, DO  
normal...

## 2020-01-17 RX ORDER — LEVOTHYROXINE SODIUM 112 MCG
112 TABLET ORAL DAILY
Qty: 30 TABLET | Refills: 1 | Status: SHIPPED | OUTPATIENT
Start: 2020-01-17 | End: 2020-04-01

## 2020-01-18 DIAGNOSIS — J30.1 ALLERGIC RHINITIS DUE TO POLLEN, UNSPECIFIED SEASONALITY: ICD-10-CM

## 2020-01-20 RX ORDER — AZELASTINE 1 MG/ML
SPRAY, METERED NASAL
Qty: 30 ML | Refills: 0 | Status: SHIPPED | OUTPATIENT
Start: 2020-01-20

## 2020-01-20 NOTE — TELEPHONE ENCOUNTER
"Last Written Prescription Date: 11/26/2019  Last Fill Quantity: 30 ml,  # refills: 1   Last office visit: 8/2/2019 with prescribing provider:  yes   Future Office Visit:      Requested Prescriptions   Signed Prescriptions Disp Refills    azelastine (ASTELIN) 0.1 % nasal spray 30 mL 0     Sig: USE 1 TO 2 SPRAYS IN EACH NOSTRIL TWICE DAILY       Antihistamines Protocol Passed - 1/18/2020  3:37 AM        Passed - Patient is 3-64 years of age     Apply weight-based dosing for peds patients age 3 - 12 years of age.    Forward request to provider for patients under the age of 3 or over the age of 64.          Passed - Recent (12 mo) or future (30 days) visit within the authorizing provider's specialty     Patient has had an office visit with the authorizing provider or a provider within the authorizing providers department within the previous 12 mos or has a future within next 30 days. See \"Patient Info\" tab in inbasket, or \"Choose Columns\" in Meds & Orders section of the refill encounter.              Passed - Medication is active on med list        Prescription approved per [facility] Refill Protocol.  Betsey Yepez RN    "

## 2020-03-15 DIAGNOSIS — J45.40 MODERATE PERSISTENT ASTHMA WITH ALLERGIC RHINITIS WITHOUT COMPLICATION: ICD-10-CM

## 2020-03-17 DIAGNOSIS — E06.3 HYPOTHYROIDISM DUE TO HASHIMOTO'S THYROIDITIS: ICD-10-CM

## 2020-03-17 LAB — TSH SERPL DL<=0.005 MIU/L-ACNC: 1.51 MU/L (ref 0.4–4)

## 2020-03-17 PROCEDURE — 36415 COLL VENOUS BLD VENIPUNCTURE: CPT | Performed by: FAMILY MEDICINE

## 2020-03-17 PROCEDURE — 84443 ASSAY THYROID STIM HORMONE: CPT | Performed by: FAMILY MEDICINE

## 2020-03-17 RX ORDER — BUDESONIDE 180 UG/1
AEROSOL, POWDER RESPIRATORY (INHALATION)
Qty: 1 INHALER | Refills: 0 | Status: SHIPPED | OUTPATIENT
Start: 2020-03-17 | End: 2021-08-02

## 2020-03-17 NOTE — TELEPHONE ENCOUNTER
"Last Written Prescription Date:  6/19/2019  Last Fill Quantity: 1,  # refills: 0   Last office visit: 8/2/2019 with prescribing provider:  Dr. Callaway  Future Office Visit: none      Next 5 appointments (look out 90 days)    Mar 17, 2020  7:30 AM CDT  Lab visit with [facility]  [facility] ([facility]) [address] [facility] [address] [phone]         Requested Prescriptions   Pending Prescriptions Disp Refills     PULMICORT FLEXHALER 180 MCG/ACT inhaler [Pharmacy Med Name: PULMICORT 180MCG FLEXHALR(120PUFFS)]       Sig: INHALE 2 PUFFS BY MOUTH TWICE DAILY       Inhaled Steroids Protocol Passed - 3/15/2020  3:36 AM        Passed - Patient is age 12 or older        Passed - Asthma control assessment score within normal limits in last 6 months     Please review ACT score.           Passed - Medication is active on med list        Passed - Recent (6 mo) or future (30 days) visit within the authorizing provider's specialty     Patient had office visit in the last 6 months or has a visit in the next 30 days with authorizing provider or within the authorizing provider's specialty.  See \"Patient Info\" tab in inbasket, or \"Choose Columns\" in Meds & Orders section of the refill encounter.               Kylah Dykes MA  Medical Assistant  [facility]        "
Prescription approved per [facility] Refill Protocol.  Betsey Yepez RN    
Declines

## 2020-03-17 NOTE — RESULT ENCOUNTER NOTE
Dear Kasandra,   Your test results are all back -   -thyroid looks great  Let us know if you have any questions.  -Trish Callaway, DO

## 2020-04-01 DIAGNOSIS — E06.3 HYPOTHYROIDISM DUE TO HASHIMOTO'S THYROIDITIS: ICD-10-CM

## 2020-04-01 RX ORDER — LEVOTHYROXINE SODIUM 112 MCG
TABLET ORAL
Qty: 90 TABLET | Refills: 0 | Status: SHIPPED | OUTPATIENT
Start: 2020-04-01 | End: 2020-07-28

## 2020-04-01 NOTE — TELEPHONE ENCOUNTER
"SYNTHROID 0.112MG (112MCG) TABLETS   Last Written Prescription Date:  01/17/2020  Last Fill Quantity: 30,  # refills: 1   Last office visit: 8/2/2019 with prescribing provider:  TONY   Future Office Visit: Nothing at this time scheduled.     Requested Prescriptions   Pending Prescriptions Disp Refills     SYNTHROID 112 MCG tablet [Pharmacy Med Name: SYNTHROID 0.112MG (112MCG) TABLETS] 30 tablet 1     Sig: TAKE 1 TABLET(112 MCG) BY MOUTH DAILY       Thyroid Protocol Passed - 4/1/2020  8:34 AM        Passed - Patient is 12 years or older        Passed - Recent (12 mo) or future (30 days) visit within the authorizing provider's specialty     Patient has had an office visit with the authorizing provider or a provider within the authorizing providers department within the previous 12 mos or has a future within next 30 days. See \"Patient Info\" tab in inbasket, or \"Choose Columns\" in Meds & Orders section of the refill encounter.              Passed - Medication is active on med list        Passed - Normal TSH on file in past 12 months     Recent Labs   Lab Test 03/17/20  0732   TSH 1.51              Passed - No active pregnancy on record     If patient is pregnant or has had a positive pregnancy test, please check TSH.          Passed - No positive pregnancy test in past 12 months     If patient is pregnant or has had a positive pregnancy test, please check TSH.             "

## 2020-04-09 ENCOUNTER — TRANSFERRED RECORDS (OUTPATIENT)
Dept: HEALTH INFORMATION MANAGEMENT | Facility: CLINIC | Age: 40
End: 2020-04-09

## 2020-04-17 ENCOUNTER — VIRTUAL VISIT (OUTPATIENT)
Dept: FAMILY MEDICINE | Facility: CLINIC | Age: 40
End: 2020-04-17
Payer: COMMERCIAL

## 2020-04-17 DIAGNOSIS — D50.8 IRON DEFICIENCY ANEMIA SECONDARY TO INADEQUATE DIETARY IRON INTAKE: ICD-10-CM

## 2020-04-17 DIAGNOSIS — E06.3 HYPOTHYROIDISM DUE TO HASHIMOTO'S THYROIDITIS: ICD-10-CM

## 2020-04-17 DIAGNOSIS — H93.13 TINNITUS, BILATERAL: Primary | ICD-10-CM

## 2020-04-17 DIAGNOSIS — E55.9 HYPOVITAMINOSIS D: ICD-10-CM

## 2020-04-17 DIAGNOSIS — E06.3 HASHIMOTO'S THYROIDITIS: ICD-10-CM

## 2020-04-17 DIAGNOSIS — Z98.84 S/P LAPAROSCOPIC SLEEVE GASTRECTOMY: ICD-10-CM

## 2020-04-17 PROCEDURE — 99213 OFFICE O/P EST LOW 20 MIN: CPT | Mod: 95 | Performed by: FAMILY MEDICINE

## 2020-04-17 NOTE — PROGRESS NOTES
"Kasandra Winkler is a 39 year old female who is being evaluated via a billable video visit.      The patient has been notified of following:     \"This video visit will be conducted via a call between you and your physician/provider. We have found that certain health care needs can be provided without the need for an in-person physical exam.  This service lets us provide the care you need with a video conversation.  If a prescription is necessary we can send it directly to your pharmacy.  If lab work is needed we can place an order for that and you can then stop by our lab to have the test done at a later time.    Video visits are billed at different rates depending on your insurance coverage.  Please reach out to your insurance provider with any questions.    If during the course of the call the physician/provider feels a video visit is not appropriate, you will not be charged for this service.\"    Patient has given verbal consent for Video visit? Yes    How would you like to obtain your AVS? Teodoro    Patient would like the video invitation sent by: Text to cell phone: [phone]     Subjective     Kasandra Winkler is a 39 year old female who presents to clinic today for the following health issues:    I've been having ringing in my ears (constantly), lips feel tingly (intermittently but often), experiencing heart palpitations and am fatigued. Been happening for about a month. Wondering if I should get my vitamin levels checked? I've been taking multivitamins and B12 again for about a month now but it's not helping and I'm concerned I might have vitamin deficiency? I know that's a risk post stomach surgery and the symptoms I'm experiencing aren't going away with vitamins. Should I come in for labs?    Hx of sleeve gastrectomy    Felt like it could be be symptoms of vitamin deficiency   Tinnitus is driving her crazy -   Started taking more vitamins - including B12    Did see a rheumatologist - having back " pain -   Waking throughout the night due to the back pain -        Video Start Time: started at 12:03   But was kicked off after a ?5 minutes  Pt not able to re-enter so converted to telephone visit    -------------------------------------    Patient Active Problem List   Diagnosis     CARDIOVASCULAR SCREENING; LDL GOAL LESS THAN 160     Hypothyroidism     Obesity     Hashimoto's thyroiditis     Hypovitaminosis D     Fatigue     Ovarian cyst     Major depressive disorder, recurrent episode, mild (H)     Asthma with allergic rhinitis     Morbid obesity (H)     laparoscopic sleeve gastrectomy 6/29/15     Anxiety     Allergic rhinitis due to pollen     Iron deficiency anemia secondary to inadequate dietary iron intake     Migraine with aura and without status migrainosus, not intractable     Menorrhagia with irregular cycle     Encounter for IUD insertion     Past Surgical History:   Procedure Laterality Date      SECTION  10/10/10     CHOLECYSTECTOMY, LAPOROSCOPIC  2009    gallstones -      FINGER SURGERY      right little finger fracture     HYSTERECTOMY, PAP NO LONGER INDICATED  2018    ovaries remain - uterus and cervix removed     LAPAROSCOPIC GASTRIC SLEEVE N/A 2015    Procedure: LAPAROSCOPIC GASTRIC SLEEVE;  Surgeon: Cam Alvarez MD;  Location:  OR       Social History     Tobacco Use     Smoking status: Former Smoker     Packs/day: 0.50     Years: 10.00     Pack years: 5.00     Last attempt to quit: 2009     Years since quitting: 10.7     Smokeless tobacco: Never Used     Tobacco comment: social smoker for 10 yr   Substance Use Topics     Alcohol use: Yes     Alcohol/week: 0.0 standard drinks     Comment: occasional ETOH use     Family History   Problem Relation Age of Onset     Osteoporosis Mother      Hypertension Mother      Cerebrovascular Disease Mother         Aneurysm     Substance Abuse Mother      Heart Disease Maternal Grandmother      Hypertension Maternal  "Grandmother      Arthritis Maternal Grandmother         RA      Respiratory Maternal Grandmother         Asthma     Glaucoma No family hx of      Macular Degeneration No family hx of            Reviewed and updated as needed this visit by Provider         Review of Systems   ROS COMP: Constitutional, HEENT, cardiovascular, pulmonary, GI, , musculoskeletal, neuro, skin, endocrine and psych systems are negative, except as otherwise noted.      Objective    LMP 06/25/2018 (Approximate)   Estimated body mass index is 31.86 kg/m  as calculated from the following:    Height as of 8/2/19: 1.676 m (5' 6\").    Weight as of 8/2/19: 89.5 kg (197 lb 6 oz).  Physical Exam   GENERAL: healthy, alert and no distress    Diagnostic Test Results:  Labs reviewed in Epic        Assessment & Plan     1. Tinnitus, bilateral  Pt having constellation of symptoms -   Wonder about vitamin def  She does have hx of sleeve gastrectomy - potential for vitamin def   Will check annual labs - and treat if needed   - Vitamin B12; Future  - Vitamin B1 whole blood; Future  - Vitamin D Deficiency; Future  - Zinc; Future  - Vitamin A; Future  - Ferritin; Future  - Copper level; Future  - Magnesium; Future    2. Hypothyroidism due to Hashimoto's thyroiditis     - Vitamin B12; Future  - Vitamin B1 whole blood; Future  - Vitamin D Deficiency; Future  - Zinc; Future  - Vitamin A; Future  - Ferritin; Future  - Copper level; Future  - Magnesium; Future    3. Hypovitaminosis D     - Vitamin B12; Future  - Vitamin B1 whole blood; Future  - Vitamin D Deficiency; Future  - Zinc; Future  - Vitamin A; Future  - Ferritin; Future  - Copper level; Future  - Magnesium; Future    4. Iron deficiency anemia secondary to inadequate dietary iron intake     - Vitamin B12; Future  - Vitamin B1 whole blood; Future  - Vitamin D Deficiency; Future  - Zinc; Future  - Vitamin A; Future  - Ferritin; Future  - Copper level; Future  - Magnesium; Future    5. Hashimoto's " "thyroiditis     - Vitamin B12; Future  - Vitamin B1 whole blood; Future  - Vitamin D Deficiency; Future  - Zinc; Future  - Vitamin A; Future  - Ferritin; Future  - Copper level; Future  - Magnesium; Future    6. laparoscopic sleeve gastrectomy 6/29/15     - Vitamin B12; Future  - Vitamin B1 whole blood; Future  - Vitamin D Deficiency; Future  - Zinc; Future  - Vitamin A; Future  - Ferritin; Future  - Copper level; Future  - Magnesium; Future     BMI:   Estimated body mass index is 31.86 kg/m  as calculated from the following:    Height as of 8/2/19: 1.676 m (5' 6\").    Weight as of 8/2/19: 89.5 kg (197 lb 6 oz).           Pt will call or RTC if symptoms worsen or do not improve.     No follow-ups on file.    Trish Callaway DO  [facility]      Video-Visit Details    Type of service:  Video Visit    Video End Time (time video stopped): ended after 5 minutes but converted to telephone visit for additional 10 minutes due to technical issues with [employer]    Originating Location (pt. Location): Home    Distant Location (provider location):  [facility]     Mode of Communication:  [facility] Conference [facility]    No follow-ups on file.       Trish Callaway DO          "

## 2020-04-22 DIAGNOSIS — E55.9 HYPOVITAMINOSIS D: ICD-10-CM

## 2020-04-22 DIAGNOSIS — D50.8 IRON DEFICIENCY ANEMIA SECONDARY TO INADEQUATE DIETARY IRON INTAKE: ICD-10-CM

## 2020-04-22 DIAGNOSIS — H93.13 TINNITUS, BILATERAL: ICD-10-CM

## 2020-04-22 DIAGNOSIS — E06.3 HASHIMOTO'S THYROIDITIS: ICD-10-CM

## 2020-04-22 DIAGNOSIS — E06.3 HYPOTHYROIDISM DUE TO HASHIMOTO'S THYROIDITIS: ICD-10-CM

## 2020-04-22 DIAGNOSIS — Z98.84 S/P LAPAROSCOPIC SLEEVE GASTRECTOMY: ICD-10-CM

## 2020-04-22 LAB — VIT B12 SERPL-MCNC: 631 PG/ML (ref 193–986)

## 2020-04-22 PROCEDURE — 99000 SPECIMEN HANDLING OFFICE-LAB: CPT | Performed by: FAMILY MEDICINE

## 2020-04-22 PROCEDURE — 83735 ASSAY OF MAGNESIUM: CPT | Performed by: FAMILY MEDICINE

## 2020-04-22 PROCEDURE — 84630 ASSAY OF ZINC: CPT | Mod: 90 | Performed by: FAMILY MEDICINE

## 2020-04-22 PROCEDURE — 82728 ASSAY OF FERRITIN: CPT | Performed by: FAMILY MEDICINE

## 2020-04-22 PROCEDURE — 82607 VITAMIN B-12: CPT | Performed by: FAMILY MEDICINE

## 2020-04-22 PROCEDURE — 82525 ASSAY OF COPPER: CPT | Mod: 90 | Performed by: FAMILY MEDICINE

## 2020-04-22 PROCEDURE — 36415 COLL VENOUS BLD VENIPUNCTURE: CPT | Performed by: FAMILY MEDICINE

## 2020-04-22 PROCEDURE — 84425 ASSAY OF VITAMIN B-1: CPT | Mod: 90 | Performed by: FAMILY MEDICINE

## 2020-04-22 PROCEDURE — 82306 VITAMIN D 25 HYDROXY: CPT | Performed by: FAMILY MEDICINE

## 2020-04-22 PROCEDURE — 84590 ASSAY OF VITAMIN A: CPT | Mod: 90 | Performed by: FAMILY MEDICINE

## 2020-04-23 LAB
DEPRECATED CALCIDIOL+CALCIFEROL SERPL-MC: 22 UG/L (ref 20–75)
FERRITIN SERPL-MCNC: 21 NG/ML (ref 12–150)
MAGNESIUM SERPL-MCNC: 2.1 MG/DL (ref 1.6–2.3)

## 2020-04-24 LAB
ANNOTATION COMMENT IMP: NORMAL
COPPER SERPL-MCNC: 121.4 UG/DL (ref 80–155)
RETINYL PALMITATE SERPL-MCNC: 0.05 MG/L (ref 0–0.1)
VIT A SERPL-MCNC: 0.52 MG/L (ref 0.3–1.2)
VIT B1 BLD-MCNC: 103 NMOL/L (ref 70–180)
ZINC SERPL-MCNC: 73.2 UG/DL (ref 60–120)

## 2020-04-28 ENCOUNTER — TRANSFERRED RECORDS (OUTPATIENT)
Dept: HEALTH INFORMATION MANAGEMENT | Facility: CLINIC | Age: 40
End: 2020-04-28

## 2020-05-02 DIAGNOSIS — E06.3 HYPOTHYROIDISM DUE TO HASHIMOTO'S THYROIDITIS: ICD-10-CM

## 2020-05-04 RX ORDER — LIOTHYRONINE SODIUM 5 UG/1
TABLET ORAL
Qty: 30 TABLET | Refills: 3 | Status: SHIPPED | OUTPATIENT
Start: 2020-05-04 | End: 2020-09-30

## 2020-05-04 NOTE — TELEPHONE ENCOUNTER
"Last Written Prescription Date:  12/29/2019  Last Fill Quantity: 30,  # refills: 3   Last office visit: 4/17/2020 with prescribing provider:  Yes, virtual visit PN   Future Office Visit:      Requested Prescriptions   Signed Prescriptions Disp Refills    liothyronine (CYTOMEL) 5 MCG tablet 30 tablet 3     Sig: TAKE 1 TABLET(5 MCG) BY MOUTH DAILY       Thyroid Protocol Passed - 5/2/2020  3:37 AM        Passed - Patient is 12 years or older        Passed - Recent (12 mo) or future (30 days) visit within the authorizing provider's specialty     Patient has had an office visit with the authorizing provider or a provider within the authorizing providers department within the previous 12 mos or has a future within next 30 days. See \"Patient Info\" tab in inbasket, or \"Choose Columns\" in Meds & Orders section of the refill encounter.              Passed - Medication is active on med list        Passed - Normal TSH on file in past 12 months     Recent Labs   Lab Test 03/17/20  0732   TSH 1.51              Passed - No active pregnancy on record     If patient is pregnant or has had a positive pregnancy test, please check TSH.          Passed - No positive pregnancy test in past 12 months     If patient is pregnant or has had a positive pregnancy test, please check TSH.               Prescription approved per [facility] [place] [facility].  Betsey Yepez RN    "

## 2020-07-14 ENCOUNTER — DOCUMENTATION ONLY (OUTPATIENT)
Dept: FAMILY MEDICINE | Facility: CLINIC | Age: 40
End: 2020-07-14

## 2020-07-14 DIAGNOSIS — E06.3 HASHIMOTO'S THYROIDITIS: Primary | ICD-10-CM

## 2020-07-17 DIAGNOSIS — E06.3 HASHIMOTO'S THYROIDITIS: ICD-10-CM

## 2020-07-17 LAB — TSH SERPL DL<=0.005 MIU/L-ACNC: 0.91 MU/L (ref 0.4–4)

## 2020-07-17 PROCEDURE — 84443 ASSAY THYROID STIM HORMONE: CPT | Performed by: FAMILY MEDICINE

## 2020-07-17 PROCEDURE — 36415 COLL VENOUS BLD VENIPUNCTURE: CPT | Performed by: FAMILY MEDICINE

## 2020-07-18 ENCOUNTER — MYC MEDICAL ADVICE (OUTPATIENT)
Dept: FAMILY MEDICINE | Facility: CLINIC | Age: 40
End: 2020-07-18

## 2020-07-18 DIAGNOSIS — E06.3 HASHIMOTO'S THYROIDITIS: Primary | ICD-10-CM

## 2020-07-21 ENCOUNTER — MYC MEDICAL ADVICE (OUTPATIENT)
Dept: FAMILY MEDICINE | Facility: CLINIC | Age: 40
End: 2020-07-21

## 2020-07-21 NOTE — TELEPHONE ENCOUNTER
Please let her know that that is such a tiny dose adjustment   Can stay on same dose but have her take 1/2 tablet Sunday and 1 tablet the rest of the days.  Might be just enough of a change to get it closer to that range.  Thanks  PN

## 2020-07-26 DIAGNOSIS — E06.3 HYPOTHYROIDISM DUE TO HASHIMOTO'S THYROIDITIS: ICD-10-CM

## 2020-07-26 DIAGNOSIS — J45.40 MODERATE PERSISTENT ASTHMA WITH ALLERGIC RHINITIS WITHOUT COMPLICATION: ICD-10-CM

## 2020-07-28 RX ORDER — MONTELUKAST SODIUM 10 MG/1
TABLET ORAL
Qty: 90 TABLET | Refills: 1 | Status: SHIPPED | OUTPATIENT
Start: 2020-07-28 | End: 2021-06-01

## 2020-07-28 RX ORDER — LEVOTHYROXINE SODIUM 112 MCG
TABLET ORAL
Qty: 90 TABLET | Refills: 1 | Status: SHIPPED | OUTPATIENT
Start: 2020-07-28 | End: 2020-10-20

## 2020-08-09 ENCOUNTER — NURSE TRIAGE (OUTPATIENT)
Dept: NURSING | Facility: CLINIC | Age: 40
End: 2020-08-09

## 2020-08-10 NOTE — TELEPHONE ENCOUNTER
"Cat scratch on pinky finger. 1 inch long, \"fairly deep\", gaping, jagged. Injury was at 11 AM today (9 hr ago) Bleeding stopped. C/o tingling in finger. C/o numbness near distal end of laceration. S/p surgery on this finger 10 yrs ago. Advised ED or UC now.     COVID 19 Nurse Triage Plan/Patient Instructions    Please be aware that novel coronavirus (COVID-19) may be circulating in the community. If you develop symptoms such as fever, cough, or SOB or if you have concerns about the presence of another infection including coronavirus (COVID-19), please contact your health care provider or visit [email or web address].     Disposition/Instructions    ED Visit or In-Person Urgent Care visit recommended. Follow protocol based instructions.     Pt denies fever, SOB, cough or sore throat. Denies close contact w/ Covid+ person.                       Reason for Disposition    Skin is split open or gaping (or length > 1/2 inch or 12 mm on the skin, 1/4 inch or 6 mm on the face)    Additional Information    Negative: [1] Major bleeding (e.g., actively dripping or spurting) AND [2] can't be stopped    Negative: Amputation    Negative: Shock suspected (e.g., cold/pale/clammy skin, too weak to stand, low BP, rapid pulse)    Negative: [1] Knife wound (or other possibly deep cut) AND [2] to chest, abdomen, back, neck, or head    Negative: [1] Cutter (self-mutilator) AND [2] suicidal or out-of-control    Negative: Sounds like a life-threatening emergency to the triager    Negative: [1] Animal bite AND [2] broken skin    Negative: [1] Human bite AND [2] broken skin    Negative: [1] Bleeding AND [2] won't stop after 10 minutes of direct pressure (using correct technique)    Protocols used: CUTS AND [id number]      "

## 2020-08-18 ENCOUNTER — TELEPHONE (OUTPATIENT)
Dept: FAMILY MEDICINE | Facility: CLINIC | Age: 40
End: 2020-08-18

## 2020-08-18 ENCOUNTER — VIRTUAL VISIT (OUTPATIENT)
Dept: FAMILY MEDICINE | Facility: CLINIC | Age: 40
End: 2020-08-18
Payer: COMMERCIAL

## 2020-08-18 DIAGNOSIS — E06.3 HASHIMOTO'S THYROIDITIS: Primary | ICD-10-CM

## 2020-08-18 DIAGNOSIS — J45.40 MODERATE PERSISTENT ASTHMA WITH ALLERGIC RHINITIS WITHOUT COMPLICATION: Primary | ICD-10-CM

## 2020-08-18 PROCEDURE — 99213 OFFICE O/P EST LOW 20 MIN: CPT | Mod: 95 | Performed by: PHYSICIAN ASSISTANT

## 2020-08-18 RX ORDER — PREDNISONE 20 MG/1
20 TABLET ORAL DAILY
Qty: 5 TABLET | Refills: 0 | Status: SHIPPED | OUTPATIENT
Start: 2020-08-18 | End: 2020-08-21

## 2020-08-18 RX ORDER — ALBUTEROL SULFATE 90 UG/1
2 AEROSOL, METERED RESPIRATORY (INHALATION) EVERY 6 HOURS
Qty: 1 INHALER | Refills: 0 | Status: SHIPPED | OUTPATIENT
Start: 2020-08-18 | End: 2021-08-02

## 2020-08-18 NOTE — PROGRESS NOTES
"Kasandra Winkler is a 39 year old female who is being evaluated via a billable video visit.      The patient has been notified of following:     \"This video visit will be conducted via a call between you and your physician/provider. We have found that certain health care needs can be provided without the need for an in-person physical exam.  This service lets us provide the care you need with a video conversation.  If a prescription is necessary we can send it directly to your pharmacy.  If lab work is needed we can place an order for that and you can then stop by our lab to have the test done at a later time.    Video visits are billed at different rates depending on your insurance coverage.  Please reach out to your insurance provider with any questions.    If during the course of the call the physician/provider feels a video visit is not appropriate, you will not be charged for this service.\"    Patient has given verbal consent for Video visit? Yes  How would you like to obtain your AVS? MyChart  If you are dropped from the video visit, the video invite should be resent to:   Will anyone else be joining your video visit? No    Subjective     Kasandra Winkler is a 39 year old female who presents today via video visit for the following health issues:    HPI    Asthma , woke up feeling like she couldn't breathe       Video Start Time: 5:42 PM    Did have to change to telephone visit, hanh would not work.    Asthma Follow-Up     Was ACT completed today?  No      Do you have a cough?  YES    Are you experiencing any wheezing in your chest?  YES    Do you have any shortness of breath?  YES     How often are you using a short-acting (rescue) inhaler or nebulizer, such as Albuterol?  Daily    How many days per week do you miss taking your asthma controller medication?  I do not have an asthma controller medication    Please describe any recent triggers for your asthma: upper respiratory infections and " pollens    Have you had any Emergency Room Visits, Urgent Care Visits, or Hospital Admissions since your last office visit?  No    Usually has very good control of asthma.  Been flared up for a couple of weeks.  Is packing to move, is allergic to dust.  BP Readings from Last 3 Encounters:   08/02/19 121/85   03/13/19 118/83   03/01/19 110/77    Wt Readings from Last 3 Encounters:   08/02/19 89.5 kg (197 lb 6 oz)   03/13/19 91.8 kg (202 lb 4.8 oz)   03/01/19 90.3 kg (199 lb)                    Reviewed and updated as needed this visit by Provider         Review of Systems   Constitutional, HEENT, cardiovascular, pulmonary, gi and gu systems are negative, except as otherwise noted.      Objective           Vitals:  No vitals were obtained today due to virtual visit.    Physical Exam     GENERAL: alert and no distress  EYES: Eyes grossly normal to inspection.  No discharge or erythema, or obvious scleral/conjunctival abnormalities.  RESP: No audible wheeze, cough, or visible cyanosis.  No visible retractions or increased work of breathing.    SKIN: Visible skin clear. No significant rash, abnormal pigmentation or lesions.  NEURO: Cranial nerves grossly intact.  Mentation and speech appropriate for age.  PSYCH: Mentation appears normal, affect normal/bright, judgement and insight intact, normal speech and appearance well-groomed.      Diagnostic Test Results:  Labs reviewed in Epic        Assessment & Plan     Moderate persistent asthma with allergic rhinitis without complication  Recommend follow up in a couple of days if not starting to see improvement.  - albuterol (PROAIR HFA/PROVENTIL HFA/VENTOLIN HFA) 108 (90 Base) MCG/ACT inhaler; Inhale 2 puffs into the lungs every 6 hours  - predniSONE (DELTASONE) 20 MG tablet; Take 1 tablet (20 mg) by mouth daily           No follow-ups on file.    Jamie Alfaro PA-C  [facility]      Video-Visit Details    Time of visit 13 minutes

## 2020-08-18 NOTE — TELEPHONE ENCOUNTER
Central Prior Authorization Team   Phone: [phone]    PA Initiation    Medication: QVAR 80MCG ORAL INHALER 120 DOSES  Insurance Company: [employer] ([facility]) - Phone [phone] Fax [phone]  Pharmacy Filling the Rx: [facility] [id number] [place], [place] - [facility] AT [facility] & [address] [place] [address]  Filling Pharmacy Phone: [phone]  Filling Pharmacy Fax: [phone]  Start Date: 2/24/2018       Jennifer Rose

## 2020-08-18 NOTE — TELEPHONE ENCOUNTER
PN,    Patient scheduled lab appointment 8/27 to recheck TSH after med adjustment.    Order T'd up.  Thanks,  Betsey Yepez RN

## 2020-08-18 NOTE — NURSING NOTE
"Chief Complaint   Patient presents with     Asthma     2 weeks allergies have been bad     initial LMP 06/25/2018 (Approximate)  Estimated body mass index is 31.86 kg/m  as calculated from the following:    Height as of 8/2/19: 1.676 m (5' 6\").    Weight as of 8/2/19: 89.5 kg (197 lb 6 oz).  BP completed using cuff size: .  L  R arm      Health Maintenance that is potentially due pending provider review:      Troy Polanco ma  "

## 2020-08-21 ENCOUNTER — TELEPHONE (OUTPATIENT)
Dept: FAMILY MEDICINE | Facility: CLINIC | Age: 40
End: 2020-08-21

## 2020-08-21 DIAGNOSIS — J45.40 MODERATE PERSISTENT ASTHMA WITH ALLERGIC RHINITIS WITHOUT COMPLICATION: ICD-10-CM

## 2020-08-21 RX ORDER — PREDNISONE 20 MG/1
TABLET ORAL
Qty: 15 TABLET | Refills: 0 | Status: SHIPPED | OUTPATIENT
Start: 2020-08-21 | End: 2021-06-01

## 2020-08-21 NOTE — TELEPHONE ENCOUNTER
"JS,   Patient had visit with you 8/18/2020  Pt states asthma is the worst it's even been   States she isn't used to asthma being this bad so she isn't sure what to do    Took first Prednisone tablet Tuesday   Felt better initially  But then come subsequent doses, at 3pm on Wed and Thurs felt like Prednisone \"wore off\" by mid day   Dose she took this AM isn't helping at all     Doesn't feel well today  It's hard to breathe   Heart rate also higher than normal     Just did Albuterol to see if that helps   Doesn't feel like she can get a full breathe in   Feels SOB   Denies wheezing     Wondering what next steps are.  Please advise  Thanks,  Aria DEL TORO, RN             "

## 2020-08-21 NOTE — TELEPHONE ENCOUNTER
"Reason for call:  Patient reporting a symptom    Symptom or request: Pt had a virtual visit with Cam Dominic on 8/18/2020. He gave her an RX to help with asthma control. Pt called back and is reporting that the medication worked for about 6-8 hours and then didn't seem to be working after that. Pt states that she took it today, but it has not helped with the asthma. Pt does report that she is having SOB and \" doesn't feel like she is getting full oxygen\".     Duration (how long have symptoms been present):   Ongoing     Have you been treated for this before? No    Additional comments: Pt is currently using her rescue inhaler      Phone Number patient can be reached at:  Cell number on file:    Telephone Information:   Mobile [phone]       Best Time:  Any    Can we leave a detailed message on this number:  Not Applicable    Call taken on 8/21/2020 at 10:10 AM by Leilani Weaver    "

## 2020-08-21 NOTE — TELEPHONE ENCOUNTER
Calling in a higher dose of prednisone with a taper.  But with her symptoms, have low threshold to go to ER.  Sometimes asthma needs more aggressive treatment that we cannot do out patient.    Cam Alfaro PA-C

## 2020-08-27 DIAGNOSIS — E06.3 HASHIMOTO'S THYROIDITIS: ICD-10-CM

## 2020-08-27 LAB
T4 FREE SERPL-MCNC: 1.01 NG/DL (ref 0.76–1.46)
TSH SERPL DL<=0.005 MIU/L-ACNC: 13.94 MU/L (ref 0.4–4)

## 2020-08-27 PROCEDURE — 36415 COLL VENOUS BLD VENIPUNCTURE: CPT | Performed by: FAMILY MEDICINE

## 2020-08-27 PROCEDURE — 84439 ASSAY OF FREE THYROXINE: CPT | Performed by: FAMILY MEDICINE

## 2020-08-27 PROCEDURE — 84443 ASSAY THYROID STIM HORMONE: CPT | Performed by: FAMILY MEDICINE

## 2020-08-28 ENCOUNTER — MYC MEDICAL ADVICE (OUTPATIENT)
Dept: FAMILY MEDICINE | Facility: CLINIC | Age: 40
End: 2020-08-28

## 2020-08-28 DIAGNOSIS — E06.3 HASHIMOTO'S THYROIDITIS: Primary | ICD-10-CM

## 2020-08-28 NOTE — RESULT ENCOUNTER NOTE
Dear Kasandra,   Your test results are all back -   Looks like you might need a dose adjustment - remind me your dose and if you missed any  Let us know if you have any questions.  -Trish Callaway, DO

## 2020-09-29 DIAGNOSIS — E06.3 HYPOTHYROIDISM DUE TO HASHIMOTO'S THYROIDITIS: ICD-10-CM

## 2020-09-30 RX ORDER — LIOTHYRONINE SODIUM 5 UG/1
TABLET ORAL
Qty: 30 TABLET | Refills: 3 | Status: SHIPPED | OUTPATIENT
Start: 2020-09-30 | End: 2021-08-03

## 2020-09-30 NOTE — TELEPHONE ENCOUNTER
Routing refill request to provider for review/approval because:  Labs out of range:  TSH  Aria DEL TORO RN

## 2020-10-08 DIAGNOSIS — E06.3 HASHIMOTO'S THYROIDITIS: ICD-10-CM

## 2020-10-08 LAB — TSH SERPL DL<=0.005 MIU/L-ACNC: 0.67 MU/L (ref 0.4–4)

## 2020-10-08 PROCEDURE — 84443 ASSAY THYROID STIM HORMONE: CPT | Performed by: FAMILY MEDICINE

## 2020-10-19 ENCOUNTER — MYC MEDICAL ADVICE (OUTPATIENT)
Dept: FAMILY MEDICINE | Facility: CLINIC | Age: 40
End: 2020-10-19

## 2020-10-19 DIAGNOSIS — E06.3 HYPOTHYROIDISM DUE TO HASHIMOTO'S THYROIDITIS: ICD-10-CM

## 2020-10-20 ENCOUNTER — VIRTUAL VISIT (OUTPATIENT)
Dept: FAMILY MEDICINE | Facility: OTHER | Age: 40
End: 2020-10-20

## 2020-10-20 RX ORDER — LEVOTHYROXINE SODIUM 112 MCG
TABLET ORAL
Qty: 90 TABLET | Refills: 1 | COMMUNITY
Start: 2020-10-20 | End: 2021-02-01

## 2020-10-20 NOTE — PROGRESS NOTES
"Date: 10/20/2020 11:15:37  Clinician: Vince Vizcaino  Clinician NPI: [id number]  Patient: Kasandra Winkler  Patient : 1980  Patient Address: [address] [place] [id number]  Patient Phone: [phone]  Visit Protocol: URI  Patient Summary:  Kasandra is a 39 year old ( : 1980 ) female who initiated a [facility] Visit for COVID-19 (Coronavirus) evaluation and screening. When asked the question \"Please sign me up to receive news, health information and promotions. \", Kasandra responded \"No\".    Kasandra states her symptoms started suddenly 3-4 days ago.   Her symptoms consist of a cough, nasal congestion, rhinitis, facial pain or pressure, myalgia, malaise, and a sore throat. She is experiencing mild difficulty breathing with activities but can speak normally in full sentences.   Symptom details     Nasal secretions: The color of her mucus is white, clear, and yellow.    Cough: Kasandra coughs a few times an hour and her cough is more bothersome at night. Phlegm comes into her throat when she coughs. She believes her cough is caused by post-nasal drip. The color of the phlegm is white and yellow.     Sore throat: Kasandra reports having moderate throat pain (4-6 on a 10 point pain scale), does not have exudate on her tonsils, and can swallow liquids. She is not sure if the lymph nodes in her neck are enlarged. A rash has not appeared on the skin since the sore throat started.     Facial pain or pressure: The facial pain or pressure does not feel worse when bending or leaning forward.      Kasandra denies having ear pain, headache, wheezing, fever, anosmia, vomiting, nausea, chills, teeth pain, ageusia, and diarrhea. She also denies double sickening (worsening symptoms after initial improvement), taking antibiotic medication in the past month, and having recent facial or sinus surgery in the past 60 days.   Precipitating events  Kasandra is not sure if she has been exposed to someone with strep throat. She has not recently " been exposed to someone with influenza. Kasandra has been in close contact with the following high risk individuals: immunocompromised people.   Pertinent COVID-19 (Coronavirus) information  In the past 14 days, Kasandra has not worked in a congregate living setting.   She does not work or volunteer as healthcare worker or a  and does not work or volunteer in a healthcare facility.   Kasandra also has not lived in a congregate living setting in the past 14 days. She does not live with a healthcare worker.   Kasandra has not had a close contact with a laboratory-confirmed COVID-19 patient within 14 days of symptom onset.   Since December 2019, Kasandra and has not had upper respiratory infection or influenza-like illness. Has not been diagnosed with lab-confirmed COVID-19 test   Pertinent medical history  Kasandra had 1 sinus infection within the past year.   Kasandra does not get yeast infections when she takes antibiotics.   Kasandra does not need a return to work/school note.   Weight: 205 lbs   Kasandra does not smoke or use smokeless tobacco.   She denies pregnancy and denies breastfeeding. She does not menstruate.   Additional information as reported by the patient (free text): I've been having symptoms that are possible cobvid symptoms for months but thought it's asthma or allergies or thyroid related. Lately my fatigue is severe. I'm wondering if I had COVID and didn't know it and have long term symptoms or if I just got it recently. Wondering if a test for current COVID infection or antibodies tests are both possible?   Weight: 205 lbs    MEDICATIONS: clonazepam oral, liothyronine oral, Flonase Allergy Relief nasal, Tylenol Sinus Severe oral, Zyrtec oral, Synthroid oral, ALLERGIES: NKDA  Clinician Response:  Dear Kasandra,   Your symptoms show that you may have coronavirus (COVID-19). This illness can cause fever, cough and trouble breathing. Many people get a mild case and get better on their own. Some people can get very  "sick.  What should I do?  We would like to test you for this virus.   1. Please call [phone] to schedule your visit. Explain that you were [facility] by [facility] to have a COVID-19 test. Be ready to share your [facility] visit ID number.  The following will serve as your written order for this COVID Test, ordered by me, for the indication of suspected COVID [Z20.828]: The test will be ordered in [facility], our electronic health record, after you are scheduled. It will show as ordered and authorized by Kofi Streeter MD.  Order: COVID-19 (Coronavirus) PCR for SYMPTOMATIC testing from [facility].      2. When it's time for your COVID test:  Stay at least 6 feet away from others. (If someone will drive you to your test, stay in the backseat, as far away from the  as you can.)   Cover your mouth and nose with a mask, tissue or washcloth.  Go straight to the testing site. Don't make any stops on the way there or back.      3.Starting now: Stay home and away from others (self-isolate) until:   You've had no fever---and no medicine that reduces fever---for one full day (24 hours). And...   Your other symptoms have gotten better. For example, your cough or breathing has improved. And...   At least 10 days have passed since your symptoms started.       During this time, don't leave the house except for testing or medical care.   Stay in your own room, even for meals. Use your own bathroom if you can.   Stay away from others in your home. No hugging, kissing or shaking hands. No visitors.  Don't go to work, school or anywhere else.    Clean \"high touch\" surfaces often (doorknobs, counters, handles, etc.). Use a household cleaning spray or wipes. You'll find a full list of  on the EPA website: www.epa.gov/pesticide-registration/list-n-disinfectants-use-against-sars-cov-2.   Cover your mouth and nose with a mask, tissue or washcloth to avoid spreading germs.  Wash your hands and face often. Use soap and water.  Caregivers in " these groups are at risk for severe illness due to COVID-19:  o People 65 years and older  o People who live in a nursing home or long-term care facility  o People with chronic disease (lung, heart, cancer, diabetes, kidney, liver, immunologic)  o People who have a weakened immune system, including those who:   Are in cancer treatment  Take medicine that weakens the immune system, such as corticosteroids  Had a bone marrow or organ transplant  Have an immune deficiency  Have poorly controlled HIV or AIDS  Are obese (body mass index of 40 or higher)  Smoke regularly   o Caregivers should wear gloves while washing dishes, handling laundry and cleaning bedrooms and bathrooms.  o Use caution when washing and drying laundry: Don't shake dirty laundry, and use the warmest water setting that you can.  o For more tips, go to www.cdc.gov/coronavirus/2019-ncov/downloads/10Things.pdf.       How can I take care of myself?   Get lots of rest. Drink extra fluids (unless a doctor has told you not to).   Take Tylenol (acetaminophen) for fever or pain. If you have liver or kidney problems, ask your family doctor if it's okay to take Tylenol.   Adults can take either:    650 mg (two 325 mg pills) every 4 to 6 hours, or...   1,000 mg (two 500 mg pills) every 8 hours as needed.    Note: Don't take more than 3,000 mg in one day. Acetaminophen is found in many medicines (both prescribed and over-the-counter medicines). Read all labels to be sure you don't take too much.   For children, check the Tylenol bottle for the right dose. The dose is based on the child's age or weight.    If you have other health problems (like cancer, heart failure, an organ transplant or severe kidney disease): Call your specialty clinic if you don't feel better in the next 2 days.       Know when to call 911. Emergency warning signs include:    Trouble breathing or shortness of breath Pain or pressure in the chest that doesn't go away Feeling confused like you  haven't felt before, or not being able to wake up Bluish-colored lips or face.  Where can I get more information?   [facility] -- About COVID-19: [email or web address]   CDC -- What to Do If You're Sick: www.cdc.gov/coronavirus/2019-ncov/about/steps-when-sick.html   CDC -- Ending Home Isolation: www.cdc.gov/coronavirus/2019-ncov/hcp/disposition-in-home-patients.html   [facility] [place] -- Caring for Someone: www.cdc.gov/coronavirus/2019-ncov/if-you-are-sick/care-for-someone.html   [facility] -- Interim Guidance for Hospital Discharge to Home: www.[facility].mn.us/diseases/coronavirus/hcp/hospdischarge.pdf   [facility] clinical trials (COVID-19 research studies): clinicalaffairs.[facility]-clinical-trials    Below are the COVID-19 hotlines at the [facility] of Health ([facility]). Interpreters are available.    For health questions: Call 394-007-5420 or 1-946.670.4504 (7 a.m. to 7 p.m.) For questions about schools and childcare: Call 385-578-3083 or 1-694.365.2487 (7 a.m. to 7 p.m.)    Diagnosis: Contact with and (suspected) exposure to other viral communicable diseases  Diagnosis ICD: Z20.828

## 2020-10-21 DIAGNOSIS — Z20.822 SUSPECTED 2019 NOVEL CORONAVIRUS INFECTION: Primary | ICD-10-CM

## 2020-10-22 DIAGNOSIS — R05.9 COUGH: Primary | ICD-10-CM

## 2020-10-22 DIAGNOSIS — Z20.822 SUSPECTED 2019 NOVEL CORONAVIRUS INFECTION: ICD-10-CM

## 2020-10-22 LAB
DEPRECATED S PYO AG THROAT QL EIA: NEGATIVE
SPECIMEN SOURCE: NORMAL
SPECIMEN SOURCE: NORMAL
STREP GROUP A PCR: NOT DETECTED

## 2020-10-22 PROCEDURE — 87651 STREP A DNA AMP PROBE: CPT | Performed by: PHYSICIAN ASSISTANT

## 2020-10-22 PROCEDURE — 99N1174 PR STATISTIC STREP A RAPID: Performed by: PHYSICIAN ASSISTANT

## 2020-10-22 PROCEDURE — U0003 INFECTIOUS AGENT DETECTION BY NUCLEIC ACID (DNA OR RNA); SEVERE ACUTE RESPIRATORY SYNDROME CORONAVIRUS 2 (SARS-COV-2) (CORONAVIRUS DISEASE [COVID-19]), AMPLIFIED PROBE TECHNIQUE, MAKING USE OF HIGH THROUGHPUT TECHNOLOGIES AS DESCRIBED BY CMS-2020-01-R: HCPCS | Performed by: FAMILY MEDICINE

## 2020-10-23 LAB
SARS-COV-2 RNA SPEC QL NAA+PROBE: NOT DETECTED
SPECIMEN SOURCE: NORMAL

## 2020-11-11 ENCOUNTER — ALLIED HEALTH/NURSE VISIT (OUTPATIENT)
Dept: NURSING | Facility: CLINIC | Age: 40
End: 2020-11-11
Payer: COMMERCIAL

## 2020-11-11 DIAGNOSIS — Z23 NEED FOR PROPHYLACTIC VACCINATION AND INOCULATION AGAINST INFLUENZA: Primary | ICD-10-CM

## 2020-11-11 PROCEDURE — 90686 IIV4 VACC NO PRSV 0.5 ML IM: CPT

## 2020-11-11 PROCEDURE — 99207 PR NO CHARGE NURSE ONLY: CPT

## 2020-11-11 PROCEDURE — 90471 IMMUNIZATION ADMIN: CPT

## 2020-12-15 ENCOUNTER — TELEPHONE (OUTPATIENT)
Dept: OPHTHALMOLOGY | Facility: CLINIC | Age: 40
End: 2020-12-15

## 2020-12-15 ENCOUNTER — OFFICE VISIT (OUTPATIENT)
Dept: OPTOMETRY | Facility: CLINIC | Age: 40
End: 2020-12-15
Payer: COMMERCIAL

## 2020-12-15 DIAGNOSIS — G43.109 MIGRAINE WITH AURA AND WITHOUT STATUS MIGRAINOSUS, NOT INTRACTABLE: Primary | ICD-10-CM

## 2020-12-15 ASSESSMENT — TONOMETRY
OS_IOP_MMHG: 10
IOP_METHOD: ICARE
OD_IOP_MMHG: 11

## 2020-12-15 ASSESSMENT — VISUAL ACUITY
OS_SC: 20/20
OD_SC: 20/20
OD_SC+: -2
METHOD: SNELLEN - LINEAR

## 2020-12-15 ASSESSMENT — CONF VISUAL FIELD
OD_NORMAL: 1
OS_NORMAL: 1

## 2020-12-15 NOTE — TELEPHONE ENCOUNTER
Pt calling  today with complaint of lines in vision    Spoke to pt at 0944    Pt states blurred vision in both eyes yesterday and this AM for few hours and improves    Pt noticed lines in vision/blurred vision for about a hour without headache before normalizing    Reviewed s/s of ocular migraine offered appt today with Dr. Maza and pt agrees    Scheduled at 4 PM at [facility] location-- pt may arrive 15 minutes early      Timmy Anderson RN 9:51 AM 12/15/20

## 2020-12-16 ASSESSMENT — SLIT LAMP EXAM - LIDS
COMMENTS: NORMAL
COMMENTS: NORMAL

## 2020-12-16 ASSESSMENT — CUP TO DISC RATIO
OD_RATIO: 0.2
OS_RATIO: 0.2

## 2020-12-16 ASSESSMENT — EXTERNAL EXAM - LEFT EYE: OS_EXAM: NORMAL

## 2020-12-16 ASSESSMENT — EXTERNAL EXAM - RIGHT EYE: OD_EXAM: NORMAL

## 2020-12-16 NOTE — PROGRESS NOTES
Assessment/Plan  (G43.109) Migraine with aura and without status migrainosus, not intractable  (primary encounter diagnosis)  Comment: Patient receives migraine care at private head and neck clinic. Has recently started Botox injections for migraines  Plan: Discussed findings with patient. Reassured patient that ocular findings appear to be within normal limits, but advised her to touch base with her normal migraine provider given the recent change in symptoms. FL-41 tinted glasses may be helpful (patient likely already has a pair), but identifying potential triggers such as reduced sleep, increased stress, and dietary factors may also be beneficial.       Complete documentation of historical and exam elements from today's encounter can  be found in the full encounter summary report (not reduplicated in this progress  note). I personally obtained the chief complaint(s) and history of present illness. I  confirmed and edited as necessary the review of systems, past medical/surgical  history, family history, social history, and examination findings as documented by  others; and I examined the patient myself. I personally reviewed the relevant tests,  images, and reports as documented above. I formulated and edited as necessary the  assessment and plan and discussed the findings and management plan with the  patient and family.    Jason Maza OD

## 2021-01-08 ENCOUNTER — APPOINTMENT (OUTPATIENT)
Dept: LAB | Facility: CLINIC | Age: 41
End: 2021-01-08
Payer: COMMERCIAL

## 2021-01-08 ENCOUNTER — TELEPHONE (OUTPATIENT)
Dept: FAMILY MEDICINE | Facility: CLINIC | Age: 41
End: 2021-01-08

## 2021-01-08 ENCOUNTER — NURSE TRIAGE (OUTPATIENT)
Dept: NURSING | Facility: CLINIC | Age: 41
End: 2021-01-08

## 2021-01-08 ENCOUNTER — OFFICE VISIT (OUTPATIENT)
Dept: FAMILY MEDICINE | Facility: CLINIC | Age: 41
End: 2021-01-08
Payer: COMMERCIAL

## 2021-01-08 VITALS
SYSTOLIC BLOOD PRESSURE: 127 MMHG | WEIGHT: 209 LBS | OXYGEN SATURATION: 96 % | HEART RATE: 86 BPM | DIASTOLIC BLOOD PRESSURE: 69 MMHG | BODY MASS INDEX: 33.59 KG/M2 | HEIGHT: 66 IN | TEMPERATURE: 98.9 F

## 2021-01-08 DIAGNOSIS — G43.109 MIGRAINE WITH AURA AND WITHOUT STATUS MIGRAINOSUS, NOT INTRACTABLE: ICD-10-CM

## 2021-01-08 DIAGNOSIS — K21.9 GASTROESOPHAGEAL REFLUX DISEASE WITHOUT ESOPHAGITIS: Primary | ICD-10-CM

## 2021-01-08 DIAGNOSIS — Z98.84 HISTORY OF GASTRIC BYPASS: ICD-10-CM

## 2021-01-08 DIAGNOSIS — R10.13 EPIGASTRIC PAIN: ICD-10-CM

## 2021-01-08 PROBLEM — G43.909 MIGRAINE HEADACHE: Status: ACTIVE | Noted: 2017-01-31

## 2021-01-08 PROBLEM — N92.0 MENORRHAGIA: Status: ACTIVE | Noted: 2017-12-13

## 2021-01-08 PROBLEM — Z90.710 HISTORY OF HYSTERECTOMY: Status: ACTIVE | Noted: 2018-10-11

## 2021-01-08 PROBLEM — N80.00 ENDOMETRIOSIS OF UTERUS: Status: ACTIVE | Noted: 2018-10-11

## 2021-01-08 PROBLEM — D50.9 IRON DEFICIENCY ANEMIA: Status: ACTIVE | Noted: 2017-01-31

## 2021-01-08 LAB
ALBUMIN SERPL-MCNC: 3.7 G/DL (ref 3.4–5)
ALP SERPL-CCNC: 77 U/L (ref 40–150)
ALT SERPL W P-5'-P-CCNC: 21 U/L (ref 0–50)
AMYLASE SERPL-CCNC: 82 U/L (ref 30–110)
ANION GAP SERPL CALCULATED.3IONS-SCNC: 7 MMOL/L (ref 3–14)
AST SERPL W P-5'-P-CCNC: 20 U/L (ref 0–45)
BASOPHILS # BLD AUTO: 0 10E9/L (ref 0–0.2)
BASOPHILS NFR BLD AUTO: 0.3 %
BILIRUB SERPL-MCNC: 0.4 MG/DL (ref 0.2–1.3)
BUN SERPL-MCNC: 14 MG/DL (ref 7–30)
CALCIUM SERPL-MCNC: 9 MG/DL (ref 8.5–10.1)
CHLORIDE SERPL-SCNC: 108 MMOL/L (ref 94–109)
CO2 SERPL-SCNC: 23 MMOL/L (ref 20–32)
CREAT SERPL-MCNC: 0.73 MG/DL (ref 0.52–1.04)
DIFFERENTIAL METHOD BLD: NORMAL
EOSINOPHIL # BLD AUTO: 0.1 10E9/L (ref 0–0.7)
EOSINOPHIL NFR BLD AUTO: 1.1 %
ERYTHROCYTE [DISTWIDTH] IN BLOOD BY AUTOMATED COUNT: 13 % (ref 10–15)
GFR SERPL CREATININE-BSD FRML MDRD: >90 ML/MIN/{1.73_M2}
GLUCOSE SERPL-MCNC: 75 MG/DL (ref 70–99)
HCT VFR BLD AUTO: 41.1 % (ref 35–47)
HGB BLD-MCNC: 13.3 G/DL (ref 11.7–15.7)
LIPASE SERPL-CCNC: 167 U/L (ref 73–393)
LYMPHOCYTES # BLD AUTO: 2.1 10E9/L (ref 0.8–5.3)
LYMPHOCYTES NFR BLD AUTO: 33.9 %
MCH RBC QN AUTO: 28.2 PG (ref 26.5–33)
MCHC RBC AUTO-ENTMCNC: 32.4 G/DL (ref 31.5–36.5)
MCV RBC AUTO: 87 FL (ref 78–100)
MONOCYTES # BLD AUTO: 0.6 10E9/L (ref 0–1.3)
MONOCYTES NFR BLD AUTO: 9.9 %
NEUTROPHILS # BLD AUTO: 3.4 10E9/L (ref 1.6–8.3)
NEUTROPHILS NFR BLD AUTO: 54.8 %
PLATELET # BLD AUTO: 293 10E9/L (ref 150–450)
POTASSIUM SERPL-SCNC: 4.3 MMOL/L (ref 3.4–5.3)
PROT SERPL-MCNC: 7.5 G/DL (ref 6.8–8.8)
RBC # BLD AUTO: 4.71 10E12/L (ref 3.8–5.2)
SODIUM SERPL-SCNC: 138 MMOL/L (ref 133–144)
TSH SERPL DL<=0.005 MIU/L-ACNC: 1.51 MU/L (ref 0.4–4)
WBC # BLD AUTO: 6.3 10E9/L (ref 4–11)

## 2021-01-08 PROCEDURE — 36415 COLL VENOUS BLD VENIPUNCTURE: CPT | Performed by: PHYSICIAN ASSISTANT

## 2021-01-08 PROCEDURE — 80050 GENERAL HEALTH PANEL: CPT | Performed by: PHYSICIAN ASSISTANT

## 2021-01-08 PROCEDURE — 99214 OFFICE O/P EST MOD 30 MIN: CPT | Performed by: PHYSICIAN ASSISTANT

## 2021-01-08 PROCEDURE — 83690 ASSAY OF LIPASE: CPT | Performed by: PHYSICIAN ASSISTANT

## 2021-01-08 PROCEDURE — 82150 ASSAY OF AMYLASE: CPT | Performed by: PHYSICIAN ASSISTANT

## 2021-01-08 RX ORDER — OMEPRAZOLE 40 MG/1
40 CAPSULE, DELAYED RELEASE ORAL DAILY
Qty: 14 CAPSULE | Refills: 0 | Status: SHIPPED | OUTPATIENT
Start: 2021-01-08 | End: 2021-01-22

## 2021-01-08 ASSESSMENT — ANXIETY QUESTIONNAIRES
1. FEELING NERVOUS, ANXIOUS, OR ON EDGE: NEARLY EVERY DAY
3. WORRYING TOO MUCH ABOUT DIFFERENT THINGS: NEARLY EVERY DAY
5. BEING SO RESTLESS THAT IT IS HARD TO SIT STILL: NOT AT ALL
2. NOT BEING ABLE TO STOP OR CONTROL WORRYING: NEARLY EVERY DAY
6. BECOMING EASILY ANNOYED OR IRRITABLE: MORE THAN HALF THE DAYS
IF YOU CHECKED OFF ANY PROBLEMS ON THIS QUESTIONNAIRE, HOW DIFFICULT HAVE THESE PROBLEMS MADE IT FOR YOU TO DO YOUR WORK, TAKE CARE OF THINGS AT HOME, OR GET ALONG WITH OTHER PEOPLE: VERY DIFFICULT

## 2021-01-08 ASSESSMENT — MIFFLIN-ST. JEOR: SCORE: 1624.77

## 2021-01-08 ASSESSMENT — PATIENT HEALTH QUESTIONNAIRE - PHQ9
SUM OF ALL RESPONSES TO PHQ QUESTIONS 1-9: 16
5. POOR APPETITE OR OVEREATING: NEARLY EVERY DAY

## 2021-01-08 NOTE — TELEPHONE ENCOUNTER
"Should have been given Hpylori stool kit to bring sample back with from lab....    Thanks  Ange \"Dwight\" OSBALDO Solorzano   "

## 2021-01-08 NOTE — PATIENT INSTRUCTIONS
Acid Suppression Treatment    Start omeprazole 40mg tab, 30 minutes before breakfast for 2 weeks then get over the counter omeprazole 20 mg for the next week then take 1 tab every other for a week or so.    If you wish, you can also add pepcid at bedtime if you have having symptoms at night.    Consider further imaging pending above, abdominal sonogram vs CT scan of abdomen.  Lifestyle changes:    Avoid eating 2-3 hours before bedtime.   You may find it helpful to elevate the head of your bed.     Avoid following foods that are likely to trigger acid reflux:    Coffee or tea   Anything that s fizzy or has caffeine in it  Alcohol   Citrus fruits, such as oranges and almaz  Tomato based foods (salsa, pizza, lasanga)  Chocolate   Mint or peppermint  Fatty foods (ice cream)  Spicy foods  Onions and garlic          COVID vaccine FAQ:  https://bit.ly/17d7jl3      Did you know?      You can schedule a video visit for follow-up appointments as well as future appointments for certain conditions.  Please see the below link.     [email or web address]    If you have not already done so,  I encourage you to sign up for [employer] ([email or web address]).  This will allow you to review your results, securely communicate with a provider, and schedule virtual visits as well.

## 2021-01-08 NOTE — TELEPHONE ENCOUNTER
Reason for Call:  Other call back    Detailed comments: Patient was confused about leaving a sample or not. Please advise    Phone Number Patient can be reached at: Home number on file [phone] (home)    Best Time:     Can we leave a detailed message on this number? YES    Call taken on 1/8/2021 at 11:32 AM by Shante Anthony

## 2021-01-08 NOTE — RESULT ENCOUNTER NOTE
"Sindy Slaughter  Your attached labs are reassuringly within normal limits.  Please contact the office with any questions or concerns.    Ange Taylor \"Dwight\" OSBALDO Solorzano    "

## 2021-01-08 NOTE — PROGRESS NOTES
"  Assessment & Plan     Gastroesophageal reflux disease without esophagitis  Most likely diagnosis but consider abdominal migraine given history; check labs today with trial of omeprazole taper for the next few weeks; consider further imaging (EGD vs CT scan abdomen) pending above or with any worsening/changes in symptoms.   - CBC with platelets differential  - Comprehensive metabolic panel  - TSH with free T4 reflex  - Amylase  - Lipase  - Helicobacter pylori Antigen Stool; Future  - omeprazole (PRILOSEC) 40 MG DR capsule; Take 1 capsule (40 mg) by mouth daily for 14 days    Epigastric pain  See above and patient instructions  - CBC with platelets differential  - Comprehensive metabolic panel  - TSH with free T4 reflex  - Amylase  - Lipase  - Helicobacter pylori Antigen Stool; Future  - omeprazole (PRILOSEC) 40 MG DR capsule; Take 1 capsule (40 mg) by mouth daily for 14 days    History of gastric bypass  Stable at this time but could be contributing to above symptoms/diagnosis  - CBC with platelets differential    Migraine with aura and without status migrainosus, not intractable  Working with ophthalmology but could benefit from seeing neurology, referral placed today.  - NEUROLOGY ADULT REFERRAL    Review of prior external note(s) from - ophthamology, recent labs  Review of the result(s) of each unique test - no recent abd imaging noted, could benefit from update in near future pending above        40 minutes spent on the date of the encounter doing chart review, history and exam, documentation and further activities as noted above         BMI:   Estimated body mass index is 33.73 kg/m  as calculated from the following:    Height as of this encounter: 1.676 m (5' 6\").    Weight as of this encounter: 94.8 kg (209 lb).   Weight management plan: Discussed healthy diet and exercise guidelines      Patient Instructions   Acid Suppression Treatment    Start omeprazole 40mg tab, 30 minutes before breakfast for 2 weeks " then get over the counter omeprazole 20 mg for the next week then take 1 tab every other for a week or so.    If you wish, you can also add pepcid at bedtime if you have having symptoms at night.    Consider further imaging pending above, abdominal sonogram vs CT scan of abdomen.  Lifestyle changes:    Avoid eating 2-3 hours before bedtime.   You may find it helpful to elevate the head of your bed.     Avoid following foods that are likely to trigger acid reflux:    Coffee or tea   Anything that s fizzy or has caffeine in it  Alcohol   Citrus fruits, such as oranges and almaz  Tomato based foods (salsa, pizza, lasanga)  Chocolate   Mint or peppermint  Fatty foods (ice cream)  Spicy foods  Onions and garlic          COVID vaccine FAQ:  https://bit.ly/01i9yf9      Did you know?      You can schedule a video visit for follow-up appointments as well as future appointments for certain conditions.  Please see the below link.     [email or web address]    If you have not already done so,  I encourage you to sign up for [employer] ([email or web address]).  This will allow you to review your results, securely communicate with a provider, and schedule virtual visits as well.      Return in about 4 weeks (around 2/5/2021) for Routine Visit, or sooner with worsening symptoms.    OSBALDO Escobar [facility] is a 40 year old who presents to clinic today for the following health issues     HPI       Abdominal/Flank Pain  Onset/Duration: yesterday, last night 11:30pm  Description:   Character: Sharp  Location: middle  Radiation: None  Intensity: moderate  Progression of Symptoms:  same  Accompanying Signs & Symptoms:  Fever/Chills: no  Gas/Bloating: YES  Nausea: YES  Vomitting: no  Diarrhea: no  Constipation: no  Dysuria or Hematuria: no  History:   Trauma: no  Previous similar pain: no  Previous tests done: none  Precipitating  "factors:   Does the pain change with:     Food: no    Bowel Movement: no    Urination: no   Other factors:  Increased stress  Therapies tried and outcome: tums and prilosec last night with some improvement after 1-2 hours though  Patient's last menstrual period was 06/25/2018 (approximate).    Felt like gallbladder attack, but doesn't have gallbladder anymore.  Lasted for about 1.5 hours with slowly gradual increase.  Felt like huger pains then progressed to severe then waves from moderate to severe.  Has been feeling more bloated lately than usual prior to this.  She does not drink much alcohol. No big changes in diet or acid food recently; more stress for sure though.    History of gastric sleeve in 2015.    She does also note working with opthalmology and getting Botox injections for history of migraines with aura. Not working well yet, but other treatments haven't worked great in the past. Wondering about next steps vs seeing neurology.      Review of Systems   Constitutional, HEENT, cardiovascular, pulmonary, GI, , musculoskeletal, neuro, skin, endocrine and psych systems are negative, except as otherwise noted.      Objective    /69 (BP Location: Left arm, Cuff Size: Adult Large)   Pulse 86   Temp 98.9  F (37.2  C) (Tympanic)   Ht 1.676 m (5' 6\")   Wt 94.8 kg (209 lb)   LMP 06/25/2018 (Approximate)   SpO2 96%   BMI 33.73 kg/m    Body mass index is 33.73 kg/m .  Physical Exam   GENERAL: healthy, alert and no distress  RESP: lungs clear to auscultation - no rales, rhonchi or wheezes  CV: regular rate and rhythm, normal S1 S2, no S3 or S4, no murmur, click or rub, no peripheral edema and peripheral pulses strong  ABDOMEN: soft, tenderness in central epigastric area; no hepatosplenomegaly, no masses and bowel sounds normal  MS: no gross musculoskeletal defects noted, no edema  NEURO: Normal strength and tone, mentation intact and speech normal  PSYCH: mentation appears normal, affect " normal/bright    No results found for any visits on 01/08/21.

## 2021-01-08 NOTE — NURSING NOTE
"Chief Complaint   Patient presents with     Abdominal Pain     initial /69 (BP Location: Left arm, Cuff Size: Adult Large)   Pulse 86   Temp 98.9  F (37.2  C) (Tympanic)   Ht 1.676 m (5' 6\")   Wt 94.8 kg (209 lb)   LMP 06/25/2018 (Approximate)   SpO2 96%   BMI 33.73 kg/m   Estimated body mass index is 33.73 kg/m  as calculated from the following:    Height as of this encounter: 1.676 m (5' 6\").    Weight as of this encounter: 94.8 kg (209 lb).  BP completed using cuff size: large.  L  arm      Health Maintenance that is potentially due pending provider review:  NONE    PHQ/ACT/FOREST--Gave pt questionnare    Troy Polanco ma  "

## 2021-01-08 NOTE — TELEPHONE ENCOUNTER
S: Abdominal pain.  B:  Kasandra gave writer permission to speak with Ban about her crescencio.  Writer was  speaking to both on speaker phone. Acute onset of upper midline abdominal pain started around 11:15 pm on 1/7/21.  Took an antiacid which did not help much.  Does not have her gallbladder.   Is slightly nauseated no emesis.  Has a high pain tolerance. Describes pain as sharp and comes in waves. Rates pain a severe.  Last BM 1/6 soft form and brown.    Her usual is to have a stool every 2-3 days.   A: Per guideline to come into the ED tonight.,  Kasandra feels pain is a bit less.  If gets another was a pain will go into the ED.  R: Transferred to scheduling to make an appointment at the [facility] at 0900..    Hansa Salinas RN MAN   Triage Nurse Advisor [facility]    Reason for Disposition    [1] SEVERE pain (e.g., excruciating) AND [2] present > 1 hour    Additional Information    Negative: Shock suspected (e.g., cold/pale/clammy skin, too weak to stand, low BP, rapid pulse)    Negative: Difficult to awaken or acting confused (e.g., disoriented, slurred speech)    Negative: Passed out (i.e., lost consciousness, collapsed and was not responding)    Negative: Sounds like a life-threatening emergency to the triager    Protocols used: ABDOMINAL PAIN - FEMALE-A-AH    COVID 19 Nurse Triage Plan/Patient Instructions    Please be aware that novel coronavirus (COVID-19) may be circulating in the community. If you develop symptoms such as fever, cough, or SOB or if you have concerns about the presence of another infection including coronavirus (COVID-19), please contact your health care provider or visit [email or web address].     Disposition/Instructions    Virtual Visit with provider recommended. Reference Visit Selection Guide.    Thank you for taking steps to prevent the spread of this virus.  o Limit your contact with others.  o Wear a simple mask to cover your cough.  o Wash your hands well and often.    Resources    [facility]: About COVID-19: [email or web address]    CDC: What to Do If You're Sick: www.cdc.gov/coronavirus/2019-ncov/about/steps-when-sick.html    CDC: Ending Home Isolation: www.cdc.gov/coronavirus/2019-ncov/hcp/disposition-in-home-patients.html     CDC: Caring for Someone: www.cdc.gov/coronavirus/2019-ncov/if-you-are-sick/care-for-someone.html     [facility]: Interim Guidance for Hospital Discharge to Home: www.health.[facility].mn./diseases/coronavirus/hcp/hospdischarge.pdf    [facility] clinical trials (COVID-19 research studies): clinicalaffairs.[facility]-clinical-trials     Below are the COVID-19 hotlines at the [place] [facility] ([facility]). Interpreters are available.   o For health questions: Call 775-526-4378 or 1-464.715.1480 (7 a.m. to 7 p.m.)  o For questions about schools and childcare: Call 408-412-0538 or 1-555.529.2072 (7 a.m. to 7 p.m.)

## 2021-01-09 ASSESSMENT — ASTHMA QUESTIONNAIRES: ACT_TOTALSCORE: 24

## 2021-01-13 DIAGNOSIS — R10.13 EPIGASTRIC PAIN: ICD-10-CM

## 2021-01-13 DIAGNOSIS — K21.9 GASTROESOPHAGEAL REFLUX DISEASE WITHOUT ESOPHAGITIS: ICD-10-CM

## 2021-01-13 PROCEDURE — 87338 HPYLORI STOOL AG IA: CPT | Performed by: PHYSICIAN ASSISTANT

## 2021-01-15 ENCOUNTER — HEALTH MAINTENANCE LETTER (OUTPATIENT)
Age: 41
End: 2021-01-15

## 2021-01-15 LAB — H PYLORI AG STL QL IA: NEGATIVE

## 2021-01-15 NOTE — RESULT ENCOUNTER NOTE
"Sindy Slaughter  Your attached labs are negative for Hpylori.  Please contact the office with any questions or concerns.    Ange Taylor \"Dwight\" OSBALDO Solorzano    "

## 2021-01-29 DIAGNOSIS — E06.3 HYPOTHYROIDISM DUE TO HASHIMOTO'S THYROIDITIS: ICD-10-CM

## 2021-02-01 RX ORDER — LEVOTHYROXINE SODIUM 112 MCG
TABLET ORAL
Qty: 90 TABLET | Refills: 1 | Status: SHIPPED | OUTPATIENT
Start: 2021-02-01 | End: 2021-03-23

## 2021-02-23 NOTE — MR AVS SNAPSHOT
After Visit Summary   6/19/2018    Kasandra Winkler    MRN: [id number]           Patient Information     Date Of Birth          1980        Visit Information        Provider Department      6/19/2018 8:45 AM Trish Callaway DO [facility]        Today's Diagnoses     Hypothyroidism due to Hashimoto's thyroiditis    -  1    Menorrhagia with irregular cycle        Vaginal discharge           Follow-ups after your visit        Your next 10 appointments already scheduled     Jul 10, 2018  7:15 AM CDT   Teodoro Valero with Trish Callaway DO   [facility] ([facility])    [address] [address] [phone]              Who to contact     If you have questions or need follow up information about today's clinic visit or your schedule please contact [facility] directly at [phone].  Normal or non-critical lab and imaging results will be communicated to you by MyChart, letter or phone within 4 business days after the clinic has received the results. If you do not hear from us within 7 days, please contact the clinic through [employer] or phone. If you have a critical or abnormal lab result, we will notify you by phone as soon as possible.  Submit refill requests through [employer] or call your pharmacy and they will forward the refill request to us. Please allow 3 business days for your refill to be completed.          Additional Information About Your Visit        MyChart Information     [employer] gives you secure access to your electronic health record. If you see a primary care provider, you can also send messages to your care team and make appointments. If you have questions, please call your primary care clinic.  If you do not have a primary care provider, please call [phone] and they will assist you.        Care EveryWhere ID     This is your Care EveryWhere ID. This could be used by other organizations to access your  Frank Holly is a 28 year old woman  who presents today for annual.  She is sexually active.  Desires conception.  Periods regular every 28 days last 5 days, somewhat painful but manageable.  Using LH strips the past 1-2 months.  PAST GYNECOLOGIC HISTORY:   Patient's last menstrual period was 2021.     STIs: no prior history    PAST OBSTETRIC HISTORY:   OB History    Para Term  AB Living   0 0 0 0 0 0   SAB TAB Ectopic Molar Multiple Live Births   0 0 0 0 0 0       PAST MEDICAL HISTORY:   Past Medical History:   Diagnosis Date   • Negative History of CA, HTN, DM, CAD, CVA, DVT, Asthma    • RAD (reactive airway disease)         PAST SURGICAL HISTORY:   Past Surgical History:   Procedure Laterality Date   • No past surgeries          SOCIAL HISTORY:   Social History     Tobacco Use   • Smoking status: Never Smoker   • Smokeless tobacco: Never Used   Substance Use Topics   • Alcohol use: Yes     Alcohol/week: 0.0 standard drinks     Comment: occ wine         FAMILY HISTORY:    Family History   Problem Relation Age of Onset   • NEGATIVE FAMILY HX OF Other         no breast or ovarian    • Cancer Maternal Grandmother         colon ca   • Allergic Rhinitis Mother    • Asthma Mother        REVIEW OF SYSTEMS:    CONSTITUTIONAL: Denies fever/sweats and unintentional weight change.  CARDIOVASCULAR: Denies chest discomfort with exertion, SOB (shortness of breath) or palpitations.   RESPIRATORY: Denies cough, SOB, or change in exercise tolerance.   GASTROINTESTINAL: Denies abdominal pain, nausea, change in bowel habits, or melena.   GENITOURINARY: Denies frequency, dysuria, abnormal vaginal discharge/odor, or abnormal vaginal bleeding.   A complete review of systems was completed and was negative except as noted above.    PHYSICAL EXAM:  Visit Vitals  /76   Pulse (!) 105   Ht 5' 4.5\" (1.638 m)   Wt 63.9 kg   LMP 2021   BMI 23.80 kg/m²       GENERAL: Alert and oriented. Mood  "[place] medical records  [id number]        Your Vitals Were     Pulse Temperature Height Pulse Oximetry BMI (Body Mass Index)       96 98.3  F (36.8  C) (Oral) 5' 6\" (1.676 m) 97% 32.39 kg/m2        Blood Pressure from Last 3 Encounters:   06/19/18 110/78   04/20/18 118/72   02/22/18 112/75    Weight from Last 3 Encounters:   06/19/18 200 lb 11.2 oz (91 kg)   04/20/18 203 lb 11.2 oz (92.4 kg)   02/22/18 195 lb (88.5 kg)              We Performed the Following     CBC with platelets     Ferritin     T4, free     Thyroid peroxidase antibody     TSH     Wet prep          Today's Medication Changes          These changes are accurate as of 6/19/18  4:47 PM.  If you have any questions, ask your nurse or doctor.               Start taking these medicines.        Dose/Directions    metroNIDAZOLE 500 MG tablet   Commonly known as:  FLAGYL   Used for:  Vaginal discharge   Started by:  Trish Callaway DO        Dose:  500 mg   Take 1 tablet (500 mg) by mouth 2 times daily   Quantity:  14 tablet   Refills:  0            Where to get your medicines      These medications were sent to [facility] [address] [place] [address] AT [address] [address]    Hours:  24-hours Phone:  [phone]     metroNIDAZOLE 500 MG tablet                Primary Care Provider Office Phone # Fax #    Trish Callaway [phone] [address] [address]        Equal Access to Services     [facility]: Hadii chrissy christianson hadasho Sohumphrey, waaxda luqadaha, qaybta kaalmada adeegyada, chris grimm. So [facility] [phone].    ATENCIÓN: Si habla español, tiene a das disposición servicios gratuitos de asistencia lingüística. Llame al [phone].    We comply with applicable federal civil rights laws and [place] laws. We do not discriminate on the basis of race, color, national origin, age, disability, sex, " appropriate.  CV: RRR, no murmurs  CHEST: CTAB, no crackles or wheezes  NECK: supple, no masses, no thyromegaly  BREASTS: Skin appears normal without dimpling or puckering. Breasts symmetric without palpable masses. Breast self-exam reviewed and encouraged.  ABDOMEN: Soft, nondistended and nontender.  PELVIC EXAM: External genitalia: No lesions or masses noted and normal external genitalia  VAGINA: pink rugated vaginal mucosa and physiologic appearing discharge  CERVIX: Normal and without  lesions or masses .  UTERUS: mobile , firm,non-tender, anteverted, small size   ADNEXA: No masses  and no tenderness.    ASSESSMENT/PLAN  Frank Holly is a 28 year old  who presents for routine annual exam.  Findings today are within normal limits.      Desires conception  · Discontinued pills in September, started tracking cycles/trying more actively last month  · Recommended prenatal vitamin  · Discussed covid vaccine not contraindicated if offered while attempting pregnancy  · Offered carrier screening, will consider    Routine health maintenance  · Last pap , NIL.   Next due .  Continue cervical cancer screening per ACOG (American Congress of Obstetricians and Gynecologists) Pap guidelines, reviewed with patient.   · Mammogram not yet indicated  · Return to clinic in one year or sooner once pregnant.     sexual orientation, or gender identity.            Thank you!     Thank you for choosing [facility]  for your care. Our goal is always to provide you with excellent care. Hearing back from our patients is one way we can continue to improve our services. Please take a few minutes to complete the written survey that you may receive in the mail after your visit with us. Thank you!             Your Updated Medication List - Protect others around you: Learn how to safely use, store and throw away your medicines at www.disposemymeds.org.          This list is accurate as of 6/19/18  4:47 PM.  Always use your most recent med list.                   Brand Name Dispense Instructions for use Diagnosis    amitriptyline 10 MG tablet    ELAVIL    30 tablet    Take 1 tablet (10 mg) by mouth At Bedtime    Major depressive disorder, recurrent episode, mild (H)       azelastine 0.1 % spray    ASTELIN    30 mL    Spray 1-2 sprays into both nostrils 2 times daily    Allergic rhinitis due to pollen       budesonide 180 MCG/ACT inhaler    PULMICORT FLEXHALER    1 Inhaler    Inhale 2 puffs into the lungs 2 times daily    Moderate persistent asthma with allergic rhinitis without complication       calcium carbonate 500 MG tablet    OS-JONATAN 500 mg [place]. Ca     Take 500 mg by mouth 2 times daily        cetirizine 10 MG tablet    zyrTEC     Take 10 mg by mouth daily        clonazePAM 0.5 MG tablet    klonoPIN    20 tablet    Take 1 tablet (0.5 mg) by mouth as needed for anxiety    Major depressive disorder, recurrent episode, mild (H)       Cod Liver Oil 1000 MG Caps      Take 1 capsule by mouth daily.        fluticasone 110 MCG/ACT Inhaler    FLOVENT HFA    3 Inhaler    Inhale 2 puffs into the lungs 2 times daily    Moderate persistent asthma with allergic rhinitis without complication       fluticasone 50 MCG/ACT spray    FLONASE    16 mL    SHAKE LIQUID AND USE 1 TO 2 SPRAYS IN EACH NOSTRIL DAILY    Seasonal allergic rhinitis  due to pollen, unspecified chronicity       HM MULTIVITAMIN ADULT GUMMY PO           levonorgestrel 20 MCG/24HR IUD    MIRENA (52 MG)    1 each    1 each (20 mcg) by Intrauterine route once for 1 dose    Encounter for IUD insertion       metroNIDAZOLE 500 MG tablet    FLAGYL    14 tablet    Take 1 tablet (500 mg) by mouth 2 times daily    Vaginal discharge       SYNTHROID 137 MCG tablet   Generic drug:  levothyroxine     90 tablet    Take 1 tablet (137 mcg) by mouth daily    Hypothyroidism due to Hashimoto's thyroiditis       Vitamin B-12 500 MCG Subl      Place 500 mcg under the tongue daily        Vitamin D3 3000 units Tabs      Take 1 tablet by mouth daily.

## 2021-03-19 ENCOUNTER — OFFICE VISIT (OUTPATIENT)
Dept: FAMILY MEDICINE | Facility: CLINIC | Age: 41
End: 2021-03-19
Payer: COMMERCIAL

## 2021-03-19 VITALS
TEMPERATURE: 98.4 F | WEIGHT: 213 LBS | OXYGEN SATURATION: 97 % | HEART RATE: 86 BPM | BODY MASS INDEX: 34.23 KG/M2 | SYSTOLIC BLOOD PRESSURE: 112 MMHG | HEIGHT: 66 IN | RESPIRATION RATE: 16 BRPM | DIASTOLIC BLOOD PRESSURE: 80 MMHG

## 2021-03-19 DIAGNOSIS — E06.3 HYPOTHYROIDISM DUE TO HASHIMOTO'S THYROIDITIS: ICD-10-CM

## 2021-03-19 DIAGNOSIS — R53.83 FATIGUE, UNSPECIFIED TYPE: ICD-10-CM

## 2021-03-19 DIAGNOSIS — Z13.6 CARDIOVASCULAR SCREENING; LDL GOAL LESS THAN 160: ICD-10-CM

## 2021-03-19 DIAGNOSIS — R35.89 POLYURIA: Primary | ICD-10-CM

## 2021-03-19 DIAGNOSIS — K21.00 GASTROESOPHAGEAL REFLUX DISEASE WITH ESOPHAGITIS, UNSPECIFIED WHETHER HEMORRHAGE: ICD-10-CM

## 2021-03-19 DIAGNOSIS — R63.1 POLYDIPSIA: ICD-10-CM

## 2021-03-19 LAB
ALBUMIN UR-MCNC: NEGATIVE MG/DL
APPEARANCE UR: CLEAR
BILIRUB UR QL STRIP: ABNORMAL
CHOLEST SERPL-MCNC: 291 MG/DL
COLOR UR AUTO: YELLOW
FSH SERPL-ACNC: 15.8 IU/L
GLUCOSE SERPL-MCNC: 85 MG/DL (ref 70–99)
GLUCOSE UR STRIP-MCNC: NEGATIVE MG/DL
HBA1C MFR BLD: 5 % (ref 0–5.6)
HDLC SERPL-MCNC: 58 MG/DL
HGB UR QL STRIP: NEGATIVE
KETONES UR STRIP-MCNC: ABNORMAL MG/DL
LDLC SERPL CALC-MCNC: 189 MG/DL
LEUKOCYTE ESTERASE UR QL STRIP: NEGATIVE
NITRATE UR QL: NEGATIVE
NONHDLC SERPL-MCNC: 233 MG/DL
PH UR STRIP: 5 PH (ref 5–7)
RBC #/AREA URNS AUTO: ABNORMAL /HPF
SOURCE: ABNORMAL
SP GR UR STRIP: >1.03 (ref 1–1.03)
T4 FREE SERPL-MCNC: 1 NG/DL (ref 0.76–1.46)
TRIGL SERPL-MCNC: 218 MG/DL
TSH SERPL DL<=0.005 MIU/L-ACNC: 4.6 MU/L (ref 0.4–4)
UROBILINOGEN UR STRIP-ACNC: 0.2 EU/DL (ref 0.2–1)
VIT B12 SERPL-MCNC: 668 PG/ML (ref 193–986)
WBC #/AREA URNS AUTO: ABNORMAL /HPF

## 2021-03-19 PROCEDURE — 83001 ASSAY OF GONADOTROPIN (FSH): CPT | Performed by: FAMILY MEDICINE

## 2021-03-19 PROCEDURE — 84443 ASSAY THYROID STIM HORMONE: CPT | Performed by: FAMILY MEDICINE

## 2021-03-19 PROCEDURE — 80061 LIPID PANEL: CPT | Performed by: FAMILY MEDICINE

## 2021-03-19 PROCEDURE — 84439 ASSAY OF FREE THYROXINE: CPT | Performed by: FAMILY MEDICINE

## 2021-03-19 PROCEDURE — 81001 URINALYSIS AUTO W/SCOPE: CPT | Performed by: FAMILY MEDICINE

## 2021-03-19 PROCEDURE — 82947 ASSAY GLUCOSE BLOOD QUANT: CPT | Performed by: FAMILY MEDICINE

## 2021-03-19 PROCEDURE — 99000 SPECIMEN HANDLING OFFICE-LAB: CPT | Performed by: FAMILY MEDICINE

## 2021-03-19 PROCEDURE — 83036 HEMOGLOBIN GLYCOSYLATED A1C: CPT | Performed by: FAMILY MEDICINE

## 2021-03-19 PROCEDURE — 36415 COLL VENOUS BLD VENIPUNCTURE: CPT | Performed by: FAMILY MEDICINE

## 2021-03-19 PROCEDURE — 82607 VITAMIN B-12: CPT | Performed by: FAMILY MEDICINE

## 2021-03-19 PROCEDURE — 99214 OFFICE O/P EST MOD 30 MIN: CPT | Performed by: FAMILY MEDICINE

## 2021-03-19 PROCEDURE — 83516 IMMUNOASSAY NONANTIBODY: CPT | Performed by: FAMILY MEDICINE

## 2021-03-19 PROCEDURE — 86256 FLUORESCENT ANTIBODY TITER: CPT | Mod: 90 | Performed by: FAMILY MEDICINE

## 2021-03-19 ASSESSMENT — MIFFLIN-ST. JEOR: SCORE: 1642.91

## 2021-03-19 NOTE — PROGRESS NOTES
"    Assessment & Plan     Polyuria  Increase polyuria and polydipsia -   Will check labs to look for infection, diabetes or other possible causes  - Hemoglobin A1c  - Glucose  - UA with Microscopic reflex to Culture    Polydipsia  As above   - Hemoglobin A1c  - Glucose  - UA with Microscopic reflex to Culture    Gastroesophageal reflux disease with esophagitis, unspecified whether hemorrhage  GERD -   Will check to look for possible underlying causes -   - Tissue transglutaminase andreas IgA and IgG  - Endomysial Antibody IgA by IFA    Fatigue, unspecified type  Pending labs   - Vitamin B12  - Glucose  - Follicle stimulating hormone    CARDIOVASCULAR SCREENING; LDL GOAL LESS THAN 160     - Lipid panel reflex to direct LDL Fasting    Hypothyroidism due to Hashimoto's thyroiditis     - TSH with free T4 reflex               No follow-ups on file.    Trish Callaway [facility]    Velasquez Slaughter is a 40 year old who presents for the following health issues     HPI     Multiple concerns    Symptoms for months -   Will notice if she eats something sweet - will have to urinate more including wake to urinate  Past month or two more urination -   Has dry mouth   At times feels like heart race is pounding or fast     Started taking b12 again - was having ringing in her ears and tingling in fingers     Possibly had first hot flash -       Review of Systems   Constitutional, HEENT, cardiovascular, pulmonary, GI, , musculoskeletal, neuro, skin, endocrine and psych systems are negative, except as otherwise noted.      Objective    /80 (BP Location: Left arm, Cuff Size: Adult Large)   Pulse 86   Temp 98.4  F (36.9  C) (Tympanic)   Resp 16   Ht 1.676 m (5' 6\")   Wt 96.6 kg (213 lb)   LMP 06/25/2018 (Approximate)   SpO2 97%   BMI 34.38 kg/m    Body mass index is 34.38 kg/m .  Physical Exam   GENERAL: healthy, alert and no distress  HENT: normal cephalic/atraumatic and ear canals and TM's " normal  NECK: no adenopathy, no asymmetry, masses, or scars and thyroid normal to palpation  RESP: lungs clear to auscultation - no rales, rhonchi or wheezes  CV: regular rate and rhythm, normal S1 S2, no S3 or S4, no murmur, click or rub, no peripheral edema and peripheral pulses strong

## 2021-03-19 NOTE — NURSING NOTE
"Chief Complaint   Patient presents with     Consult     thirsty and frequent urination, ?hot flash, ringing in ears and tingling in fingers     initial Pulse 86   Temp 98.4  F (36.9  C) (Tympanic)   Resp 16   Ht 1.676 m (5' 6\")   Wt 96.6 kg (213 lb)   LMP 06/25/2018 (Approximate)   SpO2 97%   BMI 34.38 kg/m   Estimated body mass index is 34.38 kg/m  as calculated from the following:    Height as of this encounter: 1.676 m (5' 6\").    Weight as of this encounter: 96.6 kg (213 lb).  BP completed using cuff size: large.  L  arm      Health Maintenance that is potentially due pending provider review:  NONE    n/a    Troy Polanco ma  "

## 2021-03-21 LAB — ENDOMYSIUM IGA TITR SER IF: NORMAL {TITER}

## 2021-03-22 LAB
TTG IGA SER-ACNC: 1 U/ML
TTG IGG SER-ACNC: 1 U/ML

## 2021-03-23 ENCOUNTER — MYC MEDICAL ADVICE (OUTPATIENT)
Dept: FAMILY MEDICINE | Facility: CLINIC | Age: 41
End: 2021-03-23

## 2021-03-23 DIAGNOSIS — E06.3 HYPOTHYROIDISM DUE TO HASHIMOTO'S THYROIDITIS: ICD-10-CM

## 2021-03-23 RX ORDER — LEVOTHYROXINE SODIUM 125 MCG
125 TABLET ORAL DAILY
Qty: 30 TABLET | Refills: 1 | Status: SHIPPED | OUTPATIENT
Start: 2021-03-23 | End: 2021-05-26

## 2021-03-23 NOTE — RESULT ENCOUNTER NOTE
Dear Kasandra,   Your test results are all back -   -All of your labs are normal except:  Lipids - are high - keep working on healthy diet and exercise.  Thyroid - looks like we may be able to increase your dose a little (unless you missed any doses).  Please remind me.  Let us know if you have any questions.  -Trish Callaway, DO

## 2021-03-29 ENCOUNTER — IMMUNIZATION (OUTPATIENT)
Dept: NURSING | Facility: CLINIC | Age: 41
End: 2021-03-29
Payer: COMMERCIAL

## 2021-03-29 PROCEDURE — 0001A PR COVID VAC PFIZER DIL RECON 30 MCG/0.3 ML IM: CPT

## 2021-03-29 PROCEDURE — 91300 PR COVID VAC PFIZER DIL RECON 30 MCG/0.3 ML IM: CPT

## 2021-04-19 ENCOUNTER — IMMUNIZATION (OUTPATIENT)
Dept: NURSING | Facility: CLINIC | Age: 41
End: 2021-04-19
Attending: INTERNAL MEDICINE
Payer: COMMERCIAL

## 2021-04-19 PROCEDURE — 91300 PR COVID VAC PFIZER DIL RECON 30 MCG/0.3 ML IM: CPT

## 2021-04-19 PROCEDURE — 0002A PR COVID VAC PFIZER DIL RECON 30 MCG/0.3 ML IM: CPT

## 2021-04-30 DIAGNOSIS — E06.3 HYPOTHYROIDISM DUE TO HASHIMOTO'S THYROIDITIS: ICD-10-CM

## 2021-04-30 LAB — TSH SERPL DL<=0.005 MIU/L-ACNC: 2.77 MU/L (ref 0.4–4)

## 2021-04-30 PROCEDURE — 84443 ASSAY THYROID STIM HORMONE: CPT | Performed by: FAMILY MEDICINE

## 2021-04-30 PROCEDURE — 36415 COLL VENOUS BLD VENIPUNCTURE: CPT | Performed by: FAMILY MEDICINE

## 2021-05-21 ENCOUNTER — MYC REFILL (OUTPATIENT)
Dept: FAMILY MEDICINE | Facility: CLINIC | Age: 41
End: 2021-05-21

## 2021-05-21 DIAGNOSIS — F33.0 MAJOR DEPRESSIVE DISORDER, RECURRENT EPISODE, MILD (H): ICD-10-CM

## 2021-05-21 RX ORDER — CLONAZEPAM 0.5 MG/1
0.5 TABLET ORAL PRN
Qty: 20 TABLET | Refills: 0 | Status: CANCELLED | OUTPATIENT
Start: 2021-05-21

## 2021-05-24 NOTE — TELEPHONE ENCOUNTER
Routing refill request to provider for review/approval because:  Drug not on the [facility] refill protocol   Aria DEL TORO RN

## 2021-06-01 ENCOUNTER — VIRTUAL VISIT (OUTPATIENT)
Dept: FAMILY MEDICINE | Facility: CLINIC | Age: 41
End: 2021-06-01
Payer: COMMERCIAL

## 2021-06-01 ENCOUNTER — TELEPHONE (OUTPATIENT)
Dept: FAMILY MEDICINE | Facility: CLINIC | Age: 41
End: 2021-06-01

## 2021-06-01 DIAGNOSIS — F33.0 MAJOR DEPRESSIVE DISORDER, RECURRENT EPISODE, MILD (H): ICD-10-CM

## 2021-06-01 DIAGNOSIS — F41.1 GENERALIZED ANXIETY DISORDER: Primary | ICD-10-CM

## 2021-06-01 PROCEDURE — 99214 OFFICE O/P EST MOD 30 MIN: CPT | Mod: 95 | Performed by: FAMILY MEDICINE

## 2021-06-01 RX ORDER — CLONAZEPAM 0.5 MG/1
0.5 TABLET ORAL DAILY
Qty: 30 TABLET | Refills: 1 | Status: SHIPPED | OUTPATIENT
Start: 2021-06-01 | End: 2021-09-10

## 2021-06-01 ASSESSMENT — ANXIETY QUESTIONNAIRES
IF YOU CHECKED OFF ANY PROBLEMS ON THIS QUESTIONNAIRE, HOW DIFFICULT HAVE THESE PROBLEMS MADE IT FOR YOU TO DO YOUR WORK, TAKE CARE OF THINGS AT HOME, OR GET ALONG WITH OTHER PEOPLE: EXTREMELY DIFFICULT
GAD7 TOTAL SCORE: 19
2. NOT BEING ABLE TO STOP OR CONTROL WORRYING: NEARLY EVERY DAY
5. BEING SO RESTLESS THAT IT IS HARD TO SIT STILL: MORE THAN HALF THE DAYS
7. FEELING AFRAID AS IF SOMETHING AWFUL MIGHT HAPPEN: NEARLY EVERY DAY
6. BECOMING EASILY ANNOYED OR IRRITABLE: MORE THAN HALF THE DAYS
1. FEELING NERVOUS, ANXIOUS, OR ON EDGE: NEARLY EVERY DAY
3. WORRYING TOO MUCH ABOUT DIFFERENT THINGS: NEARLY EVERY DAY

## 2021-06-01 ASSESSMENT — PATIENT HEALTH QUESTIONNAIRE - PHQ9: 5. POOR APPETITE OR OVEREATING: NEARLY EVERY DAY

## 2021-06-01 NOTE — TELEPHONE ENCOUNTER
Reason for Call:  Medication or medication refill:    Do you use a [facility]?  Name of the pharmacy and phone number for the current request:   [id number] - [place], [place] - [address] [place] [facility] [address]  Ph: [phone]    Name of the medication requested:   clonazePAM (KLONOPIN) 0.5 MG tablet     Other request: Patient states that the provider that fills this medication is out of office indefinitely. Patient states she is out of this medication and needs it refilled ASAP. Per patient request, please call patient to inform them when prescription is sent    Can we leave a detailed message on this number? NO    Phone number patient can be reached at: Other phone number:  [phone]*    Best Time: ASAP    Call taken on 6/1/2021 at 9:49 AM by Cassidy Montes

## 2021-06-01 NOTE — PROGRESS NOTES
Kasandra is a 40 year old who is being evaluated via a billable video visit.      How would you like to obtain your AVS? MyChart  If the video visit is dropped, the invitation should be resent by: Text to cell phone: [phone]  Will anyone else be joining your video visit? No    Video Start Time: 11:03 AM    Assessment & Plan     Generalized anxiety disorder  Worsening anxiety   Pt was following with psychiatrist who was prescribing clonazepam daily -   She abruptly stopped 3 weeks ago because psychiatrist is out of the clinic on medical leave.  Discussed this could be anxiety vs possible withdrawal from benzodiazepine -   Advised of long term risks of the med - pt was not aware  Will have her take 1/2 tablet for now to see since off for a while  Encouraged self cares  Feel she would benefit from psychiatrist as she has extensive medication history with multiple selective serotonin reuptake inhibitor, SNRI and other meds without relief.  Referred   - MENTAL HEALTH REFERRAL  - Adult; Psychiatry; Psychiatry; [facility]: Psychiatry Clinic [phone]; We will contact you to schedule the appointment or please call with any questions  - clonazePAM (KLONOPIN) 0.5 MG tablet; Take 1 tablet (0.5 mg) by mouth daily                 No follow-ups on file.    Trish Callaway, [facility]   Kasandra is a 40 year old who presents for the following health issues     HPI     Anxiety Follow-Up    How are you doing with your anxiety since your last visit? Worsened     Are you having other symptoms that might be associated with anxiety? Yes:  Panic attacks    Have you had a significant life event? No - job stressful as usual    Are you feeling depressed? No    Do you have any concerns with your use of alcohol or other drugs? No     Was told to call PCP for refill  Psychiatrist is on leave for 5-6 weeks     Keeps having panic attacks -   Was having visits every 3-6 months  Seeing psychologist every few  weeks  Has been taking clonazepam 1-2 per day - a few weeks ago   Has not had any for 3 weeks  Struggles with panic attacks -     Was on daily medications in the past -   Did not have any luck with anything in the past -   Tried desvenlafexine - had terrible reaction   Had panic attack while taking         Social History     Tobacco Use     Smoking status: Former Smoker     Packs/day: 0.50     Years: 10.00     Pack years: 5.00     Quit date: 2009     Years since quittin.8     Smokeless tobacco: Never Used     Tobacco comment: social smoker for 10 yr   Substance Use Topics     Alcohol use: Yes     Alcohol/week: 0.0 standard drinks     Comment: occasional ETOH use     Drug use: No     FOREST-7 SCORE 2017   Total Score 10 17 16     PHQ 10/24/2018 2019 2021   PHQ-9 Total Score 5 7 16   Q9: Thoughts of better off dead/self-harm past 2 weeks Not at all Not at all Not at all     FOREST-7  2021   1. Feeling nervous, anxious, or on edge 3   2. Not being able to stop or control worrying 3   3. Worrying too much about different things 3   4. Trouble relaxing 3   5. Being so restless that it is hard to sit still 2   6. Becoming easily annoyed or irritable 2   7. Feeling afraid, as if something awful might happen 3   FOREST-7 Total Score 19   If you checked any problems, how difficult have they made it for you to do your work, take care of things at home, or get along with other people? Extremely difficult         How many servings of fruits and vegetables do you eat daily?  2-3    On average, how many sweetened beverages do you drink each day (Examples: soda, juice, sweet tea, etc.  Do NOT count diet or artificially sweetened beverages)?   0    How many days per week do you exercise enough to make your heart beat faster? 3 or less    How many minutes a day do you exercise enough to make your heart beat faster? 9 or less    How many days per week do you miss taking your medication?  occasional    Health Maintenance Addressed:  Up to date        Review of Systems   Constitutional, HEENT, cardiovascular, pulmonary, GI, , musculoskeletal, neuro, skin, endocrine and psych systems are negative, except as otherwise noted.      Objective           Vitals:  No vitals were obtained today due to virtual visit.    Physical Exam   GENERAL: alert and no distress  EYES: Eyes grossly normal to inspection.  No discharge or erythema, or obvious scleral/conjunctival abnormalities.  RESP: No audible wheeze, cough, or visible cyanosis.  No visible retractions or increased work of breathing.    SKIN: Visible skin clear. No significant rash, abnormal pigmentation or lesions.  NEURO: Cranial nerves grossly intact.  Mentation and speech appropriate for age.  PSYCH: mentation appears normal, affect flat and anxious                Video-Visit Details    Type of service:  Video Visit    Video End Time:11:26 AM    Originating Location (pt. Location): Home    Distant Location (provider location):  [facility]     Platform used for Video Visit: [employer]

## 2021-06-02 ASSESSMENT — ANXIETY QUESTIONNAIRES: GAD7 TOTAL SCORE: 19

## 2021-06-07 ENCOUNTER — TELEPHONE (OUTPATIENT)
Dept: PSYCHIATRY | Facility: CLINIC | Age: 41
End: 2021-06-07

## 2021-06-07 NOTE — TELEPHONE ENCOUNTER
PSYCHIATRY CLINIC PHONE INTAKE     SERVICES REQUESTED / INTERESTED IN          Med Management    Presenting Problem and Brief History                              What would you like to be seen for? (brief description):  Pt was diagnosed a long time ago. Currently taking clonazapam 1-2 0.5mg per day, and currently taking 1/2 of 1 pill a day. Concerns with sleep, hard to fall asleep and stay asleep. Biggest concerns are her anxiety, which include panic attacks often. Panic attacks include heart palpitations, nausea, headaches, dizziness, off kilter, tightening in the chest, sweaty, and very uncomfortable. It seems like it can last for from half an hour to hour, and sometimes it happens multiple times throughout the day.     Have you received a mental health diagnosis? Yes   Which one (s): anxiety, depression, PTSD  Is there any history of developmental delay?  No   Are you currently seeing a mental health provider?  Yes            Who / month last seen:  Psychologist - private [facility]  Do you have mental health records elsewhere?  Yes  Will you sign a release so we can obtain them?  Yes    Have you ever been hospitalized for psychiatric reasons?  No  Describe:  NA    Do you have current thoughts of self-harm?  No    Do you currently have thoughts of harming others?  No       Substance Use History     Do you have any history of alcohol / illicit drug use?  No  Describe:  NA  Have you ever received treatment for this?  No    Describe:  NA     Social History     Who is the patient's a guardian?  No    Name / number: NA  Have you had an ACT team in last 12 months?  No  Describe: NA   Do you have any current or past legal issues?  No  Describe: NA   OK to leave a detailed voicemail?  Yes    Medical/ Surgical History                                   Patient Active Problem List   Diagnosis     CARDIOVASCULAR SCREENING; LDL GOAL LESS THAN 160     Hypothyroidism     Obesity     Hashimoto's thyroiditis      Hypovitaminosis D     Fatigue     Ovarian cyst     Major depressive disorder, recurrent episode, mild (H)     Asthma with allergic rhinitis     Status post partial gastrectomy     Anxiety     Allergic rhinitis due to pollen     Iron deficiency anemia     Migraine headache     Menorrhagia     Encounter for IUD insertion     Endometriosis of uterus     History of hysterectomy          Medications             Current Outpatient Medications   Medication Sig Dispense Refill     albuterol (PROAIR HFA/PROVENTIL HFA/VENTOLIN HFA) 108 (90 Base) MCG/ACT inhaler Inhale 2 puffs into the lungs every 6 hours 1 Inhaler 0     azelastine (ASTELIN) 0.1 % nasal spray USE 1 TO 2 SPRAYS IN EACH NOSTRIL TWICE DAILY 30 mL 0     cetirizine (ZYRTEC) 10 MG tablet Take 10 mg by mouth daily       Cholecalciferol (VITAMIN D3) 3000 UNITS TABS Take 1 tablet by mouth daily.       clonazePAM (KLONOPIN) 0.5 MG tablet Take 1 tablet (0.5 mg) by mouth daily 30 tablet 1     clonazePAM (KLONOPIN) 0.5 MG tablet Take 1 tablet (0.5 mg) by mouth as needed for anxiety 20 tablet 0     Cod Liver Oil 1000 MG CAPS Take 1 capsule by mouth daily.       diclofenac (VOLTAREN) 1 % GEL topical gel Apply 4 grams to knees or 2 grams to hands four times daily using enclosed dosing card. 100 g 1     fluticasone (FLONASE) 50 MCG/ACT nasal spray SHAKE LIQUID AND USE 1 TO 2 SPRAYS IN EACH NOSTRIL DAILY 16 g 3     fluticasone (FLOVENT HFA) 110 MCG/ACT Inhaler Inhale 2 puffs into the lungs 2 times daily 3 Inhaler 3     liothyronine (CYTOMEL) 5 MCG tablet TAKE 1 TABLET(5 MCG) BY MOUTH DAILY 30 tablet 3     Multiple Vitamins-Minerals (HM MULTIVITAMIN ADULT GUMMY PO)        olopatadine (PATANOL) 0.1 % ophthalmic solution Place 1 drop into both eyes 2 times daily 1 Bottle 11     PULMICORT FLEXHALER 180 MCG/ACT inhaler INHALE 2 PUFFS BY MOUTH TWICE DAILY 1 Inhaler 0     SYNTHROID 125 MCG tablet TAKE 1 TABLET(125 MCG) BY MOUTH DAILY 90 tablet 2     zolpidem (AMBIEN) 5 MG tablet Take  1 tablet (5 mg) by mouth nightly as needed for sleep 1 tablet 0         DISPOSITION      6/7/21 Intake complete. Prefers NP female provider.     Precious Pardo,

## 2021-07-23 ENCOUNTER — DOCUMENTATION ONLY (OUTPATIENT)
Dept: FAMILY MEDICINE | Facility: CLINIC | Age: 41
End: 2021-07-23

## 2021-07-23 DIAGNOSIS — E06.3 HYPOTHYROIDISM DUE TO HASHIMOTO'S THYROIDITIS: Primary | ICD-10-CM

## 2021-07-27 ENCOUNTER — LAB (OUTPATIENT)
Dept: LAB | Facility: CLINIC | Age: 41
End: 2021-07-27
Payer: COMMERCIAL

## 2021-07-27 DIAGNOSIS — E06.3 HYPOTHYROIDISM DUE TO HASHIMOTO'S THYROIDITIS: ICD-10-CM

## 2021-07-27 LAB
T4 FREE SERPL-MCNC: 1.43 NG/DL (ref 0.76–1.46)
TSH SERPL DL<=0.005 MIU/L-ACNC: 0.31 MU/L (ref 0.4–4)

## 2021-07-27 PROCEDURE — 84443 ASSAY THYROID STIM HORMONE: CPT

## 2021-07-27 PROCEDURE — 84439 ASSAY OF FREE THYROXINE: CPT

## 2021-07-27 PROCEDURE — 36415 COLL VENOUS BLD VENIPUNCTURE: CPT

## 2021-07-28 ENCOUNTER — MYC MEDICAL ADVICE (OUTPATIENT)
Dept: FAMILY MEDICINE | Facility: CLINIC | Age: 41
End: 2021-07-28
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
## 2021-08-03 ENCOUNTER — OFFICE VISIT (OUTPATIENT)
Dept: FAMILY MEDICINE | Facility: CLINIC | Age: 41
End: 2021-08-03
Payer: COMMERCIAL

## 2021-08-03 VITALS
DIASTOLIC BLOOD PRESSURE: 86 MMHG | OXYGEN SATURATION: 96 % | SYSTOLIC BLOOD PRESSURE: 129 MMHG | RESPIRATION RATE: 16 BRPM | BODY MASS INDEX: 34.49 KG/M2 | WEIGHT: 207 LBS | HEART RATE: 83 BPM | TEMPERATURE: 97.3 F | HEIGHT: 65 IN

## 2021-08-03 DIAGNOSIS — K21.00 GASTROESOPHAGEAL REFLUX DISEASE WITH ESOPHAGITIS, UNSPECIFIED WHETHER HEMORRHAGE: ICD-10-CM

## 2021-08-03 DIAGNOSIS — E06.3 HYPOTHYROIDISM DUE TO HASHIMOTO'S THYROIDITIS: ICD-10-CM

## 2021-08-03 DIAGNOSIS — E06.3 HASHIMOTO'S THYROIDITIS: ICD-10-CM

## 2021-08-03 DIAGNOSIS — R00.2 HEART PALPITATIONS: Primary | ICD-10-CM

## 2021-08-03 DIAGNOSIS — J45.40 MODERATE PERSISTENT ASTHMA WITH ALLERGIC RHINITIS WITHOUT COMPLICATION: ICD-10-CM

## 2021-08-03 LAB
ERYTHROCYTE [DISTWIDTH] IN BLOOD BY AUTOMATED COUNT: 13.7 % (ref 10–15)
HCT VFR BLD AUTO: 42.6 % (ref 35–47)
HGB BLD-MCNC: 13.6 G/DL (ref 11.7–15.7)
MCH RBC QN AUTO: 28.5 PG (ref 26.5–33)
MCHC RBC AUTO-ENTMCNC: 31.9 G/DL (ref 31.5–36.5)
MCV RBC AUTO: 89 FL (ref 78–100)
PLATELET # BLD AUTO: 314 10E3/UL (ref 150–450)
RBC # BLD AUTO: 4.77 10E6/UL (ref 3.8–5.2)
WBC # BLD AUTO: 9.9 10E3/UL (ref 4–11)

## 2021-08-03 PROCEDURE — 36415 COLL VENOUS BLD VENIPUNCTURE: CPT | Performed by: FAMILY MEDICINE

## 2021-08-03 PROCEDURE — 85027 COMPLETE CBC AUTOMATED: CPT | Performed by: FAMILY MEDICINE

## 2021-08-03 PROCEDURE — 93000 ELECTROCARDIOGRAM COMPLETE: CPT | Performed by: FAMILY MEDICINE

## 2021-08-03 PROCEDURE — 99214 OFFICE O/P EST MOD 30 MIN: CPT | Performed by: FAMILY MEDICINE

## 2021-08-03 RX ORDER — OMEPRAZOLE 40 MG/1
40 CAPSULE, DELAYED RELEASE ORAL DAILY
COMMUNITY
Start: 2021-08-03

## 2021-08-03 RX ORDER — LEVOTHYROXINE SODIUM 112 MCG
112 TABLET ORAL DAILY
Qty: 30 TABLET | Refills: 1 | Status: SHIPPED | OUTPATIENT
Start: 2021-08-03 | End: 2022-02-04

## 2021-08-03 ASSESSMENT — MIFFLIN-ST. JEOR: SCORE: 1599.83

## 2021-08-03 ASSESSMENT — ANXIETY QUESTIONNAIRES
5. BEING SO RESTLESS THAT IT IS HARD TO SIT STILL: SEVERAL DAYS
3. WORRYING TOO MUCH ABOUT DIFFERENT THINGS: NEARLY EVERY DAY
6. BECOMING EASILY ANNOYED OR IRRITABLE: SEVERAL DAYS
GAD7 TOTAL SCORE: 17
GAD7 TOTAL SCORE: 17
7. FEELING AFRAID AS IF SOMETHING AWFUL MIGHT HAPPEN: NEARLY EVERY DAY
4. TROUBLE RELAXING: NEARLY EVERY DAY
2. NOT BEING ABLE TO STOP OR CONTROL WORRYING: NEARLY EVERY DAY
7. FEELING AFRAID AS IF SOMETHING AWFUL MIGHT HAPPEN: NEARLY EVERY DAY
8. IF YOU CHECKED OFF ANY PROBLEMS, HOW DIFFICULT HAVE THESE MADE IT FOR YOU TO DO YOUR WORK, TAKE CARE OF THINGS AT HOME, OR GET ALONG WITH OTHER PEOPLE?: EXTREMELY DIFFICULT
GAD7 TOTAL SCORE: 17
1. FEELING NERVOUS, ANXIOUS, OR ON EDGE: NEARLY EVERY DAY

## 2021-08-03 ASSESSMENT — PATIENT HEALTH QUESTIONNAIRE - PHQ9
10. IF YOU CHECKED OFF ANY PROBLEMS, HOW DIFFICULT HAVE THESE PROBLEMS MADE IT FOR YOU TO DO YOUR WORK, TAKE CARE OF THINGS AT HOME, OR GET ALONG WITH OTHER PEOPLE: EXTREMELY DIFFICULT
SUM OF ALL RESPONSES TO PHQ QUESTIONS 1-9: 12
SUM OF ALL RESPONSES TO PHQ QUESTIONS 1-9: 12

## 2021-08-03 NOTE — RESULT ENCOUNTER NOTE
Dear Kasandra,   Your test results are all back -   -Normal red blood cell (hgb) levels, normal white blood cell count and normal platelet levels.  Let us know if you have any questions.  -Trish Callaway, DO

## 2021-08-04 ASSESSMENT — ANXIETY QUESTIONNAIRES: GAD7 TOTAL SCORE: 17

## 2021-08-12 ENCOUNTER — MYC MEDICAL ADVICE (OUTPATIENT)
Dept: FAMILY MEDICINE | Facility: CLINIC | Age: 41
End: 2021-08-12

## 2021-08-12 DIAGNOSIS — J45.40 MODERATE PERSISTENT ASTHMA WITH ALLERGIC RHINITIS WITHOUT COMPLICATION: Primary | ICD-10-CM

## 2021-08-13 RX ORDER — BUDESONIDE AND FORMOTEROL FUMARATE DIHYDRATE 160; 4.5 UG/1; UG/1
2 AEROSOL RESPIRATORY (INHALATION) 2 TIMES DAILY
Qty: 6 G | Refills: 3 | Status: SHIPPED | OUTPATIENT
Start: 2021-08-13

## 2021-09-04 ENCOUNTER — HEALTH MAINTENANCE LETTER (OUTPATIENT)
Age: 41
End: 2021-09-04

## 2021-09-09 DIAGNOSIS — F41.1 GENERALIZED ANXIETY DISORDER: ICD-10-CM

## 2021-09-10 RX ORDER — CLONAZEPAM 0.5 MG/1
TABLET ORAL
Qty: 30 TABLET | Refills: 0 | Status: SHIPPED | OUTPATIENT
Start: 2021-09-10 | End: 2021-10-19

## 2021-10-04 ENCOUNTER — OFFICE VISIT (OUTPATIENT)
Dept: FAMILY MEDICINE | Facility: CLINIC | Age: 41
End: 2021-10-04
Payer: COMMERCIAL

## 2021-10-04 VITALS
SYSTOLIC BLOOD PRESSURE: 112 MMHG | DIASTOLIC BLOOD PRESSURE: 77 MMHG | BODY MASS INDEX: 34.89 KG/M2 | OXYGEN SATURATION: 96 % | TEMPERATURE: 98.4 F | HEIGHT: 65 IN | RESPIRATION RATE: 22 BRPM | HEART RATE: 75 BPM | WEIGHT: 209.4 LBS

## 2021-10-04 DIAGNOSIS — R59.9 ENLARGED LYMPH NODE: Primary | ICD-10-CM

## 2021-10-04 DIAGNOSIS — J30.2 SEASONAL ALLERGIC RHINITIS, UNSPECIFIED TRIGGER: ICD-10-CM

## 2021-10-04 DIAGNOSIS — E06.3 HYPOTHYROIDISM DUE TO HASHIMOTO'S THYROIDITIS: ICD-10-CM

## 2021-10-04 LAB
ERYTHROCYTE [DISTWIDTH] IN BLOOD BY AUTOMATED COUNT: 13.5 % (ref 10–15)
HCT VFR BLD AUTO: 38.5 % (ref 35–47)
HGB BLD-MCNC: 12.6 G/DL (ref 11.7–15.7)
MCH RBC QN AUTO: 29.2 PG (ref 26.5–33)
MCHC RBC AUTO-ENTMCNC: 32.7 G/DL (ref 31.5–36.5)
MCV RBC AUTO: 89 FL (ref 78–100)
PLATELET # BLD AUTO: 274 10E3/UL (ref 150–450)
RBC # BLD AUTO: 4.32 10E6/UL (ref 3.8–5.2)
TSH SERPL DL<=0.005 MIU/L-ACNC: 1.35 MU/L (ref 0.4–4)
WBC # BLD AUTO: 5 10E3/UL (ref 4–11)

## 2021-10-04 PROCEDURE — 85027 COMPLETE CBC AUTOMATED: CPT | Performed by: FAMILY MEDICINE

## 2021-10-04 PROCEDURE — 99214 OFFICE O/P EST MOD 30 MIN: CPT | Mod: 25 | Performed by: FAMILY MEDICINE

## 2021-10-04 PROCEDURE — 90471 IMMUNIZATION ADMIN: CPT | Performed by: FAMILY MEDICINE

## 2021-10-04 PROCEDURE — 84443 ASSAY THYROID STIM HORMONE: CPT | Performed by: FAMILY MEDICINE

## 2021-10-04 PROCEDURE — 90686 IIV4 VACC NO PRSV 0.5 ML IM: CPT | Performed by: FAMILY MEDICINE

## 2021-10-04 PROCEDURE — 36415 COLL VENOUS BLD VENIPUNCTURE: CPT | Performed by: FAMILY MEDICINE

## 2021-10-04 ASSESSMENT — MIFFLIN-ST. JEOR: SCORE: 1610.71

## 2021-10-04 NOTE — PROGRESS NOTES
Assessment & Plan     Enlarged lymph node  She has a slightly enlarged palpable, tender lymph node just anterior to the left ear.  The underlying cause may be due to worsening allergy symptoms she has had over the past month.  I recommended we simply monitor this over the next few weeks.  If it does not improve or if symptoms become worse she will let us know and we will consider further work-up.  We will be checking a CBC as part of today's lab work-up.  - CBC with platelets; Future    Seasonal allergic rhinitis, unspecified trigger  She may continue the Zyrtec, Flonase and eyedrops.    Hypothyroidism due to Hashimoto's thyroiditis  She is due for a TSH.  2 months ago her TSH was low at 0.31 with a free T4 of 1.43.  Her levothyroxine dose was decreased from 125 to 112 mcg daily.  - TSH with free T4 reflex; Future                   Return in about 4 weeks (around 11/1/2021) for Follow up if symptoms not improving..    Caleb uHertas [facility]    Velasquez Slaughter is a 40 year old who presents for the following health issues     HPI     Pt also needs thyroid labs checked today.     Concern - Lump/Bump  Onset: x3 weeks  Description: Small, hard lump on left jaw (just anterior to the left ear)  Intensity: mild  Progression of Symptoms:  worsening/growing  Accompanying Signs & Symptoms: mild pain/discomfort   Previous history of similar problem: no, but hx TMJ pain   Precipitating factors:        Worsened by: none  Alleviating factors:        Improved by: none  Therapies tried and outcome: heat - helpful for TMJ, not for lump      She denies any recent illnesses.  She is not having fevers.  She has had worsening allergy symptoms though over the past month.  She is using Zyrtec, Flonase and eyedrops which provides some relief of symptoms.  She also has a history of TMJ and has been massaging this area more and that is when she noticed the bump.    He has a history of hypothyroidism  "and recently had the levothyroxine dose decreased from 125 to 112 mcg because her TSH was low and she was experiencing palpitations.  She reports still occasionally having palpitations symptoms.  On 8/3/2021 her EKG was normal.    Review of Systems   Constitutional, HEENT, cardiovascular, pulmonary, gi and gu systems are negative, except as otherwise noted.      Objective    /77 (Patient Position: Sitting, Cuff Size: Adult Large)   Pulse 75   Temp 98.4  F (36.9  C)   Resp 22   Ht 1.651 m (5' 5\")   Wt 95 kg (209 lb 6.4 oz)   LMP 06/25/2018 (Approximate)   SpO2 96%   BMI 34.85 kg/m    Body mass index is 34.85 kg/m .  Physical Exam   GENERAL: healthy, alert and no distress  EYES: Eyes grossly normal to inspection, PERRL and conjunctivae and sclerae normal  HENT: ear canals and TM's normal, nose and mouth without ulcers or lesions  NECK: There is a small movable, palpable, well-circumscribed mass just anterior to the left ear which feels consistent with a lymph node.  Nontender over the parotid glands.  No neck asymmetry, masses, or scars and thyroid normal to palpation  RESP: lungs clear to auscultation - no rales, rhonchi or wheezes  CV: regular rate and rhythm, normal S1 S2, no S3 or S4, no murmur, click or rub, no peripheral edema and peripheral pulses strong  MS: no gross musculoskeletal defects noted, no edema  SKIN: no suspicious lesions or rashes  NEURO: Normal strength and tone, mentation intact and speech normal  PSYCH: mentation appears normal, affect normal/bright                "

## 2021-10-04 NOTE — NURSING NOTE
Prior to immunization administration, verified patients identity using patient s name and date of birth. Please see Immunization Activity for additional information.     Screening Questionnaire for Adult Immunization    Are you sick today?   No   Do you have allergies to medications, food, a vaccine component or latex?   No   Have you ever had a serious reaction after receiving a vaccination?   No   Do you have a long-term health problem with heart, lung, kidney, or metabolic disease (e.g., diabetes), asthma, a blood disorder, no spleen, complement component deficiency, a cochlear implant, or a spinal fluid leak?  Are you on long-term aspirin therapy?   No   Do you have cancer, leukemia, HIV/AIDS, or any other immune system problem?   No   Do you have a parent, brother, or sister with an immune system problem?   No   In the past 3 months, have you taken medications that affect  your immune system, such as prednisone, other steroids, or anticancer drugs; drugs for the treatment of rheumatoid arthritis, Crohn s disease, or psoriasis; or have you had radiation treatments?   No   Have you had a seizure, or a brain or other nervous system problem?   No   During the past year, have you received a transfusion of blood or blood    products, or been given immune (gamma) globulin or antiviral drug?   No   For women: Are you pregnant or is there a chance you could become       pregnant during the next month?   No   Have you received any vaccinations in the past 4 weeks?   No     Immunization questionnaire answers were all negative.        Per orders of Dr. Latrice Huertas, injection of Fluzone given by Vickie Barcenas MA. Patient instructed to remain in clinic for 15 minutes afterwards, and to report any adverse reaction to me immediately.       Screening performed by Vickie Barcenas MA on 10/4/2021 at 9:28 AM.  Vickie Barcenas MA on 10/4/2021 at 9:29 AM

## 2021-10-05 ASSESSMENT — ASTHMA QUESTIONNAIRES: ACT_TOTALSCORE: 20

## 2021-10-12 DIAGNOSIS — F41.1 GENERALIZED ANXIETY DISORDER: ICD-10-CM

## 2021-10-12 RX ORDER — CLONAZEPAM 0.5 MG/1
TABLET ORAL
Qty: 30 TABLET | OUTPATIENT
Start: 2021-10-12

## 2021-10-18 ENCOUNTER — VIRTUAL VISIT (OUTPATIENT)
Dept: PSYCHIATRY | Facility: CLINIC | Age: 41
End: 2021-10-18
Attending: SOCIAL WORKER
Payer: COMMERCIAL

## 2021-10-18 DIAGNOSIS — F41.0 PANIC DISORDER WITHOUT AGORAPHOBIA: ICD-10-CM

## 2021-10-18 DIAGNOSIS — F43.10 PTSD (POST-TRAUMATIC STRESS DISORDER): ICD-10-CM

## 2021-10-18 DIAGNOSIS — F33.1 MAJOR DEPRESSIVE DISORDER, RECURRENT EPISODE, MODERATE (H): ICD-10-CM

## 2021-10-18 DIAGNOSIS — F41.1 GAD (GENERALIZED ANXIETY DISORDER): Primary | ICD-10-CM

## 2021-10-18 PROCEDURE — 90791 PSYCH DIAGNOSTIC EVALUATION: CPT | Mod: 95
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
## 2021-10-19 ENCOUNTER — MYC REFILL (OUTPATIENT)
Dept: FAMILY MEDICINE | Facility: CLINIC | Age: 41
End: 2021-10-19

## 2021-10-19 DIAGNOSIS — F41.1 GENERALIZED ANXIETY DISORDER: ICD-10-CM

## 2021-10-19 RX ORDER — CLONAZEPAM 0.5 MG/1
0.5 TABLET ORAL DAILY
Qty: 30 TABLET | Refills: 0 | Status: SHIPPED | OUTPATIENT
Start: 2021-10-19 | End: 2021-12-03

## 2021-10-25 ENCOUNTER — TELEPHONE (OUTPATIENT)
Dept: PSYCHIATRY | Facility: CLINIC | Age: 41
End: 2021-10-25

## 2021-10-25 ENCOUNTER — VIRTUAL VISIT (OUTPATIENT)
Dept: PSYCHIATRY | Facility: CLINIC | Age: 41
End: 2021-10-25
Attending: NURSE PRACTITIONER
Payer: COMMERCIAL

## 2021-10-25 DIAGNOSIS — F41.9 ANXIETY: ICD-10-CM

## 2021-10-25 DIAGNOSIS — F33.0 MAJOR DEPRESSIVE DISORDER, RECURRENT EPISODE, MILD (H): Primary | ICD-10-CM

## 2021-10-25 PROCEDURE — 99215 OFFICE O/P EST HI 40 MIN: CPT | Mod: 95 | Performed by: NURSE PRACTITIONER

## 2021-10-25 RX ORDER — PROPRANOLOL HYDROCHLORIDE 10 MG/1
TABLET ORAL
Qty: 60 TABLET | Refills: 1 | Status: SHIPPED | OUTPATIENT
Start: 2021-10-25 | End: 2022-06-13

## 2021-10-25 ASSESSMENT — PAIN SCALES - GENERAL: PAINLEVEL: NO PAIN (0)
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
## 2021-10-25 NOTE — TELEPHONE ENCOUNTER
On October 25, 2021, at 8:41 AM, writer called patient at mobile to confirm Virtual Visit. Writer unable to make contact with patient. Writer left detailed voice message for call back, with [phone] left as callback number. Link for Davonte was sent via email to [email or web address] per previous virtual rooming note. Keren Lucas, EMT

## 2021-10-25 NOTE — PATIENT INSTRUCTIONS
**For crisis resources, please see the information at the end of this document**     Patient Education      Thank you for coming to the [facility] [place] [facility].    Lab Testing:  If you had lab testing today and your results are reassuring or normal they will be mailed to you or sent through [employer] within 7 days. If the lab tests need quick action we will call you with the results. The phone number we will call with results is # [phone] (home) . If this is not the best number please call our clinic and change the number.    Medication Refills:  If you need any refills please call your pharmacy and they will contact us. Our fax number for refills is [phone]. Please allow three business for refill processing. If you need to  your refill at a new pharmacy, please contact the new pharmacy directly. The new pharmacy will help you get your medications transferred.     Scheduling:  If you have any concerns about today's visit or wish to schedule another appointment please call our office during normal business hours [phone] (8-5:00 M-F)    Contact Us:  Please call [phone] during business hours (8-5:00 M-F).  If after clinic hours, or on the weekend, please call  [phone].    Financial Assistance [phone]  [facility] Billing [phone]  Central Billing Office, [facility]: [phone]  [place] Billing [phone]  Medical Records [phone]  [place] Patient Bill of Rights [email or web address]       MENTAL HEALTH CRISIS NUMBERS:  For a medical emergency please call  911 or go to the nearest ER.     [facility]:   [facility] -[phone]   Crisis Residence [facility]idence -[phone]   Walk-In Counseling Center [facility] -[phone]   COPE 24/7 [place] Mobile Team -[phone] (adults)/[phone] (child)  CHILD: [facility] needs assessment  team - [phone]      [facility]:   [facility] - [phone]   Walk-in counseling [facility] - [phone]   Walk-in counseling [facility] - [phone]   Crisis Residence [facility]idence - [phone]  Urgent Care Adult Mental Jztjvc-[phone] mobile unit/ 24/7 crisis line    National Crisis Numbers:   National Suicide Prevention Lifeline: 1-995-039-TALK (852-220-0747)  Poison Control Center - 6-537-445-6545  TRIRIGA/resources for a list of additional resources (SOS)  Trans Lifeline a hotline for transgender people 1-553.985.4058  The [employer] a hotline for LGBT youth 5-268-741-9505  Crisis Text Line: For any crisis 24/7   To: 412484  see www.crisistextline.org  - IF MAKING A CALL FEELS TOO HARD, send a text!         Again thank you for choosing [facility] ADDI[facility] [[email or web address]] and please let us know how we can best partner with you to improve you and your family's health.    You may be receiving a survey regarding this appointment. We would love to have your feedback, both positive and negative. The survey is done by an external company, so your answers are anonymous.

## 2021-10-26 ENCOUNTER — TELEPHONE (OUTPATIENT)
Dept: PSYCHIATRY | Facility: CLINIC | Age: 41
End: 2021-10-26
Payer: COMMERCIAL

## 2021-10-26 NOTE — TELEPHONE ENCOUNTER
----- Message -----   From: Rosemary Hollis, VIKTORIA CNP   Sent: 10/25/2021  11:33 AM CDT   To: Renata Buenrostro RN, Tina Briones, PETERSON Wilder/ Renata/ Danitza,     Can you please place order for Genesight then MTM? She's concerned if insurance will approve but knows this will be checked.     Danitza, can you please call patient to facilitate SALINAS for her current therapist (I think this is her website: [email or web address]) and former MD who abruptly stopped practicing- I copied the contact info I had into the plan at the bottom of her chart.     Rosemary Taylor     Follow up:     Writer placed a call patient to obtain an SALINAS for therapist and former MD. Patient requested the SALINAS be emailed to her at [email or web address].     Two blank SALINAS emailed to patient per request

## 2021-10-27 ENCOUNTER — TELEPHONE (OUTPATIENT)
Dept: PSYCHIATRY | Facility: CLINIC | Age: 41
End: 2021-10-27
Payer: COMMERCIAL

## 2021-10-27 NOTE — TELEPHONE ENCOUNTER
GeneSight test was placed at [employer]. A test kit with instructions will be sent to the pt.'s listed address via [employer] including a return FedEx envp. Per message below from the provider a MTM is to be ordered upon receipt of results.Tina Briones LPN        Note   ----- Message -----   From: Rosemary Hollis APRN CNP   Sent: 10/25/2021  11:33 AM CDT   To: Renata Buenrostro RN, PETERSON Whittaker/ Renata/ Danitza,     Can you please place order for Genesight then MTM? She's concerned if insurance will approve but knows this will be checked.     Danitza, can you please call patient to facilitate SALINAS for her current therapist (I think this is her website: [email or web address]) and former MD who abruptly stopped practicing- I copied the contact info I had into the plan at the bottom of her chart.     Rosemary Taylor      Follow up:      Writer placed a call patient to obtain an SALINAS for therapist and former MD. Patient requested the SALINAS be emailed to her at [email or web address].      Two blank SALINAS emailed to patient per request

## 2021-10-29 ENCOUNTER — IMMUNIZATION (OUTPATIENT)
Dept: NURSING | Facility: CLINIC | Age: 41
End: 2021-10-29
Payer: COMMERCIAL

## 2021-10-29 PROCEDURE — 91301 PR COVID VAC MODERNA 100 MCG/0.5 ML IM: CPT

## 2021-10-29 PROCEDURE — 0064A PR COVID VAC MODERNA 100 MCG/0.5 ML IM: CPT

## 2021-11-24 ENCOUNTER — TELEPHONE (OUTPATIENT)
Dept: FAMILY MEDICINE | Facility: CLINIC | Age: 41
End: 2021-11-24
Payer: COMMERCIAL

## 2021-11-24 ENCOUNTER — LAB (OUTPATIENT)
Dept: LAB | Facility: CLINIC | Age: 41
End: 2021-11-24
Payer: COMMERCIAL

## 2021-11-24 DIAGNOSIS — E06.3 HASHIMOTO'S THYROIDITIS: Primary | ICD-10-CM

## 2021-11-24 DIAGNOSIS — E06.3 HASHIMOTO'S THYROIDITIS: ICD-10-CM

## 2021-11-24 PROCEDURE — 36415 COLL VENOUS BLD VENIPUNCTURE: CPT

## 2021-11-24 PROCEDURE — 84443 ASSAY THYROID STIM HORMONE: CPT

## 2021-11-24 NOTE — TELEPHONE ENCOUNTER
Says she is still having symptoms of heart pounding. Checks pulse and 80 bpm.  Did speak to Cesia about this and was told to f/u if continuing. Feels that these episodes are increasing. Doesn't have sob, vision changes, radiating chest pain when heart pounding occurs.    Feeling lightheaded mostly with positional changes, sitting to standing. Recommended to stay hydrated and to slowly rise when standing.    Pt not sure if this is thyroid related.    Getting thyroid checked today.  Next 5 appointments (look out 90 days)    Nov 24, 2021  1:30 PM  Lab visit with [facility]  [facility] ([facility] ) [address]  [facility] [id number] [phone]   Dec 03, 2021  3:40 PM  (Arrive by 3:20 PM)  Office Visit with Trish Callaway DO  [facility] ([facility] ) [address]  [facility] [id number] [phone]        Advised patient to call back in the meantime if notices s/s increasing/worsening. Pt agrees with plan.    Thank you,  Angela Chacon RN  [facility]

## 2021-11-24 NOTE — TELEPHONE ENCOUNTER
"Kasandra Mckeon scheduled via [employer] a lab appt today (11/24) at 1:30pm.   Per appt notes: \"Thyroid (TSH, etc.) lab, I m feeling very hyperthyroid\"  Did not see any recent lab orders.     Please advise  Thanks!  Stacey SILVER  TC   "

## 2021-11-24 NOTE — TELEPHONE ENCOUNTER
LVM with patient to find out more about symptoms. Placed order for TSH, if patient has other symptoms or concerns call back to triage. Advised to schedule Px with PCP to f/u     Thank you,  Angela Chacon RN  [place]

## 2021-11-25 LAB — TSH SERPL DL<=0.005 MIU/L-ACNC: 1.06 MU/L (ref 0.4–4)

## 2021-11-26 NOTE — RESULT ENCOUNTER NOTE
Dear Kasandra,   Your test results are all back -   -TSH (thyroid stimulating hormone) level is normal which indicates appropriate thyroid replacement dosing.  ADVISE: continuing same replacement dose and rechecking this in 12 months.  Let us know if you have any questions.  -Trish Callaway, DO

## 2021-11-30 NOTE — PROGRESS NOTES
SUBJECTIVE:   CC: Kasandra Winkler is an 40 year old woman who presents for preventive health visit.     {Split Bill scripting  The purpose of this visit is to discuss your medical history and prevent health problems before you are sick. You may be responsible for a co-pay, coinsurance, or deductible if your visit today includes services such as checking on a sore throat, having an x-ray or lab test, or treating and evaluating a new or existing condition :087715}  Patient has been advised of split billing requirements and indicates understanding: {Yes and No:393823}  HPI  {Add if <65 person on Medicare  - Required Questions (Optional):030742}  {Outside tests to abstract? :711716}    {additional problems to add (Optional):777642}    Today's PHQ-2 Score:   PHQ-2 (  [employer]) 3/19/2021   Q1: Little interest or pleasure in doing things 0   Q2: Feeling down, depressed or hopeless 0   PHQ-2 Score 0   PHQ-2 Total Score (12-17 Years)- Positive if 3 or more points; Administer PHQ-A if positive 0       Abuse: Current or Past (Physical, Sexual or Emotional) - { :447032}  Do you feel safe in your environment? { :295687}    Have you ever done Advance Care Planning? (For example, a Health Directive, POLST, or a discussion with a medical provider or your loved ones about your wishes): { :539180}    Social History     Tobacco Use     Smoking status: Former Smoker     Packs/day: 0.50     Years: 10.00     Pack years: 5.00     Quit date: 2009     Years since quittin.3     Smokeless tobacco: Never Used     Tobacco comment: social smoker for 10 yr   Substance Use Topics     Alcohol use: Yes     Alcohol/week: 0.0 standard drinks     Comment: occasional ETOH use     {Rooming Staff- Complete this question if Prescreen response is not shown below for today's visit. If you drink alcohol do you typically have >3 drinks per day or >7 drinks per week? (Optional):902865}    Alcohol Use 2017   Prescreen: >3 drinks/day or >7  "drinks/week? The patient does not drink >3 drinks per day nor >7 drinks per week.   {add AUDIT responses (Optional) (A score of 7 for adult men is an indication of hazardous drinking; a score of 8 or more is an indication of an alcohol use disorder.  A score of 7 or more for adult women is an indication of hazardous drinking or an alchohol use disorder):354449}    Reviewed orders with patient.  Reviewed health maintenance and updated orders accordingly - { :035488::\"Yes\"}  {Chronicprobdata (optional):529454}    Breast Cancer Screening:  Any new diagnosis of family breast, ovarian, or bowel cancer? {Yes_Link to Screening / No:526409}    FHS-7: No flowsheet data found.  {If any of the questions to the BCRA (FHS-7) are answered yes, consider ordering referral for genetic counseling (Optional) :524882::\"click delete button to remove this line now\"}  {AMB Mammogram Decision Support (Optional) :177087}  Pertinent mammograms are reviewed under the imaging tab.    History of abnormal Pap smear: { :869931}  PAP / HPV 9/27/2013 12/13/2010 9/30/2009   PAP (Historical) NIL NIL NIL     Reviewed and updated as needed this visit by clinical staff                Reviewed and updated as needed this visit by Provider               {HISTORY OPTIONS (Optional):333105}    Review of Systems  {FEMALE ROS (Optional):762920}     OBJECTIVE:   LMP 06/25/2018 (Approximate)   Physical Exam  {Exam Choices (Optional):566230}    {Diagnostic Test Results (Optional):218158::\"Diagnostic Test Results:\",\"Labs reviewed in Epic\"}    ASSESSMENT/PLAN:   {Diag Picklist:678560}    Patient has been advised of split billing requirements and indicates understanding: {YES / NO:367955::\"Yes\"}  COUNSELING:  {FEMALE COUNSELING MESSAGES:567333::\"Reviewed preventive health counseling, as reflected in patient instructions\"}    Estimated body mass index is 34.85 kg/m  as calculated from the following:    Height as of 10/4/21: 1.651 m (5' 5\").    Weight as of 10/4/21: " 95 kg (209 lb 6.4 oz).    {Weight Management Plan (ACO) Complete if BMI is abnormal-  Ages 18-64  BMI >24.9.  Age 65+ with BMI <23 or >30 (Optional):805819}    She reports that she quit smoking about 12 years ago. She has a 5.00 pack-year smoking history. She has never used smokeless tobacco.      Counseling Resources:  ATP IV Guidelines  Pooled Cohorts Equation Calculator  Breast Cancer Risk Calculator  BRCA-Related Cancer Risk Assessment: FHS-7 Tool  FRAX Risk Assessment  ICSI Preventive Guidelines  Dietary Guidelines for Americans, 2010  USDA's MyPlate  ASA Prophylaxis  Lung CA Screening    Trish Callaway, DO  [facility]

## 2021-12-03 ENCOUNTER — OFFICE VISIT (OUTPATIENT)
Dept: FAMILY MEDICINE | Facility: CLINIC | Age: 41
End: 2021-12-03
Payer: COMMERCIAL

## 2021-12-03 VITALS
BODY MASS INDEX: 34.81 KG/M2 | DIASTOLIC BLOOD PRESSURE: 87 MMHG | SYSTOLIC BLOOD PRESSURE: 131 MMHG | OXYGEN SATURATION: 98 % | HEIGHT: 65 IN | HEART RATE: 74 BPM | TEMPERATURE: 97.1 F | WEIGHT: 208.9 LBS

## 2021-12-03 DIAGNOSIS — R00.2 POUNDING HEARTBEAT: Primary | ICD-10-CM

## 2021-12-03 PROCEDURE — 99213 OFFICE O/P EST LOW 20 MIN: CPT | Performed by: FAMILY MEDICINE

## 2021-12-03 RX ORDER — PROPRANOLOL HCL 60 MG
60 CAPSULE, EXTENDED RELEASE 24HR ORAL DAILY
Qty: 30 CAPSULE | Refills: 1 | Status: SHIPPED | OUTPATIENT
Start: 2021-12-03 | End: 2021-12-14

## 2021-12-03 ASSESSMENT — MIFFLIN-ST. JEOR: SCORE: 1608.44

## 2021-12-03 NOTE — PROGRESS NOTES
"  Assessment & Plan     Pounding heartbeat  Plan to try Proranolol LA 60mg instead of as needed        BMI:   Estimated body mass index is 34.76 kg/m  as calculated from the following:    Height as of this encounter: 1.651 m (5' 5\").    Weight as of this encounter: 94.8 kg (208 lb 14.4 oz).         No follow-ups on file.    DO ADRIANNE Donis [facility]   Kasandra is a 40 year old who presents for the following health issues     History of Present Illness       She eats 2-3 servings of fruits and vegetables daily.She consumes 0 sweetened beverage(s) daily.        Heart Concern  Onset/Duration: For months pt has been experiencing symptoms of heart pounding. Sometimes it happens after eating.   Description:   Location: middle chest  Character: it just feeling like it pounding really hard  Radiation: None  Duration: 2 hours   Intensity: severe  Progression of Symptoms: worsening  Accompanying Signs & Symptoms:  Shortness of breath: no  Sweating: YES  Nausea/vomiting: YES nausea  Lightheadedness: YES  Palpitations: YES  Fever/Chills: no  Cough: no           Heartburn: YES  History: hashimoto's thyroiditis  Family history of heart disease: YES  Tobacco use: no  Previous similar symptoms: YES  Precipitating factors:   Worse with exertion: no  Worse with deep breaths: no           Related to eating: YES sometimes           Better with burping: no  Alleviating factors: mild  Therapies tried and outcome: None    Feels like heart is pounding very hard  So uncomfortable will develop some anxiety  Had visit in August 2021  Eliminating clonazepam from meds   Some association after eating?  ROS:  Tingling in limbs -   Feels warm   Ears ringing    Has Rx for propranolol -       Review of Systems   Constitutional, HEENT, cardiovascular, pulmonary, gi and gu systems are negative, except as otherwise noted.      Objective    /87   Pulse 74   Temp 97.1  F (36.2  C)   Ht 1.651 m (5' 5\")   Wt " 94.8 kg (208 lb 14.4 oz)   LMP 06/25/2018 (Approximate)   SpO2 98%   BMI 34.76 kg/m    Body mass index is 34.76 kg/m .  Physical Exam   GENERAL: healthy, alert and no distress  NECK: no adenopathy, no asymmetry, masses, or scars and thyroid normal to palpation  CV: regular rate and rhythm, normal S1 S2, no S3 or S4, no murmur, click or rub, no peripheral edema and peripheral pulses strong

## 2021-12-08 ENCOUNTER — MYC MEDICAL ADVICE (OUTPATIENT)
Dept: FAMILY MEDICINE | Facility: CLINIC | Age: 41
End: 2021-12-08
Payer: COMMERCIAL

## 2021-12-08 DIAGNOSIS — R00.2 POUNDING HEARTBEAT: ICD-10-CM

## 2021-12-14 RX ORDER — PROPRANOLOL HYDROCHLORIDE 80 MG/1
80 CAPSULE, EXTENDED RELEASE ORAL DAILY
Qty: 30 CAPSULE | Refills: 1 | Status: SHIPPED | OUTPATIENT
Start: 2021-12-14 | End: 2022-02-04

## 2022-01-07 ENCOUNTER — MYC MEDICAL ADVICE (OUTPATIENT)
Dept: FAMILY MEDICINE | Facility: CLINIC | Age: 42
End: 2022-01-07
Payer: COMMERCIAL

## 2022-01-07 DIAGNOSIS — E03.9 HYPOTHYROIDISM: ICD-10-CM

## 2022-01-07 DIAGNOSIS — E06.3 HASHIMOTO'S THYROIDITIS: Primary | ICD-10-CM

## 2022-02-02 NOTE — TELEPHONE ENCOUNTER
Patient scheduled for TSH recheck. Ordered lab, see MyChart message from PN.    Thank you,  Angela Chacon RN  [place]

## 2022-02-04 ENCOUNTER — MYC REFILL (OUTPATIENT)
Dept: FAMILY MEDICINE | Facility: CLINIC | Age: 42
End: 2022-02-04
Payer: COMMERCIAL

## 2022-02-04 DIAGNOSIS — E06.3 HYPOTHYROIDISM DUE TO HASHIMOTO'S THYROIDITIS: ICD-10-CM

## 2022-02-04 RX ORDER — LEVOTHYROXINE SODIUM 112 MCG
TABLET ORAL
Qty: 90 TABLET | Refills: 0 | Status: CANCELLED | OUTPATIENT
Start: 2022-02-04

## 2022-02-07 ENCOUNTER — TELEPHONE (OUTPATIENT)
Dept: FAMILY MEDICINE | Facility: CLINIC | Age: 42
End: 2022-02-07
Payer: COMMERCIAL

## 2022-02-07 DIAGNOSIS — E06.3 HYPOTHYROIDISM DUE TO HASHIMOTO'S THYROIDITIS: ICD-10-CM

## 2022-02-07 NOTE — TELEPHONE ENCOUNTER
DE (DOD),    Pt calling her insurance changed and doesn't take OREN anymore.  Requesting synthroid be changed to generic.  Pended order.  Please authorize if appropriate.  Thanks,  Anjelica Vaughan RN      Pt does not need a call back.

## 2022-02-08 RX ORDER — LEVOTHYROXINE SODIUM 112 UG/1
TABLET ORAL
Qty: 90 TABLET | Refills: 0 | Status: SHIPPED | OUTPATIENT
Start: 2022-02-08 | End: 2022-03-08

## 2022-02-19 ENCOUNTER — HEALTH MAINTENANCE LETTER (OUTPATIENT)
Age: 42
End: 2022-02-19

## 2022-02-24 ENCOUNTER — LAB (OUTPATIENT)
Dept: LAB | Facility: CLINIC | Age: 42
End: 2022-02-24
Payer: COMMERCIAL

## 2022-02-24 DIAGNOSIS — E06.3 HASHIMOTO'S THYROIDITIS: ICD-10-CM

## 2022-02-24 DIAGNOSIS — E03.9 HYPOTHYROIDISM: ICD-10-CM

## 2022-02-24 LAB — TSH SERPL DL<=0.005 MIU/L-ACNC: 2.83 MU/L (ref 0.4–4)

## 2022-02-24 PROCEDURE — 84443 ASSAY THYROID STIM HORMONE: CPT

## 2022-02-24 PROCEDURE — 36415 COLL VENOUS BLD VENIPUNCTURE: CPT

## 2022-02-25 NOTE — RESULT ENCOUNTER NOTE
Dear Kasandra,   Your test results are all back -   -All of your labs are normal.  Let us know if you have any questions.  -Trish Callaway, DO   Pt states that she has been having headache, blurry vision in right eye and light sensitivity for the last 3 days.  Pt states that she went tot he nurse at her school and had decreased visual acuity test and was sent to ED.  Pt denies PMH, states that she takes BCP.  Last took Motrin sometime yesterday.  Pt awake alert in NAD,  able to touch chin to chest without difficulty Pt states that she has been having headache, blurry vision in right eye and light sensitivity for the last 3 days.  Pt states that she went to the nurse at her school and had decreased visual acuity test and was sent to ED.  Pt denies PMH, states that she takes BCP.  Last took Motrin sometime yesterday.  Pt awake alert in NAD,  able to touch chin to chest without difficulty

## 2022-04-26 ENCOUNTER — MEDICAL CORRESPONDENCE (OUTPATIENT)
Dept: HEALTH INFORMATION MANAGEMENT | Facility: CLINIC | Age: 42
End: 2022-04-26
Payer: COMMERCIAL

## 2022-04-27 ENCOUNTER — TELEPHONE (OUTPATIENT)
Dept: NEUROLOGY | Facility: CLINIC | Age: 42
End: 2022-04-27
Payer: COMMERCIAL

## 2022-04-27 ENCOUNTER — TRANSCRIBE ORDERS (OUTPATIENT)
Dept: OTHER | Age: 42
End: 2022-04-27
Payer: COMMERCIAL

## 2022-04-27 DIAGNOSIS — G43.709 CHRONIC MIGRAINE WITHOUT AURA WITHOUT STATUS MIGRAINOSUS, NOT INTRACTABLE: Primary | ICD-10-CM

## 2022-05-12 NOTE — ED AVS SNAPSHOT
Emergency Department    [address] [facility] [address]    Phone:  [phone]    Fax:  [phone]                                       Kasandra Winkler   MRN: [id number]    Department:   Emergency Department   Date of Visit:  10/2/2018           Patient Information     Date Of Birth          1980        Your diagnoses for this visit were:     Left flank pain        You were seen by Sheyla Christina MD.      Follow-up Information     Follow up with Trish Callaway DO. Schedule an appointment as soon as possible for a visit in 1 day.    Specialty:  Family Practice    Contact information:    [address]  [facility] [address] [phone]          Follow up with  Emergency Department.    Specialty:  EMERGENCY MEDICINE    Why:  As needed, If symptoms worsen    Contact information:    [address] [facility] [id number] [phone]      Discharge References/Attachments     FLANK PAIN, UNCERTAIN CAUSE (ENGLISH)      24 Hour Appointment Hotline       To make an appointment at any [facility], call [phone] ([phone]). If you don't have a family doctor or clinic, we will help you find one. [facility] are conveniently located to serve the needs of you and your family.             Review of your medicines      Our records show that you are taking the medicines listed below. If these are incorrect, please call your family doctor or clinic.        Dose / Directions Last dose taken    amitriptyline 10 MG tablet   Commonly known as:  ELAVIL   Dose:  10 mg   Quantity:  30 tablet        Take 1 tablet (10 mg) by mouth At Bedtime   Refills:  1        azelastine 0.1 % nasal spray   Commonly known as:  ASTELIN   Dose:  1-2 spray   Quantity:  30 mL        Spray 1-2 sprays into both nostrils 2 times daily   Refills:  5        budesonide 180 MCG/ACT inhaler   Commonly known as:  PULMICORT FLEXHALER   Dose:  2 puff   Quantity:  1 Inhaler         SUBJECTIVE:  Melisa Steele is a 42 year old female who presents for:  Chief Complaint   Patient presents with   • Establish Care   • Knee Pain     for a month      HPI  Patient presents for physical.     Has been having pain in the left knee on and off for 6 weeks now.   Rides [employer] Bike.   Walks dog a lot, 5-mile 2-hr walks.   No clear triggers to the pain.   Had normal knee x-ray.   Hurts more with stairs.   Doesn't hurt while walking, but hurts afterwards.   Pain used to be posterior knee, but now more deep inside the center.     Has a lot of medical anxiety.     Went to the ER for emesis.   Had food poisoning because she could not throwing up even with Zofran.     Fibromyalgia:   Started in her early 30s.   Self-diagnosed. Mom has this as well and she has the same symptoms.   Mostly pain in the lower back.   As long as she exercises regularly and eats well, the symptoms are more controlled.     Seeing dermatologist in 2 weeks.     Has Mirena IUD for adenomyosis.     Social:   Has 13yo and 15 yo children.   Moved here from [place] about 2 years ago. Originally from [facility].   Works as an  for .     Past Medical History:   Diagnosis Date   • Fibromyalgia        Past Surgical History:   Procedure Laterality Date   •  section, low transverse      nuchal cord   •  section, low transverse     • Tubal ligation         Patient Active Problem List   Diagnosis   • Acute pain of left knee       Social History     Tobacco Use   • Smoking status: Never Smoker   • Smokeless tobacco: Never Used   Vaping Use   • Vaping Use: never used   Substance Use Topics   • Alcohol use: Yes     Alcohol/week: 2.0 standard drinks     Types: 2 Glasses of wine per week     Comment: socially 1-2 drinks per week   • Drug use: Not Currently       Family History   Problem Relation Age of Onset   • Cancer, Breast Mother 71        estrogen receptor+   • Diabetes Mother    • Other Mother          Fibromyalgia   • Cancer, Skin Melanoma Paternal Grandmother    • Leukemia Paternal Grandmother        Patient's Medications   New Prescriptions    No medications on file   Previous Medications    No medications on file   Modified Medications    No medications on file   Discontinued Medications    METOCLOPRAMIDE (REGLAN) 10 MG TABLET    Take 1 tablet by mouth 3 times daily as needed for Nausea or Vomiting.       ALLERGIES:  No Known Allergies    REVIEW OF SYSTEMS:    All other systems reviewed and negative.    OBJECTIVE:  PHYSICAL EXAM:   Visit Vitals  /74   Pulse 76   Resp 19   Ht 5' 7\" (1.702 m)   Wt 81 kg (178 lb 9.2 oz)   LMP 01/11/2022 (Approximate)   SpO2 96%   BMI 27.97 kg/m²       Wt Readings from Last 4 Encounters:   05/12/22 81 kg (178 lb 9.2 oz)   04/07/22 84 kg (185 lb 3 oz)   02/24/22 83 kg (182 lb 15.7 oz)   07/06/21 81.6 kg (180 lb)       Physical Exam  Vitals and nursing note reviewed.   Constitutional:       General: She is not in acute distress.     Appearance: She is well-developed. She is not ill-appearing.   HENT:      Head: Normocephalic and atraumatic.      Right Ear: Tympanic membrane, ear canal and external ear normal.      Left Ear: Tympanic membrane, ear canal and external ear normal.      Neck: Normal range of motion and neck supple. No tenderness.   Eyes:      General:         Right eye: No discharge.         Left eye: No discharge.      Extraocular Movements: Extraocular movements intact.      Conjunctiva/sclera: Conjunctivae normal.      Pupils: Pupils are equal, round, and reactive to light.   Neck:      Thyroid: No thyromegaly.   Cardiovascular:      Rate and Rhythm: Normal rate and regular rhythm.      Heart sounds: Normal heart sounds. No murmur heard.  Pulmonary:      Effort: Pulmonary effort is normal. No respiratory distress.      Breath sounds: Normal breath sounds. No wheezing.   Abdominal:      Palpations: Abdomen is soft. There is no mass.      Tenderness: There  Inhale 2 puffs into the lungs 2 times daily   Refills:  3        calcium carbonate 500 mg (elemental) 500 MG tablet   Commonly known as:  OS-JONATAN   Dose:  500 mg        Take 500 mg by mouth 2 times daily   Refills:  0        cetirizine 10 MG tablet   Commonly known as:  zyrTEC   Dose:  10 mg        Take 10 mg by mouth daily   Refills:  0        clonazePAM 0.5 MG tablet   Commonly known as:  klonoPIN   Dose:  0.5 mg   Quantity:  20 tablet        Take 1 tablet (0.5 mg) by mouth as needed for anxiety   Refills:  0        Cod Liver Oil 1000 MG Caps   Dose:  1 capsule        Take 1 capsule by mouth daily.   Refills:  0        fluticasone 110 MCG/ACT Inhaler   Commonly known as:  FLOVENT HFA   Dose:  2 puff   Quantity:  3 Inhaler        Inhale 2 puffs into the lungs 2 times daily   Refills:  3        fluticasone 50 MCG/ACT spray   Commonly known as:  FLONASE   Quantity:  16 mL        SHAKE LIQUID AND USE 1 TO 2 SPRAYS IN EACH NOSTRIL DAILY   Refills:  0        HM MULTIVITAMIN ADULT GUMMY PO        Refills:  0        levonorgestrel 20 MCG/24HR IUD   Commonly known as:  MIRENA (52 MG)   Dose:  1 each   Quantity:  1 each        1 each (20 mcg) by Intrauterine route once for 1 dose   Refills:  0        levothyroxine 112 MCG tablet   Commonly known as:  SYNTHROID/LEVOTHROID   Dose:  112 mcg   Quantity:  30 tablet        Take 1 tablet (112 mcg) by mouth daily   Refills:  1        metroNIDAZOLE 500 MG tablet   Commonly known as:  FLAGYL   Dose:  500 mg   Quantity:  14 tablet        Take 1 tablet (500 mg) by mouth 2 times daily   Refills:  0        Vitamin B-12 500 MCG Subl   Dose:  500 mcg        Place 500 mcg under the tongue daily   Refills:  0        Vitamin D3 3000 units Tabs   Dose:  1 tablet        Take 1 tablet by mouth daily.   Refills:  0                Procedures and tests performed during your visit     Basic metabolic panel    CBC (+ platelets, no diff)    UA with Microscopic      Orders Needing Specimen Collection      None      Pending Results     No orders found for last 3 day(s).            Pending Culture Results     No orders found for last 3 day(s).            Pending Results Instructions     If you had any lab results that were not finalized at the time of your Discharge, you can call the ED Lab Result RN at [phone]. You will be contacted by this team for any positive Lab results or changes in treatment. The nurses are available 7 days a week from 10A to 6:30P.  You can leave a message 24 hours per day and they will return your call.        Test Results From Your Hospital Stay        10/2/2018 11:17 PM      Component Results     Component Value Ref Range & Units Status    Color Urine Light Yellow  Final    Appearance Urine Clear  Final    Glucose Urine Negative NEG^Negative mg/dL Final    Bilirubin Urine Negative NEG^Negative Final    Ketones Urine Negative NEG^Negative mg/dL Final    Specific Gravity Urine 1.012 1.003 - 1.035 Final    Blood Urine Negative NEG^Negative Final    pH Urine 5.5 5.0 - 7.0 pH Final    Protein Albumin Urine Negative NEG^Negative mg/dL Final    Urobilinogen mg/dL Normal 0.0 - 2.0 mg/dL Final    Nitrite Urine Negative NEG^Negative Final    Leukocyte Esterase Urine Negative NEG^Negative Final    Source Midstream Urine  Final    WBC Urine 1 0 - 5 /HPF Final    RBC Urine <1 0 - 2 /HPF Final    Bacteria Urine Many (A) NEG^Negative /HPF Final    Squamous Epithelial /HPF Urine 1 0 - 1 /HPF Final    Mucous Urine Present (A) NEG^Negative /LPF Final         10/2/2018 11:32 PM      Component Results     Component Value Ref Range & Units Status    WBC 9.5 4.0 - 11.0 10e9/L Final    RBC Count 4.24 3.8 - 5.2 10e12/L Final    Hemoglobin 12.4 11.7 - 15.7 g/dL Final    Hematocrit 37.2 35.0 - 47.0 % Final    MCV 88 78 - 100 fl Final    MCH 29.2 26.5 - 33.0 pg Final    MCHC 33.3 31.5 - 36.5 g/dL Final    RDW 13.1 10.0 - 15.0 % Final    Platelet Count 258 150 - 450 10e9/L Final         10/2/2018 11:48 PM     is no abdominal tenderness.   Musculoskeletal:         General: No tenderness or deformity. Normal range of motion.   Lymphadenopathy:      Cervical: No cervical adenopathy.   Skin:     General: Skin is warm and dry.   Neurological:      General: No focal deficit present.      Mental Status: She is alert.      Cranial Nerves: No cranial nerve deficit.      Motor: No tremor.      Coordination: Coordination normal.      Gait: Gait normal.      Deep Tendon Reflexes: Reflexes normal.   Psychiatric:         Mood and Affect: Mood normal.         Behavior: Behavior normal.       ASSESSMENT/PLAN:  Problem List Items Addressed This Visit        Musculoskeletal and Injuries    Acute pain of left knee     Normal X-ray. No DVT. F/u with Sports medicine if not improved.            Other Visit Diagnoses     Health maintenance examination    -  Primary    Need for diphtheria-tetanus-pertussis (Tdap) vaccine        Relevant Orders    TETANUS DIPHTHERIA ACELLULAR PERTUSSIS VACC, 10+ YRS (BOOSTRIX) (Completed)          Follow up: Return in about 1 year (around 5/12/2023) for Physical; sooner as needed..     Rosy Bhagat MD  Family Medicine @ [facility]     Component Results     Component Value Ref Range & Units Status    Sodium 142 133 - 144 mmol/L Final    Potassium 3.6 3.4 - 5.3 mmol/L Final    Chloride 108 94 - 109 mmol/L Final    Carbon Dioxide 24 20 - 32 mmol/L Final    Anion Gap 10 3 - 14 mmol/L Final    Glucose 100 (H) 70 - 99 mg/dL Final    Urea Nitrogen 15 7 - 30 mg/dL Final    Creatinine 0.61 0.52 - 1.04 mg/dL Final    GFR Estimate >90 >60 mL/min/1.7m2 Final    Non  GFR Calc    GFR Estimate If Black >90 >60 mL/min/1.7m2 Final    African American GFR Calc    Calcium 8.7 8.5 - 10.1 mg/dL Final                Clinical Quality Measure: Blood Pressure Screening     Your blood pressure was checked while you were in the emergency department today. The last reading we obtained was  BP: 138/73 . Please read the guidelines below about what these numbers mean and what you should do about them.  If your systolic blood pressure (the top number) is less than 120 and your diastolic blood pressure (the bottom number) is less than 80, then your blood pressure is normal. There is nothing more that you need to do about it.  If your systolic blood pressure (the top number) is 120-139 or your diastolic blood pressure (the bottom number) is 80-89, your blood pressure may be higher than it should be. You should have your blood pressure rechecked within a year by a primary care provider.  If your systolic blood pressure (the top number) is 140 or greater or your diastolic blood pressure (the bottom number) is 90 or greater, you may have high blood pressure. High blood pressure is treatable, but if left untreated over time it can put you at risk for heart attack, stroke, or kidney failure. You should have your blood pressure rechecked by a primary care provider within the next 4 weeks.  If your provider in the emergency department today gave you specific instructions to follow-up with your doctor or provider even sooner than that, you should follow that instruction  and not wait for up to 4 weeks for your follow-up visit.        Thank you for choosing [facility]       Thank you for choosing [facility] for your care. Our goal is always to provide you with excellent care. Hearing back from our patients is one way we can continue to improve our services. Please take a few minutes to complete the written survey that you may receive in the mail after you visit with us. Thank you!        [employer] Information     [employer] gives you secure access to your electronic health record. If you see a primary care provider, you can also send messages to your care team and make appointments. If you have questions, please call your primary care clinic.  If you do not have a primary care provider, please call [phone] and they will assist you.        Care EveryWhere ID     This is your Care EveryWhere ID. This could be used by other organizations to access your [facility] medical records  [id number]        Equal Access to Services     KRIS SILVA : Krishna To, skinny flores, chris monsalve. So [facility] [phone].    ATENCIÓN: Si habla español, tiene a das disposición servicios gratuitos de asistencia lingüística. Llame al [phone].    We comply with applicable federal civil rights laws and [place] laws. We do not discriminate on the basis of race, color, national origin, age, disability, sex, sexual orientation, or gender identity.            After Visit Summary       This is your record. Keep this with you and show to your community pharmacist(s) and doctor(s) at your next visit.

## 2022-06-08 ENCOUNTER — NURSE TRIAGE (OUTPATIENT)
Dept: NURSING | Facility: CLINIC | Age: 42
End: 2022-06-08
Payer: COMMERCIAL

## 2022-06-09 ENCOUNTER — HOSPITAL ENCOUNTER (EMERGENCY)
Facility: CLINIC | Age: 42
Discharge: HOME | End: 2022-06-09
Payer: COMMERCIAL

## 2022-06-09 ENCOUNTER — OFFICE VISIT (OUTPATIENT)
Dept: URGENT CARE | Facility: URGENT CARE | Age: 42
End: 2022-06-09
Payer: COMMERCIAL

## 2022-06-09 VITALS
RESPIRATION RATE: 16 BRPM | TEMPERATURE: 98.2 F | OXYGEN SATURATION: 100 % | SYSTOLIC BLOOD PRESSURE: 128 MMHG | BODY MASS INDEX: 34.61 KG/M2 | WEIGHT: 208 LBS | HEART RATE: 74 BPM | DIASTOLIC BLOOD PRESSURE: 85 MMHG

## 2022-06-09 DIAGNOSIS — H02.59 SUPERFICIAL SWELLING OF EYELID: Primary | ICD-10-CM

## 2022-06-09 PROCEDURE — 99213 OFFICE O/P EST LOW 20 MIN: CPT | Performed by: NURSE PRACTITIONER

## 2022-06-09 NOTE — TELEPHONE ENCOUNTER
Nurse Triage SBAR    Is this a 2nd Level Triage? NO    Situation: Triage call for eye symptoms    Assessment:  Pt reports eyelid swelling, itching, and burning that started tonight.  Bottom eyelid is swollen.  Mild redness to sclera.  No redness to the eyelid.  Pt denies fever.      Pt took one Benadryl tablet (25mg) prior to triage call.     Pt denies drainage, rash, fever.     Protocol Recommended Disposition: See Physician within 24 hours.  If eye symptoms worsen, seek care in the ED tonight.  Patient verbalized understanding and had no further questions.  Call transferred to scheduling.     COVID 19 Nurse Triage Plan/Patient Instructions    Please be aware that novel coronavirus (COVID-19) may be circulating in the community. If you develop symptoms such as fever, cough, or SOB or if you have concerns about the presence of another infection including coronavirus (COVID-19), please contact your health care provider or visit [email or web address].     Disposition/Instructions    In-Person Visit with provider recommended. Reference Visit Selection Guide.    Thank you for taking steps to prevent the spread of this virus.  o Limit your contact with others.  o Wear a simple mask to cover your cough.  o Wash your hands well and often.    Resources    [facility]: About COVID-19: [email or web address].[email or web address]    CDC: What to Do If You're Sick: www.cdc.gov/coronavirus/2019-ncov/about/steps-when-sick.html    CDC: Ending Home Isolation: www.cdc.gov/coronavirus/2019-ncov/hcp/disposition-in-home-patients.html     CDC: Caring for Someone: www.cdc.gov/coronavirus/2019-ncov/if-you-are-sick/care-for-someone.html     [facility]: Interim Guidance for Hospital Discharge to Home: www.health.[facility]/diseases/coronavirus/hcp/hospdischarge.pdf    [facility] clinical trials (COVID-19 research studies): clinicalaffairs.[facility]/umn-clinical-trials     Below are the COVID-19 hotlines at the [facility]  "Health ([facility]). Interpreters are available.   o For health questions: Call 914-607-6374 or 1-241.549.5274 (7 a.m. to 7 p.m.)  o For questions about schools and childcare: Call [phone] or 1-726.764.9116 (7 a.m. to 7 p.m.)     Radha SILVER [facility] Nurse Advisor      Reason for Disposition    [1] SEVERE eyelid swelling (i.e., shut or almost) AND [2] involves both eyes AND [3] itchy    Additional Information    Negative: Unresponsive, passed out or very weak    Negative: Difficulty breathing or wheezing    Negative: [1] Difficulty swallowing or slurred speech AND [2] sudden onset    Negative: Sounds like a life-threatening emergency to the triager    Negative: [1] SEVERE eyelid swelling (i.e., shut or almost) AND [2] fever    Negative: [1] Eyelid (outer) is very red AND [2] fever    Negative: Patient sounds very sick or weak to the triager    Negative: [1] Pregnant > 20 weeks AND [2] sudden weight gain (i.e., more than 3 lbs or 1.4 kg in one week)    Negative: [1] SEVERE eyelid swelling (i.e., shut or almost) AND [2] involves both eyes      (Exception: itchy eyes, which  are probably an allergic reaction)    Negative: [1] SEVERE eyelid swelling (i.e., shut or almost) AND [2] Taking an ACE Inhibitor medication (e.g., benazepril/LOTENSIN, captopril/CAPOTEN, enalapril/VASOTEC, lisinopril/ZESTRIL)    Negative: [1] SEVERE eyelid swelling on one side AND [2] red and painful (or tender to touch)    Negative: [1] SEVERE eyelid swelling on one side AND [2] sinus pain or pressure    Negative: [1] MILD swelling AND [2] fever    Negative: [1] Painful rash AND [2] multiple small blisters grouped together (i.e., dermatomal distribution or \"band\" or \"stripe\")    Protocols used: EYE - SWELLING-A-AH      "

## 2022-06-09 NOTE — PROGRESS NOTES
Assessment & Plan     Superficial swelling of eyelid  May be allergic, will monitor  Can try alaway drops if eye become more itchy.   F/u if persists or worsens.           Return in about 2 days (around 6/11/2022) for with regular provider if symptoms persist.    VIKTORIA Moreno CNP  [facility] URGENT CARE STORM Slaughter is a 41 year old female who presents to clinic today for the following health issues:  Chief Complaint   Patient presents with     Urgent Care     Swollen eye      HPI    Eye Problem    Onset of symptoms was 1 day(s) ago.   Location: under both eyes   Course of illness is waxing and waning.    Severity moderate  Current and Associated symptoms: eyelid swelling  Treatment measures tried include warm packs  Context: unknown  Maybe allergy?  A bit itcht  Not red  No discharge or mattering.      Review of Systems  Constitutional, HEENT, cardiovascular, pulmonary, GI, , musculoskeletal, neuro, skin, endocrine and psych systems are negative, except as otherwise noted.      Objective    /85   Pulse 74   Temp 98.2  F (36.8  C)   Resp 16   Wt 94.3 kg (208 lb)   LMP 06/25/2018 (Approximate)   SpO2 100%   BMI 34.61 kg/m    Physical Exam   GENERAL: healthy, alert and no distress  EYES: lower lids slightly edematous, no erythema or crusting.  Eyes grossly normal to inspection, PERRL and conjunctivae and sclerae normal  NECK: no adenopathy, no asymmetry, masses, or scars and thyroid normal to palpation  RESP: lungs clear to auscultation - no rales, rhonchi or wheezes  CV: regular rate and rhythm, normal S1 S2, no S3 or S4, no murmur, click or rub, no peripheral edema and peripheral pulses strong  MS: no gross musculoskeletal defects noted, no edema

## 2022-06-13 ENCOUNTER — VIRTUAL VISIT (OUTPATIENT)
Dept: FAMILY MEDICINE | Facility: CLINIC | Age: 42
End: 2022-06-13
Payer: COMMERCIAL

## 2022-06-13 ENCOUNTER — TELEPHONE (OUTPATIENT)
Dept: FAMILY MEDICINE | Facility: CLINIC | Age: 42
End: 2022-06-13
Payer: COMMERCIAL

## 2022-06-13 DIAGNOSIS — J01.90 ACUTE SINUSITIS WITH SYMPTOMS > 10 DAYS: Primary | ICD-10-CM

## 2022-06-13 DIAGNOSIS — H10.45 CHRONIC ALLERGIC CONJUNCTIVITIS: ICD-10-CM

## 2022-06-13 DIAGNOSIS — J30.1 SEASONAL ALLERGIC RHINITIS DUE TO POLLEN: ICD-10-CM

## 2022-06-13 PROCEDURE — 99213 OFFICE O/P EST LOW 20 MIN: CPT | Mod: 95 | Performed by: PHYSICIAN ASSISTANT

## 2022-06-13 RX ORDER — OLOPATADINE HYDROCHLORIDE 1 MG/ML
1 SOLUTION/ DROPS OPHTHALMIC 2 TIMES DAILY
Qty: 5 ML | Refills: 0 | Status: CANCELLED | OUTPATIENT
Start: 2022-06-13

## 2022-06-13 RX ORDER — FLUTICASONE PROPIONATE 50 MCG
2 SPRAY, SUSPENSION (ML) NASAL DAILY
Qty: 16 G | Refills: 3 | Status: CANCELLED | OUTPATIENT
Start: 2022-06-13

## 2022-06-13 RX ORDER — CETIRIZINE HYDROCHLORIDE 10 MG/1
10 TABLET ORAL DAILY
Qty: 30 TABLET | Refills: 0 | Status: CANCELLED | OUTPATIENT
Start: 2022-06-13

## 2022-06-13 ASSESSMENT — ASTHMA QUESTIONNAIRES
QUESTION_2 LAST FOUR WEEKS HOW OFTEN HAVE YOU HAD SHORTNESS OF BREATH: MORE THAN ONCE A DAY
QUESTION_1 LAST FOUR WEEKS HOW MUCH OF THE TIME DID YOUR ASTHMA KEEP YOU FROM GETTING AS MUCH DONE AT WORK, SCHOOL OR AT HOME: A LITTLE OF THE TIME
QUESTION_3 LAST FOUR WEEKS HOW OFTEN DID YOUR ASTHMA SYMPTOMS (WHEEZING, COUGHING, SHORTNESS OF BREATH, CHEST TIGHTNESS OR PAIN) WAKE YOU UP AT NIGHT OR EARLIER THAN USUAL IN THE MORNING: NOT AT ALL
ACT_TOTALSCORE: 16
ACT_TOTALSCORE: 16
QUESTION_5 LAST FOUR WEEKS HOW WOULD YOU RATE YOUR ASTHMA CONTROL: SOMEWHAT CONTROLLED
QUESTION_4 LAST FOUR WEEKS HOW OFTEN HAVE YOU USED YOUR RESCUE INHALER OR NEBULIZER MEDICATION (SUCH AS ALBUTEROL): TWO OR THREE TIMES PER WEEK

## 2022-06-13 ASSESSMENT — PATIENT HEALTH QUESTIONNAIRE - PHQ9: SUM OF ALL RESPONSES TO PHQ QUESTIONS 1-9: 15

## 2022-06-13 NOTE — TELEPHONE ENCOUNTER
Pt calling has had under eye swelling.  Went to UC last week.  Swelling was better but today it is back and feels like it has moved and through side of face.  Wonders if it is allergy, Hx of allergies and congestion per pt.    Wondering what to do.   notes not compete.  Scheduled pt in to [facility] today.    Anjelica Vaughan RN

## 2022-06-13 NOTE — COMMUNITY RESOURCES LIST (ENGLISH)
06/13/2022   [facility]patient [facility]  Echo Garcia  For questions about this resource list or additional care needs, please contact your primary care clinic or care manager.  Phone: [phone]   Email: N/A   Address: [address] [place] [address]   Hours: N/A        Hotlines and Helplines       Hotline - Crisis help  1  [facility] Office - Community Outreach for Psychiatric Emergencies (COPE) Distance: 4.86 miles      COVID-19 Status: Phone/Virtual   [address] [place] [address] [address] [place] [address]  Language: English, Djiboutian, Vietnamese  Hours: Mon - Sun Open 24 Hours   Phone: (214) [phone] Email: [email or web address] Website: http://www.[facility]/health-medical/adult-mental-health-services     2  [employer] Distance: 4.94 miles      COVID-19 Status: Phone/Virtual   [address] [place] [address]  Language: English, Austrian, Mandarin, Vietnamese, Swahili  Hours: Mon - Sun Open 24 Hours   Phone: (854) [phone] Email: info@[employer].org Website: http://www.[facility].org/          Mental Health       Individual counseling  3  [facility], [facility] - LGBTQ+ Counseling Distance: 1.42 miles      COVID-19 Status: Regular Operations, COVID-19 Status: Phone/Virtual   [address] [facility] [address] [place] [address]  Language: English  Hours: Mon - Thu 7:00 AM - 9:00 PM , Fri 7:00 AM - 5:00 PM , Sat 8:00 AM - 6:00 PM  Fees: Insurance, Self Pay   Phone: (670) 780-9836 Email: info@[facility].com Website: http://[facility].[employer]     4  [facility] [facility] Distance: 1.97 miles      COVID-19 Status: Regular Operations, COVID-19 Status: Phone/Virtual   [address] [facility] [address] [address]  Language: English, Vietnamese  Hours: Mon - Tue 7:00 AM - 7:00 PM , Wed 7:00 AM - 8:00 PM , Thu 7:00 AM - 7:00 PM , Fri 7:00 AM - 5:00 PM  Fees: Insurance,  Self Pay, Sliding Fee   Phone: (872) 750-2493 Email: [email or web address] Website: https://www.[facility].com/our-locations/[place]/[facility]/     Mental health crisis care  5  [facility] Distance: 8.57 miles      COVID-19 Status: Phone/Virtual   [address] [place] [address]  Language: English  Hours: Mon - Fri 8:00 AM - 4:30 PM  Fees: Insurance, Self Pay, Sliding Fee   Phone: (757) 397-1305 Email: [email or web address]. Website: https://www.[facility]/Mental-Health-Center     6  [facility] Acute Psychiatry Services Distance: 9.68 miles      COVID-19 Status: Regular Operations   [address] [place] [address]  Language: English  Hours: Mon - Sun Open 24 Hours  Fees: Insurance, Self Pay, Sliding Fee   Phone: (833) 121-8996 Website: https://www.[facility].org/specialty/psychiatry/acute-psychiatry-services/     Mental health support group  7  [facility] - Alzheimer's Caregiver Support Group Distance: 2.77 miles      COVID-19 Status: Phone/Virtual   [address] [place] [address]  Language: English  Hours: Mon - Thu 9:30 AM - 3:00 PM  Fees: Free   Phone: (147) 238-9375 Email: info@[facility].org Website: http://[email or web address]/     8  [facility], [facility] - [facility] Distance: 3.23 miles      COVID-19 Status: Phone/Virtual   [address] [place] [address]  Language: English  Hours: Tue - Fri 8:00 AM - 7:00 PM , Sat 9:00 AM - 4:00 PM  Fees: Insurance, Self Pay   Phone: (430) 243-9114 Email: aldaEatWithcare@[employer] Website: http://www.[employer]/          Important Numbers & Websites       Emergency Services   911  City Services   311  Poison Control   (124) 162-6459  Suicide Prevention Lifeline   (571) 428-9256 (TALK)  Child Abuse Hotline   (234) 670-9526 (4-A-Child)  Sexual Assault  Hotline   (350) 892-5490 (HOPE)  National Runaway Safeline   (128) 777-7014 (RUNAWAY)  All-Options Talkline   (726) 925-6975  Substance Abuse Referral   (223) 533-8124 (HELP)

## 2022-06-13 NOTE — PROGRESS NOTES
"Kasandra is a 41 year old who is being evaluated via a billable video visit.      How would you like to obtain your AVS? MyChart  If the video visit is dropped, the invitation should be resent by: Text to cell phone: [phone]   Will anyone else be joining your video visit? No      Video Start Time: 1:13 PM    Assessment & Plan     Acute sinusitis with symptoms > 10 days  Seasonal allergic rhinitis due to pollen  Chronic allergic conjunctivitis  Most likely allergic but prescription for oral antibiotic to cover possible sinus infection sent to pharmacy as well. Continue with previous allergy treatment options and consider oral prednisone burst if no improvement with above by end of the week.  - amoxicillin-clavulanate (AUGMENTIN) 875-125 MG tablet; Take 1 tablet by mouth 2 times daily for 10 days      Review of prior external note(s) from - previous routine PCP notes  20 minutes spent on the date of the encounter doing chart review, history and exam, documentation and further activities per the note       BMI:   Estimated body mass index is 34.61 kg/m  as calculated from the following:    Height as of 12/3/21: 1.651 m (5' 5\").    Weight as of 6/9/22: 94.3 kg (208 lb).   Weight management plan: Discussed healthy diet and exercise guidelines    Patient Instructions     Did you know?      You can schedule a video visit for follow-up appointments as well as future appointments for certain conditions.  Please see the below link.     Video Visits ([email or web address])     If you have not already done so,  I encourage you to sign up for [employer] ([email or web address]).  This will allow you to review your results, securely communicate with a provider, and schedule virtual visits as well.      Return in about 3 months (around 9/13/2022) for Follow up, with PCP, or sooner with worsening symptoms.    Ange Solorzano PA-C  [facility]    Subjective   Kasandra is a 41 year old who " "presents for the following health issues     HPI     Per RN triage note:  \"Pt calling has had under eye swelling.  Went to  last week.  Swelling was better but today it is back and feels like it has moved and through side of face.  Wonders if it is allergy, Hx of allergies and congestion per pt.     Wondering what to do.   notes not compete.\"    Patient does not increased sinus congestion and seasonal allergies.  No fever, chills, night sweats.  Left under-eye swelling initially worse than right, but slightly in both; UC thought allergies, was not a stye or infection.  Symptoms improved but then slightly worse again this morning.  Left face feels worse/more pressure with some tooth pain as well.  Patient tried over the counter decongestants and antihistamines per usual (zyrtec, eye drops and two nasal sprays) - has been taking regularly and eye drops restarted with onset of symptoms.    Review of Systems   Constitutional, HEENT, cardiovascular, pulmonary, GI, , musculoskeletal, neuro, skin, endocrine and psych systems are negative, except as otherwise noted.      Objective           Vitals:  No vitals were obtained today due to virtual visit.    Physical Exam   GENERAL: Healthy, alert and no distress  EYES: Eyes grossly normal to inspection.  No discharge or erythema, or obvious scleral/conjunctival abnormalities.  RESP: No audible wheeze, cough, or visible cyanosis.  No visible retractions or increased work of breathing.    SKIN: Visible skin clear. No significant rash, abnormal pigmentation or lesions.  NEURO: Cranial nerves grossly intact.  Mentation and speech appropriate for age.  PSYCH: Mentation appears normal, affect normal/bright, judgement and insight intact, normal speech and appearance well-groomed.            Video-Visit Details    Type of service:  Video Visit    Video End Time:1:34 PM    Originating Location (pt. Location): Home    Distant Location (provider location):  [facility] " [place]     Platform used for Video Visit: Jeannette

## 2022-06-13 NOTE — PATIENT INSTRUCTIONS
Did you know?      You can schedule a video visit for follow-up appointments as well as future appointments for certain conditions.  Please see the below link.     Video Visits ([email or web address])     If you have not already done so,  I encourage you to sign up for [employer] ([email or web address]).  This will allow you to review your results, securely communicate with a provider, and schedule virtual visits as well.

## 2022-07-11 ENCOUNTER — TRANSFERRED RECORDS (OUTPATIENT)
Dept: HEALTH INFORMATION MANAGEMENT | Facility: CLINIC | Age: 42
End: 2022-07-11

## 2022-07-14 DIAGNOSIS — R09.81 NASAL CONGESTION: ICD-10-CM

## 2022-07-14 DIAGNOSIS — J30.89 SEASONAL ALLERGIC RHINITIS DUE TO OTHER ALLERGIC TRIGGER: Primary | ICD-10-CM

## 2022-07-14 DIAGNOSIS — J30.81 ALLERGIC RHINITIS DUE TO ANIMAL (CAT) (DOG) HAIR AND DANDER: ICD-10-CM

## 2022-08-23 ENCOUNTER — DOCUMENTATION ONLY (OUTPATIENT)
Dept: LAB | Facility: CLINIC | Age: 42
End: 2022-08-23

## 2022-08-23 DIAGNOSIS — E06.3 HASHIMOTO'S THYROIDITIS: Primary | ICD-10-CM

## 2022-08-25 ENCOUNTER — LAB (OUTPATIENT)
Dept: LAB | Facility: CLINIC | Age: 42
End: 2022-08-25
Payer: COMMERCIAL

## 2022-08-25 DIAGNOSIS — J30.89 SEASONAL ALLERGIC RHINITIS DUE TO OTHER ALLERGIC TRIGGER: ICD-10-CM

## 2022-08-25 DIAGNOSIS — R09.81 NASAL CONGESTION: ICD-10-CM

## 2022-08-25 DIAGNOSIS — J30.81 ALLERGIC RHINITIS DUE TO ANIMAL (CAT) (DOG) HAIR AND DANDER: ICD-10-CM

## 2022-08-25 DIAGNOSIS — E06.3 HASHIMOTO'S THYROIDITIS: ICD-10-CM

## 2022-08-25 LAB — TSH SERPL DL<=0.005 MIU/L-ACNC: 1.02 MU/L (ref 0.4–4)

## 2022-08-25 PROCEDURE — 86003 ALLG SPEC IGE CRUDE XTRC EA: CPT | Mod: 59

## 2022-08-25 PROCEDURE — 84443 ASSAY THYROID STIM HORMONE: CPT

## 2022-08-25 PROCEDURE — 86003 ALLG SPEC IGE CRUDE XTRC EA: CPT

## 2022-08-25 PROCEDURE — 82785 ASSAY OF IGE: CPT

## 2022-08-25 PROCEDURE — 36415 COLL VENOUS BLD VENIPUNCTURE: CPT

## 2022-08-26 LAB
A ALTERNATA IGE QN: <0.1 KU(A)/L
A FUMIGATUS IGE QN: <0.1 KU(A)/L
C HERBARUM IGE QN: <0.1 KU(A)/L
COMMON RAGWEED IGE QN: <0.1 KU(A)/L
COTTONWOOD IGE QN: <0.1 KU(A)/L
DEPRECATED IGE QN: <0.1 KU(A)/L
IGE SERPL-ACNC: 145 KU/L (ref 0–114)
MAPLE IGE QN: <0.1 KU(A)/L
NETTLE IGE QN: <0.1 KU(A)/L
P NOTATUM IGE QN: <0.1 KU(A)/L
S ROSTRATA IGE QN: <0.1 KU(A)/L
SALTWORT IGE QN: <0.1 KU(A)/L
SILVER BIRCH IGE QN: <0.1 KU(A)/L
TIMOTHY IGE QN: <0.1 KU(A)/L
WHITE ASH IGE QN: <0.1 KU(A)/L
WHITE ELM IGE QN: <0.1 KU(A)/L
WHITE OAK IGE QN: <0.1 KU(A)/L

## 2022-08-26 NOTE — RESULT ENCOUNTER NOTE
Dear Kasandra,   Your test results are all back -   Thyroid is great.  Let us know if you have any questions.  -Trish Callaway, DO

## 2022-09-12 ENCOUNTER — TRANSFERRED RECORDS (OUTPATIENT)
Dept: HEALTH INFORMATION MANAGEMENT | Facility: CLINIC | Age: 42
End: 2022-09-12

## 2022-09-20 ENCOUNTER — OFFICE VISIT (OUTPATIENT)
Dept: FAMILY MEDICINE | Facility: CLINIC | Age: 42
End: 2022-09-20
Payer: COMMERCIAL

## 2022-09-20 VITALS
DIASTOLIC BLOOD PRESSURE: 83 MMHG | OXYGEN SATURATION: 97 % | SYSTOLIC BLOOD PRESSURE: 128 MMHG | HEART RATE: 76 BPM | TEMPERATURE: 97.7 F | WEIGHT: 242.5 LBS | BODY MASS INDEX: 40.35 KG/M2

## 2022-09-20 DIAGNOSIS — R21 RASH: Primary | ICD-10-CM

## 2022-09-20 DIAGNOSIS — L29.9 PRURITUS: ICD-10-CM

## 2022-09-20 DIAGNOSIS — E66.01 MORBID OBESITY (H): ICD-10-CM

## 2022-09-20 DIAGNOSIS — E03.9 ACQUIRED HYPOTHYROIDISM: ICD-10-CM

## 2022-09-20 LAB
ALBUMIN SERPL-MCNC: 3.2 G/DL (ref 3.4–5)
ALP SERPL-CCNC: 85 U/L (ref 40–150)
ALT SERPL W P-5'-P-CCNC: 29 U/L (ref 0–50)
ANION GAP SERPL CALCULATED.3IONS-SCNC: 8 MMOL/L (ref 3–14)
AST SERPL W P-5'-P-CCNC: 17 U/L (ref 0–45)
BILIRUB SERPL-MCNC: 0.3 MG/DL (ref 0.2–1.3)
BUN SERPL-MCNC: 12 MG/DL (ref 7–30)
CALCIUM SERPL-MCNC: 8.9 MG/DL (ref 8.5–10.1)
CHLORIDE BLD-SCNC: 109 MMOL/L (ref 94–109)
CO2 SERPL-SCNC: 22 MMOL/L (ref 20–32)
CREAT SERPL-MCNC: 0.58 MG/DL (ref 0.52–1.04)
ERYTHROCYTE [DISTWIDTH] IN BLOOD BY AUTOMATED COUNT: 14.2 % (ref 10–15)
GFR SERPL CREATININE-BSD FRML MDRD: >90 ML/MIN/1.73M2
GLUCOSE BLD-MCNC: 89 MG/DL (ref 70–99)
HCT VFR BLD AUTO: 39.9 % (ref 35–47)
HGB BLD-MCNC: 12.7 G/DL (ref 11.7–15.7)
MCH RBC QN AUTO: 28.7 PG (ref 26.5–33)
MCHC RBC AUTO-ENTMCNC: 31.8 G/DL (ref 31.5–36.5)
MCV RBC AUTO: 90 FL (ref 78–100)
PLATELET # BLD AUTO: 373 10E3/UL (ref 150–450)
POTASSIUM BLD-SCNC: 3.9 MMOL/L (ref 3.4–5.3)
PROT SERPL-MCNC: 7.2 G/DL (ref 6.8–8.8)
RBC # BLD AUTO: 4.43 10E6/UL (ref 3.8–5.2)
SODIUM SERPL-SCNC: 139 MMOL/L (ref 133–144)
T4 FREE SERPL-MCNC: 0.93 NG/DL (ref 0.76–1.46)
TSH SERPL DL<=0.005 MIU/L-ACNC: 7.06 MU/L (ref 0.4–4)
WBC # BLD AUTO: 9.5 10E3/UL (ref 4–11)

## 2022-09-20 PROCEDURE — 85027 COMPLETE CBC AUTOMATED: CPT | Performed by: FAMILY MEDICINE

## 2022-09-20 PROCEDURE — 36415 COLL VENOUS BLD VENIPUNCTURE: CPT | Performed by: FAMILY MEDICINE

## 2022-09-20 PROCEDURE — 84443 ASSAY THYROID STIM HORMONE: CPT | Performed by: FAMILY MEDICINE

## 2022-09-20 PROCEDURE — 80053 COMPREHEN METABOLIC PANEL: CPT | Performed by: FAMILY MEDICINE

## 2022-09-20 PROCEDURE — 99214 OFFICE O/P EST MOD 30 MIN: CPT | Performed by: FAMILY MEDICINE

## 2022-09-20 PROCEDURE — 84439 ASSAY OF FREE THYROXINE: CPT | Performed by: FAMILY MEDICINE

## 2022-09-20 RX ORDER — TRIAMCINOLONE ACETONIDE 1 MG/G
OINTMENT TOPICAL 2 TIMES DAILY
Qty: 80 G | Refills: 0 | Status: SHIPPED | OUTPATIENT
Start: 2022-09-20 | End: 2022-12-06

## 2022-09-20 ASSESSMENT — ASTHMA QUESTIONNAIRES
QUESTION_5 LAST FOUR WEEKS HOW WOULD YOU RATE YOUR ASTHMA CONTROL: SOMEWHAT CONTROLLED
QUESTION_3 LAST FOUR WEEKS HOW OFTEN DID YOUR ASTHMA SYMPTOMS (WHEEZING, COUGHING, SHORTNESS OF BREATH, CHEST TIGHTNESS OR PAIN) WAKE YOU UP AT NIGHT OR EARLIER THAN USUAL IN THE MORNING: NOT AT ALL
ACT_TOTALSCORE: 20
QUESTION_2 LAST FOUR WEEKS HOW OFTEN HAVE YOU HAD SHORTNESS OF BREATH: ONCE OR TWICE A WEEK
QUESTION_4 LAST FOUR WEEKS HOW OFTEN HAVE YOU USED YOUR RESCUE INHALER OR NEBULIZER MEDICATION (SUCH AS ALBUTEROL): NOT AT ALL
ACT_TOTALSCORE: 20
QUESTION_1 LAST FOUR WEEKS HOW MUCH OF THE TIME DID YOUR ASTHMA KEEP YOU FROM GETTING AS MUCH DONE AT WORK, SCHOOL OR AT HOME: SOME OF THE TIME

## 2022-09-20 ASSESSMENT — PATIENT HEALTH QUESTIONNAIRE - PHQ9
SUM OF ALL RESPONSES TO PHQ QUESTIONS 1-9: 12
10. IF YOU CHECKED OFF ANY PROBLEMS, HOW DIFFICULT HAVE THESE PROBLEMS MADE IT FOR YOU TO DO YOUR WORK, TAKE CARE OF THINGS AT HOME, OR GET ALONG WITH OTHER PEOPLE: VERY DIFFICULT
SUM OF ALL RESPONSES TO PHQ QUESTIONS 1-9: 12

## 2022-09-20 NOTE — PROGRESS NOTES
Assessment & Plan     Rash  Will use triamcinolone ointment sparingly in the waist like and then avoid wearing tight fitting clothes/pants , if no improvement in a week , will schedule a f/u with derm , referral given   - triamcinolone (KENALOG) 0.1 % external ointment; Apply topically 2 times daily  - Adult Dermatology Referral; Future    Pruritus  Will check labs today as there is a generalized pruritus but no rash other than the waist line   - Comprehensive metabolic panel (BMP + Alb, Alk Phos, ALT, AST, Total. Bili, TP); Future  - TSH with free T4 reflex; Future  - CBC with platelets; Future  - Adult Dermatology Referral; Future  - Comprehensive metabolic panel (BMP + Alb, Alk Phos, ALT, AST, Total. Bili, TP)  - TSH with free T4 reflex  - CBC with platelets  - T4 free    Acquired hypothyroidism  She is on 112 mcg of synthroid and has gained a lot of weight in the past three months and wishes to check her TSh today   - REVIEW OF HEALTH MAINTENANCE PROTOCOL ORDERS  - TSH with free T4 reflex; Future  - TSH with free T4 reflex  - T4 free    Morbid obesity (H)  Healthy diet and exercise encouraged and pt has started doing this       RTC if no improving or worsening.        Sharon Durand MD  [facility]   Kasandra is a 41 year old, presenting for the following health issues:  No chief complaint on file.      History of Present Illness       Reason for visit:  Rash  Symptom onset:  3-4 weeks ago  Symptoms include:  Rash, itchy  Symptom intensity:  Moderate  Symptom progression:  Worsening  Had these symptoms before:  No  What makes it worse:  No  What makes it better:  No    She eats 2-3 servings of fruits and vegetables daily.She consumes 0 sweetened beverage(s) daily.She exercises with enough effort to increase her heart rate 9 or less minutes per day.  She exercises with enough effort to increase her heart rate 3 or less days per week.   She is taking medications  regularly.    Today's PHQ-9         PHQ-9 Total Score: 12    PHQ-9 Q9 Thoughts of better off dead/self-harm past 2 weeks :   Not at all    How difficult have these problems made it for you to do your work, take care of things at home, or get along with other people: Very difficult      no new medications , clotrimazole topical and HX topical , its itchy, feels itchy every where , rash is   Allergies , tested fr allergies ,is  allergic to dust and dogs allergies , exercise and allergy induced asthma , allergy medications , no shortness of breath , has not been taking inhaler regularly y , Albuterol before exercise   itchy eyes and all over the body , nasal congestion, occasionally ithcy cough , no wheezing   Rash  Onset/Duration: 3-4 weeks ago   Description  Location: Abdominal Area   Character: red patches   Itching: moderate  Intensity:  moderate  Progression of Symptoms:  worsening  Accompanying signs and symptoms:   Fever: No  Body aches or joint pain: No  Sore throat symptoms: No  Recent cold symptoms: No  History:           Previous episodes of similar rash: None  New exposures:  None  Recent travel: No  Exposure to similar rash: No  Precipitating or alleviating factors: None   Therapies tried and outcome: Clotrimazole        Review of Systems   Constitutional, HEENT, cardiovascular, pulmonary, GI, , musculoskeletal, neuro, skin, endocrine and psych systems are negative, except as otherwise noted.      Objective    LMP 06/25/2018 (Approximate)   There is no height or weight on file to calculate BMI.  Physical Exam   GENERAL: healthy, alert and no distress  NECK: no adenopathy, no asymmetry, masses, or scars and thyroid normal to palpation  RESP: lungs clear to auscultation - no rales, rhonchi or wheezes  CV: regular rate and rhythm, normal S1 S2, no S3 or S4, no murmur, click or rub, no peripheral edema and peripheral pulses strong  ABDOMEN: soft, nontender, no hepatosplenomegaly, no masses and bowel sounds  normal  MS: no gross musculoskeletal defects noted, no edema  SKIN: no suspicious lesions EXCEPT there some erythematous rash in the waist line area predominantly     Results for orders placed or performed in visit on 09/20/22 (from the past 24 hour(s))   Comprehensive metabolic panel (BMP + Alb, Alk Phos, ALT, AST, Total. Bili, TP)   Result Value Ref Range    Sodium 139 133 - 144 mmol/L    Potassium 3.9 3.4 - 5.3 mmol/L    Chloride 109 94 - 109 mmol/L    Carbon Dioxide (CO2) 22 20 - 32 mmol/L    Anion Gap 8 3 - 14 mmol/L    Urea Nitrogen 12 7 - 30 mg/dL    Creatinine 0.58 0.52 - 1.04 mg/dL    Calcium 8.9 8.5 - 10.1 mg/dL    Glucose 89 70 - 99 mg/dL    Alkaline Phosphatase 85 40 - 150 U/L    AST 17 0 - 45 U/L    ALT 29 0 - 50 U/L    Protein Total 7.2 6.8 - 8.8 g/dL    Albumin 3.2 (L) 3.4 - 5.0 g/dL    Bilirubin Total 0.3 0.2 - 1.3 mg/dL    GFR Estimate >90 >60 mL/min/1.73m2   TSH with free T4 reflex   Result Value Ref Range    TSH 7.06 (H) 0.40 - 4.00 mU/L   CBC with platelets   Result Value Ref Range    WBC Count 9.5 4.0 - 11.0 10e3/uL    RBC Count 4.43 3.80 - 5.20 10e6/uL    Hemoglobin 12.7 11.7 - 15.7 g/dL    Hematocrit 39.9 35.0 - 47.0 %    MCV 90 78 - 100 fL    MCH 28.7 26.5 - 33.0 pg    MCHC 31.8 31.5 - 36.5 g/dL    RDW 14.2 10.0 - 15.0 %    Platelet Count 373 150 - 450 10e3/uL   T4 free   Result Value Ref Range    Free T4 0.93 0.76 - 1.46 ng/dL

## 2022-09-21 ENCOUNTER — MYC MEDICAL ADVICE (OUTPATIENT)
Dept: FAMILY MEDICINE | Facility: CLINIC | Age: 42
End: 2022-09-21

## 2022-09-21 DIAGNOSIS — E03.9 ACQUIRED HYPOTHYROIDISM: Primary | ICD-10-CM

## 2022-09-21 NOTE — TELEPHONE ENCOUNTER
LS,  Please see below [employer] message and advise.  Thanks,  Christiana PALMER RN

## 2022-09-22 RX ORDER — LEVOTHYROXINE SODIUM 112 MCG
112 TABLET ORAL DAILY
Qty: 90 TABLET | Refills: 0 | Status: SHIPPED | OUTPATIENT
Start: 2022-09-22 | End: 2022-12-06

## 2022-10-16 ENCOUNTER — HEALTH MAINTENANCE LETTER (OUTPATIENT)
Age: 42
End: 2022-10-16

## 2022-10-24 ENCOUNTER — LAB (OUTPATIENT)
Dept: URGENT CARE | Facility: URGENT CARE | Age: 42
End: 2022-10-24
Attending: FAMILY MEDICINE
Payer: COMMERCIAL

## 2022-10-24 DIAGNOSIS — Z20.822 SUSPECTED 2019 NOVEL CORONAVIRUS INFECTION: ICD-10-CM

## 2022-10-24 LAB — SARS-COV-2 RNA RESP QL NAA+PROBE: NEGATIVE

## 2022-10-24 PROCEDURE — U0003 INFECTIOUS AGENT DETECTION BY NUCLEIC ACID (DNA OR RNA); SEVERE ACUTE RESPIRATORY SYNDROME CORONAVIRUS 2 (SARS-COV-2) (CORONAVIRUS DISEASE [COVID-19]), AMPLIFIED PROBE TECHNIQUE, MAKING USE OF HIGH THROUGHPUT TECHNOLOGIES AS DESCRIBED BY CMS-2020-01-R: HCPCS

## 2022-10-24 PROCEDURE — U0005 INFEC AGEN DETEC AMPLI PROBE: HCPCS

## 2022-10-25 ENCOUNTER — APPOINTMENT (OUTPATIENT)
Dept: URBAN - METROPOLITAN AREA CLINIC 256 | Age: 42
Setting detail: DERMATOLOGY
End: 2022-10-25

## 2022-10-25 DIAGNOSIS — L259 CONTACT DERMATITIS AND OTHER ECZEMA, UNSPECIFIED CAUSE: ICD-10-CM

## 2022-10-25 PROBLEM — L23.9 ALLERGIC CONTACT DERMATITIS, UNSPECIFIED CAUSE: Status: ACTIVE | Noted: 2022-10-25

## 2022-10-25 PROBLEM — D23.39 OTHER BENIGN NEOPLASM OF SKIN OF OTHER PARTS OF FACE: Status: ACTIVE | Noted: 2022-10-25

## 2022-10-25 PROCEDURE — OTHER MIPS QUALITY: OTHER

## 2022-10-25 PROCEDURE — OTHER COUNSELING: OTHER

## 2022-10-25 PROCEDURE — 99203 OFFICE O/P NEW LOW 30 MIN: CPT

## 2022-10-25 ASSESSMENT — LOCATION SIMPLE DESCRIPTION DERM
LOCATION SIMPLE: ABDOMEN
LOCATION SIMPLE: LEFT ANTERIOR NECK

## 2022-10-25 ASSESSMENT — LOCATION DETAILED DESCRIPTION DERM
LOCATION DETAILED: LEFT INFERIOR LATERAL NECK
LOCATION DETAILED: RIGHT LATERAL ABDOMEN
LOCATION DETAILED: LEFT LATERAL ABDOMEN

## 2022-10-25 ASSESSMENT — LOCATION ZONE DERM
LOCATION ZONE: NECK
LOCATION ZONE: TRUNK

## 2022-10-25 NOTE — PROCEDURE: COUNSELING
Detail Level: Simple
Moisturizer Recommendations: Cetaphil and Vanicream.
Detail Level: Detailed
Cleanser Recommendations: Cetaphil and Dove soaps.
Patient Specific Counseling (Will Not Stick From Patient To Patient): -Fragrance and non botanical regimen handout was given, and discussed in depth. Recommended the patient to change all of her body and hair care products.

## 2022-10-25 NOTE — HPI: RASH
What Type Of Note Output Would You Prefer (Optional)?: Standard Output
How Severe Is Your Rash?: moderate
Is This A New Presentation, Or A Follow-Up?: Rash
Additional History: The patient has tried Triamcinolone 0.1% ointment for a few weeks, which did not help. She also took Prednisone 4mg for 1 week, and noticed the rash calmed down, but once she stopped taking it the rash flared up again.

## 2022-11-17 ENCOUNTER — TELEPHONE (OUTPATIENT)
Dept: FAMILY MEDICINE | Facility: CLINIC | Age: 42
End: 2022-11-17

## 2022-11-17 ENCOUNTER — MYC MEDICAL ADVICE (OUTPATIENT)
Dept: FAMILY MEDICINE | Facility: CLINIC | Age: 42
End: 2022-11-17

## 2022-11-17 DIAGNOSIS — E03.9 ACQUIRED HYPOTHYROIDISM: Primary | ICD-10-CM

## 2022-11-17 NOTE — TELEPHONE ENCOUNTER
Reason for Call:  Appointment Request    Patient requesting this type of appt:  Labs- TSH    Requested provider: Trish Callaway    Reason patient unable to be scheduled: n/a    When does patient want to be seen/preferred time: Same day    Comments: PT HAS LABS SCHEDULED FOR 7:30AM, NEEDING ORDERS FOR TSH LABS    Could we send this information to you in [employer] or would you prefer to receive a phone call?:   Patient would like to be contacted via [employer]    Call taken on 11/17/2022 at 7:13 AM by Gabriela Laura V

## 2022-11-18 ENCOUNTER — LAB (OUTPATIENT)
Dept: LAB | Facility: CLINIC | Age: 42
End: 2022-11-18
Payer: COMMERCIAL

## 2022-11-18 DIAGNOSIS — E03.9 ACQUIRED HYPOTHYROIDISM: ICD-10-CM

## 2022-11-18 PROCEDURE — 84439 ASSAY OF FREE THYROXINE: CPT

## 2022-11-18 PROCEDURE — 36415 COLL VENOUS BLD VENIPUNCTURE: CPT

## 2022-11-18 PROCEDURE — 84443 ASSAY THYROID STIM HORMONE: CPT

## 2022-11-19 LAB
T4 FREE SERPL-MCNC: 1.01 NG/DL (ref 0.9–1.7)
TSH SERPL DL<=0.005 MIU/L-ACNC: 5.42 UIU/ML (ref 0.3–4.2)

## 2022-11-26 ENCOUNTER — MYC MEDICAL ADVICE (OUTPATIENT)
Dept: FAMILY MEDICINE | Facility: CLINIC | Age: 42
End: 2022-11-26

## 2022-11-26 DIAGNOSIS — E06.3 HYPOTHYROIDISM DUE TO HASHIMOTO'S THYROIDITIS: ICD-10-CM

## 2022-11-29 RX ORDER — LEVOTHYROXINE SODIUM 125 MCG
125 TABLET ORAL DAILY
Qty: 30 TABLET | Refills: 1 | Status: SHIPPED | OUTPATIENT
Start: 2022-11-29 | End: 2023-01-31

## 2022-12-06 ENCOUNTER — OFFICE VISIT (OUTPATIENT)
Dept: FAMILY MEDICINE | Facility: CLINIC | Age: 42
End: 2022-12-06
Payer: COMMERCIAL

## 2022-12-06 VITALS
BODY MASS INDEX: 41.08 KG/M2 | HEART RATE: 70 BPM | TEMPERATURE: 98.3 F | OXYGEN SATURATION: 94 % | SYSTOLIC BLOOD PRESSURE: 113 MMHG | WEIGHT: 255.6 LBS | HEIGHT: 66 IN | RESPIRATION RATE: 16 BRPM | DIASTOLIC BLOOD PRESSURE: 77 MMHG

## 2022-12-06 DIAGNOSIS — R21 RASH: ICD-10-CM

## 2022-12-06 DIAGNOSIS — E66.01 MORBID OBESITY (H): ICD-10-CM

## 2022-12-06 DIAGNOSIS — L29.9 PRURITIC DISORDER: Primary | ICD-10-CM

## 2022-12-06 DIAGNOSIS — F41.9 ANXIETY: ICD-10-CM

## 2022-12-06 DIAGNOSIS — F33.1 MAJOR DEPRESSIVE DISORDER, RECURRENT EPISODE, MODERATE (H): ICD-10-CM

## 2022-12-06 DIAGNOSIS — E06.3 HYPOTHYROIDISM DUE TO HASHIMOTO'S THYROIDITIS: ICD-10-CM

## 2022-12-06 PROCEDURE — 99214 OFFICE O/P EST MOD 30 MIN: CPT | Performed by: FAMILY MEDICINE

## 2022-12-06 PROCEDURE — 96127 BRIEF EMOTIONAL/BEHAV ASSMT: CPT | Performed by: FAMILY MEDICINE

## 2022-12-06 RX ORDER — HYDROXYZINE HYDROCHLORIDE 10 MG/1
10 TABLET, FILM COATED ORAL 2 TIMES DAILY PRN
Qty: 60 TABLET | Refills: 1 | Status: SHIPPED | OUTPATIENT
Start: 2022-12-06 | End: 2023-09-05

## 2022-12-06 ASSESSMENT — PAIN SCALES - GENERAL: PAINLEVEL: MODERATE PAIN (5)

## 2022-12-06 ASSESSMENT — ANXIETY QUESTIONNAIRES
8. IF YOU CHECKED OFF ANY PROBLEMS, HOW DIFFICULT HAVE THESE MADE IT FOR YOU TO DO YOUR WORK, TAKE CARE OF THINGS AT HOME, OR GET ALONG WITH OTHER PEOPLE?: EXTREMELY DIFFICULT
4. TROUBLE RELAXING: NEARLY EVERY DAY
GAD7 TOTAL SCORE: 21
GAD7 TOTAL SCORE: 21
2. NOT BEING ABLE TO STOP OR CONTROL WORRYING: NEARLY EVERY DAY
7. FEELING AFRAID AS IF SOMETHING AWFUL MIGHT HAPPEN: NEARLY EVERY DAY
3. WORRYING TOO MUCH ABOUT DIFFERENT THINGS: NEARLY EVERY DAY
GAD7 TOTAL SCORE: 21
5. BEING SO RESTLESS THAT IT IS HARD TO SIT STILL: NEARLY EVERY DAY
IF YOU CHECKED OFF ANY PROBLEMS ON THIS QUESTIONNAIRE, HOW DIFFICULT HAVE THESE PROBLEMS MADE IT FOR YOU TO DO YOUR WORK, TAKE CARE OF THINGS AT HOME, OR GET ALONG WITH OTHER PEOPLE: EXTREMELY DIFFICULT
7. FEELING AFRAID AS IF SOMETHING AWFUL MIGHT HAPPEN: NEARLY EVERY DAY
6. BECOMING EASILY ANNOYED OR IRRITABLE: NEARLY EVERY DAY
1. FEELING NERVOUS, ANXIOUS, OR ON EDGE: NEARLY EVERY DAY

## 2022-12-06 ASSESSMENT — PATIENT HEALTH QUESTIONNAIRE - PHQ9
10. IF YOU CHECKED OFF ANY PROBLEMS, HOW DIFFICULT HAVE THESE PROBLEMS MADE IT FOR YOU TO DO YOUR WORK, TAKE CARE OF THINGS AT HOME, OR GET ALONG WITH OTHER PEOPLE: EXTREMELY DIFFICULT
SUM OF ALL RESPONSES TO PHQ QUESTIONS 1-9: 21
SUM OF ALL RESPONSES TO PHQ QUESTIONS 1-9: 21

## 2022-12-06 NOTE — PROGRESS NOTES
Assessment & Plan           Pruritic disorder & Rash  Plan: dry skin is contributing to itching- its interesting that itching/rash is limited to anterior abdomen - easily reachable .  Advised to use cerave or eucerin hypoallergenic cream to maintain good moisturizer for skin.  Use anti histamine to help with itching- though it may cause sedation.  Do not operate machine if feels sedated   - hydrOXYzine (ATARAX) 10 MG tablet; Take 1 tablet (10 mg) by mouth 2 times daily as needed for itching  # 60 with refills  Use only as needed   Stop if ineffective in a few days or if too sedating.  Keep taking over the counter zyrtec.    - hydrOXYzine (ATARAX) 10 MG tablet; Take 1 tablet (10 mg) by mouth 2 times daily as needed for itching  Potential medication side effects were discussed with the patient; let me know if any occur.      Hypothyroidism  Dose change levothyroxine 125 mcg only a few days ago  Advised to continue it empty stomach and recheck level is 6 months    Moderate recurrent depression and anxiety.  Plan: unwilling to start any medications- because  previous experiences with all   Wellbutrin, effexor, cymbalta, lexapro , zoloft, prsitique- either caused side  effects or were ineffective  Advised to proceed with MTM consultation to review her concerns about genesight mapping & ok to get referral.  And also to  seek a new psychiatrist-  Does not need a new referral & reports able to find on own.  Felt best on clonazepam      FOREST-7 SCORE 10/18/2021 10/18/2021 12/6/2022   Total Score 17 (severe anxiety) 17 (severe anxiety) 21 (severe anxiety)   Total Score - - 21   Some encounter information is confidential and restricted. Go to Review Flowsheets activity to see all data.     PHQ 6/13/2022 9/20/2022 12/6/2022   PHQ-9 Total Score 15 12 21   Q9: Thoughts of better off dead/self-harm past 2 weeks Not at all Not at all Not at all   Some encounter information is confidential and restricted. Go to Review Flowsheets  "activity to see all data.         I spent a total of 32 minutes on the day of the visit.   Time spent doing chart review, history and exam, documentation and further activities per the note       BMI:   Estimated body mass index is 41.89 kg/m  as calculated from the following:    Height as of this encounter: 5' 5.5\" (1.664 m).    Weight as of this encounter: 255 lb 9.6 oz (115.9 kg).   Weight management plan: Discussed healthy diet and exercise guidelines        No follow-ups on file.   Follow-up Visit   Expected date:  Dec 20, 2022 (Approximate)      Follow Up Appointment Details:     Follow-up with whom?: Any Primary Care Provider    Follow-Up for what?: Acute Issue Recheck    How?: In Person                    Ernestine Carey MD  [facility] is a 41 year old, presenting for the following health issues:  Derm Problem (Follow up from 9/22 visit)      History of Present Illness       Reason for visit:  Hives    She eats 4 or more servings of fruits and vegetables daily.She consumes 0 sweetened beverage(s) daily.She exercises with enough effort to increase her heart rate 9 or less minutes per day.  She exercises with enough effort to increase her heart rate 3 or less days per week.   She is taking medications regularly.    Today's PHQ-9         PHQ-9 Total Score: 21    PHQ-9 Q9 Thoughts of better off dead/self-harm past 2 weeks :   Not at all    How difficult have these problems made it for you to do your work, take care of things at home, or get along with other people: Extremely difficult  Today's FOREST-7 Score: 21    Seen 9/20- was given kenalog and used for a few days and itching never stopped.  Was given oral steroid and that did not help.  Then saw dermatologist 3 weeks ago.    Itching limited to torso- anteriorly  Seen by Derm 3 weeks ago   Diagnosed with ? Dermatitis  Advised to avoid fragrance  She followed through  No change in rash and itching- on " "torso        Works from home, lives with wife and 11 yo son. Never leaves home due to covid and moderate- except for school runs.  Really depressed and really anxious- worse than last yr. Feels safe. No suicidal thoughts   Has been in counseling for past 5 yrs   Stopped seeing psychiatrist , was getting lorazepam- they stopped practice.  Previous others ineffective or did not help  Wellbutrin, effexor, cymbalta, lexapro , zoloft, prsitique     Review of Systems   Constitutional, HEENT, cardiovascular, pulmonary, GI, , musculoskeletal, neuro, skin, endocrine and psych systems are negative, except as otherwise noted.      Objective    /77   Pulse 70   Temp 98.3  F (36.8  C) (Temporal)   Resp 16   Ht 5' 5.5\" (1.664 m)   Wt 255 lb 9.6 oz (115.9 kg)   LMP 06/25/2018 (Approximate)   SpO2 94%   BMI 41.89 kg/m    Body mass index is 41.89 kg/m .  Physical Exam   GENERAL: healthy, alert and no distress  NECK: no adenopathy, no asymmetry, masses, or scars and thyroid normal to palpation  RESP: lungs clear to auscultation - no rales, rhonchi or wheezes  CV: regular rate and rhythm.ABDOMEN: soft, nontender, no hepatosplenomegaly, no masses and bowel sounds normal  SKIN: no suspicious lesions or rashes and excoriation marks on torso related to scratching and generalized dry skin. No rash on back or limbs.  Psych: Alert and oriented times 3; coherent speech, normal   rate and volume, able to articulate logical thoughts, able   to abstract reason, no tangential thoughts, no hallucinations   or delusions                        "

## 2022-12-07 ENCOUNTER — TELEPHONE (OUTPATIENT)
Dept: FAMILY MEDICINE | Facility: CLINIC | Age: 42
End: 2022-12-07

## 2022-12-07 NOTE — TELEPHONE ENCOUNTER
MTM referral from: [facility] visit (referral by provider)    MTM referral outreach attempt on December 7, 2022 at 9:49 AM      Outcome: Patient is not interested at this time because private pay, will route to MTM Pharmacist/Provider as an FYI. Thank you for the referral.     Priya Laura, [facility]

## 2022-12-09 ENCOUNTER — OFFICE VISIT (OUTPATIENT)
Dept: PODIATRY | Facility: CLINIC | Age: 42
End: 2022-12-09
Payer: COMMERCIAL

## 2022-12-09 ENCOUNTER — ANCILLARY PROCEDURE (OUTPATIENT)
Dept: GENERAL RADIOLOGY | Facility: CLINIC | Age: 42
End: 2022-12-09
Attending: PODIATRIST
Payer: COMMERCIAL

## 2022-12-09 VITALS — DIASTOLIC BLOOD PRESSURE: 74 MMHG | SYSTOLIC BLOOD PRESSURE: 108 MMHG

## 2022-12-09 DIAGNOSIS — G89.29 CHRONIC PAIN IN RIGHT FOOT: ICD-10-CM

## 2022-12-09 DIAGNOSIS — M72.2 PLANTAR FASCIITIS, BILATERAL: ICD-10-CM

## 2022-12-09 DIAGNOSIS — M79.671 CHRONIC PAIN IN RIGHT FOOT: Primary | ICD-10-CM

## 2022-12-09 DIAGNOSIS — M79.671 CHRONIC PAIN IN RIGHT FOOT: ICD-10-CM

## 2022-12-09 DIAGNOSIS — G89.29 CHRONIC PAIN IN RIGHT FOOT: Primary | ICD-10-CM

## 2022-12-09 PROCEDURE — 99203 OFFICE O/P NEW LOW 30 MIN: CPT | Performed by: PODIATRIST

## 2022-12-09 PROCEDURE — 73630 X-RAY EXAM OF FOOT: CPT | Mod: TC | Performed by: RADIOLOGY

## 2022-12-09 NOTE — PATIENT INSTRUCTIONS
Thank you for choosing [facility] Podiatry / Foot & Ankle Surgery!    DR. MISTRY'S CLINIC LOCATIONS:     [facility] TRIAGE LINE: [phone]   [address] [place] [address] APPOINTMENTS: [phone]   [place] [address] RADIOLOGY: [phone]   (Every other Tues - Wed - Fri PM) SET UP SURGERY: [phone]    PHYSICAL THERAPY: [phone]   [facility] BILLING QUESTIONS: [phone]   [address] FAX: [phone]   [place] [address]    (Thurs & Fri AM)      Follow up: 6 weeks if pain not improving, sooner as needed.    Next steps:   Today we discussed trial of custom orthotics, Physical Therapy.   [facility] LOCATIONS  [facility]  [address]  [place] [address]  Phone: [phone]  Fax: [phone] [facility]  [address]  [place] [address]  Phone: [phone]   Fax: [phone]   [facility]  [address]  [place] [address]  Phone: [phone]  Fax: [phone] [facility]  [address]  [place] [address]  Phone: [phone]   Fax: [phone]   [facility]   [address]  [place] [address]  Phone: [phone]  Fax: [phone] * Please call any location listed to make an appointment for a casting/fitting. Your referral was sent to their central office and they will all have the order on file.         HEEL PAIN TIPS    1)  A rigid-soled, athletic shoe like a hiking shoe is recommended.    2)  Avoid barefoot walking at home. It is a good idea to wear a clean athletic shoe inside.    3)  Stretch your calf muscules and plantar fascia frequently. It is a good idea to do this before getting out of bed.    4)  Ice and ibuprofen can help if pain is related to inflammation.    5)  Some good over the counter inserts might help, see the handout in this packet on our recommendations    * If your pain  persists, please return to clinic. Future options include a walking boot, physical therapy, night splints, and injections.      PLANTAR FASCIITIS  Plantar fasciitis is often referred to as heel spurs or heel pain. Plantar fasciitis is a very common problem that affects people of all foot shapes, age, weight and activity level. Pain may be in the arch or on the weight-bearing surface of the heel. The pain may come on without injury or identifiable cause. Pain is generally present when first getting out of bed in the morning or up from a seated break.     CAUSES  The plantar fascia is a dense fibrous band of tissue that stretches across the bottom surface of the foot. The fascia helps support the foot muscles and arch. Plantar fasciitis is thought to be caused by mechanical strain or overload. Frequent walking without shoes or wearing unsupportive shoes is thought to cause structural overload and ultimately inflammation of the plantar fascia. Some people have heel spurs that can be seen on x-ray. The heel spur is actually a minor component of plantar fascitis and is largely ignored.       SELF TREATMENT   The easiest solution is to stop walking around your home without shoes. Plantar fasciitis is largely a shoe problem. Shoes are either not being worn often enough or your current shoes are inadequate for your weight, foot structure or activity level. The majority of shoes on the market today are not sufficient to resist development of plantar fasciitis or to promote healing. Assume that your current shoes are inadequate and will need to be replaced. Even high quality shoes wear out with 6 months to one year of frequent use. Weight loss is another option. Losing ten pounds in the next two months may be enough to resolve the problem. Ice applied to the area of pain two to three times per day for ten minutes each session can be very helpful. Warm foot soaks in epsom salts can also relieve pain. This should continue until  the problem resolves. Achilles tendon stretching is essential. Stretch multiple times daily to promote healing and to prevent recurrence in the future. Over all stretching of the body is helpful as well such as the calves, thighs and lower back. Normally when one area of the body is tight, other areas are too. Gentle Yoga can be good for this.     Over the counter topical anti inflammatories can be helpful such as biofreeze, bengay, salon pas, ect...  Oral ibuprofen or aleve is recommended as well to try to calm down inflammation.     Night splints can be helpful to gradually stretch the foot at night as a lot of pain is when you get up in the morning. Taking a towel or thera band and stretching the foot back multiple times before you get ou of bed can be beneficial as well.     MEDICAL TREATMENT  Medical treatments often include custom arch supports, cortisone injections, physical therapy, splints to be worn in bed, prescription medications and surgery. The home treatments listed above will be necessary regardless of these advanced medical treatments. Surgery is rarely needed but is very helpful in selected cases.     PROGNOSIS  Plantar fasciitis can last from one day to a lifetime. Some people get intermittent fascitis that is very short-lived. Others suffer daily for years. Excessive body weight, frequent bare foot walking, long hours on the feet, inadequate shoes, predisposing foot structures and excessive activity such as running are all potential issues that lead to chronic and/or recurring plantar fascitis. Having plantar fasciitis means that you are forever prone to this problem and will require modification of some of the above factors. Most people seek treatment within one to four months. Healing usually requires a similar one to four month time frame. Healing time is relative to the amount of effort spent treating the problem.   Plantar fasciitis is highly recurrent. Risk factors often continue, including  return to bare foot walking, inadequate shoes, excessive body weight, excessive activities, etc. Your life style and foot structure may predispose you to recurrent plantar fasciitis. A daily prevention regimen can be very helpful. Ongoing use of shoe inserts, careful attention to appropriate shoes, daily Achilles stretching, etc. may prevent recurrence. Prompt attention at the earliest warning signs of heel pain can resolve the problem in as short as a few days.     EXERCISES  Stair Exercise: Step on the stairs with the ball of your foot and hold your position for at least 15 seconds, then slowly step down with the heels of your foot. You can do this daily and as often as you want.   Picking the Towel: Sit comfortably and then pick the towel up with your toes. You can use any object other than a towel as long as the material can be soft and you can pick it up with your toes.  Rolling the Bottle: Use a small ball or frozen water bottle and then roll it around with your foot.   Flex the Toes: Sit comfortably and then flex your toes by pointing it towards the floor or towards your body. This will relax and flex your foot and exercise your plantar fascia, the calf, and the Achilles tendon. The inability of the foot to stretch often causes the bunching up of the plantar fascia area leading to the pain.  Calf/Achilles Stretching: Lay on you back and raise one foot, then point your toes towards the floor. See photo below:               Hold each stretch for 10 seconds. Stretch 10 times per set, three sets per day. Morning, afternoon and evening. If your heel pain is very severe in the morning, consider doing the first set of stretches before you get out of bed.      OVER THE COUNTER INSERT RECOMMENDATIONS  SuperFeet   Sofsole Fit Spenco   Power Step   Walk-Fit Arch Cradles     Most of these can be found at your local [employer], sporting [employer], or online.  **A good high quality over the counter insert should cost  around $40-$50      [facility] LOCATIONS  [place]  [address]  [phone]   [address]  [phone] [place]  [address]  [phone]   [place]  [address] [place] [address] [address]  [phone]   [place]  [address]  [phone] [place] [address]  [phone]   [place]  [address]  [phone]   [place]  [address]  [phone] [place]  [address]  [phone]

## 2022-12-09 NOTE — PROGRESS NOTES
ASSESSMENT:  Encounter Diagnoses   Name Primary?     Chronic pain in right foot Yes     Plantar fasciitis, bilateral      MEDICAL DECISION MAKING:  I personally reviewed the right foot x-ray images with Kasandra.  No calcaneal pathology.  I showed her all the bone spurs not involved in the weightbearing surface.  No other acute or chronic findings.    Her pain is consistent with plantar fascial pain, right greater than left.    Recommendations:  Tri-Lock brace with foot strap medial, due to her degree of pain on the right  Referral to physical therapy  Referral for custom molded orthoses    We discussed the causes and nature of arch and heel pain.  The anatomy and function of the plantar fascia was discussed.  The treatment plan discussed included calf and plantar fascial stretching, avoidance of barefoot walking, stiffer soled shoes, activity modification, over-the-counter arch supports versus custom orthoses, prn icing and NSAIDs.  A comprehensive After-Visit Summary was provided.     Follow-up in 4 to 6 weeks.  The problem is persisting or worsening, will consider advanced imaging.    Disclaimer: This note consists of symbols derived from keyboarding, dictation and/or voice recognition software. As a result, there may be errors in the script that have gone undetected. Please consider this when interpreting information found in this chart.    João Mcdonnell DPM, FACFAS, MS    [place] [facility] of Pod[facility]/Foot & Ankle Surgery      ____________________________________________________________________    HPI:       Kasandra presents today reporting a 10-year history of bilateral heel and arch pain.  Currently the right is more uncomfortable.  Pain is worse with standing and walking activities.  She describes a burning pain.  Pain is rated 10 out of 10 at worst.  Prior treatment has included ice, NSAIDs, elevation, rest good shoes, arch support.  *  Past Medical History:   Diagnosis Date     Allergic rhinitis      Asthma      Allergy induced     Hashimoto thyroiditis      Mild major depression (H) 2009     TMJ (temporomandibular joint disorder)    *  *  Past Surgical History:   Procedure Laterality Date      SECTION  10/10/10     CHOLECYSTECTOMY, LAPOROSCOPIC  2009    gallstones -      FINGER SURGERY      right little finger fracture     HYSTERECTOMY, PAP NO LONGER INDICATED  2018    ovaries remain - uterus and cervix removed     LAPAROSCOPIC GASTRIC SLEEVE N/A 2015    Procedure: LAPAROSCOPIC GASTRIC SLEEVE;  Surgeon: Cam Alvarez MD;  Location: [facility] OR   *  *  Current Outpatient Medications   Medication Sig Dispense Refill     albuterol (PROAIR HFA/PROVENTIL HFA/VENTOLIN HFA) 108 (90 Base) MCG/ACT inhaler Inhale 2 puffs into the lungs every 6 hours 18 g 1     azelastine (ASTELIN) 0.1 % nasal spray USE 1 TO 2 SPRAYS IN EACH NOSTRIL TWICE DAILY 30 mL 0     budesonide-formoterol (SYMBICORT) 160-4.5 MCG/ACT Inhaler Inhale 2 puffs into the lungs 2 times daily 6 g 3     cetirizine (ZYRTEC) 10 MG tablet Take 10 mg by mouth daily       Cholecalciferol (VITAMIN D3) 3000 UNITS TABS Take 1 tablet by mouth daily.       Cod Liver Oil 1000 MG CAPS Take 1 capsule by mouth daily.       fluticasone (FLONASE) 50 MCG/ACT nasal spray SHAKE LIQUID AND USE 1 TO 2 SPRAYS IN EACH NOSTRIL DAILY 16 g 3     hydrOXYzine (ATARAX) 10 MG tablet Take 1 tablet (10 mg) by mouth 2 times daily as needed for itching 60 tablet 1     Multiple Vitamins-Minerals (HM MULTIVITAMIN ADULT GUMMY PO)        olopatadine (PATANOL) 0.1 % ophthalmic solution Place 1 drop into both eyes 2 times daily 1 Bottle 11     omeprazole (PRILOSEC) 40 MG DR capsule Take 1 capsule (40 mg) by mouth daily       propranolol ER (INDERAL LA) 80 MG 24 hr capsule TAKE 1 CAPSULE(80 MG) BY MOUTH DAILY 90 capsule 1     SYNTHROID 125 MCG tablet Take 1 tablet (125 mcg) by mouth daily 30 tablet 1         EXAM:    Vitals: /74 (BP Location: Left arm)   LMP 2018  (Approximate)   BMI: There is no height or weight on file to calculate BMI.    Constitutional:  Kasandra Winkler is in no apparent distress, appears well-nourished.  Cooperative with history and physical exam.    Vascular:  Pedal pulses are palpable for both the DP and PT arteries.  CFT < 3 sec.  No edema.      Neuro: Light touch sensation is intact to the L4, L5, S1 distributions  No evidence of weakness, spasticity, or contracture in the lower extremities.     Derm: Normal texture and turgor.  No erythema, ecchymosis, or cyanosis.  No open lesions.     Musculoskeletal:    Lower extremity muscle strength is normal. No gross deformities.  For some pain on palpation with squeezing of the right calcaneus and palpation along the plantar fascial band.  Similar on the left, to a lesser degree.  No pain with squeezing the calcaneal body on the left.    X-Ray Findings:  I personally reviewed the right foot images.  Please see comments above

## 2022-12-09 NOTE — LETTER
12/9/2022         RE: Kasandra Winkler  [address] [address] [facility] [address]        Dear Colleague,    Thank you for referring your patient, Kasandra Winkler, to the [facility] [place]. Please see a copy of my visit note below.    ASSESSMENT:  Encounter Diagnoses   Name Primary?     Chronic pain in right foot Yes     Plantar fasciitis, bilateral      MEDICAL DECISION MAKING:  I personally reviewed the right foot x-ray images with Kasandra.  No calcaneal pathology.  I showed her all the bone spurs not involved in the weightbearing surface.  No other acute or chronic findings.    Her pain is consistent with plantar fascial pain, right greater than left.    Recommendations:  Tri-Lock brace with foot strap medial, due to her degree of pain on the right  Referral to physical therapy  Referral for custom molded orthoses    We discussed the causes and nature of arch and heel pain.  The anatomy and function of the plantar fascia was discussed.  The treatment plan discussed included calf and plantar fascial stretching, avoidance of barefoot walking, stiffer soled shoes, activity modification, over-the-counter arch supports versus custom orthoses, prn icing and NSAIDs.  A comprehensive After-Visit Summary was provided.     Follow-up in 4 to 6 weeks.  The problem is persisting or worsening, will consider advanced imaging.    Disclaimer: This note consists of symbols derived from keyboarding, dictation and/or voice recognition software. As a result, there may be errors in the script that have gone undetected. Please consider this when interpreting information found in this chart.    João Mcdonnell, JENNIE, FACFAS, MS    [facility]le [facility]      ____________________________________________________________________    HPI:       Kasandra presents today reporting a 10-year history of bilateral heel and arch pain.  Currently the right is more uncomfortable.  Pain is worse with  standing and walking activities.  She describes a burning pain.  Pain is rated 10 out of 10 at worst.  Prior treatment has included ice, NSAIDs, elevation, rest good shoes, arch support.  *  Past Medical History:   Diagnosis Date     Allergic rhinitis      Asthma     Allergy induced     Hashimoto thyroiditis      Mild major depression (H) 2009     TMJ (temporomandibular joint disorder)    *  *  Past Surgical History:   Procedure Laterality Date      SECTION  10/10/10     CHOLECYSTECTOMY, LAPOROSCOPIC  2009    gallstones -      FINGER SURGERY      right little finger fracture     HYSTERECTOMY, PAP NO LONGER INDICATED  2018    ovaries remain - uterus and cervix removed     LAPAROSCOPIC GASTRIC SLEEVE N/A 2015    Procedure: LAPAROSCOPIC GASTRIC SLEEVE;  Surgeon: Cam Alvarez MD;  Location: [facility]   *  *  Current Outpatient Medications   Medication Sig Dispense Refill     albuterol (PROAIR HFA/PROVENTIL HFA/VENTOLIN HFA) 108 (90 Base) MCG/ACT inhaler Inhale 2 puffs into the lungs every 6 hours 18 g 1     azelastine (ASTELIN) 0.1 % nasal spray USE 1 TO 2 SPRAYS IN EACH NOSTRIL TWICE DAILY 30 mL 0     budesonide-formoterol (SYMBICORT) 160-4.5 MCG/ACT Inhaler Inhale 2 puffs into the lungs 2 times daily 6 g 3     cetirizine (ZYRTEC) 10 MG tablet Take 10 mg by mouth daily       Cholecalciferol (VITAMIN D3) 3000 UNITS TABS Take 1 tablet by mouth daily.       Cod Liver Oil 1000 MG CAPS Take 1 capsule by mouth daily.       fluticasone (FLONASE) 50 MCG/ACT nasal spray SHAKE LIQUID AND USE 1 TO 2 SPRAYS IN EACH NOSTRIL DAILY 16 g 3     hydrOXYzine (ATARAX) 10 MG tablet Take 1 tablet (10 mg) by mouth 2 times daily as needed for itching 60 tablet 1     Multiple Vitamins-Minerals (HM MULTIVITAMIN ADULT GUMMY PO)        olopatadine (PATANOL) 0.1 % ophthalmic solution Place 1 drop into both eyes 2 times daily 1 Bottle 11     omeprazole (PRILOSEC) 40 MG DR capsule Take 1 capsule (40 mg) by mouth  daily       propranolol ER (INDERAL LA) 80 MG 24 hr capsule TAKE 1 CAPSULE(80 MG) BY MOUTH DAILY 90 capsule 1     SYNTHROID 125 MCG tablet Take 1 tablet (125 mcg) by mouth daily 30 tablet 1         EXAM:    Vitals: /74 (BP Location: Left arm)   LMP 06/25/2018 (Approximate)   BMI: There is no height or weight on file to calculate BMI.    Constitutional:  Kasandra Winkler is in no apparent distress, appears well-nourished.  Cooperative with history and physical exam.    Vascular:  Pedal pulses are palpable for both the DP and PT arteries.  CFT < 3 sec.  No edema.      Neuro: Light touch sensation is intact to the L4, L5, S1 distributions  No evidence of weakness, spasticity, or contracture in the lower extremities.     Derm: Normal texture and turgor.  No erythema, ecchymosis, or cyanosis.  No open lesions.     Musculoskeletal:    Lower extremity muscle strength is normal. No gross deformities.  For some pain on palpation with squeezing of the right calcaneus and palpation along the plantar fascial band.  Similar on the left, to a lesser degree.  No pain with squeezing the calcaneal body on the left.    X-Ray Findings:  I personally reviewed the right foot images.  Please see comments above            Again, thank you for allowing me to participate in the care of your patient.        Sincerely,        João Mcdonnell DPM

## 2022-12-19 ENCOUNTER — THERAPY VISIT (OUTPATIENT)
Dept: PHYSICAL THERAPY | Facility: CLINIC | Age: 42
End: 2022-12-19
Attending: PODIATRIST
Payer: COMMERCIAL

## 2022-12-19 DIAGNOSIS — M79.604 BILATERAL LOWER EXTREMITY PAIN: ICD-10-CM

## 2022-12-19 DIAGNOSIS — G89.29 CHRONIC PAIN IN RIGHT FOOT: ICD-10-CM

## 2022-12-19 DIAGNOSIS — M79.605 BILATERAL LOWER EXTREMITY PAIN: ICD-10-CM

## 2022-12-19 DIAGNOSIS — M72.2 PLANTAR FASCIITIS, BILATERAL: ICD-10-CM

## 2022-12-19 DIAGNOSIS — M79.671 CHRONIC PAIN IN RIGHT FOOT: ICD-10-CM

## 2022-12-19 PROCEDURE — 97530 THERAPEUTIC ACTIVITIES: CPT | Mod: GP | Performed by: PHYSICAL THERAPIST

## 2022-12-19 PROCEDURE — 97161 PT EVAL LOW COMPLEX 20 MIN: CPT | Mod: GP | Performed by: PHYSICAL THERAPIST

## 2022-12-19 PROCEDURE — 97110 THERAPEUTIC EXERCISES: CPT | Mod: GP | Performed by: PHYSICAL THERAPIST

## 2022-12-19 NOTE — PROGRESS NOTES
"Physical Therapy Initial Evaluation  Subjective:  The history is provided by the patient. No  was used.   Patient Health History           General health as reported by patient is fair.  Pertinent medical history includes: asthma, migraines/headaches, overweight and thyroid problems.   Red flags:  None as reported by patient.  Medical allergies: none.   Surgeries include:  Other.    Current medications:  Thyroid medication and other. Other medications details: Allergy.    Current occupation is  - FT (works from home, just bought standing desk).   Primary job tasks include:  Computer work and prolonged sitting.                  Therapist Generated HPI Evaluation  Problem details: 2008 pain started without injury bilateral plantar feet/heels, has been intermittent since and in the last year pain has been more in the right>left.  Pain is intermittent 0-10/10, describes as \"sharp\" right posterior and plantar aspect of heel, progresses into the arch and forefoot area and  left foot are arch>heel and intermittently into the shin.  For the last several months in addition she has been getting painful \"knots\" right distal quadricep which comes with tingling/numbness right anterior knee and in the last couple weeks has had intermittent pain left lateral knee and distal thigh.  She denies buttock symptoms, has history of LBP and left sciatica.  Currently she has minimal intermittent LBP.  Symptoms increase with sitting 15' (worse with knees flexed, better with LE elevated), standing/walking 15', bending forward, going up and down stairs (intermittently nonreciprocal/sideways).  Symptoms decrease with rest.  She has worn OTC orthotics for years, no relief with but gets more pain when going without.  She does have orders for prescription orthotics.     Xray right foot 12-9-22 MPRESSION: No acute fracture or malalignment. Mild first MTP joint  degenerative changes. Small plantar calcaneal " "enthesophyte..                     Pain is the same all the time.  Since onset symptoms are gradually worsening.     Special tests included:  X-ray.    Work activity restrictions: does not work outside the home.  Home/work barriers: asks for help to decrease time on her feet.                        Objective:  Standing Alignment:      Shoulder/UE:  Rounded shoulders  Lumbar:  Lordosis decr (poor sitting posutre)        Ankle/Foot:  Pes planus L and pes planus R    Gait:    Gait Type:  Normal   Assistive Devices:  None                 Lumbar/SI Evaluation  ROM:    AROM Lumbar:   Flexion:          WNL  Ext:                    WNL, \"stiffness\"   Side Bend:        Left:     Right:   Rotation:           Left:     Right:   Side Glide:        Left:  WNL-LBP    Right:  Min restriction - thoracic pain          Lumbar Myotomes:  normal                Lumbar Dermtomes:  normal                Neural Tension/Mobility:      Left side:Slump  negative.     Right side:   Slump  negative.                                                        Symptoms prior to test movements:  Pain right>left foot, right knee numbness  Correction of sitting posture with lumbar roll:  Decreased tingling right knee  Prone:  Right heel pain, \"tight/pain\" LB  REIL:  No effect  ZAID/sustained:  No effect    Palpation:  Tenderness bilateral plantar/medial heel and into arch right>left    Assessment/Plan:    Patient is a 42 year old female with right>left foot pain, bilateral leg and LBP complaints.    Patient has the following significant findings with corresponding treatment plan.                Diagnosis 1:  B foot pain    Pain -  self management, education and home program  Decreased ROM/flexibility - therapeutic exercise and home program  Inflammation - self management/home program  Decreased function - therapeutic activities and home program  Impaired posture - neuro re-education and home program    Cumulative Therapy Evaluation is: Low " complexity.    Previous and current functional limitations:  (See Goal Flow Sheet for this information)    Short term and Long term goals: (See Goal Flow Sheet for this information)     Communication ability:  Patient appears to be able to clearly communicate and understand verbal and written communication and follow directions correctly.  Treatment Explanation - The following has been discussed with the patient:   RX ordered/plan of care  Anticipated outcomes  Possible risks and side effects  This patient would benefit from PT intervention to resume normal activities.   Rehab potential is fair.    Frequency:  1 X week, once daily  Duration:  for 6-8 weeks  Discharge Plan:  Achieve all LTG.  Independent in home treatment program.  Reach maximal therapeutic benefit.    Please refer to the daily flowsheet for treatment today, total treatment time and time spent performing 1:1 timed codes.

## 2022-12-22 ENCOUNTER — E-VISIT (OUTPATIENT)
Dept: FAMILY MEDICINE | Facility: CLINIC | Age: 42
End: 2022-12-22
Payer: COMMERCIAL

## 2022-12-22 DIAGNOSIS — G43.109 OCULAR MIGRAINE: Primary | ICD-10-CM

## 2022-12-22 PROCEDURE — 99421 OL DIG E/M SVC 5-10 MIN: CPT | Performed by: FAMILY MEDICINE

## 2023-01-31 ENCOUNTER — OFFICE VISIT (OUTPATIENT)
Dept: FAMILY MEDICINE | Facility: CLINIC | Age: 43
End: 2023-01-31
Payer: COMMERCIAL

## 2023-01-31 VITALS
TEMPERATURE: 97.6 F | HEIGHT: 66 IN | WEIGHT: 260 LBS | HEART RATE: 83 BPM | BODY MASS INDEX: 41.78 KG/M2 | DIASTOLIC BLOOD PRESSURE: 87 MMHG | SYSTOLIC BLOOD PRESSURE: 139 MMHG | OXYGEN SATURATION: 96 % | RESPIRATION RATE: 16 BRPM

## 2023-01-31 DIAGNOSIS — Z11.59 NEED FOR HEPATITIS C SCREENING TEST: ICD-10-CM

## 2023-01-31 DIAGNOSIS — F33.0 MAJOR DEPRESSIVE DISORDER, RECURRENT EPISODE, MILD (H): ICD-10-CM

## 2023-01-31 DIAGNOSIS — Z00.00 ROUTINE GENERAL MEDICAL EXAMINATION AT A HEALTH CARE FACILITY: Primary | ICD-10-CM

## 2023-01-31 DIAGNOSIS — R00.2 POUNDING HEARTBEAT: ICD-10-CM

## 2023-01-31 DIAGNOSIS — E06.3 HYPOTHYROIDISM DUE TO HASHIMOTO'S THYROIDITIS: ICD-10-CM

## 2023-01-31 DIAGNOSIS — G43.109 OCULAR MIGRAINE: ICD-10-CM

## 2023-01-31 DIAGNOSIS — M79.671 PAIN OF RIGHT HEEL: ICD-10-CM

## 2023-01-31 PROBLEM — Z30.430 ENCOUNTER FOR IUD INSERTION: Status: RESOLVED | Noted: 2017-12-28 | Resolved: 2023-01-31

## 2023-01-31 PROBLEM — N92.0 MENORRHAGIA: Status: RESOLVED | Noted: 2017-12-13 | Resolved: 2023-01-31

## 2023-01-31 PROBLEM — N80.00 ENDOMETRIOSIS OF UTERUS: Status: RESOLVED | Noted: 2018-10-11 | Resolved: 2023-01-31

## 2023-01-31 LAB
ALBUMIN SERPL BCG-MCNC: 4 G/DL (ref 3.5–5.2)
ALP SERPL-CCNC: 87 U/L (ref 35–104)
ALT SERPL W P-5'-P-CCNC: 16 U/L (ref 10–35)
ANION GAP SERPL CALCULATED.3IONS-SCNC: 13 MMOL/L (ref 7–15)
AST SERPL W P-5'-P-CCNC: 21 U/L (ref 10–35)
BILIRUB SERPL-MCNC: 0.2 MG/DL
BUN SERPL-MCNC: 7.8 MG/DL (ref 6–20)
CALCIUM SERPL-MCNC: 9.5 MG/DL (ref 8.6–10)
CHLORIDE SERPL-SCNC: 104 MMOL/L (ref 98–107)
CHOLEST SERPL-MCNC: 251 MG/DL
CREAT SERPL-MCNC: 0.64 MG/DL (ref 0.51–0.95)
DEPRECATED HCO3 PLAS-SCNC: 23 MMOL/L (ref 22–29)
FSH SERPL IRP2-ACNC: 9.7 MIU/ML
GFR SERPL CREATININE-BSD FRML MDRD: >90 ML/MIN/1.73M2
GLUCOSE SERPL-MCNC: 93 MG/DL (ref 70–99)
HDLC SERPL-MCNC: 58 MG/DL
LDLC SERPL CALC-MCNC: 146 MG/DL
NONHDLC SERPL-MCNC: 193 MG/DL
POTASSIUM SERPL-SCNC: 4 MMOL/L (ref 3.4–5.3)
PROT SERPL-MCNC: 6.9 G/DL (ref 6.4–8.3)
SODIUM SERPL-SCNC: 140 MMOL/L (ref 136–145)
TRIGL SERPL-MCNC: 234 MG/DL
TSH SERPL DL<=0.005 MIU/L-ACNC: 1.39 UIU/ML (ref 0.3–4.2)

## 2023-01-31 PROCEDURE — 83001 ASSAY OF GONADOTROPIN (FSH): CPT | Performed by: FAMILY MEDICINE

## 2023-01-31 PROCEDURE — 99396 PREV VISIT EST AGE 40-64: CPT | Mod: 25 | Performed by: FAMILY MEDICINE

## 2023-01-31 PROCEDURE — 91312 COVID-19 VACCINE BIVALENT BOOSTER 12+ (PFIZER): CPT | Performed by: FAMILY MEDICINE

## 2023-01-31 PROCEDURE — 0124A COVID-19 VACCINE BIVALENT BOOSTER 12+ (PFIZER): CPT | Performed by: FAMILY MEDICINE

## 2023-01-31 PROCEDURE — 86803 HEPATITIS C AB TEST: CPT | Performed by: FAMILY MEDICINE

## 2023-01-31 PROCEDURE — 90471 IMMUNIZATION ADMIN: CPT | Performed by: FAMILY MEDICINE

## 2023-01-31 PROCEDURE — 80061 LIPID PANEL: CPT | Performed by: FAMILY MEDICINE

## 2023-01-31 PROCEDURE — 99214 OFFICE O/P EST MOD 30 MIN: CPT | Mod: 25 | Performed by: FAMILY MEDICINE

## 2023-01-31 PROCEDURE — 36415 COLL VENOUS BLD VENIPUNCTURE: CPT | Performed by: FAMILY MEDICINE

## 2023-01-31 PROCEDURE — 80053 COMPREHEN METABOLIC PANEL: CPT | Performed by: FAMILY MEDICINE

## 2023-01-31 PROCEDURE — 84443 ASSAY THYROID STIM HORMONE: CPT | Performed by: FAMILY MEDICINE

## 2023-01-31 PROCEDURE — 90686 IIV4 VACC NO PRSV 0.5 ML IM: CPT | Performed by: FAMILY MEDICINE

## 2023-01-31 RX ORDER — PROPRANOLOL HYDROCHLORIDE 80 MG/1
CAPSULE, EXTENDED RELEASE ORAL
Qty: 90 CAPSULE | Refills: 1 | Status: SHIPPED | OUTPATIENT
Start: 2023-01-31 | End: 2023-09-26

## 2023-01-31 RX ORDER — LEVOTHYROXINE SODIUM 125 MCG
125 TABLET ORAL DAILY
Qty: 30 TABLET | Refills: 11 | Status: SHIPPED | OUTPATIENT
Start: 2023-01-31 | End: 2024-02-27

## 2023-01-31 RX ORDER — BUPROPION HYDROCHLORIDE 150 MG/1
150 TABLET ORAL EVERY MORNING
Qty: 30 TABLET | Refills: 1 | Status: SHIPPED | OUTPATIENT
Start: 2023-01-31 | End: 2024-04-01

## 2023-01-31 ASSESSMENT — ENCOUNTER SYMPTOMS
BREAST MASS: 0
HEADACHES: 1
DYSURIA: 0
NERVOUS/ANXIOUS: 1
FEVER: 0
SORE THROAT: 0
HEARTBURN: 1
PARESTHESIAS: 1
DIZZINESS: 0
HEMATOCHEZIA: 0
JOINT SWELLING: 0
NAUSEA: 0
ABDOMINAL PAIN: 0
CHILLS: 0
CONSTIPATION: 1
PALPITATIONS: 1
COUGH: 0
DIARRHEA: 0
WEAKNESS: 0
MYALGIAS: 0
ARTHRALGIAS: 1
HEMATURIA: 0
EYE PAIN: 0
FREQUENCY: 0
SHORTNESS OF BREATH: 0

## 2023-01-31 ASSESSMENT — PAIN SCALES - GENERAL: PAINLEVEL: NO PAIN (0)

## 2023-01-31 NOTE — PROGRESS NOTES
SUBJECTIVE:   CC: Kasandra is an 42 year old who presents for preventive health visit.     Healthy Habits:     Getting at least 3 servings of Calcium per day:  Yes    Bi-annual eye exam:  Yes    Dental care twice a year:  Yes    Sleep apnea or symptoms of sleep apnea:  None    Diet:  Regular (no restrictions)    Frequency of exercise:  None    Taking medications regularly:  Yes    Medication side effects:  None    PHQ-2 Total Score: 6    Additional concerns today:  Yes          PROBLEMS TO ADD ON...  -------------------------------------  Abnormal Mood Symptoms      Duration: depression and anxiety -     Description:  Depression: YES  Anxiety: YES  Panic attacks: no      Accompanying signs and symptoms: see PHQ-9 and FOREST scores    History (similar episodes/previous evaluation): None    Precipitating or alleviating factors: None    Therapies tried and outcome: see problem list for details      Today's PHQ-2 Score:   PHQ-2 (  [employer]) 2023   Q1: Little interest or pleasure in doing things 3   Q2: Feeling down, depressed or hopeless 3   PHQ-2 Score 6   PHQ-2 Total Score (12-17 Years)- Positive if 3 or more points; Administer PHQ-A if positive -   Q1: Little interest or pleasure in doing things Nearly every day   Q2: Feeling down, depressed or hopeless Nearly every day   PHQ-2 Score 6       Have you ever done Advance Care Planning? (For example, a Health Directive, POLST, or a discussion with a medical provider or your loved ones about your wishes): No, advance care planning information given to patient to review.  Patient declined advance care planning discussion at this time.    Social History     Tobacco Use     Smoking status: Former     Packs/day: 0.50     Years: 10.00     Pack years: 5.00     Types: Cigarettes     Quit date: 2009     Years since quittin.5     Smokeless tobacco: Never     Tobacco comments:     social smoker for 10 yr   Substance Use Topics     Alcohol use: Yes     Comment:  occasional ETOH use     If you drink alcohol do you typically have >3 drinks per day or >7 drinks per week? No    Alcohol Use 2023   Prescreen: >3 drinks/day or >7 drinks/week? No   Prescreen: >3 drinks/day or >7 drinks/week? -   No flowsheet data found.    Reviewed orders with patient.  Reviewed health maintenance and updated orders accordingly - Yes  Patient Active Problem List   Diagnosis     CARDIOVASCULAR SCREENING; LDL GOAL LESS THAN 160     Hypothyroidism     Obesity     Hashimoto's thyroiditis     Hypovitaminosis D     Fatigue     Ovarian cyst     Major depressive disorder, recurrent episode, mild (H)     Asthma with allergic rhinitis     Status post partial gastrectomy     Anxiety     Allergic rhinitis due to pollen     Iron deficiency anemia     Migraine headache     History of hysterectomy     Morbid obesity (H)     Major depressive disorder, recurrent episode, moderate (H)     Bilateral lower extremity pain     Past Surgical History:   Procedure Laterality Date      SECTION  10/10/10     CHOLECYSTECTOMY, LAPOROSCOPIC  2009    gallstones -      FINGER SURGERY      right little finger fracture     HYSTERECTOMY, PAP NO LONGER INDICATED  2018    ovaries remain - uterus and cervix removed     LAPAROSCOPIC GASTRIC SLEEVE N/A 2015    Procedure: LAPAROSCOPIC GASTRIC SLEEVE;  Surgeon: Cam Alvarez MD;  Location:  OR       Social History     Tobacco Use     Smoking status: Former     Packs/day: 0.50     Years: 10.00     Pack years: 5.00     Types: Cigarettes     Quit date: 2009     Years since quittin.5     Smokeless tobacco: Never     Tobacco comments:     social smoker for 10 yr   Substance Use Topics     Alcohol use: Yes     Comment: occasional ETOH use     Family History   Problem Relation Age of Onset     Osteoporosis Mother      Hypertension Mother      Cerebrovascular Disease Mother         Aneurysm     Substance Abuse Mother      Heart Disease Maternal  Grandmother      Hypertension Maternal Grandmother      Arthritis Maternal Grandmother         RA      Respiratory Maternal Grandmother         Asthma     Glaucoma No family hx of      Macular Degeneration No family hx of            Breast Cancer Screening:    Breast CA Risk Assessment (FHS-7) 1/31/2023   Do you have a family history of breast, colon, or ovarian cancer? No / Unknown         Mammogram Screening - Offered annual screening and updated Health Maintenance for [place] plan based on risk factor consideration    Pertinent mammograms are reviewed under the imaging tab.    History of abnormal Pap smear: Status post benign hysterectomy. Health Maintenance and Surgical History updated.  PAP / HPV 9/27/2013 12/13/2010 9/30/2009   PAP (Historical) NIL NIL NIL     Reviewed and updated as needed this visit by clinical staff   Tobacco  Allergies  Meds              Reviewed and updated as needed this visit by Provider                     Review of Systems   Constitutional: Negative for chills and fever.   HENT: Negative for congestion, ear pain, hearing loss and sore throat.    Eyes: Negative for pain and visual disturbance.   Respiratory: Negative for cough and shortness of breath.    Cardiovascular: Positive for chest pain, palpitations and peripheral edema.   Gastrointestinal: Positive for constipation and heartburn. Negative for abdominal pain, diarrhea, hematochezia and nausea.   Breasts:  Negative for tenderness, breast mass and discharge.   Genitourinary: Negative for dysuria, frequency, genital sores, hematuria, pelvic pain, urgency, vaginal bleeding and vaginal discharge.   Musculoskeletal: Positive for arthralgias. Negative for joint swelling and myalgias.   Skin: Negative for rash.   Neurological: Positive for headaches and paresthesias. Negative for dizziness and weakness.   Psychiatric/Behavioral: Positive for mood changes. The patient is nervous/anxious.         OBJECTIVE:   /87   Pulse 83    "Temp 97.6  F (36.4  C) (Temporal)   Resp 16   Ht 1.664 m (5' 5.5\")   Wt 117.9 kg (260 lb)   LMP 06/25/2018 (Approximate)   SpO2 96%   BMI 42.61 kg/m    Physical Exam  GENERAL: alert and no distress  EYES: Eyes grossly normal to inspection, PERRL and conjunctivae and sclerae normal  HENT: ear canals and TM's normal, nose and mouth without ulcers or lesions  NECK: no adenopathy, no asymmetry, masses, or scars and thyroid normal to palpation  RESP: lungs clear to auscultation - no rales, rhonchi or wheezes  BREAST: normal without masses, tenderness or nipple discharge and no palpable axillary masses or adenopathy  CV: regular rate and rhythm, normal S1 S2, no S3 or S4, no murmur, click or rub, no peripheral edema and peripheral pulses strong  ABDOMEN: soft, nontender, no hepatosplenomegaly, no masses and bowel sounds normal  MS: no gross musculoskeletal defects noted, no edema  SKIN: no suspicious lesions or rashes  NEURO: Normal strength and tone, mentation intact and speech normal  PSYCH: mentation appears normal, affect normal/bright    Diagnostic Test Results:  Labs reviewed in Epic    ASSESSMENT/PLAN:   (Z00.00) Routine general medical examination at a health care facility  (primary encounter diagnosis)  Comment:    Plan: Follicle stimulating hormone, Lipid panel         reflex to direct LDL Fasting, Comprehensive         metabolic panel (BMP + Alb, Alk Phos, ALT, AST,        Total. Bili, TP)             (Z11.59) Need for hepatitis C screening test  Comment:    Plan: Hepatitis C Screen Reflex to HCV RNA Quant and         Genotype             (E03.8,  E06.3) Hypothyroidism due to Hashimoto's thyroiditis  Comment: TSH recheck today -   Adjusted med in November and due for recheck   Plan: SYNTHROID 125 MCG tablet             (F33.0) Major depressive disorder, recurrent episode, mild (H)  Comment: depression worsening with some anxiety -   Will start wellbutrin   F/u 4-6 weeks   Plan: Comprehensive metabolic " panel (BMP + Alb, Alk         Phos, ALT, AST, Total. Bili, TP), buPROPion         (WELLBUTRIN XL) 150 MG 24 hr tablet             (R00.2) Pounding heartbeat  Comment: refilled   Plan: propranolol ER (INDERAL LA) 80 MG 24 hr capsule             (M79.671) Pain of right heel  Comment: suspect plantar fascitis or possibly calcaneal stress fracture -   Plan:  conservative therapy     (G43.109) Ocular migraine  Comment: will be seeing neurology for evaluation  Plan:      Patient has been advised of split billing requirements and indicates understanding: Yes      COUNSELING:  Reviewed preventive health counseling, as reflected in patient instructions        She reports that she quit smoking about 13 years ago. Her smoking use included cigarettes. She has a 5.00 pack-year smoking history. She has never used smokeless tobacco.      Trish Callaway, [facility]  Answers for HPI/ROS submitted by the patient on 1/31/2023  If you checked off any problems, how difficult have these problems made it for you to do your work, take care of things at home, or get along with other people?: Extremely difficult  PHQ9 TOTAL SCORE: 21

## 2023-02-01 LAB — HCV AB SERPL QL IA: NONREACTIVE

## 2023-02-01 NOTE — RESULT ENCOUNTER NOTE
Dear Kasandra,   Your test results are all back -   -Cholesterol levels (LDL,HDL, Triglycerides) are improved. ADVISE: rechecking in 1 year.   -Liver and gallbladder tests are normal (ALT,AST, Alk phos, bilirubin), kidney function is normal (Cr, GFR), sodium is normal, potassium is normal, calcium is normal, glucose is normal.  -TSH (thyroid stimulating hormone) level is normal which indicates appropriate thyroid replacement dosing.  ADVISE: continuing same replacement dose and rechecking this in 12 months.  -FSH shows you are not postmenopausal  Let us know if you have any questions.  -Trish Callaway, DO

## 2023-02-16 ENCOUNTER — MYC MEDICAL ADVICE (OUTPATIENT)
Dept: FAMILY MEDICINE | Facility: CLINIC | Age: 43
End: 2023-02-16
Payer: COMMERCIAL

## 2023-02-17 NOTE — TELEPHONE ENCOUNTER
PN,  Please see below [employer] for Parents message and advise.  Thanks,  Christiana PALMER RN

## 2023-05-02 NOTE — PROGRESS NOTES
5-2-23 Patient seen for one time evaluation and treatment.  Patient did not return for further treatment and current status is unknown.  Please see initial evaluation for further information. Catherine Trinidad PT

## 2023-06-13 ENCOUNTER — LAB (OUTPATIENT)
Dept: LAB | Facility: CLINIC | Age: 43
End: 2023-06-13
Payer: COMMERCIAL

## 2023-06-13 DIAGNOSIS — E03.9 ACQUIRED HYPOTHYROIDISM: ICD-10-CM

## 2023-06-13 PROCEDURE — 84443 ASSAY THYROID STIM HORMONE: CPT

## 2023-06-13 PROCEDURE — 36415 COLL VENOUS BLD VENIPUNCTURE: CPT

## 2023-06-14 LAB — TSH SERPL DL<=0.005 MIU/L-ACNC: 1.88 UIU/ML (ref 0.3–4.2)

## 2023-09-05 ENCOUNTER — VIRTUAL VISIT (OUTPATIENT)
Dept: FAMILY MEDICINE | Facility: CLINIC | Age: 43
End: 2023-09-05
Payer: COMMERCIAL

## 2023-09-05 DIAGNOSIS — J30.1 ALLERGIC RHINITIS DUE TO POLLEN, UNSPECIFIED SEASONALITY: ICD-10-CM

## 2023-09-05 DIAGNOSIS — E06.3 HYPOTHYROIDISM DUE TO HASHIMOTO'S THYROIDITIS: ICD-10-CM

## 2023-09-05 DIAGNOSIS — F33.0 MAJOR DEPRESSIVE DISORDER, RECURRENT EPISODE, MILD (H): ICD-10-CM

## 2023-09-05 DIAGNOSIS — E66.01 MORBID OBESITY (H): Primary | ICD-10-CM

## 2023-09-05 DIAGNOSIS — R00.2 POUNDING HEARTBEAT: ICD-10-CM

## 2023-09-05 DIAGNOSIS — J45.40 MODERATE PERSISTENT ASTHMA WITH ALLERGIC RHINITIS WITHOUT COMPLICATION: ICD-10-CM

## 2023-09-05 PROCEDURE — 99213 OFFICE O/P EST LOW 20 MIN: CPT | Mod: VID | Performed by: FAMILY MEDICINE

## 2023-09-05 ASSESSMENT — ANXIETY QUESTIONNAIRES
5. BEING SO RESTLESS THAT IT IS HARD TO SIT STILL: NOT AT ALL
IF YOU CHECKED OFF ANY PROBLEMS ON THIS QUESTIONNAIRE, HOW DIFFICULT HAVE THESE PROBLEMS MADE IT FOR YOU TO DO YOUR WORK, TAKE CARE OF THINGS AT HOME, OR GET ALONG WITH OTHER PEOPLE: EXTREMELY DIFFICULT
3. WORRYING TOO MUCH ABOUT DIFFERENT THINGS: NEARLY EVERY DAY
GAD7 TOTAL SCORE: 18
1. FEELING NERVOUS, ANXIOUS, OR ON EDGE: NEARLY EVERY DAY
GAD7 TOTAL SCORE: 18
7. FEELING AFRAID AS IF SOMETHING AWFUL MIGHT HAPPEN: NEARLY EVERY DAY
4. TROUBLE RELAXING: NEARLY EVERY DAY
6. BECOMING EASILY ANNOYED OR IRRITABLE: NEARLY EVERY DAY
2. NOT BEING ABLE TO STOP OR CONTROL WORRYING: NEARLY EVERY DAY

## 2023-09-05 ASSESSMENT — PATIENT HEALTH QUESTIONNAIRE - PHQ9
SUM OF ALL RESPONSES TO PHQ QUESTIONS 1-9: 19
10. IF YOU CHECKED OFF ANY PROBLEMS, HOW DIFFICULT HAVE THESE PROBLEMS MADE IT FOR YOU TO DO YOUR WORK, TAKE CARE OF THINGS AT HOME, OR GET ALONG WITH OTHER PEOPLE: EXTREMELY DIFFICULT
SUM OF ALL RESPONSES TO PHQ QUESTIONS 1-9: 19

## 2023-09-05 ASSESSMENT — ASTHMA QUESTIONNAIRES: ACT_TOTALSCORE: 19

## 2023-09-05 NOTE — PROGRESS NOTES
"Kasandra is a 42 year old who is being evaluated via a billable video visit.      How would you like to obtain your AVS? MyChart  If the video visit is dropped, the invitation should be resent by: Text to cell phone: [phone]  Will anyone else be joining your video visit? No          Assessment & Plan     Morbid obesity (H)  Plan: long discussion about medications, MOA, side effects, rebound weight gain.  She is willing to try it.  Need for increase in the monthly dose also discussed.  It may take weeks or max dose to see the impact.  - Semaglutide-Weight Management (WEGOVY) 0.25 MG/0.5ML pen; Inject 0.25 mg Subcutaneous once a week    Follow up on phone with me  or primary care physicain for wegovy refills- unless they are managed by MTM or weight management team   - Med Therapy Management Referral  - Adult C  Potential medication side effects were discussed with the patient; let me know if any occur.    Moderate persistent asthma with allergic rhinitis without complication  Allergic rhinitis due to pollen, unspecified seasonality  Major depressive disorder, recurrent episode, mild (H)  Hypothyroidism due to Hashimoto's thyroiditis  Pounding heartbeat  - considered this problem when making today's plans  - no interventions today       -followed by primary care physicain       Review of external notes as documented elsewhere in note  I spent a total of 24 minutes on the day of the visit.   Time spent by me doing chart review, history and exam, documentation and further activities per the note       BMI:   Estimated body mass index is 42.61 kg/m  as calculated from the following:    Height as of 1/31/23: 1.664 m (5' 5.5\").    Weight as of 1/31/23: 117.9 kg (260 lb).     Ernestine Carey MD  [facility]    Subjective   Kasandra is a 42 year old, presenting for the following health issues:  Weight Problem      History of Present Illness       Reason for visit:  Concerns about my weight gain    She " eats 2-3 servings of fruits and vegetables daily.She consumes 1 sweetened beverage(s) daily.She exercises with enough effort to increase her heart rate 20 to 29 minutes per day.  She exercises with enough effort to increase her heart rate 4 days per week.   She is taking medications regularly.   Wants ozempic.  Weight gain is perpatuating depression  Never too wellbutrin      She feels- 3 yrs ago 200 lbs & was at a better weight and better mental state of mind  And over the course- definite persistent weight gain  Sometimes food makes her happy , so the portion control is difficult.  Also feels weight gain- is covid related-.  Trying to work on weight on own- currently focus on protein in am- as eggs in am.  Avoid snacks. Trying to get out and over daily, even little short period of time.    Wt Readings from Last 5 Encounters:   01/31/23 117.9 kg (260 lb)   12/06/22 115.9 kg (255 lb 9.6 oz)   09/20/22 110 kg (242 lb 8 oz)   06/09/22 94.3 kg (208 lb)   12/03/21 94.8 kg (208 lb 14.4 oz)         Review of Systems   Constitutional, HEENT, cardiovascular, pulmonary, GI, , musculoskeletal, neuro, skin, endocrine and psych systems are negative, except as otherwise noted.      Objective           Vitals:  No vitals were obtained today due to virtual visit.    Physical Exam   GENERAL: Healthy, alert and no distress  EYES: Eyes grossly normal to inspection.  No discharge or erythema, or obvious scleral/conjunctival abnormalities.  RESP: No audible wheeze, cough, or visible cyanosis.  No visible retractions or increased work of breathing.    SKIN: Visible skin clear. No significant rash, abnormal pigmentation or lesions.  NEURO: Cranial nerves grossly intact.  Mentation and speech appropriate for age.  PSYCH: Mentation appears normal, affect normal/bright, judgement and insight intact, normal speech and appearance well-groomed.            Video-Visit Details    Type of service:  Video Visit     Originating  Location (pt. Location): Home    Distant Location (provider location):  On-site  Platform used for Video Visit: [employer]

## 2023-09-06 ENCOUNTER — TELEPHONE (OUTPATIENT)
Dept: FAMILY MEDICINE | Facility: CLINIC | Age: 43
End: 2023-09-06
Payer: COMMERCIAL

## 2023-09-06 NOTE — TELEPHONE ENCOUNTER
MTM referral from: [facility] visit (referral by provider)    MTM referral outreach attempt #1 on September 6, 2023 at 11:42 AM      Outcome: Patient is not interested at this time since insurance does not cover, will route to MTM Pharmacist/Provider as an FYI. Thank you for the referral.     Use private pay for the carrier/Plan on the flowsheet    JOSE DE JESUS Waller      Reason for Referral:  wegovy- teaching and taper up dose as needed          
SURGERY

## 2023-09-06 NOTE — TELEPHONE ENCOUNTER
Prior Authorization Retail Medication Request    Medication/Dose: Semaglutide-Weight Management (WEGOVY) 0.25 MG/0.5ML pen 2 mL 0 9/5/2023  --  Sig - Route: Inject 0.25 mg Subcutaneous once a week - Subcutaneous  Sent to pharmacy as: Semaglutide-Weight Management 0.25 MG/0.5ML Subcutaneous Solution Auto-injector (WEGOVY)      ICD code (if different than what is on RX):  E66.01  Previously Tried and Failed:  Unknown  Rationale:  Unknown    Insurance Name:  [employer]  Insurance ID:  [id number]      Pharmacy Information (if different than what is on RX)  Name:     [id number] - [place], [place] - [address] AT [facility] OF [facility] [address]      Phone:  [phone]

## 2023-09-07 NOTE — TELEPHONE ENCOUNTER
PA Initiation    Medication: WEGOVY 0.25 MG/0.5ML SC SOAJ  Insurance Company: [employer] - Phone [phone] Fax [phone]  Pharmacy Filling the Rx: [id number] - [place] - [address] AT [facility] OF [facility] [address]  Filling Pharmacy Phone: [phone]  Filling Pharmacy Fax:    Start Date: 9/7/2023

## 2023-09-08 NOTE — TELEPHONE ENCOUNTER
PRIOR AUTHORIZATION DENIED    Medication: WEGOVY 0.25 MG/0.5ML SC SOAJ  Insurance Company: [employer] - Phone [phone] Fax [phone]  Denial Date: 9/8/2023  Denial Rational: Medication is Excluded from coverage      Appeal Information:   Patient Notified: No

## 2023-09-11 NOTE — TELEPHONE ENCOUNTER
Lori JIMENEZ see below medication was denied for patient    If you would like to start appeal please write Letter of Medical Necessity ID number [id number] under the Letter tab and send back to [facility] PA MED pool.     Thank you,     Betsey Yepez RN

## 2023-09-12 NOTE — TELEPHONE ENCOUNTER
Please let patient know her insurance does not cover wegovy   Not clear if she discussed other options with dr. Carey who saw her and wrote for this medication.  Will route to Dr. Carey as well for her input.  PN

## 2023-09-13 ENCOUNTER — MYC MEDICAL ADVICE (OUTPATIENT)
Dept: FAMILY MEDICINE | Facility: CLINIC | Age: 43
End: 2023-09-13
Payer: COMMERCIAL

## 2023-09-13 NOTE — TELEPHONE ENCOUNTER
Dear Triage,    Patient was given wt management referral as well.  Wegovy not covered.  Multiple oral medications options can be discussed with either MTM or wt management.  Both referral were given    Thanks

## 2023-09-13 NOTE — TELEPHONE ENCOUNTER
Provided scheduling information   Will check insurance first  And call back if running into any issues  Debra MERAZ RN

## 2023-09-25 DIAGNOSIS — R00.2 POUNDING HEARTBEAT: ICD-10-CM

## 2023-09-26 RX ORDER — PROPRANOLOL HYDROCHLORIDE 80 MG/1
CAPSULE, EXTENDED RELEASE ORAL
Qty: 90 CAPSULE | Refills: 0 | Status: SHIPPED | OUTPATIENT
Start: 2023-09-26 | End: 2024-01-02

## 2023-09-26 NOTE — TELEPHONE ENCOUNTER
"Prescription approved per [facility] Refill Protocol.  Requested Prescriptions   Pending Prescriptions Disp Refills    propranolol ER (INDERAL LA) 80 MG 24 hr capsule [Pharmacy Med Name: PROPRANOLOL ER 80MG CAPSULES] 90 capsule 0     Sig: TAKE 1 CAPSULE(80 MG) BY MOUTH DAILY       Beta-Blockers Protocol Passed - 9/25/2023  8:14 AM        Passed - Blood pressure under 140/90 in past 12 months     BP Readings from Last 3 Encounters:   01/31/23 139/87   12/09/22 108/74   12/06/22 113/77                 Passed - Patient is age 6 or older        Passed - Recent (12 mo) or future (30 days) visit within the authorizing provider's specialty     Patient has had an office visit with the authorizing provider or a provider within the authorizing providers department within the previous 12 mos or has a future within next 30 days. See \"Patient Info\" tab in inbasket, or \"Choose Columns\" in Meds & Orders section of the refill encounter.              Passed - Medication is active on med list           Debra MERAZ RN    "

## 2023-10-11 ENCOUNTER — IMMUNIZATION (OUTPATIENT)
Dept: FAMILY MEDICINE | Facility: CLINIC | Age: 43
End: 2023-10-11
Payer: COMMERCIAL

## 2023-10-11 DIAGNOSIS — Z23 NEED FOR PROPHYLACTIC VACCINATION AND INOCULATION AGAINST INFLUENZA: Primary | ICD-10-CM

## 2023-10-11 DIAGNOSIS — Z23 HIGH PRIORITY FOR 2019-NCOV VACCINE: ICD-10-CM

## 2023-10-11 PROCEDURE — 99207 PR NO CHARGE NURSE ONLY: CPT

## 2023-10-11 PROCEDURE — 90686 IIV4 VACC NO PRSV 0.5 ML IM: CPT

## 2023-10-11 PROCEDURE — 91320 SARSCV2 VAC 30MCG TRS-SUC IM: CPT

## 2023-10-11 PROCEDURE — 90471 IMMUNIZATION ADMIN: CPT

## 2023-10-11 PROCEDURE — 90480 ADMN SARSCOV2 VAC 1/ONLY CMP: CPT

## 2023-10-23 ENCOUNTER — OFFICE VISIT (OUTPATIENT)
Dept: FAMILY MEDICINE | Facility: CLINIC | Age: 43
End: 2023-10-23
Payer: COMMERCIAL

## 2023-10-23 VITALS
HEART RATE: 72 BPM | TEMPERATURE: 98.6 F | WEIGHT: 254.9 LBS | DIASTOLIC BLOOD PRESSURE: 78 MMHG | HEIGHT: 66 IN | OXYGEN SATURATION: 97 % | SYSTOLIC BLOOD PRESSURE: 132 MMHG | BODY MASS INDEX: 40.97 KG/M2 | RESPIRATION RATE: 16 BRPM

## 2023-10-23 DIAGNOSIS — R00.2 PALPITATIONS: ICD-10-CM

## 2023-10-23 DIAGNOSIS — R42 DIZZINESS: Primary | ICD-10-CM

## 2023-10-23 DIAGNOSIS — R53.83 OTHER FATIGUE: ICD-10-CM

## 2023-10-23 LAB
ALBUMIN SERPL BCG-MCNC: 3.9 G/DL (ref 3.5–5.2)
ALP SERPL-CCNC: 73 U/L (ref 35–104)
ALT SERPL W P-5'-P-CCNC: 19 U/L (ref 0–50)
ANION GAP SERPL CALCULATED.3IONS-SCNC: 9 MMOL/L (ref 7–15)
AST SERPL W P-5'-P-CCNC: 20 U/L (ref 0–45)
BILIRUB SERPL-MCNC: 0.2 MG/DL
BUN SERPL-MCNC: 9.3 MG/DL (ref 6–20)
CALCIUM SERPL-MCNC: 9 MG/DL (ref 8.6–10)
CHLORIDE SERPL-SCNC: 106 MMOL/L (ref 98–107)
CREAT SERPL-MCNC: 0.63 MG/DL (ref 0.51–0.95)
DEPRECATED HCO3 PLAS-SCNC: 24 MMOL/L (ref 22–29)
EGFRCR SERPLBLD CKD-EPI 2021: >90 ML/MIN/1.73M2
ERYTHROCYTE [DISTWIDTH] IN BLOOD BY AUTOMATED COUNT: 13.8 % (ref 10–15)
FERRITIN SERPL-MCNC: 39 NG/ML (ref 6–175)
FSH SERPL IRP2-ACNC: 1.8 MIU/ML
GLUCOSE SERPL-MCNC: 92 MG/DL (ref 70–99)
HCT VFR BLD AUTO: 38.9 % (ref 35–47)
HGB BLD-MCNC: 12.3 G/DL (ref 11.7–15.7)
MCH RBC QN AUTO: 28 PG (ref 26.5–33)
MCHC RBC AUTO-ENTMCNC: 31.6 G/DL (ref 31.5–36.5)
MCV RBC AUTO: 89 FL (ref 78–100)
PLATELET # BLD AUTO: 322 10E3/UL (ref 150–450)
POTASSIUM SERPL-SCNC: 3.9 MMOL/L (ref 3.4–5.3)
PROT SERPL-MCNC: 6.8 G/DL (ref 6.4–8.3)
RBC # BLD AUTO: 4.39 10E6/UL (ref 3.8–5.2)
SODIUM SERPL-SCNC: 139 MMOL/L (ref 135–145)
T3 SERPL-MCNC: 92 NG/DL (ref 85–202)
T4 FREE SERPL-MCNC: 1.3 NG/DL (ref 0.9–1.7)
TSH SERPL DL<=0.005 MIU/L-ACNC: 2.05 UIU/ML (ref 0.3–4.2)
VIT D+METAB SERPL-MCNC: 20 NG/ML (ref 20–50)
WBC # BLD AUTO: 9.2 10E3/UL (ref 4–11)

## 2023-10-23 PROCEDURE — 84480 ASSAY TRIIODOTHYRONINE (T3): CPT | Performed by: FAMILY MEDICINE

## 2023-10-23 PROCEDURE — 93000 ELECTROCARDIOGRAM COMPLETE: CPT | Performed by: FAMILY MEDICINE

## 2023-10-23 PROCEDURE — 80053 COMPREHEN METABOLIC PANEL: CPT | Performed by: FAMILY MEDICINE

## 2023-10-23 PROCEDURE — 99213 OFFICE O/P EST LOW 20 MIN: CPT | Mod: 25 | Performed by: FAMILY MEDICINE

## 2023-10-23 PROCEDURE — 85027 COMPLETE CBC AUTOMATED: CPT | Performed by: FAMILY MEDICINE

## 2023-10-23 PROCEDURE — 82306 VITAMIN D 25 HYDROXY: CPT | Performed by: FAMILY MEDICINE

## 2023-10-23 PROCEDURE — 83001 ASSAY OF GONADOTROPIN (FSH): CPT | Performed by: FAMILY MEDICINE

## 2023-10-23 PROCEDURE — 84439 ASSAY OF FREE THYROXINE: CPT | Performed by: FAMILY MEDICINE

## 2023-10-23 PROCEDURE — 36415 COLL VENOUS BLD VENIPUNCTURE: CPT | Performed by: FAMILY MEDICINE

## 2023-10-23 PROCEDURE — 82728 ASSAY OF FERRITIN: CPT | Performed by: FAMILY MEDICINE

## 2023-10-23 PROCEDURE — 84443 ASSAY THYROID STIM HORMONE: CPT | Performed by: FAMILY MEDICINE

## 2023-10-23 ASSESSMENT — PAIN SCALES - GENERAL: PAINLEVEL: MODERATE PAIN (5)

## 2023-10-23 ASSESSMENT — ASTHMA QUESTIONNAIRES: ACT_TOTALSCORE: 23

## 2023-10-23 ASSESSMENT — PATIENT HEALTH QUESTIONNAIRE - PHQ9
SUM OF ALL RESPONSES TO PHQ QUESTIONS 1-9: 8
10. IF YOU CHECKED OFF ANY PROBLEMS, HOW DIFFICULT HAVE THESE PROBLEMS MADE IT FOR YOU TO DO YOUR WORK, TAKE CARE OF THINGS AT HOME, OR GET ALONG WITH OTHER PEOPLE: VERY DIFFICULT
SUM OF ALL RESPONSES TO PHQ QUESTIONS 1-9: 8

## 2023-10-23 NOTE — PROGRESS NOTES
Assessment & Plan     Kasandra was seen today for fatigue and headache.    Diagnoses and all orders for this visit:    Other fatigue  Dizziness   no specific findings to suggest a clear cause.  Patient is reassured that fatigue is common and does not always represent an active disease process. It may be related to stress, overwork, not getting enough exercise, not getting enough sleep, medication use, a mild viral infection, or an as-yet unknown medical problem that may evolve over time. I have suggested observation for worsening or other new symptoms such as pain, fever or weight loss and running a few tests, as ordered. She will follow up if symptoms persist or worsen.  -     Ferritin; Future  -     Vitamin D Deficiency; Future  -     CBC with platelets; Future  -     TSH; Future  -     T3, total; Future  -     T4, free; Future  -     Comprehensive metabolic panel (BMP + Alb, Alk Phos, ALT, AST, Total. Bili, TP); Future  -     Follicle stimulating hormone; Future    Palpitations  -     EKG 12-lead complete w/read - Clinics  -     Follicle stimulating hormone; Future        Other orders  -     REVIEW OF HEALTH MAINTENANCE PROTOCOL ORDERS        Return in about 3 days (around 10/26/2023) for Follow-up visit-  if symptoms failed to improve.           Canelo Self MD  [facility]   Kasandra is a 42 year old, presenting for the following health issues:  Fatigue and Headache        10/23/2023    11:49 AM   Additional Questions   Roomed by Blanquita OROZCO   Accompanied by n/a       History of Present Illness       Reason for visit:  Im lethargic and lightheaded whenever i stand or moce, heart is pounding  Symptom onset:  3-4 weeks ago  Symptoms include:  Headed whenever i stand or moce, heart is pounding  Symptom intensity:  Severe  Symptom progression:  Worsening  Had these symptoms before:  No  What makes it worse:  Movement or standing  What makes it better:  Laying down    She eats 2-3  "servings of fruits and vegetables daily.She consumes 0 sweetened beverage(s) daily.She exercises with enough effort to increase her heart rate 9 or less minutes per day.  She exercises with enough effort to increase her heart rate 3 or less days per week.   She is taking medications regularly.  42-year-old who presents to the clinic for evaluation of dizziness, persistent fatigue and intermittent episodes of palpitations.  Patient reports feeling her chest pounding.  Also reports occasional pounding or skipping heartbeat.  Denies any chest pain.  Random episodes during the day. Not exertion chest pain.   Taking propanolol for tachycardia.   Moving, standing or doing anything causes extreme fatigue. Symptoms present for a few weeks.   Noticing some swelling in the legs for the past few weeks.   Moving around triggers dizziness. Describes it as feeling as if she is about to faint.     Review of Systems   Constitutional, HEENT, cardiovascular, pulmonary, gi and gu systems are negative, except as otherwise noted.      Objective    /78 (BP Location: Right arm, Patient Position: Sitting, Cuff Size: Adult Large)   Pulse 72   Temp 98.6  F (37  C) (Temporal)   Resp 16   Ht 1.665 m (5' 5.55\")   Wt 115.6 kg (254 lb 14.4 oz)   LMP 06/25/2018 (Approximate)   SpO2 97%   BMI 41.71 kg/m    Body mass index is 41.71 kg/m .  Physical Exam   GENERAL: healthy, alert and no distress  RESP: lungs clear to auscultation - no rales, rhonchi or wheezes  CV: regular rate and rhythm, normal S1 S2, no S3 or S4, no murmur, click or rub, no peripheral edema and peripheral pulses strong  ABDOMEN: soft, nontender, no hepatosplenomegaly, no masses and bowel sounds normal  MS: no gross musculoskeletal defects noted, no edema  SKIN: no suspicious lesions or rashes  NEURO: Normal strength and tone, mentation intact and speech normal  PSYCH: mentation appears normal, affect normal/bright    Results for orders placed or performed in visit on " 10/23/23 (from the past 24 hour(s))   CBC with platelets   Result Value Ref Range    WBC Count 9.2 4.0 - 11.0 10e3/uL    RBC Count 4.39 3.80 - 5.20 10e6/uL    Hemoglobin 12.3 11.7 - 15.7 g/dL    Hematocrit 38.9 35.0 - 47.0 %    MCV 89 78 - 100 fL    MCH 28.0 26.5 - 33.0 pg    MCHC 31.6 31.5 - 36.5 g/dL    RDW 13.8 10.0 - 15.0 %    Platelet Count 322 150 - 450 10e3/uL

## 2023-10-25 NOTE — RESULT ENCOUNTER NOTE
Dear Kasandra,   Your results are normal except for a borderline vitamin D level. Please start taking over the counter vitamin d supplement. 2000 international unit(s) daily.     Best Regards  Canelo Self MD

## 2023-10-25 NOTE — RESULT ENCOUNTER NOTE
Dear Kasandra,   All your results are normal except for borderline vitamin D. I would recommend starting an over the counter vitamin D supplement - 2000 international unit(s) daily.     Best Regards  Canelo Self MD

## 2023-11-15 ENCOUNTER — HOSPITAL ENCOUNTER (OUTPATIENT)
Dept: CARDIOLOGY | Facility: CLINIC | Age: 43
Discharge: HOME | End: 2023-11-15
Attending: FAMILY MEDICINE
Payer: COMMERCIAL

## 2023-11-15 DIAGNOSIS — R00.2 PALPITATIONS: ICD-10-CM

## 2023-11-15 DIAGNOSIS — R42 DIZZINESS: ICD-10-CM

## 2023-11-15 LAB — LVEF ECHO: NORMAL

## 2023-11-15 PROCEDURE — 93244 EXT ECG>48HR<7D REV&INTERPJ: CPT | Performed by: INTERNAL MEDICINE

## 2023-11-15 PROCEDURE — 255N000002 HC RX 255 OP 636: Performed by: FAMILY MEDICINE

## 2023-11-15 PROCEDURE — 999N000208 ECHOCARDIOGRAM COMPLETE

## 2023-11-15 PROCEDURE — 93242 EXT ECG>48HR<7D RECORDING: CPT

## 2023-11-15 PROCEDURE — 93306 TTE W/DOPPLER COMPLETE: CPT | Mod: 26 | Performed by: INTERNAL MEDICINE

## 2023-11-15 RX ADMIN — HUMAN ALBUMIN MICROSPHERES AND PERFLUTREN 9 ML: 10; .22 INJECTION, SOLUTION INTRAVENOUS at 10:47

## 2023-11-16 NOTE — RESULT ENCOUNTER NOTE
Dear Kasandra,   Your echocardiogram does not reveal any abnormalities.    Best Regards   Canelo Self MD

## 2023-11-29 NOTE — RESULT ENCOUNTER NOTE
Dear Kasandra,   We received the results of your Holter Monitor. Zio patch activation correlated with normal sinus rhythm. Your results also revealed revealed a few rare premature atrial contractions and premature ventricular contractions. These are benign and they do not require any additional cardiac work-up.     Best Regards  Canelo Self MD

## 2023-12-31 DIAGNOSIS — R00.2 POUNDING HEARTBEAT: ICD-10-CM

## 2024-01-02 RX ORDER — PROPRANOLOL HYDROCHLORIDE 80 MG/1
CAPSULE, EXTENDED RELEASE ORAL
Qty: 90 CAPSULE | Refills: 0 | Status: SHIPPED | OUTPATIENT
Start: 2024-01-02 | End: 2024-04-26

## 2024-01-04 ENCOUNTER — TRANSFERRED RECORDS (OUTPATIENT)
Dept: HEALTH INFORMATION MANAGEMENT | Facility: CLINIC | Age: 44
End: 2024-01-04
Payer: COMMERCIAL

## 2024-01-13 ENCOUNTER — HEALTH MAINTENANCE LETTER (OUTPATIENT)
Age: 44
End: 2024-01-13

## 2024-02-08 NOTE — TELEPHONE ENCOUNTER
NOTE FROM PHARMACY -  PATIENT SAYS SHE IS TAKING 1 TABLET every day, PLEASE SEND RX FR 30 TABLETS FOR 30 DAYS OR ADVISE PT.   [FreeTextEntry1] : 69-yo male with h/o DM, HTN, hyperlipidemia. Patient is referred for evaluation of PAF. He was found to have severe carotid disease (occluded right ICA and severe left ICA stenosis). S/p left CEA, uncomplicated. Quit smoking.  Severe Covid infection in 2020, intubated for > 20 days. Recurrent pleural effusion while undergoing rehab, admitted again and treated with antibiotics.  Pt presents for a follow up visit.  S/p left SACHI and left EIA intervention on 11/23. Resolved left leg/calf pain. S/p COVID in December 2023.   Pt became sick during vacation after overcoming Covid.  S/p Hospitalization on 01/12/24 due to SOB and URI at [facility].   Pt denies chest pain, SOB with exertion, no edema. 12/15/23 Chol 150 LDL83 TRIG 148 BUN/CR 23/1.18   CHICHI/PVR: Left: severe iliofemoral disease.  LE arterial duplex: Right: Diffuse LAVON disease with >75% stenosis in the proximal, mid and distal segments. Left: EIA >75% stenosis. Left: Proximal LAVON occlusion with reconstitution of flow in the DPA.  LDL 83.

## 2024-02-13 ENCOUNTER — OFFICE VISIT (OUTPATIENT)
Dept: FAMILY MEDICINE | Facility: CLINIC | Age: 44
End: 2024-02-13
Payer: COMMERCIAL

## 2024-02-13 VITALS
WEIGHT: 260.1 LBS | DIASTOLIC BLOOD PRESSURE: 83 MMHG | HEIGHT: 66 IN | RESPIRATION RATE: 16 BRPM | HEART RATE: 76 BPM | TEMPERATURE: 98.8 F | SYSTOLIC BLOOD PRESSURE: 125 MMHG | OXYGEN SATURATION: 97 % | BODY MASS INDEX: 41.8 KG/M2

## 2024-02-13 DIAGNOSIS — G89.29 CHRONIC PAIN OF RIGHT KNEE: Primary | ICD-10-CM

## 2024-02-13 DIAGNOSIS — M25.561 CHRONIC PAIN OF RIGHT KNEE: Primary | ICD-10-CM

## 2024-02-13 PROCEDURE — 99213 OFFICE O/P EST LOW 20 MIN: CPT | Performed by: PHYSICIAN ASSISTANT

## 2024-02-13 ASSESSMENT — PATIENT HEALTH QUESTIONNAIRE - PHQ9
SUM OF ALL RESPONSES TO PHQ QUESTIONS 1-9: 23
10. IF YOU CHECKED OFF ANY PROBLEMS, HOW DIFFICULT HAVE THESE PROBLEMS MADE IT FOR YOU TO DO YOUR WORK, TAKE CARE OF THINGS AT HOME, OR GET ALONG WITH OTHER PEOPLE: EXTREMELY DIFFICULT
SUM OF ALL RESPONSES TO PHQ QUESTIONS 1-9: 23

## 2024-02-13 NOTE — PROGRESS NOTES
"Assessment & Plan       Chronic pain of right knee  Discuss referral to ortho for further workup and may benefit from steroid injection. She choses to go to [facility] walk in clinic today.   - Orthopedic  Referral; Future          BMI  Estimated body mass index is 42.62 kg/m  as calculated from the following:    Height as of this encounter: 1.664 m (5' 5.5\").    Weight as of this encounter: 118 kg (260 lb 1.6 oz).       Depression Screening Follow Up        2/13/2024     9:37 AM   PHQ   PHQ-9 Total Score 23   Q9: Thoughts of better off dead/self-harm past 2 weeks More than half the days   F/U: Thoughts of suicide or self-harm No   F/U: Safety concerns No         2/13/2024     9:37 AM   Last PHQ-9   1.  Little interest or pleasure in doing things 3   2.  Feeling down, depressed, or hopeless 3   3.  Trouble falling or staying asleep, or sleeping too much 3   4.  Feeling tired or having little energy 3   5.  Poor appetite or overeating 3   6.  Feeling bad about yourself 3   7.  Trouble concentrating 3   8.  Moving slowly or restless 0   Q9: Thoughts of better off dead/self-harm past 2 weeks 2   PHQ-9 Total Score 23   In the past two weeks have you had thoughts of suicide or self harm? No   Do you have concerns about your personal safety or the safety of others? No               Follow Up    Follow Up Actions Taken  Crisis resource information provided in the After Visit Summary    Discussed the following ways the patient can remain in a safe environment:          Velasquez Slaughter is a 43 year old, presenting for the following health issues:  Knee Pain (For the past couple months, Right Knee pain, has been deteriorating over the past couple weeks)      Has had ongoing right knee pain chronic and diagnosed as runners knee but the last week pain has gotten much worse and achy at night. No swollen or hot.   Had a knee xray in 2018 that was within normal limits            2/13/2024     9:44 AM   Additional Questions " "  Roomed by RICO Hinds   Accompanied by Self     Knee Pain    History of Present Illness       Reason for visit:  Knee pain  Symptom onset:  3-7 days ago  Symptoms include:  Knee pain, difficult to nend or stnd on  Symptom intensity:  Severe  Symptom progression:  Worsening  Had these symptoms before:  No  What makes it worse:  Standing or moving/using knee  What makes it better:  Keeping immobile in very specific positions    She eats 2-3 servings of fruits and vegetables daily.She consumes 0 sweetened beverage(s) daily.She exercises with enough effort to increase her heart rate 20 to 29 minutes per day.  She exercises with enough effort to increase her heart rate 3 or less days per week.   She is taking medications regularly.                 Review of Systems  Constitutional, HEENT, cardiovascular, pulmonary, gi and gu systems are negative, except as otherwise noted.      Objective    /83 (BP Location: Right arm, Patient Position: Sitting, Cuff Size: Adult Large)   Pulse 76   Temp 98.8  F (37.1  C) (Oral)   Resp 16   Ht 1.664 m (5' 5.5\")   Wt 118 kg (260 lb 1.6 oz)   LMP 06/25/2018 (Approximate)   SpO2 97%   Breastfeeding No   BMI 42.62 kg/m    Body mass index is 42.62 kg/m .  Physical Exam   GENERAL: alert and no distress  MS:  right knee:  no gross musculoskeletal defects noted, no edema.  TTP along medial side of knee. No calf squeeze tenderness. Homans negative            Signed Electronically by: Ramona Ann Aaseby-Aguilera, PA-C    Chief Complaint: Chronic problems general questions HPI Form  What is the reason for your visit today?: Knee pain  When did your symptoms begin?: 3-7 days ago  What are your symptoms?: Knee pain, difficult to nend or stnd on  How would you describe these symptoms?: Severe  Are your symptoms:: Worsening  Have you had these symptoms before?: No  Is there anything that makes you feel worse?: Standing or moving/using knee  Is there anything that makes you feel " better?: Keeping immobile in very specific positions    Answers submitted by the patient for this visit:  Patient Health Questionnaire (Submitted on 2/13/2024)  If you checked off any problems, how difficult have these problems made it for you to do your work, take care of things at home, or get along with other people?: Extremely difficult  PHQ9 TOTAL SCORE: 23  General Questionnaire (Submitted on 2/11/2024)  Chief Complaint: Chronic problems general questions HPI Form  How many servings of fruits and vegetables do you eat daily?: 2-3  On average, how many sweetened beverages do you drink each day (Examples: soda, juice, sweet tea, etc.  Do NOT count diet or artificially sweetened beverages)?: 0  How many minutes a day do you exercise enough to make your heart beat faster?: 20 to 29  How many days a week do you exercise enough to make your heart beat faster?: 3 or less  How many days per week do you miss taking your medication?:

## 2024-02-25 DIAGNOSIS — E06.3 HYPOTHYROIDISM DUE TO HASHIMOTO'S THYROIDITIS: ICD-10-CM

## 2024-02-26 RX ORDER — LEVOTHYROXINE SODIUM 125 MCG
125 TABLET ORAL DAILY
Qty: 30 TABLET | Refills: 11 | OUTPATIENT
Start: 2024-02-26

## 2024-02-27 ENCOUNTER — MYC REFILL (OUTPATIENT)
Dept: FAMILY MEDICINE | Facility: CLINIC | Age: 44
End: 2024-02-27
Payer: COMMERCIAL

## 2024-02-27 DIAGNOSIS — E06.3 HYPOTHYROIDISM DUE TO HASHIMOTO'S THYROIDITIS: ICD-10-CM

## 2024-02-27 RX ORDER — LEVOTHYROXINE SODIUM 125 MCG
125 TABLET ORAL DAILY
Qty: 90 TABLET | Refills: 1 | Status: SHIPPED | OUTPATIENT
Start: 2024-02-27 | End: 2024-07-16 | Stop reason: DRUGHIGH

## 2024-03-14 ENCOUNTER — HOSPITAL ENCOUNTER (EMERGENCY)
Facility: CLINIC | Age: 44
Discharge: HOME | End: 2024-03-14
Attending: EMERGENCY MEDICINE | Admitting: EMERGENCY MEDICINE
Payer: COMMERCIAL

## 2024-03-14 ENCOUNTER — NURSE TRIAGE (OUTPATIENT)
Dept: FAMILY MEDICINE | Facility: CLINIC | Age: 44
End: 2024-03-14

## 2024-03-14 VITALS
DIASTOLIC BLOOD PRESSURE: 82 MMHG | SYSTOLIC BLOOD PRESSURE: 156 MMHG | RESPIRATION RATE: 20 BRPM | OXYGEN SATURATION: 95 % | TEMPERATURE: 99.6 F | HEART RATE: 89 BPM

## 2024-03-14 DIAGNOSIS — U07.1 COVID-19 VIRUS INFECTION: ICD-10-CM

## 2024-03-14 LAB
ATRIAL RATE - MUSE: 76 BPM
CREAT BLD-MCNC: 0.6 MG/DL (ref 0.5–1)
DIASTOLIC BLOOD PRESSURE - MUSE: NORMAL MMHG
EGFRCR SERPLBLD CKD-EPI 2021: >60 ML/MIN/1.73M2
INTERPRETATION ECG - MUSE: NORMAL
P AXIS - MUSE: 12 DEGREES
PR INTERVAL - MUSE: 152 MS
QRS DURATION - MUSE: 74 MS
QT - MUSE: 366 MS
QTC - MUSE: 411 MS
R AXIS - MUSE: 20 DEGREES
SYSTOLIC BLOOD PRESSURE - MUSE: NORMAL MMHG
T AXIS - MUSE: 14 DEGREES
VENTRICULAR RATE- MUSE: 76 BPM

## 2024-03-14 PROCEDURE — 250N000013 HC RX MED GY IP 250 OP 250 PS 637: Performed by: EMERGENCY MEDICINE

## 2024-03-14 PROCEDURE — 99284 EMERGENCY DEPT VISIT MOD MDM: CPT

## 2024-03-14 PROCEDURE — 93005 ELECTROCARDIOGRAM TRACING: CPT

## 2024-03-14 PROCEDURE — 82565 ASSAY OF CREATININE: CPT

## 2024-03-14 RX ORDER — ACETAMINOPHEN 325 MG/1
650 TABLET ORAL ONCE
Status: COMPLETED | OUTPATIENT
Start: 2024-03-14 | End: 2024-03-14

## 2024-03-14 RX ADMIN — ACETAMINOPHEN 650 MG: 325 TABLET, FILM COATED ORAL at 12:42

## 2024-03-14 ASSESSMENT — ACTIVITIES OF DAILY LIVING (ADL): ADLS_ACUITY_SCORE: 35

## 2024-03-14 NOTE — TELEPHONE ENCOUNTER
S-(situation): Cheo called into the clinic due to testing positive for covid today on an at home test.     B-(background): Pt is a 43 yr old with history of asthma     A-(assessment): Pt is able to talk with me normally and clearly. Pt is not having any chest pain. Pt does have a fever currently. She does not have sore throat. She does have itchy/watery eyes. She has felt some mild shortness of breath since last week. Current oral temp is 101.5F. She has someone at home with her currently. She informed me the person at home with can drive her into the ER. She is able to walk around without difficulty however she is more fatigued.     R-(recommendations):   Informed cheo based on her symptoms and given her health history she should be evaluated in the ER right away today. Informed Cheo to have someone drive her to the nearest ER right away as we do not want her driving herself. Cheo has someone of driving age at home with her currently who can bring her into the ER. Informed to call clinic back if any new or worsening symptoms arise or if red flag signs to call 911. Informed cheo of red flag signs to call 911. Informed Cheo if she starts to have chest pain or worsening breathing to call 911 right away. Cheo was comfortable with and understanding of this plan. No further questions at this time. Routed to her care team to review and update.       Reason for Disposition   MODERATE difficulty breathing (e.g., speaks in phrases, SOB even at rest, pulse 100-120)    Additional Information   Negative: SEVERE difficulty breathing (e.g., struggling for each breath, speaks in single words)   Negative: Difficult to awaken or acting confused (e.g., disoriented, slurred speech)   Negative: Bluish (or gray) lips or face now   Negative: Shock suspected (e.g., cold/pale/clammy skin, too weak to stand, low BP, rapid pulse)   Negative: Sounds like a life-threatening emergency to the triager   Negative: Diagnosed or suspected  "COVID-19 and symptoms lasting 3 or more weeks   Negative: COVID-19 exposure and no symptoms   Negative: COVID-19 vaccine reaction suspected (e.g., fever, headache, muscle aches) occurring 1 to 3 days after getting vaccine   Negative: COVID-19 vaccine, questions about   Negative: Lives with someone known to have influenza (flu test positive) and flu-like symptoms (e.g., cough, runny nose, sore throat, SOB; with or without fever)   Negative: Possible COVID-19 symptoms and triager concerned about severity of symptoms or other causes   Negative: COVID-19 and breastfeeding, questions about   Negative: Headache and stiff neck (can't touch chin to chest)   Negative: Oxygen level (e.g., pulse oximetry) 90% or lower   Negative: Chest pain or pressure  (Exception: MILD central chest pain, present only when coughing.)   Negative: Drinking very little and dehydration suspected (e.g., no urine > 12 hours, very dry mouth, very lightheaded)   Negative: Patient sounds very sick or weak to the triager   Negative: SEVERE or constant chest pain or pressure  (Exception: Mild central chest pain, present only when coughing.)    Answer Assessment - Initial Assessment Questions  1. COVID-19 DIAGNOSIS: \"How do you know that you have COVID?\" (e.g., positive lab test or self-test, diagnosed by doctor or NP/PA, symptoms after exposure).      Took an at home test  2. COVID-19 EXPOSURE: \"Was there any known exposure to COVID before the symptoms began?\" CDC Definition of close contact: within 6 feet (2 meters) for a total of 15 minutes or more over a 24-hour period.       unsure  3. ONSET: \"When did the COVID-19 symptoms start?\"       Symptoms first started 3/12/24  4. WORST SYMPTOM: \"What is your worst symptom?\" (e.g., cough, fever, shortness of breath, muscle aches)      Fever and coughing. Slight shortness of breath, and has asthma she has had this for about a week. She also has itchy eyes.   5. COUGH: \"Do you have a cough?\" If Yes, ask: \"How " "bad is the cough?\"        The cough started yesterday 3/13/24  6. FEVER: \"Do you have a fever?\" If Yes, ask: \"What is your temperature, how was it measured, and when did it start?\"      Highest it has been 102F. Oral temp. 3/12/24 is when fever started. Current temp is 101.5F.   7. RESPIRATORY STATUS: \"Describe your breathing?\" (e.g., normal; shortness of breath, wheezing, unable to speak)       Able to speak normally. No wheezing. Does have some shortness of breath. She is fine going to the bathroom.   8. BETTER-SAME-WORSE: \"Are you getting better, staying the same or getting worse compared to yesterday?\"  If getting worse, ask, \"In what way?\"      Getting worse  9. OTHER SYMPTOMS: \"Do you have any other symptoms?\"  (e.g., chills, fatigue, headache, loss of smell or taste, muscle pain, sore throat)      Fatigue, headache, no loss of smell or taste. No muscle pain, throat is irritated.   10. HIGH RISK DISEASE: \"Do you have any chronic medical problems?\" (e.g., asthma, heart or lung disease, weak immune system, obesity, etc.)        yes  11. VACCINE: \"Have you had the COVID-19 vaccine?\" If Yes, ask: \"Which one, how many shots, when did you get it?\"        Has had some covid vaccines. Last one this past October 2023  12. PREGNANCY: \"Is there any chance you are pregnant?\" \"When was your last menstrual period?\"        no  13. O2 SATURATION MONITOR:  \"Do you use an oxygen saturation monitor (pulse oximeter) at home?\" If Yes, ask \"What is your reading (oxygen level) today?\" \"What is your usual oxygen saturation reading?\" (e.g., 95%)        Does not check    Protocols used: Coronavirus (COVID-19) Diagnosed or Sncszybed-F-PF    "

## 2024-03-14 NOTE — ED PROVIDER NOTES
History     Chief Complaint:  Covid Concern and Shortness of Breath       HPI   Kasandra Winkler is a 43 year old female with a past medical history of HTN, hypothyroidism, and asthma who presents after a positive Covid test. The patient states that on Tuesday she started to developed symptoms. She states that she got a fever, cough, congestion, headache, body aches, and fatigue. She states that the fever has been keeping her up at night. She tested positive for Covid today and came in to get a treatment. She states that she also has been dealing with ongoing shortness of breath for the past few weeks. She attributes this to allergies with her asthma as she gets this occasionally. She also has been feeling lightheaded with walking wince getting the fever. She states that her rescue inhaler has helped her symptoms.      Independent Historian:   None - Patient Only    Review of External Notes:   None      Medications:    albuterol  buPROPion   cetirizine   Cholecalciferol   propranolol   Synthroid    Past Medical History:    Allergic rhinitis  Asthma  Hypothyroidism  HTN  Depression  TMJ disorder  Ovarian cyst  LUCIEN  Obesity    Past Surgical History:     C section  Cholecystectomy  Right little finger surgery  Gastric sleeve     Physical Exam   Patient Vitals for the past 24 hrs:   BP Temp Temp src Pulse Resp SpO2   03/14/24 1125 (!) 156/82 99.6  F (37.6  C) Temporal 89 20 95 %        Physical Exam  General: Sitting on the ED chair  HEENT: Normocephalic, atraumatic  Cardiac: Warm and well perfused, regular rate and rhythm  Pulm: Breathing comfortably, no accessory muscle usage, no conversational dyspnea, and lungs clear bilaterally  MSK: No bony deformities  Skin: Warm and dry  Neuro: Moves all extremities  Psych: Pleasant mood and affect      Emergency Department Course   ECG  ECG taken at 1121, ECG read at 1208  Normal sinus rhythm  Normal ECG   No change as compared to prior, dated 7/8/16.  Rate 76 bpm. KS  interval 152 ms. QRS duration 74 ms. QT/QTc 366/411 ms. P-R-T axes 12 20 14.     Laboratory:  Labs Ordered and Resulted from Time of ED Arrival to Time of ED Departure   ISTAT CREATININE POCT - Normal       Result Value    Creatinine POCT 0.6      GFR, ESTIMATED POCT >60        Emergency Department Course & Assessments:    Interventions:  Medications   acetaminophen (TYLENOL) tablet 650 mg (650 mg Oral $Given 3/14/24 1242)        Assessments:  1145 Obtained the patients history and performed initial exam    Social Determinants of Health affecting care:   None    Disposition:  The patient was discharged.     Impression & Plan      Medical Decision Makin-year-old female presents with COVID symptoms for 2 days.  She has underlying asthma and it seems that these symptoms have increased in conjunction with allergies over the last couple of weeks with COVID infection symptoms starting Tuesday.  Amatory pulse ox was 95%, no indication for chest x-ray or lab work today except for a point-of-care creatinine for Paxlovid purposes, which is indicated due to the patient's underlying asthma.  Otherwise, stable for discharge home with PCP follow-up.      Diagnosis:    ICD-10-CM    1. COVID-19 virus infection  U07.1            Discharge Medications:  Discharge Medication List as of 3/14/2024 12:51 PM        START taking these medications    Details   nirmatrelvir and ritonavir (PAXLOVID) 300 mg/100 mg therapy pack Take 3 tablets by mouth 2 times daily for 5 days, Disp-30 tablet, R-0, E-PrescribeDate of symptom onset: 3/12/24; Risk criteria met: Yes; Weight >40 kg Yes; Renal fxn: normal;  Drug-Drug interactions reviewed & addressed: Yes              Scribe Disclosure:  Chance MELARA, am serving as a scribe at 11:46 AM on 3/14/2024 to document services personally performed by Fransisco Roy MD based on my observations and the provider's statements to me.     3/14/2024   Fransisco Roy MD King, Colin, MD  24  [phone]

## 2024-03-14 NOTE — ED TRIAGE NOTES
Pt diagnosed with COVID this morning, symptoms started Tuesday. Pt reports fever, exertional dyspnea and dry cough. Pt called primary this morning asking for Paxlovid and was told to come to ER     Triage Assessment (Adult)       Row Name 03/14/24 1123          Triage Assessment    Airway WDL WDL        Respiratory WDL    Respiratory WDL X  dyspnea        Skin Circulation/Temperature WDL    Skin Circulation/Temperature WDL WDL        Cardiac WDL    Cardiac WDL WDL        Peripheral/Neurovascular WDL    Peripheral Neurovascular WDL WDL        Cognitive/Neuro/Behavioral WDL    Cognitive/Neuro/Behavioral WDL WDL

## 2024-03-21 ENCOUNTER — MYC MEDICAL ADVICE (OUTPATIENT)
Dept: FAMILY MEDICINE | Facility: CLINIC | Age: 44
End: 2024-03-21
Payer: COMMERCIAL

## 2024-03-21 DIAGNOSIS — Z12.11 SCREENING FOR COLON CANCER: Primary | ICD-10-CM
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
## 2024-03-22 ENCOUNTER — TELEPHONE (OUTPATIENT)
Dept: GASTROENTEROLOGY | Facility: CLINIC | Age: 44
End: 2024-03-22
Payer: COMMERCIAL

## 2024-03-22 DIAGNOSIS — Z12.11 SPECIAL SCREENING FOR MALIGNANT NEOPLASMS, COLON: Primary | ICD-10-CM

## 2024-03-22 NOTE — TELEPHONE ENCOUNTER
"Endoscopy Scheduling Screen    Have you had a positive Covid test in the last 14 days?  Yes (Schedule at least 14 days from symptom onset)    What is your communication preference for Instructions and/or Bowel Prep?   MyChart    What insurance is in the chart?  Other:  BCBS    Ordering/Referring Provider:     WEGENER, JOEL DANIEL IRWIN      (If ordering provider performs procedure, schedule with ordering provider unless otherwise instructed. )    BMI: Estimated body mass index is 42.62 kg/m  as calculated from the following:    Height as of 2/13/24: 1.664 m (5' 5.5\").    Weight as of 2/13/24: 118 kg (260 lb 1.6 oz).     Sedation Ordered  moderate sedation.   If patient BMI > 50 do not schedule in [facility].    [facility] 45 [facility]    Are you taking methadone or Suboxone?  No    Have you had difficulties, pain, or discomfort during past endoscopy procedures?  No    Are you taking any prescription medications for pain 3 or more times per week?   NO, No RN review required.    Do you have a history of malignant hyperthermia?  No    (Females) Are you currently pregnant?   No     Have you been diagnosed or told you have pulmonary hypertension?   No    Do you have an LVAD?  No    Have you been told you have moderate to severe sleep apnea?  No    Have you been told you have COPD, asthma, or any other lung disease?  Yes     What breathing problems do you have?  Asthma     Do you use home oxygen?  No    Have your breathing problems required an ED visit or hospitalization in the last year?  No    Do you have any heart conditions?  No     Have you ever had or are you waiting for an organ transplant?  No. Continue scheduling, no site restrictions.    Have you had a stroke or transient ischemic attack (TIA aka \"mini stroke\" in the last 6 months?   No    Have you been diagnosed with or been told you have cirrhosis of the liver?   No    Are you currently on dialysis?   No    Do you need assistance " "transferring?   No    BMI: Estimated body mass index is 42.62 kg/m  as calculated from the following:    Height as of 2/13/24: 1.664 m (5' 5.5\").    Weight as of 2/13/24: 118 kg (260 lb 1.6 oz).     Is patients BMI > 40 and scheduling location [facility]?  No    Do you take an injectable medication for weight loss or diabetes (excluding insulin)?  No    Do you take the medication Naltrexone?  No    Do you take blood thinners?  No       Prep   Are you currently on dialysis or do you have chronic kidney disease?  No    Do you have a diagnosis of diabetes?  No    Do you have a diagnosis of cystic fibrosis (CF)?  No    On a regular basis do you go 3 -5 days between bowel movements?  Yes (Extended Prep)    BMI > 40?  Yes (Extended Prep)    Preferred Pharmacy:    [id number] - [place], [place] - [address] AT [facility] OF [facility] [address] [facility]  [address] [facility] [address]  Phone: [phone] Fax: [phone]    Final Scheduling Details     Procedure scheduled  Colonoscopy    Surgeon:  AZEEM     Date of procedure:  6/12     Pre-OP / PAC:   No - Not required for this site.    Location  SH - Per order.    Sedation   Moderate Sedation - Per order.      Patient Reminders:   You will receive a call from a Nurse to review instructions and health history.  This assessment must be completed prior to your procedure.  Failure to complete the Nurse assessment may result in the procedure being cancelled.      On the day of your procedure, please designate an adult(s) who can drive you home stay with you for the next 24 hours. The medicines used in the exam will make you sleepy. You will not be able to drive.      You cannot take public transportation, ride share services, or non-medical taxi service without a responsible caregiver.  Medical transport services are allowed with the requirement that a responsible caregiver will receive you at your destination.  We require that drivers and " caregivers are confirmed prior to your procedure.

## 2024-03-23 ENCOUNTER — HEALTH MAINTENANCE LETTER (OUTPATIENT)
Age: 44
End: 2024-03-23

## 2024-03-31 ENCOUNTER — NURSE TRIAGE (OUTPATIENT)
Dept: NURSING | Facility: CLINIC | Age: 44
End: 2024-03-31
Payer: COMMERCIAL

## 2024-03-31 ASSESSMENT — PATIENT HEALTH QUESTIONNAIRE - PHQ9
SUM OF ALL RESPONSES TO PHQ QUESTIONS 1-9: 11
10. IF YOU CHECKED OFF ANY PROBLEMS, HOW DIFFICULT HAVE THESE PROBLEMS MADE IT FOR YOU TO DO YOUR WORK, TAKE CARE OF THINGS AT HOME, OR GET ALONG WITH OTHER PEOPLE: EXTREMELY DIFFICULT
SUM OF ALL RESPONSES TO PHQ QUESTIONS 1-9: 11

## 2024-04-01 ENCOUNTER — VIRTUAL VISIT (OUTPATIENT)
Dept: FAMILY MEDICINE | Facility: CLINIC | Age: 44
End: 2024-04-01
Payer: COMMERCIAL

## 2024-04-01 DIAGNOSIS — J11.1 INFLUENZA: Primary | ICD-10-CM

## 2024-04-01 DIAGNOSIS — J45.40 MODERATE PERSISTENT ASTHMA WITH ALLERGIC RHINITIS WITHOUT COMPLICATION: ICD-10-CM

## 2024-04-01 PROCEDURE — 99214 OFFICE O/P EST MOD 30 MIN: CPT | Mod: 95 | Performed by: FAMILY MEDICINE

## 2024-04-01 RX ORDER — OSELTAMIVIR PHOSPHATE 75 MG/1
75 CAPSULE ORAL 2 TIMES DAILY
Qty: 10 CAPSULE | Refills: 0 | Status: SHIPPED | OUTPATIENT
Start: 2024-04-01 | End: 2024-04-06

## 2024-04-01 NOTE — PROGRESS NOTES
"Kasandra is a 43 year old who is being evaluated via a billable video visit.    How would you like to obtain your AVS? MyChart  If the video visit is dropped, the invitation should be resent by: Text to cell phone: [phone]  Will anyone else be joining your video visit? No      Assessment & Plan     (J11.1) Influenza  (primary encounter diagnosis)  Comment: exposed -  has it too  Plan: oseltamivir (TAMIFLU) 75 MG capsule        Willing to try tamiflu. Script faxed Cares and symptomatic treatment discussed follow up if problem       (J45.40) Moderate persistent asthma with allergic rhinitis without complication  Comment: managed by another outside provider .  Plan: she thinks  it is stable and have her inhalers for now, so does not need any refill but she will monitor closely and follow up with PCP if problem      Script  sent.Cares and  treatment discussed.  follow up if problem   Patient expressed understanding and agreement with treatment plan. All patient's questions were answered, will let me know if has more later.  Medications: Rx's: Reviewed the potential side effects/complications of medications prescribed.       BMI  Estimated body mass index is 42.62 kg/m  as calculated from the following:    Height as of 2/13/24: 1.664 m (5' 5.5\").    Weight as of 2/13/24: 118 kg (260 lb 1.6 oz).           Subjective   Kasandra is a 43 year old, presenting for the following health issues:  URI        4/1/2024     8:14 AM   Additional Questions   Roomed by Zeny TOVAR    History of Present Illness       Reason for visit:  Flu sympotms & was exposed to Influenza A  Symptom onset:  1-3 days ago  Symptoms include:  Fever, chills, achy, headach, sneezing, cough, congestion, fatigue, sore throat  Symptom intensity:  Severe  Symptom progression:  Worsening  Had these symptoms before:  Yes  Has tried/received treatment for these symptoms:  No  What makes it worse:  Doing anything thats not lying in bed  What makes it " better:  No    She eats 4 or more servings of fruits and vegetables daily.She consumes 1 sweetened beverage(s) daily.She exercises with enough effort to increase her heart rate 20 to 29 minutes per day.  She exercises with enough effort to increase her heart rate 5 days per week.   She is taking medications regularly.       Acute Illness  Acute illness concerns: Flu symptoms   Onset/Duration: 3 days  Symptoms:  Fever: YES, initially at 100 ,   Chills/Sweats: YES  Headache (location?): No  Sinus Pressure: YES  Conjunctivitis:  No  Ear Pain: no  Rhinorrhea: No  Congestion: YES  Sore Throat: YES  Cough: YES  Wheeze: No  Decreased Appetite: No  Nausea: No  Vomiting: No  Diarrhea: No  Dysuria/Freq.: No  Dysuria or Hematuria: No  Fatigue/Achiness: YES  Sick/Strep Exposure: YES - flu exposure . Spouse had influenza and had confirmed with labs Also had covid earlier this year.  has had all her immunization   Therapies tried and outcome: None    Asthma Follow-Up    Was ACT completed today?  No  . Doing ok on controller inhalers, not needing albuterol as much. Mostly allergies cam trigger asthma but not bad right now since flu sx . See in another provider for managing her asthma/ allergies        10/23/2023    11:44 AM   ACT Total Scores   ACT TOTAL SCORE (Goal Greater than or Equal to 20) 23   In the past 12 months, how many times did you visit the emergency room for your asthma without being admitted to the hospital? 0   In the past 12 months, how many times were you hospitalized overnight because of your asthma? 0        How many days per week do you miss taking your asthma controller medication?  0  Please describe any recent triggers for your asthma: None but has allergy induced asthma  but well controlled on current med's              Review of Systems  Constitutional, HEENT, cardiovascular, pulmonary, GI, , musculoskeletal, neuro, skin, endocrine and psych systems are negative, except as otherwise noted.       Objective           Vitals:  No vitals were obtained today due to virtual visit.    Physical Exam   GENERAL: alert and no distress  EYES: Eyes grossly normal to inspection.  No discharge or erythema, or obvious scleral/conjunctival abnormalities.  RESP: No audible wheeze, cough, or visible cyanosis.    SKIN: Visible skin clear. No significant rash, abnormal pigmentation or lesions.  NEURO: Cranial nerves grossly intact.  Mentation and speech appropriate for age.  PSYCH: Appropriate affect, tone, and pace of words          Video-Visit Details    Type of service:  Video Visit   Originating Location (pt. Location): Home    Distant Location (provider location):  Off-site  Platform used for Video Visit: Davonte  Signed Electronically by: Kelsey Champagne MD

## 2024-04-26 DIAGNOSIS — R00.2 POUNDING HEARTBEAT: ICD-10-CM

## 2024-04-26 RX ORDER — PROPRANOLOL HYDROCHLORIDE 80 MG/1
CAPSULE, EXTENDED RELEASE ORAL
Qty: 90 CAPSULE | Refills: 0 | Status: SHIPPED | OUTPATIENT
Start: 2024-04-26 | End: 2024-05-22

## 2024-05-15 RX ORDER — BISACODYL 5 MG/1
TABLET, DELAYED RELEASE ORAL
Qty: 4 TABLET | Refills: 0 | Status: ON HOLD | OUTPATIENT
Start: 2024-05-15 | End: 2024-06-12

## 2024-05-15 NOTE — TELEPHONE ENCOUNTER
Extended Golytely Bowel Prep . Instructions were sent via [facility]. Bowel prep was sent 5/15/2024 to    [facility] #[id number] - [place] - [address] AT [facility] OF [address] Colorectal [facility]    Division of Gastroenterology at [facility]

## 2024-05-22 ENCOUNTER — OFFICE VISIT (OUTPATIENT)
Dept: FAMILY MEDICINE | Facility: CLINIC | Age: 44
End: 2024-05-22
Payer: COMMERCIAL

## 2024-05-22 VITALS
BODY MASS INDEX: 42.62 KG/M2 | DIASTOLIC BLOOD PRESSURE: 82 MMHG | OXYGEN SATURATION: 96 % | TEMPERATURE: 97.2 F | RESPIRATION RATE: 16 BRPM | HEIGHT: 65 IN | SYSTOLIC BLOOD PRESSURE: 121 MMHG | HEART RATE: 69 BPM | WEIGHT: 255.8 LBS

## 2024-05-22 DIAGNOSIS — Z13.6 CARDIOVASCULAR SCREENING; LDL GOAL LESS THAN 160: ICD-10-CM

## 2024-05-22 DIAGNOSIS — M25.50 POLYARTHRALGIA: ICD-10-CM

## 2024-05-22 DIAGNOSIS — Z12.31 VISIT FOR SCREENING MAMMOGRAM: ICD-10-CM

## 2024-05-22 DIAGNOSIS — E06.3 HYPOTHYROIDISM DUE TO HASHIMOTO'S THYROIDITIS: ICD-10-CM

## 2024-05-22 DIAGNOSIS — R00.2 POUNDING HEARTBEAT: ICD-10-CM

## 2024-05-22 DIAGNOSIS — Z00.00 ROUTINE GENERAL MEDICAL EXAMINATION AT A HEALTH CARE FACILITY: Primary | ICD-10-CM

## 2024-05-22 LAB
ALBUMIN SERPL BCG-MCNC: 3.7 G/DL (ref 3.5–5.2)
ALP SERPL-CCNC: 83 U/L (ref 40–150)
ALT SERPL W P-5'-P-CCNC: 5 U/L (ref 0–50)
ANION GAP SERPL CALCULATED.3IONS-SCNC: 13 MMOL/L (ref 7–15)
AST SERPL W P-5'-P-CCNC: 22 U/L (ref 0–45)
BILIRUB SERPL-MCNC: 0.3 MG/DL
BUN SERPL-MCNC: 12.9 MG/DL (ref 6–20)
CALCIUM SERPL-MCNC: 9 MG/DL (ref 8.6–10)
CHLORIDE SERPL-SCNC: 105 MMOL/L (ref 98–107)
CHOLEST SERPL-MCNC: 229 MG/DL
CREAT SERPL-MCNC: 0.7 MG/DL (ref 0.51–0.95)
CRP SERPL-MCNC: 19.1 MG/L
DEPRECATED HCO3 PLAS-SCNC: 19 MMOL/L (ref 22–29)
EGFRCR SERPLBLD CKD-EPI 2021: >90 ML/MIN/1.73M2
ERYTHROCYTE [DISTWIDTH] IN BLOOD BY AUTOMATED COUNT: 14.1 % (ref 10–15)
ERYTHROCYTE [SEDIMENTATION RATE] IN BLOOD BY WESTERGREN METHOD: 40 MM/HR (ref 0–20)
FASTING STATUS PATIENT QL REPORTED: YES
FASTING STATUS PATIENT QL REPORTED: YES
GLUCOSE SERPL-MCNC: 93 MG/DL (ref 70–99)
HCT VFR BLD AUTO: 36.9 % (ref 35–47)
HDLC SERPL-MCNC: 50 MG/DL
HGB BLD-MCNC: 11.6 G/DL (ref 11.7–15.7)
LDLC SERPL CALC-MCNC: 146 MG/DL
MCH RBC QN AUTO: 27.7 PG (ref 26.5–33)
MCHC RBC AUTO-ENTMCNC: 31.4 G/DL (ref 31.5–36.5)
MCV RBC AUTO: 88 FL (ref 78–100)
NONHDLC SERPL-MCNC: 179 MG/DL
PLATELET # BLD AUTO: 309 10E3/UL (ref 150–450)
POTASSIUM SERPL-SCNC: 4.2 MMOL/L (ref 3.4–5.3)
PROT SERPL-MCNC: 6.6 G/DL (ref 6.4–8.3)
RBC # BLD AUTO: 4.19 10E6/UL (ref 3.8–5.2)
SODIUM SERPL-SCNC: 137 MMOL/L (ref 135–145)
T3FREE SERPL-MCNC: 2.9 PG/ML (ref 2–4.4)
T4 FREE SERPL-MCNC: 1.49 NG/DL (ref 0.9–1.7)
TRIGL SERPL-MCNC: 163 MG/DL
TSH SERPL DL<=0.005 MIU/L-ACNC: 0.2 UIU/ML (ref 0.3–4.2)
WBC # BLD AUTO: 7 10E3/UL (ref 4–11)

## 2024-05-22 PROCEDURE — 84481 FREE ASSAY (FT-3): CPT | Performed by: FAMILY MEDICINE

## 2024-05-22 PROCEDURE — 90472 IMMUNIZATION ADMIN EACH ADD: CPT | Performed by: FAMILY MEDICINE

## 2024-05-22 PROCEDURE — 84443 ASSAY THYROID STIM HORMONE: CPT | Performed by: FAMILY MEDICINE

## 2024-05-22 PROCEDURE — 99396 PREV VISIT EST AGE 40-64: CPT | Mod: 25 | Performed by: FAMILY MEDICINE

## 2024-05-22 PROCEDURE — 99214 OFFICE O/P EST MOD 30 MIN: CPT | Mod: 25 | Performed by: FAMILY MEDICINE

## 2024-05-22 PROCEDURE — 90715 TDAP VACCINE 7 YRS/> IM: CPT | Performed by: FAMILY MEDICINE

## 2024-05-22 PROCEDURE — 80061 LIPID PANEL: CPT | Performed by: FAMILY MEDICINE

## 2024-05-22 PROCEDURE — 85027 COMPLETE CBC AUTOMATED: CPT | Performed by: FAMILY MEDICINE

## 2024-05-22 PROCEDURE — 84439 ASSAY OF FREE THYROXINE: CPT | Performed by: FAMILY MEDICINE

## 2024-05-22 PROCEDURE — 90471 IMMUNIZATION ADMIN: CPT | Performed by: FAMILY MEDICINE

## 2024-05-22 PROCEDURE — 80053 COMPREHEN METABOLIC PANEL: CPT | Performed by: FAMILY MEDICINE

## 2024-05-22 PROCEDURE — 36415 COLL VENOUS BLD VENIPUNCTURE: CPT | Performed by: FAMILY MEDICINE

## 2024-05-22 PROCEDURE — 86140 C-REACTIVE PROTEIN: CPT | Performed by: FAMILY MEDICINE

## 2024-05-22 PROCEDURE — 90677 PCV20 VACCINE IM: CPT | Performed by: FAMILY MEDICINE

## 2024-05-22 PROCEDURE — 85652 RBC SED RATE AUTOMATED: CPT | Performed by: FAMILY MEDICINE

## 2024-05-22 RX ORDER — TRAZODONE HYDROCHLORIDE 50 MG/1
50 TABLET, FILM COATED ORAL AT BEDTIME
COMMUNITY
Start: 2024-05-22

## 2024-05-22 RX ORDER — PROPRANOLOL HYDROCHLORIDE 80 MG/1
CAPSULE, EXTENDED RELEASE ORAL
Qty: 90 CAPSULE | Refills: 3 | Status: SHIPPED | OUTPATIENT
Start: 2024-05-22

## 2024-05-22 RX ORDER — GABAPENTIN 300 MG/1
300 CAPSULE ORAL 3 TIMES DAILY
COMMUNITY
Start: 2024-05-22

## 2024-05-22 SDOH — HEALTH STABILITY: PHYSICAL HEALTH: ON AVERAGE, HOW MANY MINUTES DO YOU ENGAGE IN EXERCISE AT THIS LEVEL?: 30 MIN

## 2024-05-22 SDOH — HEALTH STABILITY: PHYSICAL HEALTH: ON AVERAGE, HOW MANY DAYS PER WEEK DO YOU ENGAGE IN MODERATE TO STRENUOUS EXERCISE (LIKE A BRISK WALK)?: 7 DAYS

## 2024-05-22 ASSESSMENT — ASTHMA QUESTIONNAIRES
QUESTION_1 LAST FOUR WEEKS HOW MUCH OF THE TIME DID YOUR ASTHMA KEEP YOU FROM GETTING AS MUCH DONE AT WORK, SCHOOL OR AT HOME: NONE OF THE TIME
QUESTION_3 LAST FOUR WEEKS HOW OFTEN DID YOUR ASTHMA SYMPTOMS (WHEEZING, COUGHING, SHORTNESS OF BREATH, CHEST TIGHTNESS OR PAIN) WAKE YOU UP AT NIGHT OR EARLIER THAN USUAL IN THE MORNING: NOT AT ALL
QUESTION_2 LAST FOUR WEEKS HOW OFTEN HAVE YOU HAD SHORTNESS OF BREATH: NOT AT ALL
ACT_TOTALSCORE: 25
QUESTION_5 LAST FOUR WEEKS HOW WOULD YOU RATE YOUR ASTHMA CONTROL: COMPLETELY CONTROLLED
ACT_TOTALSCORE: 25
QUESTION_4 LAST FOUR WEEKS HOW OFTEN HAVE YOU USED YOUR RESCUE INHALER OR NEBULIZER MEDICATION (SUCH AS ALBUTEROL): NOT AT ALL

## 2024-05-22 ASSESSMENT — ANXIETY QUESTIONNAIRES
5. BEING SO RESTLESS THAT IT IS HARD TO SIT STILL: NOT AT ALL
4. TROUBLE RELAXING: SEVERAL DAYS
3. WORRYING TOO MUCH ABOUT DIFFERENT THINGS: SEVERAL DAYS
6. BECOMING EASILY ANNOYED OR IRRITABLE: NOT AT ALL
1. FEELING NERVOUS, ANXIOUS, OR ON EDGE: SEVERAL DAYS
GAD7 TOTAL SCORE: 3
8. IF YOU CHECKED OFF ANY PROBLEMS, HOW DIFFICULT HAVE THESE MADE IT FOR YOU TO DO YOUR WORK, TAKE CARE OF THINGS AT HOME, OR GET ALONG WITH OTHER PEOPLE?: SOMEWHAT DIFFICULT
7. FEELING AFRAID AS IF SOMETHING AWFUL MIGHT HAPPEN: NOT AT ALL
GAD7 TOTAL SCORE: 3
7. FEELING AFRAID AS IF SOMETHING AWFUL MIGHT HAPPEN: NOT AT ALL
2. NOT BEING ABLE TO STOP OR CONTROL WORRYING: NOT AT ALL
GAD7 TOTAL SCORE: 3
IF YOU CHECKED OFF ANY PROBLEMS ON THIS QUESTIONNAIRE, HOW DIFFICULT HAVE THESE PROBLEMS MADE IT FOR YOU TO DO YOUR WORK, TAKE CARE OF THINGS AT HOME, OR GET ALONG WITH OTHER PEOPLE: SOMEWHAT DIFFICULT

## 2024-05-22 ASSESSMENT — PATIENT HEALTH QUESTIONNAIRE - PHQ9
10. IF YOU CHECKED OFF ANY PROBLEMS, HOW DIFFICULT HAVE THESE PROBLEMS MADE IT FOR YOU TO DO YOUR WORK, TAKE CARE OF THINGS AT HOME, OR GET ALONG WITH OTHER PEOPLE: SOMEWHAT DIFFICULT
SUM OF ALL RESPONSES TO PHQ QUESTIONS 1-9: 3
SUM OF ALL RESPONSES TO PHQ QUESTIONS 1-9: 3

## 2024-05-22 ASSESSMENT — SOCIAL DETERMINANTS OF HEALTH (SDOH): HOW OFTEN DO YOU GET TOGETHER WITH FRIENDS OR RELATIVES?: ONCE A WEEK

## 2024-05-22 ASSESSMENT — PAIN SCALES - GENERAL: PAINLEVEL: SEVERE PAIN (7)

## 2024-05-22 NOTE — NURSING NOTE
Prior to immunization administration, verified patients identity using patient s name and date of birth. Please see Immunization Activity for additional information.     Screening Questionnaire for Adult Immunization    Are you sick today?   No   Do you have allergies to medications, food, a vaccine component or latex?   No   Have you ever had a serious reaction after receiving a vaccination?   No   Do you have a long-term health problem with heart, lung, kidney, or metabolic disease (e.g., diabetes), asthma, a blood disorder, no spleen, complement component deficiency, a cochlear implant, or a spinal fluid leak?  Are you on long-term aspirin therapy?   No   Do you have cancer, leukemia, HIV/AIDS, or any other immune system problem?   No   Do you have a parent, brother, or sister with an immune system problem?   No   In the past 3 months, have you taken medications that affect  your immune system, such as prednisone, other steroids, or anticancer drugs; drugs for the treatment of rheumatoid arthritis, Crohn s disease, or psoriasis; or have you had radiation treatments?   No   Have you had a seizure, or a brain or other nervous system problem?   No   During the past year, have you received a transfusion of blood or blood    products, or been given immune (gamma) globulin or antiviral drug?   No   For women: Are you pregnant or is there a chance you could become       pregnant during the next month?   No   Have you received any vaccinations in the past 4 weeks?   No     Immunization questionnaire answers were all negative.      Patient instructed to remain in clinic for 15 minutes afterwards, and to report any adverse reactions.     Screening performed by João Walker MA on 5/22/2024 at 8:01 AM.

## 2024-05-22 NOTE — PATIENT INSTRUCTIONS
"Preventive Care Advice   This is general advice we often give to help people stay healthy. Your care team may have specific advice just for you. Please talk to your care team about your own preventive care needs.  Lifestyle  Exercise at least 150 minutes each week (30 minutes a day, 5 days a week).  Do muscle strengthening activities 2 days a week. These help control your weight and prevent disease.  No smoking.  Wear sunscreen to prevent skin cancer.  Have your home tested for radon every 2 to 5 years. Radon is a colorless, odorless gas that can harm your lungs. To learn more, go to www.health.[facility]. and search for \"Radon in Homes.\"  Keep guns unloaded and locked up in a safe place like a safe or gun vault, or, use a gun lock and hide the keys. Always lock away bullets separately. To learn more, visit [employer].[place].gov and search for \"safe gun storage.\"  Nutrition  Eat 5 or more servings of fruits and vegetables each day.  Try wheat bread, brown rice and whole grain pasta (instead of white bread, rice, and pasta).  Get enough calcium and vitamin D. Check the label on foods and aim for 100% of the RDA (recommended daily allowance).  Regular exams  Have a dental exam and cleaning every 6 months.  See your health care team every year to talk about:  Any changes in your health.  Any medicines your care team has prescribed.  Preventive care, family planning, and ways to prevent chronic diseases.  Shots (vaccines)   HPV shots (up to age 26), if you've never had them before.  Hepatitis B shots (up to age 59), if you've never had them before.  COVID-19 shot: Get this shot when it's due.  Flu shot: Get a flu shot every year.  Tetanus shot: Get a tetanus shot every 10 years.  Pneumococcal, hepatitis A, and RSV shots: Ask your care team if you need these based on your risk.  Shingles shot (for age 50 and up).  General health tests  Diabetes screening:  Starting at age 35, Get screened for diabetes at least every 3 years.  If " you are younger than age 35, ask your care team if you should be screened for diabetes.  Cholesterol test: At age 39, start having a cholesterol test every 5 years, or more often if advised.  Bone density scan (DEXA): At age 50, ask your care team if you should have this scan for osteoporosis (brittle bones).  Hepatitis C: Get tested at least once in your life.  Abdominal aortic aneurysm screening: Talk to your doctor about having this screening if you:  Have ever smoked; and  Are biologically male; and  Are between the ages of 65 and 75.  STIs (sexually transmitted infections)  Before age 24: Ask your care team if you should be screened for STIs.  After age 24: Get screened for STIs if you're at risk. You are at risk for STIs (including HIV) if:  You are sexually active with more than one person.  You don't use condoms every time.  You or a partner was diagnosed with a sexually transmitted infection.  If you are at risk for HIV, ask about PrEP medicine to prevent HIV.  Get tested for HIV at least once in your life, whether you are at risk for HIV or not.  Cancer screening tests  Cervical cancer screening: If you have a cervix, begin getting regular cervical cancer screening tests at age 21. Most people who have regular screenings with normal results can stop after age 65. Talk about this with your provider.  Breast cancer scan (mammogram): If you've ever had breasts, begin having regular mammograms starting at age 40. This is a scan to check for breast cancer.  Colon cancer screening: It is important to start screening for colon cancer at age 45.  Have a colonoscopy test every 10 years (or more often if you're at risk) Or, ask your provider about stool tests like a FIT test every year or Cologuard test every 3 years.  To learn more about your testing options, visit: www.[employer]/178060.pdf.  For help making a decision, visit: luis/pn56545.  Prostate cancer screening test: If you have a prostate and are age 55  to 69, ask your provider if you would benefit from a yearly prostate cancer screening test.  Lung cancer screening: If you are a current or former smoker age 50 to 80, ask your care team if ongoing lung cancer screenings are right for you.  For informational purposes only. Not to replace the advice of your health care provider. Copyright   2023 [employer]. All rights reserved. Clinically reviewed by the [facility] Transitions Program. [employer] [id number] - REV 04/24.

## 2024-05-22 NOTE — PROGRESS NOTES
"Preventive Care Visit  [facility]  Trish Callaway DO, Family Medicine  May 22, 2024      Assessment & Plan     Routine general medical examination at a health care facility     - Comprehensive metabolic panel (BMP + Alb, Alk Phos, ALT, AST, Total. Bili, TP); Future  - CBC with platelets; Future  - Comprehensive metabolic panel (BMP + Alb, Alk Phos, ALT, AST, Total. Bili, TP)  - CBC with platelets    Visit for screening mammogram  Scheduled for Mammogram - insurance said they only cover at [facility]   - Comprehensive metabolic panel (BMP + Alb, Alk Phos, ALT, AST, Total. Bili, TP); Future  - CBC with platelets; Future  - Comprehensive metabolic panel (BMP + Alb, Alk Phos, ALT, AST, Total. Bili, TP)  - CBC with platelets    Pounding heartbeat  Pt was evaluated last Fall for symptoms -   She had ziopatch and ECHO both normal.  Since that time her Apple Watch documented a few episodes of irregular rhythm  She brings up today and it shows \"Atrial Fibrillation\" but exam of the rhythm shows SVT episodes for a few seconds up to rate of 150bpm  Discussed avoiding excessive caffeine and will have her monitor  If getting more could repeat the Zio patch or refer to cardiology   Continue the propranolol   - propranolol ER (INDERAL LA) 80 MG 24 hr capsule; TAKE 1 CAPSULE(80 MG) BY MOUTH DAILY  - Comprehensive metabolic panel (BMP + Alb, Alk Phos, ALT, AST, Total. Bili, TP); Future  - CBC with platelets; Future  - Comprehensive metabolic panel (BMP + Alb, Alk Phos, ALT, AST, Total. Bili, TP)  - CBC with platelets    CARDIOVASCULAR SCREENING; LDL GOAL LESS THAN 160     - Lipid panel reflex to direct LDL Fasting; Future  - Comprehensive metabolic panel (BMP + Alb, Alk Phos, ALT, AST, Total. Bili, TP); Future  - CBC with platelets; Future  - Lipid panel reflex to direct LDL Fasting  - Comprehensive metabolic panel (BMP + Alb, Alk Phos, ALT, AST, Total. Bili, TP)  - CBC with platelets    Hypothyroidism " "due to Hashimoto's thyroiditis  Pending TSH - will adjust if needed   - TSH; Future  - T3, Free; Future  - T4, free; Future  - Comprehensive metabolic panel (BMP + Alb, Alk Phos, ALT, AST, Total. Bili, TP); Future  - CBC with platelets; Future  - TSH  - T3, Free  - T4, free  - Comprehensive metabolic panel (BMP + Alb, Alk Phos, ALT, AST, Total. Bili, TP)  - CBC with platelets    Polyarthralgia   Hx of bilateral joint pain   Current flare - knees, feet  Will check inflammatory tests   - ESR: Erythrocyte sedimentation rate; Future  - CRP, inflammation; Future  - Comprehensive metabolic panel (BMP + Alb, Alk Phos, ALT, AST, Total. Bili, TP); Future  - CBC with platelets; Future  - ESR: Erythrocyte sedimentation rate  - CRP, inflammation  - Comprehensive metabolic panel (BMP + Alb, Alk Phos, ALT, AST, Total. Bili, TP)  - CBC with platelets    Patient has been advised of split billing requirements and indicates understanding: Yes        BMI  Estimated body mass index is 42.24 kg/m  as calculated from the following:    Height as of this encounter: 1.657 m (5' 5.25\").    Weight as of this encounter: 116 kg (255 lb 12.8 oz).   Weight management plan: Discussed healthy diet and exercise guidelines    Counseling  Appropriate preventive services were discussed with this patient, including applicable screening as appropriate for fall prevention, nutrition, physical activity, Tobacco-use cessation, weight loss and cognition.  Checklist reviewing preventive services available has been given to the patient.  Reviewed patient's diet, addressing concerns and/or questions.           Velasquez Slaughter is a 43 year old, presenting for the following:  Physical        5/22/2024     7:05 AM   Additional Questions   Roomed by Beckie        Health Care Directive  Patient does not have a Health Care Directive or Living Will: Discussed advance care planning with patient; however, patient declined at this time.  Answers submitted by the " patient for this visit:  Patient Health Questionnaire (Submitted on 5/22/2024)  If you checked off any problems, how difficult have these problems made it for you to do your work, take care of things at home, or get along with other people?: Somewhat difficult  PHQ9 TOTAL SCORE: 3  FOREST-7 (Submitted on 5/22/2024)  FOREST 7 TOTAL SCORE: 3    HPI  Reports Mammogram is scheduled on 5/24/24- [facility]   - SALINAS Filled and recieved            5/22/2024   General Health   How would you rate your overall physical health? (!) FAIR   Feel stress (tense, anxious, or unable to sleep) Not at all         5/22/2024   Nutrition   Three or more servings of calcium each day? Yes   Diet: Regular (no restrictions)   How many servings of fruit and vegetables per day? (!) 2-3   How many sweetened beverages each day? 0-1         5/22/2024   Exercise   Days per week of moderate/strenous exercise 7 days   Average minutes spent exercising at this level 30 min         5/22/2024   Social Factors   Frequency of gathering with friends or relatives Once a week   Worry food won't last until get money to buy more No   Food not last or not have enough money for food? No   Do you have housing?  Yes   Are you worried about losing your housing? No   Lack of transportation? No   Unable to get utilities (heat,electricity)? No         5/22/2024   Dental   Dentist two times every year? Yes         5/22/2024   TB Screening   Were you born outside of the [place]? No       Today's PHQ-9 Score:       5/22/2024     6:52 AM   PHQ-9 SCORE   PHQ-9 Total Score MyChart 3 (Minimal depression)   PHQ-9 Total Score 3         5/22/2024   Substance Use   Alcohol more than 3/day or more than 7/wk No   Do you use any other substances recreationally? No     Social History     Tobacco Use    Smoking status: Former     Current packs/day: 0.00     Average packs/day: 0.5 packs/day for 10.0 years (5.0 ttl pk-yrs)     Types: Cigarettes     Start date: 7/20/1999     Quit date:  2009     Years since quittin.8    Smokeless tobacco: Never    Tobacco comments:     social smoker for 10 yr   Vaping Use    Vaping status: Never Used   Substance Use Topics    Alcohol use: Yes     Comment: occasional ETOH use    Drug use: No             2024   Breast Cancer Screening   Family history of breast, colon, or ovarian cancer? No / Unknown      Mammogram Screening - Mammogram every 1-2 years updated in Health Maintenance based on mutual decision making        2024   STI Screening   New sexual partner(s) since last STI/HIV test? No     History of abnormal Pap smear: Status post hysterectomy with removal of cervix and no history of CIN2 or greater or cervical cancer. Health Maintenance and Surgical History updated.        2013    12:00 AM 2010    12:00 AM 2009    12:00 AM   PAP / HPV   PAP (Historical) NIL  NIL  NIL      ASCVD Risk   The 10-year ASCVD risk score (Luca BARILLAS, et al., 2019) is: 1%    Values used to calculate the score:      Age: 43 years      Sex: Female      Is Non- : No      Diabetic: No      Tobacco smoker: No      Systolic Blood Pressure: 121 mmHg      Is BP treated: No      HDL Cholesterol: 58 mg/dL      Total Cholesterol: 251 mg/dL       Reviewed and updated as needed this visit by Provider   Tobacco  Allergies  Meds  Problems  Med Hx  Surg Hx  Fam Hx            Patient Active Problem List   Diagnosis    CARDIOVASCULAR SCREENING; LDL GOAL LESS THAN 160    Hypothyroidism    Obesity    Hashimoto's thyroiditis    Hypovitaminosis D    Fatigue    Ovarian cyst    Major depressive disorder, recurrent episode, mild (H24)    Asthma with allergic rhinitis    Status post partial gastrectomy    Anxiety    Allergic rhinitis due to pollen    Iron deficiency anemia    Migraine headache    History of hysterectomy    Morbid obesity (H)    Major depressive disorder, recurrent episode, moderate (H)    Bilateral lower extremity pain  "    Past Surgical History:   Procedure Laterality Date     SECTION  10/10/10    CHOLECYSTECTOMY, LAPOROSCOPIC  2009    gallstones -     FINGER SURGERY      right little finger fracture    HYSTERECTOMY, PAP NO LONGER INDICATED  2018    ovaries remain - uterus and cervix removed    LAPAROSCOPIC GASTRIC SLEEVE N/A 2015    Procedure: LAPAROSCOPIC GASTRIC SLEEVE;  Surgeon: Cam Alvarez MD;  Location:  OR       Social History     Tobacco Use    Smoking status: Former     Current packs/day: 0.00     Average packs/day: 0.5 packs/day for 10.0 years (5.0 ttl pk-yrs)     Types: Cigarettes     Start date: 1999     Quit date: 2009     Years since quittin.8    Smokeless tobacco: Never    Tobacco comments:     social smoker for 10 yr   Substance Use Topics    Alcohol use: Yes     Comment: occasional ETOH use     Family History   Problem Relation Age of Onset    Osteoporosis Mother     Hypertension Mother     Cerebrovascular Disease Mother         Aneurysm    Substance Abuse Mother     Heart Disease Maternal Grandmother     Hypertension Maternal Grandmother     Arthritis Maternal Grandmother         RA     Respiratory Maternal Grandmother         Asthma    Glaucoma No family hx of     Macular Degeneration No family hx of              Review of Systems  Constitutional, HEENT, cardiovascular, pulmonary, GI, , musculoskeletal, neuro, skin, endocrine and psych systems are negative, except as otherwise noted.     Objective    Exam  /82   Pulse 69   Temp 97.2  F (36.2  C) (Temporal)   Resp 16   Ht 1.657 m (5' 5.25\")   Wt 116 kg (255 lb 12.8 oz)   LMP 2018 (Approximate)   SpO2 96%   Breastfeeding No   BMI 42.24 kg/m     Estimated body mass index is 42.24 kg/m  as calculated from the following:    Height as of this encounter: 1.657 m (5' 5.25\").    Weight as of this encounter: 116 kg (255 lb 12.8 oz).    Physical Exam  GENERAL: alert and no distress  EYES: Eyes grossly " normal to inspection, PERRL and conjunctivae and sclerae normal  HENT: ear canals and TM's normal, nose and mouth without ulcers or lesions  NECK: no adenopathy, no asymmetry, masses, or scars  RESP: lungs clear to auscultation - no rales, rhonchi or wheezes  BREAST: normal without masses, tenderness or nipple discharge and no palpable axillary masses or adenopathy  CV: regular rate and rhythm, normal S1 S2, no S3 or S4, no murmur, click or rub, no peripheral edema  ABDOMEN: soft, nontender, no hepatosplenomegaly, no masses and bowel sounds normal  MS: no gross musculoskeletal defects noted, no edema  SKIN: no suspicious lesions or rashes  NEURO: Normal strength and tone, mentation intact and speech normal  PSYCH: mentation appears normal, affect normal/bright        Signed Electronically by: Trish Callaway,

## 2024-05-24 ENCOUNTER — TRANSFERRED RECORDS (OUTPATIENT)
Dept: HEALTH INFORMATION MANAGEMENT | Facility: CLINIC | Age: 44
End: 2024-05-24
Payer: COMMERCIAL

## 2024-05-28 ENCOUNTER — MYC MEDICAL ADVICE (OUTPATIENT)
Dept: FAMILY MEDICINE | Facility: CLINIC | Age: 44
End: 2024-05-28
Payer: COMMERCIAL

## 2024-05-28 DIAGNOSIS — M25.50 POLYARTHRALGIA: Primary | ICD-10-CM

## 2024-05-28 DIAGNOSIS — R70.0 ELEVATED SED RATE: ICD-10-CM

## 2024-05-28 NOTE — TELEPHONE ENCOUNTER
PN,  Please see below [employer] message and advise.  Pended referral  Thanks,  Christiana PALMER RN

## 2024-05-28 NOTE — RESULT ENCOUNTER NOTE
Dear Kasandra,   Your test results are all back -   CBC shows the hemoglobin is just borderline.  I would like to recheck in 6 weeks.   -Cholesterol levels (LDL,HDL, Triglycerides) are borderline - keep working on diet and exercise  ADVISE: rechecking in 1 year.   -Liver and gallbladder tests are normal (ALT,AST, Alk phos, bilirubin), kidney function is normal (Cr, GFR), sodium is normal, potassium is normal, calcium is normal, glucose is normal.  -TSH (thyroid stimulating hormone) is abnormal suggesting you are currently underreplaced.  ADVISE:lets have you stay on the current dose but take 1/2 tablet on Sundays.  Recheck in 6 weeks.  Your CRP and sed rate are elevated.  I think with your joint symptoms we should have you go back and see rheumatology again.  Let us know if you have any questions.  -Trish Callaway, DO

## 2024-05-29 ENCOUNTER — TELEPHONE (OUTPATIENT)
Dept: GASTROENTEROLOGY | Facility: CLINIC | Age: 44
End: 2024-05-29
Payer: COMMERCIAL

## 2024-05-29 NOTE — TELEPHONE ENCOUNTER
Caller:     Reason for Reschedule/Cancellation   (please be detailed, any staff messages or encounters to note?): AZEEM NOT CLEARED      Prior to reschedule please review:  Ordering Provider:     WEGENER, JOEL DANIEL IRWIN     Sedation Determined: CS  Does patient have any ASC Exclusions, please identify?:       Notes on Cancelled Procedure:  Procedure: Lower Endoscopy [Colonoscopy]   Date: 6/12  Location: [facility]; [address] [place] [address]   Surgeon: AZEEM      Rescheduled: Yes,   Procedure: Lower Endoscopy [Colonoscopy]    Date: 6/12   Location: [facility]; [address] [place] [address]   Surgeon: DU   Sedation Level Scheduled  CS ,  Reason for Sedation Level ORDER   Instructions updated and sent: Y     Does patient need PAC or Pre -Op Rescheduled? : N       Did you cancel or rescheduled an EUS procedure? No.

## 2024-06-02 ENCOUNTER — TELEPHONE (OUTPATIENT)
Dept: GASTROENTEROLOGY | Facility: CLINIC | Age: 44
End: 2024-06-02
Payer: COMMERCIAL

## 2024-06-02 NOTE — TELEPHONE ENCOUNTER
Pre visit planning completed.      Procedure details:    Patient scheduled for Colonoscopy  on 6/12/24.     Arrival time: 1015. Procedure time 1100    Facility location: [facility]; [place] [address] [place] [id number]. Check in location: Main entrance at . Come through the roundabout underneath the awning (not the ER entrance).    Sedation type: Conscious sedation     Pre op exam needed? N/A    Indication for procedure: screening       Chart review:     Electronic implanted devices? No    Recent diagnosis of diverticulitis within the last 6 weeks? No    Diabetic? No      Medication review:    Anticoagulants? No    NSAIDS? No NSAID medications per patient's medication list.  RN will verify with pre-assessment call.    Other medication HOLDING recommendations:  N/A      Prep for procedure:     Bowel prep recommendation: Extended Golytely. Bowel prep prescription sent to    [id number] - [place], [place] - [id number] by CRC team.    Due to: BMI > 40.  and constipation noted or reported.     Prep instructions sent via [employer]        Seema Arreola RN  Endoscopy Procedure Pre Assessment RN  [phone] option 4

## 2024-06-04 NOTE — TELEPHONE ENCOUNTER
Pre assessment completed for upcoming procedure.   (Please see previous telephone encounter notes for complete details)        Procedure details:    Arrival time and facility location reviewed.    Pre op exam needed? N/A    Designated  policy reviewed. Instructed to have someone stay 6  hours post procedure.       Medication review:    Medications reviewed. Please see supporting documentation below. Holding recommendations discussed (if applicable).   N/A      Prep for procedure:     Procedure prep instructions reviewed.        Any additional information needed:  N/A      Patient  verbalized understanding and had no questions or concerns at this time.      Debbi Alexander RN  Endoscopy Procedure Pre Assessment   [phone] option 4

## 2024-06-05 ENCOUNTER — HOSPITAL ENCOUNTER (OUTPATIENT)
Dept: MAMMOGRAPHY | Facility: CLINIC | Age: 44
Discharge: HOME | End: 2024-06-05
Attending: FAMILY MEDICINE
Payer: COMMERCIAL

## 2024-06-05 DIAGNOSIS — R92.8 ABNORMAL MAMMOGRAM: ICD-10-CM

## 2024-06-05 PROCEDURE — 77061 BREAST TOMOSYNTHESIS UNI: CPT | Mod: LT

## 2024-06-12 ENCOUNTER — HOSPITAL ENCOUNTER (OUTPATIENT)
Facility: CLINIC | Age: 44
Discharge: HOME | End: 2024-06-12
Attending: COLON & RECTAL SURGERY | Admitting: COLON & RECTAL SURGERY
Payer: COMMERCIAL

## 2024-06-12 VITALS
DIASTOLIC BLOOD PRESSURE: 69 MMHG | WEIGHT: 255 LBS | SYSTOLIC BLOOD PRESSURE: 108 MMHG | RESPIRATION RATE: 16 BRPM | HEART RATE: 60 BPM | HEIGHT: 60 IN | BODY MASS INDEX: 50.06 KG/M2 | OXYGEN SATURATION: 99 %

## 2024-06-12 LAB — COLONOSCOPY: NORMAL

## 2024-06-12 PROCEDURE — 88305 TISSUE EXAM BY PATHOLOGIST: CPT | Mod: TC | Performed by: COLON & RECTAL SURGERY

## 2024-06-12 PROCEDURE — 250N000011 HC RX IP 250 OP 636: Mod: JZ | Performed by: COLON & RECTAL SURGERY

## 2024-06-12 PROCEDURE — G0500 MOD SEDAT ENDO SERVICE >5YRS: HCPCS | Performed by: COLON & RECTAL SURGERY

## 2024-06-12 PROCEDURE — 258N000003 HC RX IP 258 OP 636: Mod: JZ | Performed by: COLON & RECTAL SURGERY

## 2024-06-12 PROCEDURE — 88305 TISSUE EXAM BY PATHOLOGIST: CPT | Mod: 26 | Performed by: PATHOLOGY

## 2024-06-12 PROCEDURE — 45385 COLONOSCOPY W/LESION REMOVAL: CPT | Mod: PT | Performed by: COLON & RECTAL SURGERY

## 2024-06-12 PROCEDURE — 99153 MOD SED SAME PHYS/QHP EA: CPT | Mod: PT | Performed by: COLON & RECTAL SURGERY

## 2024-06-12 RX ORDER — NALOXONE HYDROCHLORIDE 0.4 MG/ML
0.4 INJECTION, SOLUTION INTRAMUSCULAR; INTRAVENOUS; SUBCUTANEOUS
Status: DISCONTINUED | OUTPATIENT
Start: 2024-06-12 | End: 2024-06-12 | Stop reason: HOSPADM

## 2024-06-12 RX ORDER — FLUMAZENIL 0.1 MG/ML
0.2 INJECTION, SOLUTION INTRAVENOUS
Status: DISCONTINUED | OUTPATIENT
Start: 2024-06-12 | End: 2024-06-12 | Stop reason: HOSPADM

## 2024-06-12 RX ORDER — ONDANSETRON 2 MG/ML
4 INJECTION INTRAMUSCULAR; INTRAVENOUS
Status: DISCONTINUED | OUTPATIENT
Start: 2024-06-12 | End: 2024-06-12 | Stop reason: HOSPADM

## 2024-06-12 RX ORDER — NALOXONE HYDROCHLORIDE 0.4 MG/ML
0.2 INJECTION, SOLUTION INTRAMUSCULAR; INTRAVENOUS; SUBCUTANEOUS
Status: DISCONTINUED | OUTPATIENT
Start: 2024-06-12 | End: 2024-06-12 | Stop reason: HOSPADM

## 2024-06-12 RX ORDER — FENTANYL CITRATE 50 UG/ML
50-100 INJECTION, SOLUTION INTRAMUSCULAR; INTRAVENOUS EVERY 5 MIN PRN
Status: DISCONTINUED | OUTPATIENT
Start: 2024-06-12 | End: 2024-06-12 | Stop reason: HOSPADM

## 2024-06-12 RX ORDER — ONDANSETRON 4 MG/1
4 TABLET, ORALLY DISINTEGRATING ORAL EVERY 6 HOURS PRN
Status: DISCONTINUED | OUTPATIENT
Start: 2024-06-12 | End: 2024-06-12 | Stop reason: HOSPADM

## 2024-06-12 RX ORDER — DIPHENHYDRAMINE HYDROCHLORIDE 50 MG/ML
25-50 INJECTION INTRAMUSCULAR; INTRAVENOUS
Status: DISCONTINUED | OUTPATIENT
Start: 2024-06-12 | End: 2024-06-12 | Stop reason: HOSPADM

## 2024-06-12 RX ORDER — PROCHLORPERAZINE MALEATE 10 MG
10 TABLET ORAL EVERY 6 HOURS PRN
Status: DISCONTINUED | OUTPATIENT
Start: 2024-06-12 | End: 2024-06-12 | Stop reason: HOSPADM

## 2024-06-12 RX ORDER — ATROPINE SULFATE 0.1 MG/ML
1 INJECTION INTRAVENOUS
Status: DISCONTINUED | OUTPATIENT
Start: 2024-06-12 | End: 2024-06-12 | Stop reason: HOSPADM

## 2024-06-12 RX ORDER — LIDOCAINE 40 MG/G
CREAM TOPICAL
Status: DISCONTINUED | OUTPATIENT
Start: 2024-06-12 | End: 2024-06-12 | Stop reason: HOSPADM

## 2024-06-12 RX ORDER — ONDANSETRON 2 MG/ML
4 INJECTION INTRAMUSCULAR; INTRAVENOUS EVERY 6 HOURS PRN
Status: DISCONTINUED | OUTPATIENT
Start: 2024-06-12 | End: 2024-06-12 | Stop reason: HOSPADM

## 2024-06-12 RX ORDER — EPINEPHRINE 1 MG/ML
0.1 INJECTION, SOLUTION INTRAMUSCULAR; SUBCUTANEOUS
Status: DISCONTINUED | OUTPATIENT
Start: 2024-06-12 | End: 2024-06-12 | Stop reason: HOSPADM

## 2024-06-12 RX ORDER — SIMETHICONE 40MG/0.6ML
133 SUSPENSION, DROPS(FINAL DOSAGE FORM)(ML) ORAL
Status: DISCONTINUED | OUTPATIENT
Start: 2024-06-12 | End: 2024-06-12 | Stop reason: HOSPADM

## 2024-06-12 RX ADMIN — MIDAZOLAM 2 MG: 1 INJECTION INTRAMUSCULAR; INTRAVENOUS at 11:19

## 2024-06-12 RX ADMIN — SODIUM CHLORIDE 500 ML: 9 INJECTION, SOLUTION INTRAVENOUS at 11:16

## 2024-06-12 RX ADMIN — FENTANYL CITRATE 25 MCG: 50 INJECTION, SOLUTION INTRAMUSCULAR; INTRAVENOUS at 11:26

## 2024-06-12 RX ADMIN — FENTANYL CITRATE 100 MCG: 50 INJECTION, SOLUTION INTRAMUSCULAR; INTRAVENOUS at 11:17

## 2024-06-12 RX ADMIN — MIDAZOLAM 1 MG: 1 INJECTION INTRAMUSCULAR; INTRAVENOUS at 11:26

## 2024-06-12 ASSESSMENT — ACTIVITIES OF DAILY LIVING (ADL)
ADLS_ACUITY_SCORE: 35
ADLS_ACUITY_SCORE: 35

## 2024-06-12 NOTE — H&P
Pre-Endoscopy History and Physical     Kasandra Winkler MRN# [id number]   YOB: 1980 Age: 43 year old     Date of Procedure: 6/12/2024  Primary care provider: Trish Callaway  Type of Endoscopy: colonoscopy  Reason for Procedure: screening  Type of Anesthesia Anticipated: Moderate Sedation    HPI:    Kasandra is a 43 year old female who will be undergoing the above procedure.      A history and physical has been performed. The patient's medications and allergies have been reviewed. The risks and benefits of the procedure and the sedation options and risks were discussed with the patient.  All questions were answered and informed consent was obtained.      She denies a personal or family history of anesthesia complications or bleeding disorders.     Allergies   Allergen Reactions    Dogs     Dust Mites Difficulty breathing    Procaine Palpitations        Prior to Admission Medications   Prescriptions Last Dose Informant Patient Reported? Taking?   Cholecalciferol (VITAMIN D3) 3000 UNITS TABS   Yes No   Sig: Take 1 tablet by mouth daily.   FLUoxetine (PROZAC) 20 MG capsule   Yes No   Sig: Take 1 capsule (20 mg) by mouth daily   Multiple Vitamins-Minerals (HM MULTIVITAMIN ADULT GUMMY PO)   Yes No   SYNTHROID 125 MCG tablet   No No   Sig: Take 1 tablet (125 mcg) by mouth daily   albuterol (PROAIR HFA/PROVENTIL HFA/VENTOLIN HFA) 108 (90 Base) MCG/ACT inhaler   No No   Sig: Inhale 2 puffs into the lungs every 6 hours   azelastine (ASTELIN) 0.1 % nasal spray   No No   Sig: USE 1 TO 2 SPRAYS IN EACH NOSTRIL TWICE DAILY   bisacodyl (DULCOLAX) 5 MG EC tablet   No No   Sig: Two days prior to exam take two (2) tablets at 4pm. One day prior to exam take two (2) tablets at 4pm   Patient not taking: Reported on 5/22/2024   budesonide-formoterol (SYMBICORT) 160-4.5 MCG/ACT Inhaler   No No   Sig: Inhale 2 puffs into the lungs 2 times daily   cetirizine (ZYRTEC) 10 MG tablet   Yes No   Sig: Take 10 mg by mouth daily    fluticasone (FLONASE) 50 MCG/ACT nasal spray   No No   Sig: SHAKE LIQUID AND USE 1 TO 2 SPRAYS IN EACH NOSTRIL DAILY   gabapentin (NEURONTIN) 300 MG capsule   Yes No   Sig: Take 1 capsule (300 mg) by mouth 3 times daily   olopatadine (PATANOL) 0.1 % ophthalmic solution   No No   Sig: Place 1 drop into both eyes 2 times daily   omeprazole (PRILOSEC) 40 MG DR capsule   Yes No   Sig: Take 1 capsule (40 mg) by mouth daily   polyethylene glycol (GOLYTELY) 236 g suspension   No No   Sig: Take as directed in colonoscopy prep instructions   Patient not taking: Reported on 2024   propranolol ER (INDERAL LA) 80 MG 24 hr capsule   No No   Sig: TAKE 1 CAPSULE(80 MG) BY MOUTH DAILY   traZODone (DESYREL) 50 MG tablet   Yes No   Sig: Take 1 tablet (50 mg) by mouth at bedtime      Facility-Administered Medications: None       Patient Active Problem List   Diagnosis    CARDIOVASCULAR SCREENING; LDL GOAL LESS THAN 160    Hypothyroidism    Obesity    Hashimoto's thyroiditis    Hypovitaminosis D    Fatigue    Ovarian cyst    Major depressive disorder, recurrent episode, mild (H24)    Asthma with allergic rhinitis    Status post partial gastrectomy    Anxiety    Allergic rhinitis due to pollen    Iron deficiency anemia    Migraine headache    History of hysterectomy    Morbid obesity (H)    Major depressive disorder, recurrent episode, moderate (H)    Bilateral lower extremity pain        Past Medical History:   Diagnosis Date    Allergic rhinitis     Asthma     Allergy induced    Hashimoto thyroiditis     Hypertension     Mild major depression (H24) 2009    TMJ (temporomandibular joint disorder)         Past Surgical History:   Procedure Laterality Date     SECTION  10/10/10    CHOLECYSTECTOMY, LAPOROSCOPIC  2009    gallstones -     FINGER SURGERY      right little finger fracture    HYSTERECTOMY, PAP NO LONGER INDICATED  2018    ovaries remain - uterus and cervix removed    LAPAROSCOPIC GASTRIC SLEEVE  "N/A 2015    Procedure: LAPAROSCOPIC GASTRIC SLEEVE;  Surgeon: Cam Alvarez MD;  Location:  OR       Social History     Tobacco Use    Smoking status: Former     Current packs/day: 0.00     Average packs/day: 0.5 packs/day for 10.0 years (5.0 ttl pk-yrs)     Types: Cigarettes     Start date: 1999     Quit date: 2009     Years since quittin.9    Smokeless tobacco: Never    Tobacco comments:     social smoker for 10 yr   Substance Use Topics    Alcohol use: Yes     Comment: occasional ETOH use       Family History   Problem Relation Age of Onset    Osteoporosis Mother     Hypertension Mother     Cerebrovascular Disease Mother         Aneurysm    Substance Abuse Mother     Heart Disease Maternal Grandmother     Hypertension Maternal Grandmother     Arthritis Maternal Grandmother         RA     Respiratory Maternal Grandmother         Asthma    Glaucoma No family hx of     Macular Degeneration No family hx of        REVIEW OF SYSTEMS:     5 point ROS negative except as noted above in HPI, including Gen., Resp., CV, GI &  system review.      PHYSICAL EXAM:   LMP 2018 (Approximate)  Estimated body mass index is 42.24 kg/m  as calculated from the following:    Height as of 24: 1.657 m (5' 5.25\").    Weight as of 24: 116 kg (255 lb 12.8 oz).   GENERAL APPEARANCE: healthy and alert  MENTAL STATUS: alert  AIRWAY EXAM: Mallampatti Class II (visualization of the soft palate, fauces, and uvula)  RESP: lungs clear to auscultation - no rales, rhonchi or wheezes  CV: normal S1 S2, no S3 or S4      DIAGNOSTICS:    Not indicated      IMPRESSION   ASA Class 3 - Severe systemic disease, but not incapacitating        PLAN:       Plan for colonoscopy. We discussed the risks, benefits and alternatives and the patient wished to proceed.    The above has been forwarded to the consulting provider.      Signed Electronically by: Aleja Gloria MD  2024    "

## 2024-06-13 LAB
PATH REPORT.COMMENTS IMP SPEC: NORMAL
PATH REPORT.COMMENTS IMP SPEC: NORMAL
PATH REPORT.FINAL DX SPEC: NORMAL
PATH REPORT.GROSS SPEC: NORMAL
PATH REPORT.MICROSCOPIC SPEC OTHER STN: NORMAL
PATH REPORT.RELEVANT HX SPEC: NORMAL
PHOTO IMAGE: NORMAL

## 2024-07-03 ENCOUNTER — DOCUMENTATION ONLY (OUTPATIENT)
Dept: FAMILY MEDICINE | Facility: CLINIC | Age: 44
End: 2024-07-03
Payer: COMMERCIAL

## 2024-07-03 ENCOUNTER — TELEPHONE (OUTPATIENT)
Dept: FAMILY MEDICINE | Facility: CLINIC | Age: 44
End: 2024-07-03
Payer: COMMERCIAL

## 2024-07-03 DIAGNOSIS — E06.3 HYPOTHYROIDISM DUE TO HASHIMOTO'S THYROIDITIS: Primary | ICD-10-CM

## 2024-07-03 DIAGNOSIS — Z00.00 ROUTINE GENERAL MEDICAL EXAMINATION AT A HEALTH CARE FACILITY: ICD-10-CM

## 2024-07-03 NOTE — TELEPHONE ENCOUNTER
P.N.,  Patient sent [employer] message requesting lab orders  Following labs completed 5/22/24  Patient scheduled for lab only 7/8  Pended  Thanks,  Debra MERAZ RN

## 2024-07-08 ENCOUNTER — LAB (OUTPATIENT)
Dept: LAB | Facility: CLINIC | Age: 44
End: 2024-07-08
Payer: COMMERCIAL

## 2024-07-08 DIAGNOSIS — M25.50 POLYARTHRALGIA: ICD-10-CM

## 2024-07-08 DIAGNOSIS — E06.3 HYPOTHYROIDISM DUE TO HASHIMOTO'S THYROIDITIS: ICD-10-CM

## 2024-07-08 LAB
HOLD SPECIMEN: NORMAL
HOLD SPECIMEN: NORMAL

## 2024-07-08 PROCEDURE — 84443 ASSAY THYROID STIM HORMONE: CPT

## 2024-07-08 PROCEDURE — 84439 ASSAY OF FREE THYROXINE: CPT

## 2024-07-08 PROCEDURE — 36415 COLL VENOUS BLD VENIPUNCTURE: CPT

## 2024-07-08 PROCEDURE — 85027 COMPLETE CBC AUTOMATED: CPT

## 2024-07-09 ENCOUNTER — DOCUMENTATION ONLY (OUTPATIENT)
Dept: FAMILY MEDICINE | Facility: CLINIC | Age: 44
End: 2024-07-09
Payer: COMMERCIAL

## 2024-07-09 DIAGNOSIS — E06.3 HYPOTHYROIDISM DUE TO HASHIMOTO'S THYROIDITIS: Primary | ICD-10-CM

## 2024-07-09 DIAGNOSIS — M25.50 POLYARTHRALGIA: ICD-10-CM

## 2024-07-09 LAB
ERYTHROCYTE [DISTWIDTH] IN BLOOD BY AUTOMATED COUNT: 12.7 % (ref 10–15)
HCT VFR BLD AUTO: 38 % (ref 35–47)
HGB BLD-MCNC: 11.7 G/DL (ref 11.7–15.7)
MCH RBC QN AUTO: 26.6 PG (ref 26.5–33)
MCHC RBC AUTO-ENTMCNC: 30.8 G/DL (ref 31.5–36.5)
MCV RBC AUTO: 86 FL (ref 78–100)
PLATELET # BLD AUTO: 342 10E3/UL (ref 150–450)
RBC # BLD AUTO: 4.4 10E6/UL (ref 3.8–5.2)
WBC # BLD AUTO: 7.9 10E3/UL (ref 4–11)

## 2024-07-10 LAB
T4 FREE SERPL-MCNC: 1.56 NG/DL (ref 0.9–1.7)
TSH SERPL DL<=0.005 MIU/L-ACNC: 0.2 UIU/ML (ref 0.3–4.2)

## 2024-07-12 NOTE — RESULT ENCOUNTER NOTE
Dear Kasandra,   Your test results are all back -   TSH is still low indicating your a slightly over treated and we should lower the dose of thyroid medication.  Let me know the dose you are taking.  Let us know if you have any questions.  -Trish Callaway, DO

## 2024-07-13 ENCOUNTER — MYC MEDICAL ADVICE (OUTPATIENT)
Dept: FAMILY MEDICINE | Facility: CLINIC | Age: 44
End: 2024-07-13
Payer: COMMERCIAL

## 2024-07-13 DIAGNOSIS — E06.3 HYPOTHYROIDISM DUE TO HASHIMOTO'S THYROIDITIS: ICD-10-CM

## 2024-07-16 RX ORDER — LEVOTHYROXINE SODIUM 112 MCG
112 TABLET ORAL DAILY
Qty: 60 TABLET | Refills: 0 | Status: SHIPPED | OUTPATIENT
Start: 2024-07-16

## 2024-08-13 NOTE — TELEPHONE ENCOUNTER
Called patient verified insurance.  She was given number for [facility] prior auth dept for procedures  They would like her to call one week prior to preceedure to verify coverage.   Number was given [phone].   [Potential consequences of obesity discussed] : Potential consequences of obesity discussed [Benefits of weight loss discussed] : Benefits of weight loss discussed [Encouraged to increase physical activity] : Encouraged to increase physical activity [Decrease Portions] : decrease portions [None] : None [Good understanding] : Patient has a good understanding of lifestyle changes and steps needed to achieve self management goal

## 2024-09-18 ENCOUNTER — LAB (OUTPATIENT)
Dept: LAB | Facility: CLINIC | Age: 44
End: 2024-09-18
Payer: COMMERCIAL

## 2024-09-18 DIAGNOSIS — E06.3 HYPOTHYROIDISM DUE TO HASHIMOTO'S THYROIDITIS: ICD-10-CM

## 2024-09-18 PROCEDURE — 84443 ASSAY THYROID STIM HORMONE: CPT

## 2024-09-18 PROCEDURE — 36415 COLL VENOUS BLD VENIPUNCTURE: CPT

## 2024-09-19 LAB — TSH SERPL DL<=0.005 MIU/L-ACNC: 0.43 UIU/ML (ref 0.3–4.2)

## 2024-10-02 ENCOUNTER — VIRTUAL VISIT (OUTPATIENT)
Dept: FAMILY MEDICINE | Facility: CLINIC | Age: 44
End: 2024-10-02
Payer: COMMERCIAL

## 2024-10-02 DIAGNOSIS — G25.81 RESTLESS LEG SYNDROME: ICD-10-CM

## 2024-10-02 DIAGNOSIS — R41.89 BRAIN FOG: Primary | ICD-10-CM

## 2024-10-02 DIAGNOSIS — M25.50 POLYARTHRALGIA: ICD-10-CM

## 2024-10-02 DIAGNOSIS — R23.2 HOT FLASHES: ICD-10-CM

## 2024-10-02 DIAGNOSIS — R53.83 FATIGUE, UNSPECIFIED TYPE: ICD-10-CM

## 2024-10-02 PROCEDURE — 99214 OFFICE O/P EST MOD 30 MIN: CPT | Mod: 95 | Performed by: FAMILY MEDICINE

## 2024-10-02 ASSESSMENT — ENCOUNTER SYMPTOMS: FATIGUE: 1

## 2024-10-02 NOTE — PROGRESS NOTES
Kasandra is a 43 year old who is being evaluated via a billable video visit.    How would you like to obtain your AVS? MyChart  If the video visit is dropped, the invitation should be resent by: Text to cell phone: [phone]  Will anyone else be joining your video visit? No      Assessment & Plan     Brain fog  Increase brain fog and fatigue   Unclear etiology but wonder about perimenopause, inflammation from rheum condition or low mineral or vitamin   Hx of hysterectomy so no periods - will check FSH  Had elevated sed rate and CRP in spring 2024 - will recheck.  She is scheduled to see rheumatology but unable to get in until February.  She is also having polyarthralgia so could be related to her symptoms  Plan to check some vitamins as she did have gastric bypass and is on omeprazole which cna affect absorption of b12, iron and other   Will adjust further treatment or evaluation based on lab results  Other thought would be anxiety and depression - recommended working with her psychiatrist   Pt will call or RTC if symptoms worsen or do not improve.     Restless leg syndrome     - Ferritin; Future  - Iron and iron binding capacity; Future  - Vitamin D Deficiency; Future    Fatigue, unspecified type     - ESR: Erythrocyte sedimentation rate; Future  - CRP, inflammation; Future  - CBC with platelets; Future  - Comprehensive metabolic panel (BMP + Alb, Alk Phos, ALT, AST, Total. Bili, TP); Future  - Vitamin B12; Future  - Ferritin; Future  - Vitamin D Deficiency; Future    Hot flashes   As above   - Follicle stimulating hormone; Future     Polyarthralgia                  Subjective   Kasandra is a 43 year old, presenting for the following health issues:  Fatigue (Brain fog and body aches /)      10/2/2024     1:34 PM   Additional Questions   Roomed by bess campbell   Accompanied by johny         10/2/2024     1:34 PM   Patient Reported Additional Medications   Patient reports taking the following new medications n/a      Fatigue  Associated symptoms include fatigue.   History of Present Illness       Reason for visit:  Extreme fatigue, memory issues, joint pain and brain fog   Kasandra is taking medications regularly.     Feeling very tired -   Most tired she has ever been   Feels like she is coming down with something   More brain fog and memory concerns  Feels like she could be more ADD  Having more joint pain   Feels like body is not getting enough oxygen   Feels like this is worse over the past summer and few months     Has not seen rheumatology - made appt but not able to be seen until February     ROS -   Having some more headaches -this is a little worse  Some tingling in arms and legs - will come on and go away quickly    Has some tinnitus     Taking gabapentin - prescribed by old psychiatrist -   Will take for panic attack or strong anxiety   Currently taking nightly but more infrequent during the day -    Stopped prozac - one week ago                 Objective           Vitals:  No vitals were obtained today due to virtual visit.    Physical Exam   GENERAL: alert and no distress  EYES: Eyes grossly normal to inspection.  No discharge or erythema, or obvious scleral/conjunctival abnormalities.  RESP: No audible wheeze, cough, or visible cyanosis.    SKIN: Visible skin clear. No significant rash, abnormal pigmentation or lesions.  NEURO: Cranial nerves grossly intact.  Mentation and speech appropriate for age.  PSYCH: Appropriate affect, tone, and pace of words          Video-Visit Details    Type of service:  Video Visit   Originating Location (pt. Location): Home    Distant Location (provider location):  On-site  Platform used for Video Visit: Davonte  Signed Electronically by: Trish Callaway DO

## 2024-10-03 ENCOUNTER — LAB (OUTPATIENT)
Dept: LAB | Facility: CLINIC | Age: 44
End: 2024-10-03
Payer: COMMERCIAL

## 2024-10-03 DIAGNOSIS — R53.83 FATIGUE, UNSPECIFIED TYPE: ICD-10-CM

## 2024-10-03 DIAGNOSIS — R23.2 HOT FLASHES: ICD-10-CM

## 2024-10-03 DIAGNOSIS — G25.81 RESTLESS LEG SYNDROME: ICD-10-CM

## 2024-10-03 LAB
ALBUMIN SERPL BCG-MCNC: 3.8 G/DL (ref 3.5–5.2)
ALP SERPL-CCNC: 88 U/L (ref 40–150)
ALT SERPL W P-5'-P-CCNC: 8 U/L (ref 0–70)
ANION GAP SERPL CALCULATED.3IONS-SCNC: 9 MMOL/L (ref 7–15)
AST SERPL W P-5'-P-CCNC: 16 U/L (ref 0–45)
BILIRUB SERPL-MCNC: 0.2 MG/DL
BUN SERPL-MCNC: 10.4 MG/DL (ref 6–20)
CALCIUM SERPL-MCNC: 8.9 MG/DL (ref 8.8–10.4)
CHLORIDE SERPL-SCNC: 105 MMOL/L (ref 98–107)
CREAT SERPL-MCNC: 0.73 MG/DL (ref 0.51–1.17)
CRP SERPL-MCNC: 21 MG/L
EGFRCR SERPLBLD CKD-EPI 2021: >90 ML/MIN/1.73M2
ERYTHROCYTE [DISTWIDTH] IN BLOOD BY AUTOMATED COUNT: 14.4 % (ref 10–15)
ERYTHROCYTE [SEDIMENTATION RATE] IN BLOOD BY WESTERGREN METHOD: 35 MM/HR (ref 0–20)
FERRITIN SERPL-MCNC: 30 NG/ML (ref 6–409)
FSH SERPL IRP2-ACNC: 2.6 MIU/ML (ref 1.5–134.8)
GLUCOSE SERPL-MCNC: 99 MG/DL (ref 70–99)
HCO3 SERPL-SCNC: 24 MMOL/L (ref 22–29)
HCT VFR BLD AUTO: 35.6 % (ref 35–53)
HGB BLD-MCNC: 11.6 G/DL (ref 11.7–17.7)
IRON BINDING CAPACITY (ROCHE): 326 UG/DL (ref 240–430)
IRON SATN MFR SERPL: 13 % (ref 15–46)
IRON SERPL-MCNC: 43 UG/DL (ref 37–157)
MCH RBC QN AUTO: 27 PG (ref 26.5–33)
MCHC RBC AUTO-ENTMCNC: 32.6 G/DL (ref 31.5–36.5)
MCV RBC AUTO: 83 FL (ref 78–100)
PLATELET # BLD AUTO: 310 10E3/UL (ref 150–450)
POTASSIUM SERPL-SCNC: 4.1 MMOL/L (ref 3.4–5.3)
PROT SERPL-MCNC: 6.7 G/DL (ref 6.4–8.3)
RBC # BLD AUTO: 4.3 10E6/UL (ref 3.8–5.9)
SODIUM SERPL-SCNC: 138 MMOL/L (ref 135–145)
VIT B12 SERPL-MCNC: 533 PG/ML (ref 232–1245)
WBC # BLD AUTO: 7.4 10E3/UL (ref 4–11)

## 2024-10-03 PROCEDURE — 36415 COLL VENOUS BLD VENIPUNCTURE: CPT

## 2024-10-03 PROCEDURE — 80053 COMPREHEN METABOLIC PANEL: CPT

## 2024-10-03 PROCEDURE — 85652 RBC SED RATE AUTOMATED: CPT

## 2024-10-03 PROCEDURE — 82306 VITAMIN D 25 HYDROXY: CPT

## 2024-10-03 PROCEDURE — 86140 C-REACTIVE PROTEIN: CPT

## 2024-10-03 PROCEDURE — 85027 COMPLETE CBC AUTOMATED: CPT

## 2024-10-03 PROCEDURE — 83550 IRON BINDING TEST: CPT

## 2024-10-03 PROCEDURE — 83540 ASSAY OF IRON: CPT

## 2024-10-03 PROCEDURE — 82607 VITAMIN B-12: CPT

## 2024-10-03 PROCEDURE — 83001 ASSAY OF GONADOTROPIN (FSH): CPT

## 2024-10-03 PROCEDURE — 82728 ASSAY OF FERRITIN: CPT

## 2024-10-04 LAB — VIT D+METAB SERPL-MCNC: 20 NG/ML (ref 20–50)

## 2024-10-04 NOTE — RESULT ENCOUNTER NOTE
Dear Kasandra,   Your test results are all back -   Sed rate and CRP are both still elevated and hemoglobin is just borderline.  I think we should try to have you see rheumatology sooner?  [facility] hired a bunch of rheumatologists and some of my patients have had better luck getting in to see rheumatology sooner?  Let us know if you have any questions.  -Trish Callaway, DO

## 2024-10-17 ENCOUNTER — OFFICE VISIT (OUTPATIENT)
Dept: FAMILY MEDICINE | Facility: CLINIC | Age: 44
End: 2024-10-17
Payer: COMMERCIAL

## 2024-10-17 VITALS
RESPIRATION RATE: 16 BRPM | WEIGHT: 255.6 LBS | OXYGEN SATURATION: 98 % | DIASTOLIC BLOOD PRESSURE: 70 MMHG | SYSTOLIC BLOOD PRESSURE: 100 MMHG | TEMPERATURE: 97.9 F | HEART RATE: 53 BPM | BODY MASS INDEX: 41.08 KG/M2 | HEIGHT: 66 IN

## 2024-10-17 DIAGNOSIS — E06.3 HYPOTHYROIDISM DUE TO HASHIMOTO'S THYROIDITIS: ICD-10-CM

## 2024-10-17 DIAGNOSIS — H60.393 INFECTIVE OTITIS EXTERNA, BILATERAL: Primary | ICD-10-CM

## 2024-10-17 DIAGNOSIS — F45.8 BRUXISM (TEETH GRINDING): ICD-10-CM

## 2024-10-17 PROCEDURE — 99213 OFFICE O/P EST LOW 20 MIN: CPT | Mod: 25 | Performed by: FAMILY MEDICINE

## 2024-10-17 PROCEDURE — 90471 IMMUNIZATION ADMIN: CPT | Performed by: FAMILY MEDICINE

## 2024-10-17 PROCEDURE — 90480 ADMN SARSCOV2 VAC 1/ONLY CMP: CPT | Performed by: FAMILY MEDICINE

## 2024-10-17 PROCEDURE — 91320 SARSCV2 VAC 30MCG TRS-SUC IM: CPT | Performed by: FAMILY MEDICINE

## 2024-10-17 PROCEDURE — 90656 IIV3 VACC NO PRSV 0.5 ML IM: CPT | Performed by: FAMILY MEDICINE

## 2024-10-17 RX ORDER — CIPROFLOXACIN AND DEXAMETHASONE 3; 1 MG/ML; MG/ML
4 SUSPENSION/ DROPS AURICULAR (OTIC) 2 TIMES DAILY
Qty: 7.5 ML | Refills: 0 | Status: SHIPPED | OUTPATIENT
Start: 2024-10-17 | End: 2024-10-27

## 2024-10-17 RX ORDER — LEVOTHYROXINE SODIUM 112 UG/1
112 TABLET ORAL DAILY
Qty: 90 TABLET | Refills: 0 | Status: SHIPPED | OUTPATIENT
Start: 2024-10-17

## 2024-10-17 RX ORDER — LEVOTHYROXINE SODIUM 112 MCG
112 TABLET ORAL DAILY
Qty: 60 TABLET | Refills: 0 | Status: CANCELLED | OUTPATIENT
Start: 2024-10-17

## 2024-10-17 ASSESSMENT — PAIN SCALES - GENERAL: PAINLEVEL: MODERATE PAIN (4)

## 2024-10-17 NOTE — PROGRESS NOTES
"  Assessment & Plan     Infective otitis externa, bilateral  Main symptom is draining of ear worse in morning with some itching.  No tympanic membrane rupture on exam today in either ear. .  Today tympanic membranes are clear and not erythematous. Neither Ear canal is particularly red nor inflamed but no cerumen either.     Known bruxism may be causing some discomfort but is hard to say.  Given clear description of fluid draining and irritation will treat for presumed otitis externa and if not improved could consider follow up with ENT.  Referral given but could cancel appointment if needed.   - ciprofloxacin-dexAMETHasone (CIPRODEX) 0.3-0.1 % otic suspension; Place 4 drops into both ears 2 times daily for 10 days.    Bruxism (teeth grinding)  As above.     Hypothyroidism due to Hashimoto's thyroiditis  Would like to change to generic for cost/refill sent.  Tsh normal September.     Has follow up scheduled with primary provider next year.   - levothyroxine (SYNTHROID/LEVOTHROID) 112 MCG tablet; Take 1 tablet (112 mcg) by mouth daily.                Velasquez Slaughter is a 43 year old, presenting for the following health issues:  Ear Problem      10/17/2024     7:46 AM   Additional Questions   Roomed by JIGAR Carvajal   Accompanied by n/a     History of Present Illness       Reason for visit:  Ear pain and fluid in left ear  Symptom onset:  More than a month  Symptoms include:  Both ears are itchy on the inside. Pain in both ears. Has some pain in jaw, \"like when you have something extremely sour, you get a twinge by your ears/upper jaw\"  Symptom intensity:  Moderate  Symptom progression:  Worsening  Had these symptoms before:  No  What makes it worse:  Fatimah Slaughter is taking medications regularly.                     Objective    LMP 06/25/2018 (Approximate)   There is no height or weight on file to calculate BMI.  Physical Exam   As above.     Oral exam with linea alba bilaterally.     Bilateral " TMJ joints shift with opening/closing left more than right.             Signed Electronically by: Joel Daniel Wegener, MD

## 2024-10-17 NOTE — NURSING NOTE
Pt received COVID vaccine and flu vaccine per provider, Dr. Wegener's, orders. Pt given VIS forms prior to immunization administration.   Prior to immunization administration, verified patients identity using patient s name and date of birth.   Patient instructed to remain in clinic for 15 minutes afterwards, and to report any adverse reactions.     Pt declined AVS. Pt was advised to review AVS on MyChart - Pt verbalized understanding.     Performed by Onesimo Pardo RN on 10/17/2024.

## 2024-10-29 ENCOUNTER — MYC MEDICAL ADVICE (OUTPATIENT)
Dept: FAMILY MEDICINE | Facility: CLINIC | Age: 44
End: 2024-10-29
Payer: COMMERCIAL

## 2024-10-29 DIAGNOSIS — M25.50 POLYARTHRALGIA: Primary | ICD-10-CM

## 2024-10-29 DIAGNOSIS — R79.82 ELEVATED C-REACTIVE PROTEIN (CRP): ICD-10-CM

## 2024-10-29 NOTE — TELEPHONE ENCOUNTER
Dr. Callaway,     Please see below MyChart message and advise.   Rheumatology Referral pended, if approved.      Thanks,   Ifrah KYLE RN

## 2024-11-08 DIAGNOSIS — E06.3 HYPOTHYROIDISM DUE TO HASHIMOTO'S THYROIDITIS: ICD-10-CM

## 2024-11-08 RX ORDER — LEVOTHYROXINE SODIUM 112 MCG
112 TABLET ORAL DAILY
Qty: 60 TABLET | Refills: 0 | OUTPATIENT
Start: 2024-11-08

## 2024-11-25 DIAGNOSIS — E06.3 HYPOTHYROIDISM DUE TO HASHIMOTO'S THYROIDITIS: ICD-10-CM

## 2024-11-26 RX ORDER — LEVOTHYROXINE SODIUM 112 UG/1
112 TABLET ORAL DAILY
Qty: 90 TABLET | Refills: 0 | Status: SHIPPED | OUTPATIENT
Start: 2024-11-26

## 2024-12-05 NOTE — TELEPHONE ENCOUNTER
"FUTURE VISIT INFORMATION      FUTURE VISIT INFORMATION:  Date: 2/28/25  Time: 12:20 PM  Location: [facility] - ENT  REFERRAL INFORMATION:  Referring provider:  Wegener, Joel Daniel Irwin, MD  Referring providers clinic:  [facility]   Reason for visit/diagnosis:  Infective otitis externa, bilateral [H60.393]  REF BY Wegener, Joel Daniel Irwin, MD  RECS IN [facility]  CONF LOC  SCHED W/PT     RECORDS REQUESTED FROM      Clinic name Comments Records Status Imaging Status   [facility] 10/17/24 referral/ note- Wegener, Joel Daniel Irwin, MD [facility]    [facility] 11/9/2016 MR cervical  8/16/2016 MR brain Epic PACS           \"Please notify/message CSS if patient completed outside imaging prior to scheduled appointment and/or any outside records that might have been missed at pre visit -Thanks\"  "

## 2025-01-06 ENCOUNTER — TRANSFERRED RECORDS (OUTPATIENT)
Dept: HEALTH INFORMATION MANAGEMENT | Facility: CLINIC | Age: 45
End: 2025-01-06
Payer: COMMERCIAL

## 2025-01-14 ENCOUNTER — TELEPHONE (OUTPATIENT)
Dept: OTOLARYNGOLOGY | Facility: CLINIC | Age: 45
End: 2025-01-14
Payer: COMMERCIAL

## 2025-01-14 NOTE — TELEPHONE ENCOUNTER
LVM for patient regarding rescheduling needed as provider is out of clinic.   Provided ENT Clinic number for rescheduling options.       Additionally, sent patient a [employer] message.

## 2025-01-22 ENCOUNTER — TELEPHONE (OUTPATIENT)
Dept: OTOLARYNGOLOGY | Facility: CLINIC | Age: 45
End: 2025-01-22
Payer: COMMERCIAL

## 2025-01-22 NOTE — TELEPHONE ENCOUNTER
Patient confirmed scheduled appointment:     Date: 3/3/25  Time: 10am  Appointment Type: New Ear  [facility]   Location: [facility]  Testing/imaging: AUDIO PRIOR  Additional Notes:

## 2025-02-22 NOTE — PROGRESS NOTES
Chief Complaint   Patient presents with    Consult     Drainage from ears 2 months ago, not sure which ear. Was given some drops and cleared up. Does have pain in both ears on and off and drainage on and off from both. Hears noises and ringing.       History of Present Illness   Kasandra Winkler is a 44 year old adult who presents today for evaluation. I am seeing this patient in consultation for infective otitis externa at the request of the provider Dr. Joel Daniel Irwin Wegener.  The patient developed ear drainage this past fall.  She was treated with a course of Ciprodex drops.  She does have a history of intermittent moisture in the ear canal, sometimes a spot or 2 or drainage on the pillow.  Occasionally her ears will itch.  She does not get any significant crusting or scaling of the skin.  She had ear infection when she was younger and had a perforated otitis media at 1 point.  She has not had ear tubes or prior ear surgery.  She does report bilateral tinnitus that is intermittent.  No current otalgia or otorrhea.  No bloody otorrhea.    Past Medical History  Patient Active Problem List   Diagnosis    CARDIOVASCULAR SCREENING; LDL GOAL LESS THAN 160    Hypothyroidism    Obesity    Hashimoto's thyroiditis    Hypovitaminosis D    Fatigue    Ovarian cyst    Major depressive disorder, recurrent episode, mild    Asthma with allergic rhinitis    Status post partial gastrectomy    Anxiety    Allergic rhinitis due to pollen    Iron deficiency anemia    Migraine headache    History of hysterectomy    Morbid obesity (H)    Major depressive disorder, recurrent episode, moderate (H)    Bilateral lower extremity pain     Current Medications    Current Outpatient Medications:     albuterol (PROAIR HFA/PROVENTIL HFA/VENTOLIN HFA) 108 (90 Base) MCG/ACT inhaler, Inhale 2 puffs into the lungs every 6 hours, Disp: 18 g, Rfl: 1    azelastine (ASTELIN) 0.1 % nasal spray, USE 1 TO 2 SPRAYS IN EACH NOSTRIL TWICE DAILY, Disp: 30  mL, Rfl: 0    budesonide-formoterol (SYMBICORT) 160-4.5 MCG/ACT Inhaler, Inhale 2 puffs into the lungs 2 times daily, Disp: 6 g, Rfl: 3    cetirizine (ZYRTEC) 10 MG tablet, Take 10 mg by mouth daily, Disp: , Rfl:     Cholecalciferol (VITAMIN D3) 3000 UNITS TABS, Take 1 tablet by mouth daily., Disp: , Rfl:     fluticasone (FLONASE) 50 MCG/ACT nasal spray, SHAKE LIQUID AND USE 1 TO 2 SPRAYS IN EACH NOSTRIL DAILY, Disp: 16 g, Rfl: 3    gabapentin (NEURONTIN) 300 MG capsule, Take 1 capsule (300 mg) by mouth 3 times daily, Disp: , Rfl:     levothyroxine (SYNTHROID/LEVOTHROID) 112 MCG tablet, Take 1 tablet (112 mcg) by mouth daily., Disp: 90 tablet, Rfl: 0    Multiple Vitamins-Minerals (HM MULTIVITAMIN ADULT GUMMY PO), , Disp: , Rfl:     olopatadine (PATANOL) 0.1 % ophthalmic solution, Place 1 drop into both eyes 2 times daily, Disp: 1 Bottle, Rfl: 11    omeprazole (PRILOSEC) 40 MG DR capsule, Take 1 capsule (40 mg) by mouth daily, Disp: , Rfl:     propranolol ER (INDERAL LA) 80 MG 24 hr capsule, TAKE 1 CAPSULE(80 MG) BY MOUTH DAILY, Disp: 90 capsule, Rfl: 3    SYNTHROID 112 MCG tablet, Take 1 tablet (112 mcg) by mouth daily, Disp: 60 tablet, Rfl: 0    traZODone (DESYREL) 50 MG tablet, Take 1 tablet (50 mg) by mouth at bedtime, Disp: , Rfl:     triamcinolone (KENALOG) 0.1 % external ointment, Apply topically 3 times daily as needed for irritation. Apply to affected areas as needed, Disp: 30 g, Rfl: 3    Allergies  Allergies   Allergen Reactions    Dogs     Dust Mites Difficulty breathing    Procaine Palpitations       Social History  Social History     Socioeconomic History    Marital status:     Number of children: 1   Occupational History    Occupation:      Employer: [employer]   Tobacco Use    Smoking status: Former     Current packs/day: 0.00     Average packs/day: 0.5 packs/day for 10.0 years (5.0 ttl pk-yrs)     Types: Cigarettes     Start date: 7/20/1999     Quit date:  7/20/2009     Years since quitting: 15.6    Smokeless tobacco: Never    Tobacco comments:     social smoker for 10 yr   Vaping Use    Vaping status: Never Used   Substance and Sexual Activity    Alcohol use: Yes     Comment: occasional ETOH use    Drug use: No    Sexual activity: Yes     Partners: Female   Other Topics Concern    Parent/sibling w/ CABG, MI or angioplasty before 65F 55M? No   Social History Narrative    Social Documentation:9/22/09        Balanced Diet: YES    Calcium intake: more that 2 per day    Caffeine: 0-4 per day    Exercise:  type of activity eliptical, swim, softball;  3 times per week    Sunscreen: Yes    Seatbelts:  Yes    Self Breast Exam:  Yes    Self Testicular Exam: N/a    Physical/Emotional/Sexual Abuse: No    Do you feel safe in your environment? Yes        Cholesterol screen up to date: do today    Eye Exam up to date: No    Dental Exam up to date: Yes    Pap smear up to date: No: 2006    Mammogram up to date: Does Not Apply    Dexa Scan up to date: Does Not Apply    Colonoscopy up to date: Does Not Apply    Immunizations up to date: Last Td 2006    Glucose screen if over 40:  No        Dariana Norman CMA                                 Social Drivers of Health     Financial Resource Strain: Low Risk  (5/22/2024)    Financial Resource Strain     Within the past 12 months, have you or your family members you live with been unable to get utilities (heat, electricity) when it was really needed?: No   Food Insecurity: Low Risk  (5/22/2024)    Food Insecurity     Within the past 12 months, did you worry that your food would run out before you got money to buy more?: No     Within the past 12 months, did the food you bought just not last and you didn t have money to get more?: No   Transportation Needs: Low Risk  (5/22/2024)    Transportation Needs     Within the past 12 months, has lack of transportation kept you from medical appointments, getting your medicines, non-medical meetings or  appointments, work, or from getting things that you need?: No   Physical Activity: Sufficiently Active (5/22/2024)    Exercise Vital Sign     Days of Exercise per Week: 7 days     Minutes of Exercise per Session: 30 min   Stress: No Stress Concern Present (5/22/2024)    [employer] [employer] - Occupational Stress Questionnaire     Feeling of Stress : Not at all   Social Connections: Unknown (5/22/2024)    Social Connection and Isolation Panel [NHANES]     Frequency of Social Gatherings with Friends and Family: Once a week   Interpersonal Safety: Low Risk  (5/22/2024)    Interpersonal Safety     Do you feel physically and emotionally safe where you currently live?: Yes     Within the past 12 months, have you been hit, slapped, kicked or otherwise physically hurt by someone?: No     Within the past 12 months, have you been humiliated or emotionally abused in other ways by your partner or ex-partner?: No   Housing Stability: Low Risk  (5/22/2024)    Housing Stability     Do you have housing? : Yes     Are you worried about losing your housing?: No       Family History  Family History   Problem Relation Age of Onset    Osteoporosis Mother     Hypertension Mother     Cerebrovascular Disease Mother         Aneurysm    Substance Abuse Mother     Heart Disease Maternal Grandmother     Hypertension Maternal Grandmother     Arthritis Maternal Grandmother         RA     Respiratory Maternal Grandmother         Asthma    Glaucoma No family hx of     Macular Degeneration No family hx of     Colon Cancer No family hx of        Review of Systems  As per HPI and PMHx, otherwise 7 system review of the head and neck negative.    Physical Exam  GENERAL: The patient is a pleasant, cooperative 44 year old adult in no acute distress.  HEAD: Normocephalic, atraumatic. Hair and scalp are normal.  EYES: Pupils are equal, round, reactive to light and accommodation. Extraocular movements are intact. The sclera nonicteric  without injection. The extraocular structures are normal.  EARS: Normal shape and symmetry. No tenderness when palpating the mastoid or tragal areas bilaterally. No mastoid erythema or fluctuance.  Otoscopic exam on the right reveals a clear canal. The right tympanic membrane was round, intact without evidence of effusion. No retraction, granulation, drainage, or evidence of cholesteatoma.  Otoscopic exam on the left reveals a clear canal. The left tympanic membrane was round, intact without evidence of effusion. No retraction, granulation, drainage, or evidence of cholesteatoma.    NEUROLOGIC: Cranial nerves II through XII are grossly intact. Voice is strong. Patient is House-Brackmann I/VI bilaterally.  CARDIOVASCULAR: Extremities are warm and well-perfused. No significant peripheral edema.  RESPIRATORY: Patient has nonlabored breathing without cough, wheeze, stridor.  PSYCHIATRIC: Patient is alert and oriented. Mood and affect appear normal.  SKIN: Warm and dry. No scalp, face, or neck lesions noted.    Audiogram  The patient underwent an audiogram performed today. My review of the audiogram showed normal hearing in both ears. Pure-tone average was 10 dB on the right and 9 dB on the left. Speech reception threshold was 5 dB on the right and 10 dB on the left. The patient had 100% word recognition on the right and 100% word recognition on the left. The patient had a type A tympanogram on the right and a type A tympanogram on the left.    Assessment and Plan     ICD-10-CM    1. Infective otitis externa, bilateral  H60.393 Adult Audiology  Referral     Adult ENT  Referral     triamcinolone (KENALOG) 0.1 % external ointment      2. Normal ear exam  Z01.10 Adult Audiology  Referral     triamcinolone (KENALOG) 0.1 % external ointment      3. Normal hearing exam  Z01.10 Adult Audiology  Referral     triamcinolone (KENALOG) 0.1 % external ointment      4. Dermatitis of both ear canals   H60.543 Adult Audiology  Referral     triamcinolone (KENALOG) 0.1 % external ointment      5. Tinnitus, bilateral  H93.13 Adult Audiology  Referral     triamcinolone (KENALOG) 0.1 % external ointment        It was my pleasure seeing Kasandra Winkler today in clinic. The patient had a remote history of otitis externa on the left-hand side in October 2024.  She had resolution with Ciprodex drops.  She does have intermittent moisture from the ear canal which sounds to be serous in nature.  I think this is most consistent with ear canal dermatitis.  We discussed using triamcinolone ointment up to 3 times a day as needed for itching, crusting, drainage.  If the drainage is persistent, foul-smelling, copious or the ear starts to hurt or plug, she should contact us for evaluation for otitis externa.    During the latter part of the visit, the patient mentioned having history of nose and sinus issues.  She was seeing someone at [facility].  She had a sinus CT in 2022 and they had recommended surgery.  We will try to get the outside CT images so I can review them with her.  Will have to set up a follow-up appointment to review her nose and sinus symptoms.    The patient will follow up after obtaining the outside CT imaging.    Dannie Valentino MD  Department of Otolaryngology-Head and Neck Surgery  [facility]

## 2025-02-28 ENCOUNTER — PRE VISIT (OUTPATIENT)
Dept: OTOLARYNGOLOGY | Facility: CLINIC | Age: 45
End: 2025-02-28

## 2025-03-03 ENCOUNTER — OFFICE VISIT (OUTPATIENT)
Dept: OTOLARYNGOLOGY | Facility: CLINIC | Age: 45
End: 2025-03-03
Attending: FAMILY MEDICINE
Payer: COMMERCIAL

## 2025-03-03 ENCOUNTER — OFFICE VISIT (OUTPATIENT)
Dept: AUDIOLOGY | Facility: CLINIC | Age: 45
End: 2025-03-03
Attending: FAMILY MEDICINE
Payer: COMMERCIAL

## 2025-03-03 DIAGNOSIS — H60.543 DERMATITIS OF BOTH EAR CANALS: ICD-10-CM

## 2025-03-03 DIAGNOSIS — H93.13 TINNITUS, BILATERAL: ICD-10-CM

## 2025-03-03 DIAGNOSIS — Z01.10 NORMAL HEARING EXAM: ICD-10-CM

## 2025-03-03 DIAGNOSIS — Z01.10 NORMAL EAR EXAM: ICD-10-CM

## 2025-03-03 DIAGNOSIS — H93.13 TINNITUS OF BOTH EARS: Primary | ICD-10-CM

## 2025-03-03 DIAGNOSIS — H60.393 INFECTIVE OTITIS EXTERNA, BILATERAL: Primary | ICD-10-CM

## 2025-03-03 DIAGNOSIS — H60.393 INFECTIVE OTITIS EXTERNA, BILATERAL: ICD-10-CM

## 2025-03-03 PROCEDURE — 92550 TYMPANOMETRY & REFLEX THRESH: CPT | Performed by: AUDIOLOGIST

## 2025-03-03 PROCEDURE — 92557 COMPREHENSIVE HEARING TEST: CPT | Performed by: AUDIOLOGIST

## 2025-03-03 RX ORDER — TRIAMCINOLONE ACETONIDE 1 MG/G
OINTMENT TOPICAL 3 TIMES DAILY PRN
Qty: 30 G | Refills: 3 | Status: SHIPPED | OUTPATIENT
Start: 2025-03-03

## 2025-03-03 NOTE — PATIENT INSTRUCTIONS
Tinnitus Resources  www.SYLVIA.org (website with tinnitus resources)    Seven Things That Make Tinnitus Worse  Excess noise.  Excess caffeine.  Nicotine.  Stress.  Excess salt.  Excess aspirin.  Alcohol.    White Noise Products at Night To Help With Tinnitus (Hardik Singh)

## 2025-03-03 NOTE — NURSING NOTE
Kasandra Winkler's chief complaint for this visit includes:  Chief Complaint   Patient presents with    Consult     Drainage from ears 2 months ago, not sure which ear. Was given some drops and cleared up. Does have pain in both ears on and off and drainage on and off from both. Hears noises and ringing.       PCP: Trish Callaway    Referring Provider:  Joel Daniel Irwin Wegener, MD  [address]  [place] [address]    LMP 06/25/2018 (Approximate)

## 2025-03-03 NOTE — LETTER
3/3/2025      Kasandra Winkler  [address] [address]      Dear Colleague,    Thank you for referring your patient, Kasandra Winkler, to the [facility]. Please see a copy of my visit note below.    Chief Complaint   Patient presents with     Consult     Drainage from ears 2 months ago, not sure which ear. Was given some drops and cleared up. Does have pain in both ears on and off and drainage on and off from both. Hears noises and ringing.       History of Present Illness   Kasandra Winkler is a 44 year old adult who presents today for evaluation. I am seeing this patient in consultation for infective otitis externa at the request of the provider Dr. Joel Daniel Irwin Wegener.  The patient developed ear drainage this past fall.  She was treated with a course of Ciprodex drops.  She does have a history of intermittent moisture in the ear canal, sometimes a spot or 2 or drainage on the pillow.  Occasionally her ears will itch.  She does not get any significant crusting or scaling of the skin.  She had ear infection when she was younger and had a perforated otitis media at 1 point.  She has not had ear tubes or prior ear surgery.  She does report bilateral tinnitus that is intermittent.  No current otalgia or otorrhea.  No bloody otorrhea.    Past Medical History  Patient Active Problem List   Diagnosis     CARDIOVASCULAR SCREENING; LDL GOAL LESS THAN 160     Hypothyroidism     Obesity     Hashimoto's thyroiditis     Hypovitaminosis D     Fatigue     Ovarian cyst     Major depressive disorder, recurrent episode, mild     Asthma with allergic rhinitis     Status post partial gastrectomy     Anxiety     Allergic rhinitis due to pollen     Iron deficiency anemia     Migraine headache     History of hysterectomy     Morbid obesity (H)     Major depressive disorder, recurrent episode, moderate (H)     Bilateral lower extremity pain     Current Medications    Current Outpatient  Medications:      albuterol (PROAIR HFA/PROVENTIL HFA/VENTOLIN HFA) 108 (90 Base) MCG/ACT inhaler, Inhale 2 puffs into the lungs every 6 hours, Disp: 18 g, Rfl: 1     azelastine (ASTELIN) 0.1 % nasal spray, USE 1 TO 2 SPRAYS IN EACH NOSTRIL TWICE DAILY, Disp: 30 mL, Rfl: 0     budesonide-formoterol (SYMBICORT) 160-4.5 MCG/ACT Inhaler, Inhale 2 puffs into the lungs 2 times daily, Disp: 6 g, Rfl: 3     cetirizine (ZYRTEC) 10 MG tablet, Take 10 mg by mouth daily, Disp: , Rfl:      Cholecalciferol (VITAMIN D3) 3000 UNITS TABS, Take 1 tablet by mouth daily., Disp: , Rfl:      fluticasone (FLONASE) 50 MCG/ACT nasal spray, SHAKE LIQUID AND USE 1 TO 2 SPRAYS IN EACH NOSTRIL DAILY, Disp: 16 g, Rfl: 3     gabapentin (NEURONTIN) 300 MG capsule, Take 1 capsule (300 mg) by mouth 3 times daily, Disp: , Rfl:      levothyroxine (SYNTHROID/LEVOTHROID) 112 MCG tablet, Take 1 tablet (112 mcg) by mouth daily., Disp: 90 tablet, Rfl: 0     Multiple Vitamins-Minerals (HM MULTIVITAMIN ADULT GUMMY PO), , Disp: , Rfl:      olopatadine (PATANOL) 0.1 % ophthalmic solution, Place 1 drop into both eyes 2 times daily, Disp: 1 Bottle, Rfl: 11     omeprazole (PRILOSEC) 40 MG DR capsule, Take 1 capsule (40 mg) by mouth daily, Disp: , Rfl:      propranolol ER (INDERAL LA) 80 MG 24 hr capsule, TAKE 1 CAPSULE(80 MG) BY MOUTH DAILY, Disp: 90 capsule, Rfl: 3     SYNTHROID 112 MCG tablet, Take 1 tablet (112 mcg) by mouth daily, Disp: 60 tablet, Rfl: 0     traZODone (DESYREL) 50 MG tablet, Take 1 tablet (50 mg) by mouth at bedtime, Disp: , Rfl:      triamcinolone (KENALOG) 0.1 % external ointment, Apply topically 3 times daily as needed for irritation. Apply to affected areas as needed, Disp: 30 g, Rfl: 3    Allergies  Allergies   Allergen Reactions     Dogs      Dust Mites Difficulty breathing     Procaine Palpitations       Social History  Social History     Socioeconomic History     Marital status:      Number of children: 1   Occupational  History     Occupation:      Employer: [employer]   Tobacco Use     Smoking status: Former     Current packs/day: 0.00     Average packs/day: 0.5 packs/day for 10.0 years (5.0 ttl pk-yrs)     Types: Cigarettes     Start date: 7/20/1999     Quit date: 7/20/2009     Years since quitting: 15.6     Smokeless tobacco: Never     Tobacco comments:     social smoker for 10 yr   Vaping Use     Vaping status: Never Used   Substance and Sexual Activity     Alcohol use: Yes     Comment: occasional ETOH use     Drug use: No     Sexual activity: Yes     Partners: Female   Other Topics Concern     Parent/sibling w/ CABG, MI or angioplasty before 65F 55M? No   Social History Narrative    Social Documentation:9/22/09        Balanced Diet: YES    Calcium intake: more that 2 per day    Caffeine: 0-4 per day    Exercise:  type of activity eliptical, swim, softball;  3 times per week    Sunscreen: Yes    Seatbelts:  Yes    Self Breast Exam:  Yes    Self Testicular Exam: N/a    Physical/Emotional/Sexual Abuse: No    Do you feel safe in your environment? Yes        Cholesterol screen up to date: do today    Eye Exam up to date: No    Dental Exam up to date: Yes    Pap smear up to date: No: 2006    Mammogram up to date: Does Not Apply    Dexa Scan up to date: Does Not Apply    Colonoscopy up to date: Does Not Apply    Immunizations up to date: Last Td 2006    Glucose screen if over 40:  No        Dariana Norman CMA                                 Social Drivers of Health     Financial Resource Strain: Low Risk  (5/22/2024)    Financial Resource Strain      Within the past 12 months, have you or your family members you live with been unable to get utilities (heat, electricity) when it was really needed?: No   Food Insecurity: Low Risk  (5/22/2024)    Food Insecurity      Within the past 12 months, did you worry that your food would run out before you got money to buy more?: No      Within the past 12  months, did the food you bought just not last and you didn t have money to get more?: No   Transportation Needs: Low Risk  (5/22/2024)    Transportation Needs      Within the past 12 months, has lack of transportation kept you from medical appointments, getting your medicines, non-medical meetings or appointments, work, or from getting things that you need?: No   Physical Activity: Sufficiently Active (5/22/2024)    Exercise Vital Sign      Days of Exercise per Week: 7 days      Minutes of Exercise per Session: 30 min   Stress: No Stress Concern Present (5/22/2024)    [employer] - Occupational Stress Questionnaire      Feeling of Stress : Not at all   Social Connections: Unknown (5/22/2024)    Social Connection and Isolation Panel [NHANES]      Frequency of Social Gatherings with Friends and Family: Once a week   Interpersonal Safety: Low Risk  (5/22/2024)    Interpersonal Safety      Do you feel physically and emotionally safe where you currently live?: Yes      Within the past 12 months, have you been hit, slapped, kicked or otherwise physically hurt by someone?: No      Within the past 12 months, have you been humiliated or emotionally abused in other ways by your partner or ex-partner?: No   Housing Stability: Low Risk  (5/22/2024)    Housing Stability      Do you have housing? : Yes      Are you worried about losing your housing?: No       Family History  Family History   Problem Relation Age of Onset     Osteoporosis Mother      Hypertension Mother      Cerebrovascular Disease Mother         Aneurysm     Substance Abuse Mother      Heart Disease Maternal Grandmother      Hypertension Maternal Grandmother      Arthritis Maternal Grandmother         RA      Respiratory Maternal Grandmother         Asthma     Glaucoma No family hx of      Macular Degeneration No family hx of      Colon Cancer No family hx of        Review of Systems  As per HPI and PMHx, otherwise 7 system review of  the head and neck negative.    Physical Exam  GENERAL: The patient is a pleasant, cooperative 44 year old adult in no acute distress.  HEAD: Normocephalic, atraumatic. Hair and scalp are normal.  EYES: Pupils are equal, round, reactive to light and accommodation. Extraocular movements are intact. The sclera nonicteric without injection. The extraocular structures are normal.  EARS: Normal shape and symmetry. No tenderness when palpating the mastoid or tragal areas bilaterally. No mastoid erythema or fluctuance.  Otoscopic exam on the right reveals a clear canal. The right tympanic membrane was round, intact without evidence of effusion. No retraction, granulation, drainage, or evidence of cholesteatoma.  Otoscopic exam on the left reveals a clear canal. The left tympanic membrane was round, intact without evidence of effusion. No retraction, granulation, drainage, or evidence of cholesteatoma.    NEUROLOGIC: Cranial nerves II through XII are grossly intact. Voice is strong. Patient is House-Brackmann I/VI bilaterally.  CARDIOVASCULAR: Extremities are warm and well-perfused. No significant peripheral edema.  RESPIRATORY: Patient has nonlabored breathing without cough, wheeze, stridor.  PSYCHIATRIC: Patient is alert and oriented. Mood and affect appear normal.  SKIN: Warm and dry. No scalp, face, or neck lesions noted.    Audiogram  The patient underwent an audiogram performed today. My review of the audiogram showed normal hearing in both ears. Pure-tone average was 10 dB on the right and 9 dB on the left. Speech reception threshold was 5 dB on the right and 10 dB on the left. The patient had 100% word recognition on the right and 100% word recognition on the left. The patient had a type A tympanogram on the right and a type A tympanogram on the left.    Assessment and Plan     ICD-10-CM    1. Infective otitis externa, bilateral  H60.393 Adult Audiology  Referral     Adult ENT  Referral      triamcinolone (KENALOG) 0.1 % external ointment      2. Normal ear exam  Z01.10 Adult Audiology  Referral     triamcinolone (KENALOG) 0.1 % external ointment      3. Normal hearing exam  Z01.10 Adult Audiology  Referral     triamcinolone (KENALOG) 0.1 % external ointment      4. Dermatitis of both ear canals  H60.543 Adult Audiology  Referral     triamcinolone (KENALOG) 0.1 % external ointment      5. Tinnitus, bilateral  H93.13 Adult Audiology  Referral     triamcinolone (KENALOG) 0.1 % external ointment        It was my pleasure seeing Kasandra Winkler today in clinic. The patient had a remote history of otitis externa on the left-hand side in October 2024.  She had resolution with Ciprodex drops.  She does have intermittent moisture from the ear canal which sounds to be serous in nature.  I think this is most consistent with ear canal dermatitis.  We discussed using triamcinolone ointment up to 3 times a day as needed for itching, crusting, drainage.  If the drainage is persistent, foul-smelling, copious or the ear starts to hurt or plug, she should contact us for evaluation for otitis externa.    During the latter part of the visit, the patient mentioned having history of nose and sinus issues.  She was seeing someone at [facility].  She had a sinus CT in 2022 and they had recommended surgery.  We will try to get the outside CT images so I can review them with her.  Will have to set up a follow-up appointment to review her nose and sinus symptoms.    The patient will follow up after obtaining the outside CT imaging.    Dannie Valentino MD  Department of Otolaryngology-Head and Neck Surgery  [facility]       Again, thank you for allowing me to participate in the care of your patient.      Sincerely,    Dannie Valentino MD  Electronically signed

## 2025-03-03 NOTE — PROGRESS NOTES
AUDIOLOGY REPORT    SUMMARY: Audiology visit completed. See audiogram for results.    RECOMMENDATIONS: Follow-up with ENT.    Darryl Suero  Doctor of Audiology  [place] License # [id number]

## 2025-03-13 ENCOUNTER — MYC MEDICAL ADVICE (OUTPATIENT)
Dept: OTOLARYNGOLOGY | Facility: CLINIC | Age: 45
End: 2025-03-13
Payer: COMMERCIAL

## 2025-03-13 NOTE — TELEPHONE ENCOUNTER
A total duration of 35 minutes of non-face-to-face time spent by myself to review I review outside records/imaging and messaging the patient regarding concerns, and coordinating additional follow up. This relates to the previous visit from 3/3/2025.      Dannie Valentino MD  Department of Otolaryngology-Head and Neck Surgery  [facility]

## 2025-03-16 NOTE — PROGRESS NOTES
Chief Complaint   Patient presents with    Follow Up     Follow up regarding recurrent sinus infections which was mentioned at [facility]. Has sinus headaches and facial pressure frequently. Had sinus CT done in 2022 at [facility].      History of Present Illness-Kasandra Winkler, a 44-year-old female, reports experiencing headaches, facial pressure, and pain, which she describes as severe and potentially triggering migraines. She also experiences brain fog and fatigue. Allergies, particularly to dust, are identified as a trigger for her symptoms. She has a history of using Tylenol Sinus Severe to manage symptoms, which helps prevent sinus infections. She has previously undergone a CT scan, which was normal, but it is a couple of years old. Kasandra has a history of migraines and was previously treated with Botox, which she recalls helped with the migraines. She has not been on any migraine medication recently due to changes in her neurologist's practice.    The patient underwent a sinus CT without contrast on 7/11/2022.  I have the report but not the images for review.  There was no paranasal sinus inflammation on the CT scan.  There was a right anterior and left posterior nasal septal deviation with spur to the left.  There was bilateral middle turbinate lilian bullosa.  There was noted bilateral inferior turbinate hypertrophy.    Past Medical History  Patient Active Problem List   Diagnosis    CARDIOVASCULAR SCREENING; LDL GOAL LESS THAN 160    Hypothyroidism    Obesity    Hashimoto's thyroiditis    Hypovitaminosis D    Fatigue    Ovarian cyst    Major depressive disorder, recurrent episode, mild    Asthma with allergic rhinitis    Status post partial gastrectomy    Anxiety    Allergic rhinitis due to pollen    Iron deficiency anemia    Migraine headache    History of hysterectomy    Morbid obesity (H)    Major depressive disorder, recurrent episode, moderate (H)    Bilateral lower extremity pain     Current  Medications    Current Outpatient Medications:     albuterol (PROAIR HFA/PROVENTIL HFA/VENTOLIN HFA) 108 (90 Base) MCG/ACT inhaler, Inhale 2 puffs into the lungs every 6 hours, Disp: 18 g, Rfl: 1    azelastine (ASTELIN) 0.1 % nasal spray, USE 1 TO 2 SPRAYS IN EACH NOSTRIL TWICE DAILY, Disp: 30 mL, Rfl: 0    budesonide-formoterol (SYMBICORT) 160-4.5 MCG/ACT Inhaler, Inhale 2 puffs into the lungs 2 times daily, Disp: 6 g, Rfl: 3    cetirizine (ZYRTEC) 10 MG tablet, Take 10 mg by mouth daily, Disp: , Rfl:     Cholecalciferol (VITAMIN D3) 3000 UNITS TABS, Take 1 tablet by mouth daily., Disp: , Rfl:     DULoxetine (CYMBALTA) 60 MG capsule, , Disp: , Rfl:     fluticasone (FLONASE) 50 MCG/ACT nasal spray, SHAKE LIQUID AND USE 1 TO 2 SPRAYS IN EACH NOSTRIL DAILY, Disp: 16 g, Rfl: 3    gabapentin (NEURONTIN) 300 MG capsule, Take 1 capsule (300 mg) by mouth 3 times daily, Disp: , Rfl:     levothyroxine (SYNTHROID/LEVOTHROID) 112 MCG tablet, Take 1 tablet (112 mcg) by mouth daily., Disp: 90 tablet, Rfl: 0    Multiple Vitamins-Minerals (HM MULTIVITAMIN ADULT GUMMY PO), , Disp: , Rfl:     olopatadine (PATANOL) 0.1 % ophthalmic solution, Place 1 drop into both eyes 2 times daily, Disp: 1 Bottle, Rfl: 11    omeprazole (PRILOSEC) 40 MG DR capsule, Take 1 capsule (40 mg) by mouth daily, Disp: , Rfl:     propranolol ER (INDERAL LA) 80 MG 24 hr capsule, TAKE 1 CAPSULE(80 MG) BY MOUTH DAILY, Disp: 90 capsule, Rfl: 3    Pseudoephed-APAP-Guaifenesin (TYLENOL SINUS SEVERE CONGEST PO), , Disp: , Rfl:     SYNTHROID 112 MCG tablet, Take 1 tablet (112 mcg) by mouth daily, Disp: 60 tablet, Rfl: 0    traZODone (DESYREL) 50 MG tablet, Take 1 tablet (50 mg) by mouth at bedtime, Disp: , Rfl:     triamcinolone (KENALOG) 0.1 % external ointment, Apply topically 3 times daily as needed for irritation. Apply to affected areas as needed, Disp: 30 g, Rfl: 3    Allergies  Allergies   Allergen Reactions    Dogs     Dust Mites Difficulty breathing     Procaine Palpitations       Social History  Social History     Socioeconomic History    Marital status:     Number of children: 1   Occupational History    Occupation:      Employer: [employer]   Tobacco Use    Smoking status: Former     Current packs/day: 0.00     Average packs/day: 0.5 packs/day for 10.0 years (5.0 ttl pk-yrs)     Types: Cigarettes     Start date: 7/20/1999     Quit date: 7/20/2009     Years since quitting: 15.6    Smokeless tobacco: Never    Tobacco comments:     social smoker for 10 yr   Vaping Use    Vaping status: Never Used   Substance and Sexual Activity    Alcohol use: Yes     Comment: occasional ETOH use    Drug use: No    Sexual activity: Yes     Partners: Female   Other Topics Concern    Parent/sibling w/ CABG, MI or angioplasty before 65F 55M? No   Social History Narrative    Social Documentation:9/22/09        Balanced Diet: YES    Calcium intake: more that 2 per day    Caffeine: 0-4 per day    Exercise:  type of activity eliptical, swim, softball;  3 times per week    Sunscreen: Yes    Seatbelts:  Yes    Self Breast Exam:  Yes    Self Testicular Exam: N/a    Physical/Emotional/Sexual Abuse: No    Do you feel safe in your environment? Yes        Cholesterol screen up to date: do today    Eye Exam up to date: No    Dental Exam up to date: Yes    Pap smear up to date: No: 2006    Mammogram up to date: Does Not Apply    Dexa Scan up to date: Does Not Apply    Colonoscopy up to date: Does Not Apply    Immunizations up to date: Last Td 2006    Glucose screen if over 40:  No        Dariana Norman CMA                                 Social Drivers of Health     Financial Resource Strain: Low Risk  (5/22/2024)    Financial Resource Strain     Within the past 12 months, have you or your family members you live with been unable to get utilities (heat, electricity) when it was really needed?: No   Food Insecurity: Low Risk  (5/22/2024)    Food Insecurity      Within the past 12 months, did you worry that your food would run out before you got money to buy more?: No     Within the past 12 months, did the food you bought just not last and you didn t have money to get more?: No   Transportation Needs: Low Risk  (5/22/2024)    Transportation Needs     Within the past 12 months, has lack of transportation kept you from medical appointments, getting your medicines, non-medical meetings or appointments, work, or from getting things that you need?: No   Physical Activity: Sufficiently Active (5/22/2024)    Exercise Vital Sign     Days of Exercise per Week: 7 days     Minutes of Exercise per Session: 30 min   Stress: No Stress Concern Present (5/22/2024)    [employer] [place] [employer] - Occupational Stress Questionnaire     Feeling of Stress : Not at all   Social Connections: Unknown (5/22/2024)    Social Connection and Isolation Panel [NHANES]     Frequency of Social Gatherings with Friends and Family: Once a week   Interpersonal Safety: Low Risk  (5/22/2024)    Interpersonal Safety     Do you feel physically and emotionally safe where you currently live?: Yes     Within the past 12 months, have you been hit, slapped, kicked or otherwise physically hurt by someone?: No     Within the past 12 months, have you been humiliated or emotionally abused in other ways by your partner or ex-partner?: No   Housing Stability: Low Risk  (5/22/2024)    Housing Stability     Do you have housing? : Yes     Are you worried about losing your housing?: No       Family History  Family History   Problem Relation Age of Onset    Osteoporosis Mother     Hypertension Mother     Cerebrovascular Disease Mother         Aneurysm    Substance Abuse Mother     Heart Disease Maternal Grandmother     Hypertension Maternal Grandmother     Arthritis Maternal Grandmother         RA     Respiratory Maternal Grandmother         Asthma    Glaucoma No family hx of     Macular Degeneration No family  hx of     Colon Cancer No family hx of        Review of Systems  As per HPI and PMHx, otherwise 10 system review including the head and neck, constitutional, eyes, respiratory, GI, skin, neurologic, lymphatic, endocrine, and allergy systems is negative.    Physical Exam  LMP 06/25/2018 (Approximate)   GENERAL: The patient is a pleasant, cooperative 44 year old adult in no acute distress.  HEAD: Normocephalic, atraumatic. Hair and scalp are normal.  EYES: Pupils are equal, round, reactive to light and accommodation. Extraocular movements are intact. The sclera nonicteric without injection. The extraocular structures are normal.  EARS: Normal shape and symmetry. No tenderness when palpating the mastoid or tragal areas bilaterally. No mastoid erythema or fluctuance.   NOSE: Nares are patent.  Nasal mucosa is pink and moist. The nasal septum deviations to the right anteriorly and left posteriorly with a spur. The inferior turbinates are moderately hypertrophied. No nasal cavity masses, polyps, or mucopurulence on anterior rhinoscopy.   NEUROLOGIC: Cranial nerves II through XII are grossly intact. Voice is strong. Patient is House-Brackmann I/VI bilaterally.  CARDIOVASCULAR: Extremities are warm and well-perfused. No significant peripheral edema.  RESPIRATORY: Patient has nonlabored breathing without cough, wheeze, stridor.  PSYCHIATRIC: Patient is alert and oriented. Mood and affect appear normal.  SKIN: Warm and dry. No scalp, face, or neck lesions noted.    Assessment and Plan     ICD-10-CM    1. Nasal obstruction  J34.89 Adult Neurology  Referral      2. Nasal septal deviation  J34.2 Adult Neurology  Referral      3. Zara bullosa  J34.89 Adult Neurology  Referral      4. Hypertrophy of both inferior nasal turbinates  J34.3 Adult Neurology  Referral      5. History of migraine  Z86.69 Adult Neurology  Referral      6. Chronic allergic rhinitis  J30.9 Adult Neurology  Kathleen Referral         It was my pleasure seeing Kasandra Winkler today in clinic.      Nasal obstruction:  - Nasal obstruction likely due to structural anatomic issues, including nasal septal deviation and lilian bullosa.  - Consideration of surgery to fix nasal septum and reduce inferior turbinates to improve nasal breathing and congestion. Use Afrin for three days if feeling a sinus infection coming on, followed by nasal irrigation. Instructions provided for Afrin use.  - Risks and side effects: Discussed risks of surgery including healing and recovery time, potential for epistaxes, infection, septal perforation, and scar tissue or adhesions.    Nasal septal deviation:  - Contributing to nasal obstruction and congestion.  - Surgical correction considered to improve nasal breathing.    Lilian bullosa:  - Presence of air pockets in both turbinates, contributing to nasal obstruction.  - Surgical reduction considered to improve sinus drainage and nasal breathing.    Hypertrophy of both inferior nasal turbinates:  - Contributing to nasal obstruction and congestion.  - Surgical reduction considered to improve nasal breathing.    History of migraine:  - Potential overlap with sinus symptoms; differential diagnosis includes sinus-driven issues versus migraine.  - Referral to headache clinic at the [facility]. Consideration of CT scan during symptomatic periods to differentiate between sinusitis and migraine.    Chronic allergic rhinitis:  - Allergies, including dust, contributing to nasal congestion and symptoms.  - Use of Flonase on a regular basis recommended. Consideration of Afrin and nasal irrigation during sinus infection symptoms.    We discussed the risks, benefits, alternatives, options of endonasal septoplasty, bilateral endoscopic middle turbinate lilian bullosa reduction, bilateral inferior turbinate reduction including, but not limited to: Risk of bleeding, risk of infection, risk of  postoperative pain, risk of septal hematoma, risk of septal perforation, risk of CSF leak, risk of intranasal scarring or adhesions, risk of smell disturbance or smell loss, potential need for additional procedures, risk of general anesthesia. The patient voiced understanding is willing to proceed. We discussed the postoperative course and convalescence including lifting and activity restrictions, placement of nasal splints, use of nasal saline irrigations postoperatively, and follow-up.    Dannie Valentino MD  Department of Otolaryngology-Head and Neck Surgery  [facility]

## 2025-03-17 ENCOUNTER — OFFICE VISIT (OUTPATIENT)
Dept: OTOLARYNGOLOGY | Facility: CLINIC | Age: 45
End: 2025-03-17
Payer: COMMERCIAL

## 2025-03-17 DIAGNOSIS — J34.2 NASAL SEPTAL DEVIATION: ICD-10-CM

## 2025-03-17 DIAGNOSIS — J30.9 CHRONIC ALLERGIC RHINITIS: ICD-10-CM

## 2025-03-17 DIAGNOSIS — Z86.69 HISTORY OF MIGRAINE: ICD-10-CM

## 2025-03-17 DIAGNOSIS — J34.89 CONCHA BULLOSA: ICD-10-CM

## 2025-03-17 DIAGNOSIS — J34.3 HYPERTROPHY OF BOTH INFERIOR NASAL TURBINATES: ICD-10-CM

## 2025-03-17 DIAGNOSIS — J34.89 NASAL OBSTRUCTION: Primary | ICD-10-CM

## 2025-03-17 RX ORDER — DULOXETIN HYDROCHLORIDE 60 MG/1
CAPSULE, DELAYED RELEASE ORAL
COMMUNITY
Start: 2025-02-01

## 2025-03-17 NOTE — LETTER
3/17/2025      Kasandra Winkler  [address] [facility] [address]      Dear Colleague,    Thank you for referring your patient, Kasandra Winkler, to the [facility]. Please see a copy of my visit note below.    Chief Complaint   Patient presents with     Follow Up     Follow up regarding recurrent sinus infections which was mentioned at LOV. Has sinus headaches and facial pressure frequently. Had sinus CT done in 2022 at [facility].      History of Present Illness-Kasandra Winkler, a 44-year-old female, reports experiencing headaches, facial pressure, and pain, which she describes as severe and potentially triggering migraines. She also experiences brain fog and fatigue. Allergies, particularly to dust, are identified as a trigger for her symptoms. She has a history of using Tylenol Sinus Severe to manage symptoms, which helps prevent sinus infections. She has previously undergone a CT scan, which was normal, but it is a couple of years old. Kasandra has a history of migraines and was previously treated with Botox, which she recalls helped with the migraines. She has not been on any migraine medication recently due to changes in her neurologist's practice.    The patient underwent a sinus CT without contrast on 7/11/2022.  I have the report but not the images for review.  There was no paranasal sinus inflammation on the CT scan.  There was a right anterior and left posterior nasal septal deviation with spur to the left.  There was bilateral middle turbinate lilian bullosa.  There was noted bilateral inferior turbinate hypertrophy.    Past Medical History  Patient Active Problem List   Diagnosis     CARDIOVASCULAR SCREENING; LDL GOAL LESS THAN 160     Hypothyroidism     Obesity     Hashimoto's thyroiditis     Hypovitaminosis D     Fatigue     Ovarian cyst     Major depressive disorder, recurrent episode, mild     Asthma with allergic rhinitis     Status post partial gastrectomy      Anxiety     Allergic rhinitis due to pollen     Iron deficiency anemia     Migraine headache     History of hysterectomy     Morbid obesity (H)     Major depressive disorder, recurrent episode, moderate (H)     Bilateral lower extremity pain     Current Medications    Current Outpatient Medications:      albuterol (PROAIR HFA/PROVENTIL HFA/VENTOLIN HFA) 108 (90 Base) MCG/ACT inhaler, Inhale 2 puffs into the lungs every 6 hours, Disp: 18 g, Rfl: 1     azelastine (ASTELIN) 0.1 % nasal spray, USE 1 TO 2 SPRAYS IN EACH NOSTRIL TWICE DAILY, Disp: 30 mL, Rfl: 0     budesonide-formoterol (SYMBICORT) 160-4.5 MCG/ACT Inhaler, Inhale 2 puffs into the lungs 2 times daily, Disp: 6 g, Rfl: 3     cetirizine (ZYRTEC) 10 MG tablet, Take 10 mg by mouth daily, Disp: , Rfl:      Cholecalciferol (VITAMIN D3) 3000 UNITS TABS, Take 1 tablet by mouth daily., Disp: , Rfl:      DULoxetine (CYMBALTA) 60 MG capsule, , Disp: , Rfl:      fluticasone (FLONASE) 50 MCG/ACT nasal spray, SHAKE LIQUID AND USE 1 TO 2 SPRAYS IN EACH NOSTRIL DAILY, Disp: 16 g, Rfl: 3     gabapentin (NEURONTIN) 300 MG capsule, Take 1 capsule (300 mg) by mouth 3 times daily, Disp: , Rfl:      levothyroxine (SYNTHROID/LEVOTHROID) 112 MCG tablet, Take 1 tablet (112 mcg) by mouth daily., Disp: 90 tablet, Rfl: 0     Multiple Vitamins-Minerals (HM MULTIVITAMIN ADULT GUMMY PO), , Disp: , Rfl:      olopatadine (PATANOL) 0.1 % ophthalmic solution, Place 1 drop into both eyes 2 times daily, Disp: 1 Bottle, Rfl: 11     omeprazole (PRILOSEC) 40 MG DR capsule, Take 1 capsule (40 mg) by mouth daily, Disp: , Rfl:      propranolol ER (INDERAL LA) 80 MG 24 hr capsule, TAKE 1 CAPSULE(80 MG) BY MOUTH DAILY, Disp: 90 capsule, Rfl: 3     Pseudoephed-APAP-Guaifenesin (TYLENOL SINUS SEVERE CONGEST PO), , Disp: , Rfl:      SYNTHROID 112 MCG tablet, Take 1 tablet (112 mcg) by mouth daily, Disp: 60 tablet, Rfl: 0     traZODone (DESYREL) 50 MG tablet, Take 1 tablet (50 mg) by mouth at bedtime,  Disp: , Rfl:      triamcinolone (KENALOG) 0.1 % external ointment, Apply topically 3 times daily as needed for irritation. Apply to affected areas as needed, Disp: 30 g, Rfl: 3    Allergies  Allergies   Allergen Reactions     Dogs      Dust Mites Difficulty breathing     Procaine Palpitations       Social History  Social History     Socioeconomic History     Marital status:      Number of children: 1   Occupational History     Occupation:      Employer: [employer]   Tobacco Use     Smoking status: Former     Current packs/day: 0.00     Average packs/day: 0.5 packs/day for 10.0 years (5.0 ttl pk-yrs)     Types: Cigarettes     Start date: 7/20/1999     Quit date: 7/20/2009     Years since quitting: 15.6     Smokeless tobacco: Never     Tobacco comments:     social smoker for 10 yr   Vaping Use     Vaping status: Never Used   Substance and Sexual Activity     Alcohol use: Yes     Comment: occasional ETOH use     Drug use: No     Sexual activity: Yes     Partners: Female   Other Topics Concern     Parent/sibling w/ CABG, MI or angioplasty before 65F 55M? No   Social History Narrative    Social Documentation:9/22/09        Balanced Diet: YES    Calcium intake: more that 2 per day    Caffeine: 0-4 per day    Exercise:  type of activity eliptical, swim, softball;  3 times per week    Sunscreen: Yes    Seatbelts:  Yes    Self Breast Exam:  Yes    Self Testicular Exam: N/a    Physical/Emotional/Sexual Abuse: No    Do you feel safe in your environment? Yes        Cholesterol screen up to date: do today    Eye Exam up to date: No    Dental Exam up to date: Yes    Pap smear up to date: No: 2006    Mammogram up to date: Does Not Apply    Dexa Scan up to date: Does Not Apply    Colonoscopy up to date: Does Not Apply    Immunizations up to date: Last Td 2006    Glucose screen if over 40:  No        Dariana Norman CMA                                 Social [place] [facility]     Financial  Resource Strain: Low Risk  (5/22/2024)    Financial Resource Strain      Within the past 12 months, have you or your family members you live with been unable to get utilities (heat, electricity) when it was really needed?: No   Food Insecurity: Low Risk  (5/22/2024)    Food Insecurity      Within the past 12 months, did you worry that your food would run out before you got money to buy more?: No      Within the past 12 months, did the food you bought just not last and you didn t have money to get more?: No   Transportation Needs: Low Risk  (5/22/2024)    Transportation Needs      Within the past 12 months, has lack of transportation kept you from medical appointments, getting your medicines, non-medical meetings or appointments, work, or from getting things that you need?: No   Physical Activity: Sufficiently Active (5/22/2024)    Exercise Vital Sign      Days of Exercise per Week: 7 days      Minutes of Exercise per Session: 30 min   Stress: No Stress Concern Present (5/22/2024)    [employer] [place] [employer] - Occupational Stress Questionnaire      Feeling of Stress : Not at all   Social Connections: Unknown (5/22/2024)    Social Connection and Isolation Panel [NHANES]      Frequency of Social Gatherings with Friends and Family: Once a week   Interpersonal Safety: Low Risk  (5/22/2024)    Interpersonal Safety      Do you feel physically and emotionally safe where you currently live?: Yes      Within the past 12 months, have you been hit, slapped, kicked or otherwise physically hurt by someone?: No      Within the past 12 months, have you been humiliated or emotionally abused in other ways by your partner or ex-partner?: No   Housing Stability: Low Risk  (5/22/2024)    Housing Stability      Do you have housing? : Yes      Are you worried about losing your housing?: No       Family History  Family History   Problem Relation Age of Onset     Osteoporosis Mother      Hypertension Mother       Cerebrovascular Disease Mother         Aneurysm     Substance Abuse Mother      Heart Disease Maternal Grandmother      Hypertension Maternal Grandmother      Arthritis Maternal Grandmother         RA      Respiratory Maternal Grandmother         Asthma     Glaucoma No family hx of      Macular Degeneration No family hx of      Colon Cancer No family hx of        Review of Systems  As per HPI and PMHx, otherwise 10 system review including the head and neck, constitutional, eyes, respiratory, GI, skin, neurologic, lymphatic, endocrine, and allergy systems is negative.    Physical Exam  LMP 06/25/2018 (Approximate)   GENERAL: The patient is a pleasant, cooperative 44 year old adult in no acute distress.  HEAD: Normocephalic, atraumatic. Hair and scalp are normal.  EYES: Pupils are equal, round, reactive to light and accommodation. Extraocular movements are intact. The sclera nonicteric without injection. The extraocular structures are normal.  EARS: Normal shape and symmetry. No tenderness when palpating the mastoid or tragal areas bilaterally. No mastoid erythema or fluctuance.   NOSE: Nares are patent.  Nasal mucosa is pink and moist. The nasal septum deviations to the right anteriorly and left posteriorly with a spur. The inferior turbinates are moderately hypertrophied. No nasal cavity masses, polyps, or mucopurulence on anterior rhinoscopy.   NEUROLOGIC: Cranial nerves II through XII are grossly intact. Voice is strong. Patient is House-Brackmann I/VI bilaterally.  CARDIOVASCULAR: Extremities are warm and well-perfused. No significant peripheral edema.  RESPIRATORY: Patient has nonlabored breathing without cough, wheeze, stridor.  PSYCHIATRIC: Patient is alert and oriented. Mood and affect appear normal.  SKIN: Warm and dry. No scalp, face, or neck lesions noted.    Assessment and Plan     ICD-10-CM    1. Nasal obstruction  J34.89 Adult Neurology  Referral      2. 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 Hypertrophy of both inferior nasal turbinates  J34.3 Adult Neurology  Referral      5. History of migraine  Z86.69 Adult Neurology  Referral      6. Chronic allergic rhinitis  J30.9 Adult Neurology  Referral         It was my pleasure seeing Kasandra Winkler today in clinic.      Nasal obstruction:  - Nasal obstruction likely due to structural anatomic issues, including nasal septal deviation and lilian bullosa.  - Consideration of surgery to fix nasal septum and reduce inferior turbinates to improve nasal breathing and congestion. Use Afrin for three days if feeling a sinus infection coming on, followed by nasal irrigation. Instructions provided for Afrin use.  - Risks and side effects: Discussed risks of surgery including healing and recovery time, potential for epistaxes, infection, septal perforation, and scar tissue or adhesions.    Nasal septal deviation:  - Contributing to nasal obstruction and congestion.  - Surgical correction considered to improve nasal breathing.    Lilian bullosa:  - Presence of air pockets in both turbinates, contributing to nasal obstruction.  - Surgical reduction considered to improve sinus drainage and nasal breathing.    Hypertrophy of both inferior nasal turbinates:  - Contributing to nasal obstruction and congestion.  - Surgical reduction considered to improve nasal breathing.    History of migraine:  - Potential overlap with sinus symptoms; differential diagnosis includes sinus-driven issues versus migraine.  - Referral to headache clinic at the [facility]. Consideration of CT scan during symptomatic periods to differentiate between sinusitis and migraine.    Chronic allergic rhinitis:  - Allergies, including dust, contributing to nasal congestion and symptoms.  - Use of Flonase on a regular basis recommended. Consideration of Afrin and  nasal irrigation during sinus infection symptoms.    We discussed the risks, benefits, alternatives, options of endonasal septoplasty, bilateral endoscopic middle turbinate lilian bullosa reduction, bilateral inferior turbinate reduction including, but not limited to: Risk of bleeding, risk of infection, risk of postoperative pain, risk of septal hematoma, risk of septal perforation, risk of CSF leak, risk of intranasal scarring or adhesions, risk of smell disturbance or smell loss, potential need for additional procedures, risk of general anesthesia. The patient voiced understanding is willing to proceed. We discussed the postoperative course and convalescence including lifting and activity restrictions, placement of nasal splints, use of nasal saline irrigations postoperatively, and follow-up.    Dannie Valentino MD  Department of Otolaryngology-Head and Neck Surgery  [facility]       Again, thank you for allowing me to participate in the care of your patient.        Sincerely,        Dannie Valentino MD    Electronically signed

## 2025-03-17 NOTE — NURSING NOTE
Kasandra Winkler's chief complaint for this visit includes:  Chief Complaint   Patient presents with    Follow Up     Follow up regarding recurrent sinus infections which was mentioned at LOV. Has sinus headaches and facial pressure frequently. Had sinus CT done in 2022 at [facility].      PCP: Trish Callaway    Referring Provider:  Referred Self, MD  No address on file    [facility] 06/25/2018 (Approximate)     Avinash Archer, JACI  March 17, 2025

## 2025-04-01 ENCOUNTER — TRANSFERRED RECORDS (OUTPATIENT)
Dept: HEALTH INFORMATION MANAGEMENT | Facility: CLINIC | Age: 45
End: 2025-04-01
Payer: COMMERCIAL

## 2025-05-09 DIAGNOSIS — E06.3 HYPOTHYROIDISM DUE TO HASHIMOTO'S THYROIDITIS: ICD-10-CM

## 2025-05-12 RX ORDER — LEVOTHYROXINE SODIUM 112 UG/1
112 TABLET ORAL DAILY
Qty: 90 TABLET | Refills: 0 | Status: SHIPPED | OUTPATIENT
Start: 2025-05-12

## 2025-05-23 PROBLEM — M45.0 ANKYLOSING SPONDYLITIS OF MULTIPLE SITES IN SPINE (H): Status: ACTIVE | Noted: 2025-05-23

## 2025-05-23 PROBLEM — M79.7 FIBROMYALGIA: Status: ACTIVE | Noted: 2025-05-23

## 2025-05-27 ENCOUNTER — RESULTS FOLLOW-UP (OUTPATIENT)
Dept: FAMILY MEDICINE | Facility: CLINIC | Age: 45
End: 2025-05-27

## 2025-05-27 NOTE — RESULT ENCOUNTER NOTE
Dear Kasandra,   Your test results are all back -   -Normal red blood cell (hgb) levels, normal white blood cell count and normal platelet levels.  -LDL(bad) cholesterol level is elevated, and your triglycerides are elevated which can increase your heart disease risk.  A diet high in fat and simple carbohydrates, genetics and being overweight can contribute to this. ADVISE: exercising 150 minutes of aerobic exercise per week (30 minutes for 5 days per week or 50 minutes for 3 days per week are options), eating a low saturated fat/low carbohydrate diet, and omega-3 fatty acids (fish oil) 0319-7185 mg daily are helpful to improve this. In 12 months, you should recheck your fasting cholesterol panel by scheduling a lab-only appointment.  -Liver and gallbladder tests are normal (ALT,AST, Alk phos, bilirubin), kidney function is normal (Cr, GFR), sodium is normal, potassium is normal, calcium is normal, glucose is normal.  -TSH (thyroid stimulating hormone) level is normal which indicates appropriate thyroid replacement dosing.  ADVISE: continuing same replacement dose and rechecking this in 12 months.  Iron is normal  Your FSH does not show menopause at this time.   Let us know if you have any questions.  -Trish Callaway, DO     Patient identification verified with 2 identifiers.    Location: [facility] [id number] [place] [address] [phone] option #2     Referring Physician Dr. Marcelo Tadeo  Enrollment/ Re-enrollment date: 9/6/

## 2025-06-17 ASSESSMENT — PATIENT HEALTH QUESTIONNAIRE - PHQ9
SUM OF ALL RESPONSES TO PHQ QUESTIONS 1-9: 19
SUM OF ALL RESPONSES TO PHQ QUESTIONS 1-9: 19
10. IF YOU CHECKED OFF ANY PROBLEMS, HOW DIFFICULT HAVE THESE PROBLEMS MADE IT FOR YOU TO DO YOUR WORK, TAKE CARE OF THINGS AT HOME, OR GET ALONG WITH OTHER PEOPLE: EXTREMELY DIFFICULT

## 2025-06-18 ENCOUNTER — VIRTUAL VISIT (OUTPATIENT)
Dept: FAMILY MEDICINE | Facility: CLINIC | Age: 45
End: 2025-06-18
Payer: COMMERCIAL

## 2025-06-18 DIAGNOSIS — E06.3 HYPOTHYROIDISM DUE TO HASHIMOTO THYROIDITIS: Primary | ICD-10-CM

## 2025-06-18 DIAGNOSIS — R06.83 SNORING: ICD-10-CM

## 2025-06-18 DIAGNOSIS — G47.10 EXCESSIVE SLEEPINESS: ICD-10-CM

## 2025-06-18 DIAGNOSIS — R41.89 BRAIN FOG: ICD-10-CM

## 2025-06-18 DIAGNOSIS — E06.3 HYPOTHYROIDISM DUE TO HASHIMOTO THYROIDITIS: ICD-10-CM

## 2025-06-18 PROCEDURE — 98005 SYNCH AUDIO-VIDEO EST LOW 20: CPT | Performed by: FAMILY MEDICINE

## 2025-06-18 RX ORDER — LIOTHYRONINE SODIUM 5 UG/1
5 TABLET ORAL DAILY
Qty: 60 TABLET | Refills: 0 | Status: SHIPPED | OUTPATIENT
Start: 2025-06-18

## 2025-06-18 RX ORDER — LIOTHYRONINE SODIUM 5 UG/1
5 TABLET ORAL DAILY
Qty: 60 TABLET | Refills: 0 | OUTPATIENT
Start: 2025-06-18

## 2025-06-19 ENCOUNTER — PATIENT OUTREACH (OUTPATIENT)
Dept: CARE COORDINATION | Facility: CLINIC | Age: 45
End: 2025-06-19
Payer: COMMERCIAL

## 2025-06-23 ENCOUNTER — PATIENT OUTREACH (OUTPATIENT)
Dept: CARE COORDINATION | Facility: CLINIC | Age: 45
End: 2025-06-23

## 2025-06-23 ENCOUNTER — ANCILLARY PROCEDURE (OUTPATIENT)
Dept: MAMMOGRAPHY | Facility: CLINIC | Age: 45
End: 2025-06-23
Payer: COMMERCIAL

## 2025-06-23 DIAGNOSIS — Z12.31 VISIT FOR SCREENING MAMMOGRAM: ICD-10-CM

## 2025-06-23 DIAGNOSIS — E06.3 HYPOTHYROIDISM DUE TO HASHIMOTO THYROIDITIS: ICD-10-CM

## 2025-06-23 PROCEDURE — 77067 SCR MAMMO BI INCL CAD: CPT | Mod: TC | Performed by: RADIOLOGY

## 2025-06-23 PROCEDURE — 77063 BREAST TOMOSYNTHESIS BI: CPT | Mod: TC | Performed by: RADIOLOGY

## 2025-06-23 RX ORDER — LIOTHYRONINE SODIUM 5 UG/1
5 TABLET ORAL DAILY
Qty: 90 TABLET | Refills: 3 | Status: SHIPPED | OUTPATIENT
Start: 2025-06-23

## 2025-07-15 ENCOUNTER — TRANSFERRED RECORDS (OUTPATIENT)
Dept: HEALTH INFORMATION MANAGEMENT | Facility: CLINIC | Age: 45
End: 2025-07-15
Payer: COMMERCIAL

## 2025-08-06 ENCOUNTER — LAB (OUTPATIENT)
Dept: LAB | Facility: CLINIC | Age: 45
End: 2025-08-06
Payer: COMMERCIAL

## 2025-08-06 DIAGNOSIS — E06.3 HYPOTHYROIDISM DUE TO HASHIMOTO THYROIDITIS: ICD-10-CM

## 2025-08-06 LAB — TSH SERPL DL<=0.005 MIU/L-ACNC: 1.65 UIU/ML (ref 0.3–4.2)

## 2025-08-06 PROCEDURE — 36415 COLL VENOUS BLD VENIPUNCTURE: CPT

## 2025-08-06 PROCEDURE — 84443 ASSAY THYROID STIM HORMONE: CPT

## 2025-08-17 DIAGNOSIS — E06.3 HYPOTHYROIDISM DUE TO HASHIMOTO'S THYROIDITIS: ICD-10-CM

## 2025-08-18 RX ORDER — LEVOTHYROXINE SODIUM 112 UG/1
112 TABLET ORAL DAILY
Qty: 90 TABLET | Refills: 2 | Status: SHIPPED | OUTPATIENT
Start: 2025-08-18

## 2025-08-20 ENCOUNTER — VIRTUAL VISIT (OUTPATIENT)
Dept: FAMILY MEDICINE | Facility: CLINIC | Age: 45
End: 2025-08-20
Payer: COMMERCIAL

## 2025-08-20 DIAGNOSIS — N95.1 PERIMENOPAUSE: ICD-10-CM

## 2025-08-20 DIAGNOSIS — M45.0 ANKYLOSING SPONDYLITIS OF MULTIPLE SITES IN SPINE (H): ICD-10-CM

## 2025-08-20 DIAGNOSIS — N95.1 PERIMENOPAUSE: Primary | ICD-10-CM

## 2025-08-20 DIAGNOSIS — J45.40 MODERATE PERSISTENT ASTHMA WITH ALLERGIC RHINITIS WITHOUT COMPLICATION: ICD-10-CM

## 2025-08-20 DIAGNOSIS — R41.89 BRAIN FOG: ICD-10-CM

## 2025-08-20 DIAGNOSIS — R53.83 FATIGUE, UNSPECIFIED TYPE: ICD-10-CM

## 2025-08-20 PROCEDURE — 98006 SYNCH AUDIO-VIDEO EST MOD 30: CPT | Performed by: FAMILY MEDICINE

## 2025-08-20 RX ORDER — ESTRADIOL 0.03 MG/D
1 FILM, EXTENDED RELEASE TRANSDERMAL
Qty: 8 PATCH | Refills: 0 | Status: SHIPPED | OUTPATIENT
Start: 2025-08-21

## 2025-08-20 RX ORDER — ESTRADIOL 0.03 MG/D
1 FILM, EXTENDED RELEASE TRANSDERMAL
Qty: 24 PATCH | OUTPATIENT
Start: 2025-08-21

## 2025-08-20 ASSESSMENT — ANXIETY QUESTIONNAIRES
4. TROUBLE RELAXING: MORE THAN HALF THE DAYS
GAD7 TOTAL SCORE: 9
7. FEELING AFRAID AS IF SOMETHING AWFUL MIGHT HAPPEN: SEVERAL DAYS
7. FEELING AFRAID AS IF SOMETHING AWFUL MIGHT HAPPEN: SEVERAL DAYS
3. WORRYING TOO MUCH ABOUT DIFFERENT THINGS: MORE THAN HALF THE DAYS
8. IF YOU CHECKED OFF ANY PROBLEMS, HOW DIFFICULT HAVE THESE MADE IT FOR YOU TO DO YOUR WORK, TAKE CARE OF THINGS AT HOME, OR GET ALONG WITH OTHER PEOPLE?: VERY DIFFICULT
GAD7 TOTAL SCORE: 9
GAD7 TOTAL SCORE: 9
2. NOT BEING ABLE TO STOP OR CONTROL WORRYING: MORE THAN HALF THE DAYS
1. FEELING NERVOUS, ANXIOUS, OR ON EDGE: MORE THAN HALF THE DAYS
5. BEING SO RESTLESS THAT IT IS HARD TO SIT STILL: NOT AT ALL
IF YOU CHECKED OFF ANY PROBLEMS ON THIS QUESTIONNAIRE, HOW DIFFICULT HAVE THESE PROBLEMS MADE IT FOR YOU TO DO YOUR WORK, TAKE CARE OF THINGS AT HOME, OR GET ALONG WITH OTHER PEOPLE: VERY DIFFICULT
6. BECOMING EASILY ANNOYED OR IRRITABLE: NOT AT ALL

## 2025-08-20 ASSESSMENT — PATIENT HEALTH QUESTIONNAIRE - PHQ9
10. IF YOU CHECKED OFF ANY PROBLEMS, HOW DIFFICULT HAVE THESE PROBLEMS MADE IT FOR YOU TO DO YOUR WORK, TAKE CARE OF THINGS AT HOME, OR GET ALONG WITH OTHER PEOPLE: SOMEWHAT DIFFICULT
SUM OF ALL RESPONSES TO PHQ QUESTIONS 1-9: 7
SUM OF ALL RESPONSES TO PHQ QUESTIONS 1-9: 7

## (undated) DEVICE — ENDO TRAP POLYP QUICK CATCH 710201

## (undated) DEVICE — ENDO SNARE EXACTO COLD 9MM LOOP 2.4MMX230CM 00711115

## (undated) RX ORDER — FENTANYL CITRATE 50 UG/ML
INJECTION, SOLUTION INTRAMUSCULAR; INTRAVENOUS
Status: DISPENSED
Start: 2024-06-12

## (undated) RX ORDER — SIMETHICONE 40MG/0.6ML
SUSPENSION, DROPS(FINAL DOSAGE FORM)(ML) ORAL
Status: DISPENSED
Start: 2024-06-12